# Patient Record
Sex: MALE | Race: WHITE | Employment: OTHER | URBAN - METROPOLITAN AREA
[De-identification: names, ages, dates, MRNs, and addresses within clinical notes are randomized per-mention and may not be internally consistent; named-entity substitution may affect disease eponyms.]

---

## 2019-06-03 ENCOUNTER — HOSPITAL ENCOUNTER (OUTPATIENT)
Dept: LAB | Age: 69
Discharge: HOME | End: 2019-06-03

## 2019-12-19 ENCOUNTER — OFFICE VISIT (OUTPATIENT)
Dept: DERMATOLOGY | Facility: AMBULATORY SURGERY CENTER | Age: 69
End: 2019-12-19

## 2019-12-19 VITALS
TEMPERATURE: 97.9 F | HEART RATE: 69 BPM | WEIGHT: 165 LBS | RESPIRATION RATE: 14 BRPM | OXYGEN SATURATION: 99 % | HEIGHT: 69 IN | DIASTOLIC BLOOD PRESSURE: 120 MMHG | BODY MASS INDEX: 24.44 KG/M2 | SYSTOLIC BLOOD PRESSURE: 200 MMHG

## 2019-12-19 DIAGNOSIS — C44.319 BASAL CELL CARCINOMA (BCC) OF CHEEK: ICD-10-CM

## 2019-12-19 DIAGNOSIS — C44.41 BASAL CELL CARCINOMA (BCC) OF LEFT SIDE OF NECK: ICD-10-CM

## 2019-12-19 DIAGNOSIS — C44.319 BASAL CELL CARCINOMA (BCC) OF LEFT FOREHEAD: Primary | ICD-10-CM

## 2019-12-19 RX ORDER — LABETALOL 200 MG/1
TABLET, FILM COATED ORAL 2 TIMES DAILY
COMMUNITY
End: 2020-09-17

## 2019-12-19 RX ORDER — RANITIDINE 150 MG/1
TABLET, FILM COATED ORAL
COMMUNITY
Start: 2019-09-26 | End: 2020-09-17

## 2019-12-19 RX ORDER — ATORVASTATIN CALCIUM 20 MG/1
TABLET, FILM COATED ORAL
COMMUNITY
Start: 2019-11-15 | End: 2020-09-17

## 2019-12-19 RX ORDER — PRAMIPEXOLE DIHYDROCHLORIDE 0.12 MG/1
TABLET ORAL
COMMUNITY
Start: 2019-11-23 | End: 2021-02-18

## 2019-12-19 RX ORDER — IRBESARTAN 150 MG/1
TABLET ORAL
COMMUNITY
Start: 2019-11-19 | End: 2020-09-17

## 2019-12-19 NOTE — PROGRESS NOTES
Óscar Robertson is a 78 y.o. male presents to the office today with the following:  Chief Complaint   Patient presents with    Basal Cell Carcinoma     left neck, left lateral cheek, left forehead mohs       67-year-old  male presented this morning for Mohs surgery to treat several biopsy-proven basal cell carcinomas. The lesions were recently biopsied by Shira Ramon MD and the patient has no additional skin complaints since that time. His blood pressure was noted to be 200/120 after repeated measurements. The patient reported that his blood pressure is frequently this high. He took his blood pressure medications this morning. He reports that his primary care has referred him to a cardiologist who he is planning to see in January for further treatment for his blood pressure. He reports that he has recently had to discontinue his physical therapy due to his blood pressure being extremely high. He denies headaches, dizziness, chest pain, shortness of breath. Physical Exam  HENT:      Head: Normocephalic. Pulmonary:      Effort: Pulmonary effort is normal.   Neurological:      Mental Status: He is alert and oriented to person, place, and time. 1. Basal cell carcinoma (BCC) of left forehead  Although Mohs surgery was planned for this lesion today, the surgery is precluded by the patient's extremely high blood pressure. He was counseled regarding follow-up with his primary care and cardiologist to more aggressively treat his blood pressure. He will be rescheduled for treatment after his visit with his cardiologist.    2. Basal cell carcinoma ([facility]) of left side of neck  Although Mohs surgery was planned for this lesion today, the surgery is precluded by the patient's extremely high blood pressure. He was counseled regarding follow-up with his primary care and cardiologist to more aggressively treat his blood pressure.   He will be rescheduled for treatment after his visit with his cardiologist.    3. Basal cell carcinoma (BCC) of cheek  Although Mohs surgery was planned for this lesion today, the surgery is precluded by the patient's extremely high blood pressure. He was counseled regarding follow-up with his primary care and cardiologist to more aggressively treat his blood pressure. He will be rescheduled for treatment after his visit with his cardiologist.      Follow-up and Dispositions    · Return in about 1 month (around 1/19/2020) for Mohs.          Stef Webster MD

## 2019-12-19 NOTE — PROGRESS NOTES
Visit Vitals  BP (!) 200/120 (BP 1 Location: Left arm, BP Patient Position: Sitting)   Pulse 69   Temp 97.9 °F (36.6 °C) (Oral)   Resp 14   Ht 5' 9\" (1.753 m)   Wt 74.8 kg (165 lb)   SpO2 99%   BMI 24.37 kg/m²       Patient states he feels fine, denies dizziness. States this is a 'normal blood pressure for him'. He has appointment to see Cardiologist 1/3/19 and he will return back to have [place] [id number] surgery 1/29/19 here in our office. Blood pressure was taken 2 times in both arms, blood pressure too high to perform surgery per MD.  Patient verbalized understanding and had no further questioning.      Bishop Soto LPN

## 2020-01-29 ENCOUNTER — OFFICE VISIT (OUTPATIENT)
Dept: DERMATOLOGY | Facility: AMBULATORY SURGERY CENTER | Age: 70
End: 2020-01-29

## 2020-01-29 VITALS
BODY MASS INDEX: 24.44 KG/M2 | SYSTOLIC BLOOD PRESSURE: 156 MMHG | OXYGEN SATURATION: 95 % | DIASTOLIC BLOOD PRESSURE: 86 MMHG | HEIGHT: 69 IN | RESPIRATION RATE: 14 BRPM | WEIGHT: 165 LBS | HEART RATE: 68 BPM

## 2020-01-29 DIAGNOSIS — C44.319 BASAL CELL CARCINOMA (BCC) OF CHEEK: ICD-10-CM

## 2020-01-29 DIAGNOSIS — C44.41 BASAL CELL CARCINOMA (BCC) OF LEFT SIDE OF NECK: Primary | ICD-10-CM

## 2020-01-29 RX ORDER — CEPHALEXIN 500 MG/1
500 CAPSULE ORAL 3 TIMES DAILY
Qty: 21 CAP | Refills: 0 | Status: SHIPPED | OUTPATIENT
Start: 2020-01-29 | End: 2020-02-05

## 2020-01-29 NOTE — PATIENT INSTRUCTIONS

## 2020-01-29 NOTE — PROGRESS NOTES
Progress note for Mohs surgery patient:    Chief Complaint:  basal cell carcinoma of the left neck  and basal of the left lateral cheek . HPI:  Colonel Callahan is a 69y.o. year old male referred by  Carola Freeman MD for Mohs surgery to treat the following lesions:  Lesion Info  Location: left neck   Size: 1.3 x 1.0 cm  Type: basal  Duration: 4 months  Path Lab: [facility]  Path #: [id number]  Prior Treatment: none Lesion Info  Location: left lateral cheek   Size: 2.3 x 2.0 cm  Type: basal  Duration: 4 months  Path Lab: [facility]   Path #: [id number]  Prior Treatment: none     Symptoms of the lesions include increase in size. ROS:  Jeffry Webber is feeling well and in their usual state of health today. He is not in pain. He does not have any other skin concerns. Exam:  Jeffry Webber is an awake, alert, oriented, well-appearing male in no distress. There is not preauricular, submandibular, or cervical lymphadenopathy. The face was examined. Findings are:  On the left neck there is a pearly telangiectatic plaque. A/P:    1. basal cell carcinoma of the left neck . The diagnosis was reviewed. The Mohs surgery procedure was reviewed. Indications, risks, and options were discussed with Mr. Jason Chinchilla preoperatively. Risks including, but not limited to: pain, bleeding, infection, tumor recurrence, scarring and damage to motor and/or sensory nerves, were discussed. Mr. Jason Chinchilla chose Mohs surgery. Mr. Jason Chinchilla was an acceptable surgery candidate. I performed Mohs surgery using standard technique after verbal and written consent were obtained. The lesion was identified and confirmed with the patient and photograph, if available. The surgical site was marked with gentian violet, prepped, draped and anesthetized in standard fashion. The tumor was debulked by curettage and orientation hashes were placed. The tumor and any associated scar was excised using beveled incision.   Hemostasis was achieved, the site was bandaged, and the tissue was transported to the Mohs lab. While maintaining anatomic orientation the tissue was divided, if needed, and marked with colored inks that were noted on the corresponding Mohs map. The tissue was prepared by Mohs en-face technique for fresh frozen section analysis. The resulting slides were examined for residual tumor, scar and other concerns, all of which were marked on the corresponding Mohs map, if present. The Mohs map was used to guide subsequent stages of surgery, if necessary, and the above process was repeated until a tumor-free plane was achieved. Once the tumor was cleared the map was marked as such and signed. Dr. Lisandro Russell acted as surgeon and pathologist for the entire case, performing all stages of the surgical excision as well as examination and interpretation of the histologic slides. See table below for details regarding the surgical case. 1 stage(s) were required to reach a tumor-free plane, resulting in a 1.5 x 1.0 cm defect extending to the subcutaneous. There were not complications. The wound management options of second intent healing, layered closure, local flap, and/or full thickness skin graft were discussed. Mr. Adeel Mitchell understands the aims, risks, alternatives, and possible complications and elects to proceed with a complex layered closure. Wound margins were debeveled, standing cones were corrected and the defect was widely undermined in the subcutaneous plane for a distance of 2 cm. The wound was closed with buried 4-0 vicryl suture in the muscle and deep subcutis to reduce width of the wound and a second layer in the dermis to reduce tension on the skin edges with careful attention to edge apposition and eversion for optimal cosmesis. Epidermal edges were carefully approximated with 5-0 ethilon suture, again with careful attention to apposition and eversion. The final closure length was Closure Length: 5.0 cm.   The wound was bandaged with Petrolatum ointment, Telfa, gauze and Coverroll. Wound care instructions (written and/or verbal) and a follow up appointment were given to Mr. Orin Hall before discharge. Mr. Orin Hall was discharged in good condition. 2. basal cell carcinoma of the left lateral cheek . The diagnosis was reviewed. The Mohs surgery procedure was reviewed. Indications, risks, and options were discussed with Mr. Orin Hall preoperatively. Risks including, but not limited to: pain, bleeding, infection, tumor recurrence, scarring and damage to motor and/or sensory nerves, were discussed. Mr. Orin Hall chose Mohs surgery. Mr. Orin Hall was an acceptable surgery candidate. I performed Mohs surgery using standard technique after verbal and written consent were obtained. The lesion was identified and confirmed with the patient and photograph, if available. The surgical site was marked with gentian violet, prepped, draped and anesthetized in standard fashion. The tumor was debulked by curettage and orientation hashes were placed. The tumor and any associated scar was excised using beveled incision. Hemostasis was achieved, the site was bandaged, and the tissue was transported to the Mohs lab. While maintaining anatomic orientation the tissue was divided, if needed, and marked with colored inks that were noted on the corresponding Mohs map. The tissue was prepared by Mohs en-face technique for fresh frozen section analysis. The resulting slides were examined for residual tumor, scar and other concerns, all of which were marked on the corresponding Mohs map, if present. The Mohs map was used to guide subsequent stages of surgery, if necessary, and the above process was repeated until a tumor-free plane was achieved. Once the tumor was cleared the map was marked as such and signed.   Dr. Deshawn Plummer acted as surgeon and pathologist for the entire case, performing all stages of the surgical excision as well as examination and interpretation of the histologic slides. See table below for details regarding the surgical case. 1 stage(s) were required to reach a tumor-free plane, resulting in a 3.0 x 2.1 cm defect extending to the subcutaneous. There were not complications. [place] will follow up as needed in the postoperative period. Regular skin examinations will be with  Arlyn Knight MD.    The wound management options of second intent healing, layered closure, local flap, and/or full thickness skin graft were discussed. Mr. David Mcbride understands the aims, risks, alternatives, and possible complications and elects to proceed with a complex layered closure. Wound margins were debeveled, standing cones were corrected and the defect was widely undermined in the subcutaneous plane for a distance of 2.5 cm. The wound was closed with buried 4-0 vicryl suture in the muscle and deep subcutis to reduce width of the wound and a second layer in the dermis to reduce tension on the skin edges with careful attention to edge apposition and eversion for optimal cosmesis. Epidermal edges were carefully approximated with 5-0 ethilon suture, again with careful attention to apposition and eversion. The final closure length was Closure Length: 5.0 cm. The wound was bandaged with Petrolatum ointment, Telfa, gauze and Coverroll. Wound care instructions (written and/or verbal) and a follow up appointment were given to Mr. David Mcbride before discharge. Mr. David Mcbride was discharged in good condition.           left neck   Mohs Lesion Operative Report  Date: 01/29/20  Room: Procedure room 3  Indications: Site  Pre-op BP: 168/90  Pre-op pulse: 66  1st Assistant: Ernie Steiner LPN  Stage #: 1  Stage 1 Sections: 1  Stage 1 # Pos: 0  Perineural Involvement: No  Lymphadenopathy: No  Defect Size: 1.5 x 1.0 cm  Depth: subcutaneous  Wound Mgt: complex  Suture: Buried, Surface  Buried details: 4.0 vicryl  Surface Details: 5.0 ethilon  Undermining: SubQ  Closure Length: 4.4 cm  Estimated Blood Loss: 0.3 mL  Hemostasis: Electrosurgery  Anesthesia: 1% Lidocaine w/1:100,000 epi  1% Lidocaine: 9 cc  Complications: none  Dressing: pressure, telfa, vaseline  Post-op BP: 156/86  Post-op Pulse: 68  Pos-op Meds: Keflex 500 mg TID q7 days  W/C Instructions: Written, Verbal  Follow-up: follow up in 10 days for suture removal  left lateral cheek   Mohs Lesion Operative Report  Date: 01/29/20  Room: procedure room 3  Indications: Site  Pre-op BP: 168/90  Pre-op pulse: 66  1st Assistant: Dwain Powers LPN  Stage #: 1  Stage 1 Sections: 1  Stage 1 # Pos: 0  Perineural Involvement: No  Lymphadenopathy: No  Defect Size: 3.0 x 2.1 cm  Depth: subcutaneous  Wound Mgt: complex  Suture: Buried, Surface  Buried details: 4.0 vicryl  Surface Details: 5.0 ethilon  Undermining: SubQ  Closure Length: 5.0 cm  Estimated Blood Loss: 0.3 mL  Hemostasis: Electrosurgery  Anesthesia: 1% Lidocaine w/1:100,000 epi  1% Lidocaine: 9 cc  Complications: none  Dressing: pressure, telfa, vaseline  Post-op BP: 156/86  Post-op Pulse: 68  Pos-op Meds: Keflex 500 mg TID q7 days  W/C Instructions: Written, Verbal  Follow-up: follow up in 10 days for suture removal      [facility]   OFFICE PROCEDURE PROGRESS NOTE   Chart reviewed for the following:   INorberto MD have reviewed the History, Physical and updated the Allergic reactions for Ledora Cola. TIME OUT performed immediately prior to start of procedure:   Alondra Alicea MD, have performed the following reviews on Ledora Cola   prior to the start of the procedure:     * Patient was identified by name and date of birth   * Agreement on procedure being performed was verified   * Risks and Benefits explained to the patient   * Procedure site verified and marked as necessary   * Patient was positioned for comfort   * Consent was signed and verified     Time: 9 AM  Date of procedure: 1/29/2020  Procedure performed by:  Manual Kidney Mavis Comer MD  Provider assisted by: Tsering Puri LPN  Patient assisted by: self   How tolerated by patient: tolerated the procedure well with no complications   Comments: none

## 2020-01-29 NOTE — LETTER
1/29/2020 2:52 PM 
 
Patient:  Lizbeth Barrios YOB: 1950 Date of Visit: 1/29/2020 Dear Silvestre Bush MD 
[address] [place] [id number] VIA Facsimile: [phone] Thank you for referring Lizbeth Barrios to me for evaluation/treatment. Below are the relevant portions of my assessment and plan of care. Mr. Jhonatan Hill presented today for Mohs surgery to treat a biopsy-proven basal cell carcinoma of the left lateral cheek and a biopsy-proven basal cell carcinoma of the left neck. 1 stage(s) of Mohs surgery were required to achieve tumor free margins at each site. I repaired each defect primarily. He tolerated the procedure well. Please see the attached procedure note(s) for additional details. Mr. Jhonatan Hill will return to me for suture removal and/or wound checks at an appropriate interval and I will follow-up with him regarding any issues arising from or relating to this surgery. I will otherwise defer any additional dermatologic care back to you. If you have questions, please do not hesitate to call me. I look forward to following Mr. Jhonatan Hill along with you. Sincerely, Sharad Linares MD 
[phone] (cell)

## 2020-02-07 ENCOUNTER — OFFICE VISIT (OUTPATIENT)
Dept: DERMATOLOGY | Facility: AMBULATORY SURGERY CENTER | Age: 70
End: 2020-02-07

## 2020-02-07 DIAGNOSIS — C44.319 BASAL CELL CARCINOMA (BCC) OF CHEEK: ICD-10-CM

## 2020-02-07 DIAGNOSIS — C44.41 BASAL CELL CARCINOMA (BCC) OF LEFT SIDE OF NECK: Primary | ICD-10-CM

## 2020-02-07 NOTE — PROGRESS NOTES
Wound check/suture removal:    Chief complaint: wound check. HPI: Agnes Fajardo presents for wound check following Mohs surgery to treat a biopsy-proven basal cell carcinoma of the left infra-auricular cheek and the left neck each repaired primarily performed about 10 days ago. Exam: The surgical site was examined. There is not evidence of infection. There is erythema. There is not edema. A/P:  Wound check. The surgical site is healing well. Additional care was reviewed including liberal application of Vaseline several times daily and gentle scar massage starting at 3 weeks postop. Follow up will be in 2 weeks for Mohs surgery on the left forehead.

## 2020-02-27 ENCOUNTER — OFFICE VISIT (OUTPATIENT)
Dept: DERMATOLOGY | Facility: AMBULATORY SURGERY CENTER | Age: 70
End: 2020-02-27

## 2020-02-27 VITALS
WEIGHT: 165 LBS | OXYGEN SATURATION: 99 % | BODY MASS INDEX: 24.44 KG/M2 | DIASTOLIC BLOOD PRESSURE: 94 MMHG | TEMPERATURE: 98.2 F | SYSTOLIC BLOOD PRESSURE: 162 MMHG | HEART RATE: 63 BPM | HEIGHT: 69 IN

## 2020-02-27 DIAGNOSIS — C44.319 BASAL CELL CARCINOMA (BCC) OF LEFT FOREHEAD: Primary | ICD-10-CM

## 2020-02-27 NOTE — PATIENT INSTRUCTIONS

## 2020-02-27 NOTE — PROGRESS NOTES
Progress note for Mohs surgery patient:    Chief Complaint:  basal cell carcinoma of the left forehead    HPI:  Bethany Snow is a 69y.o. year old male referred by  Nataliia Sosa MD for Mohs surgery to treat the following lesion:  Lesion Info  Location: left forehead  Size: 1.2 cm x 0.7 cm  Type: basal  Duration: months  Path Lab: [facility] [employer] [employer]  Path #: [id number]  Prior Treatment: none     Symptoms of the lesion include none. ROS:  Iliana Hein is feeling well and in their usual state of health today. He is not in pain. He does not have any other skin concerns. Exam:  Iliana Hein is an awake, alert, oriented, well-appearing male in no distress. There is not preauricular, submandibular, or cervical lymphadenopathy. The face was examined. Findings are:  On the left forehead there is a pearly telangiectatic plaque. A/P:  basal cell carcinoma of the left forehead. The diagnosis was reviewed. The Mohs surgery procedure was reviewed. Indications, risks, and options were discussed with Mr. Poly Cancino preoperatively. Risks including, but not limited to: pain, bleeding, infection, tumor recurrence, scarring and damage to motor and/or sensory nerves, were discussed. Mr. Poly Cancino chose Mohs surgery. Mr. Poly Cancino was an acceptable surgery candidate. I performed Mohs surgery using standard technique after verbal and written consent were obtained. The lesion was identified and confirmed with the patient and photograph, if available. The surgical site was marked with gentian violet, prepped, draped and anesthetized in standard fashion. The tumor was debulked by curettage and orientation hashes were placed. The tumor and any associated scar was excised using beveled incision. Hemostasis was achieved, the site was bandaged, and the tissue was transported to the Mohs lab.   While maintaining anatomic orientation the tissue was divided, if needed, and marked with colored inks that were noted on the corresponding Mohs map.  The tissue was prepared by Mohs en-face technique for fresh frozen section analysis. The resulting slides were examined for residual tumor, scar and other concerns, all of which were marked on the corresponding Mohs map, if present. The Mohs map was used to guide subsequent stages of surgery, if necessary, and the above process was repeated until a tumor-free plane was achieved. Once the tumor was cleared the map was marked as such and signed. Dr. Lisbeth Dumont acted as surgeon and pathologist for the entire case, performing all stages of the surgical excision as well as examination and interpretation of the histologic slides. See table below for details regarding the surgical case. 1 stage(s) were required to reach a tumor-free plane, resulting in a 1.9 cm x 1.0 cm defect extending to the subcutaneous fat. There were not complications. Marion العراقي will follow up as needed in the postoperative period. Regular skin examinations will be with  Wes Guerra MD.    The wound management options of second intent healing, layered closure, local flap, and/or full thickness skin graft were discussed. Mr. Chaparro Adams understands the aims, risks, alternatives, and possible complications and elects to proceed with a complex layered closure. Wound margins were debeveled, standing cones were corrected and the defect was widely undermined in the subcutaneous plane for a distance of 1.5 cm. The wound was closed with buried 4-0 vicryl suture in the muscle and deep subcutis to reduce width of the wound and a second layer in the dermis to reduce tension on the skin edges with careful attention to edge apposition and eversion for optimal cosmesis. Epidermal edges were carefully approximated with 5-0 fast absorbing gut suture, again with careful attention to apposition and eversion. The final closure length was Closure Length: 3.8 cm. The wound was bandaged with Petrolatum ointment, Telfa, gauze and Coverroll.  Wound care instructions (written and/or verbal) and a follow up appointment were given to Mr. Justin Duvall before discharge. Mr. Justin Duvall was discharged in good condition. left forehead  Mohs Lesion Operative Report  Date: 02/27/20  Room: Procedure room 1  Indications: Site, Size, Poor definition  Pre-op Meds: none  Pre-op BP: 156/92  Pre-op pulse: 66  1st Assistant: Papito Chen  Stage #: 1  Stage 1 Sections: 1  Stage 1 # Pos: 0  Perineural Involvement: No  Lymphadenopathy: No  Defect Size: 1.9 cm x 1.0 cm  Depth: subcutaneous fat  Wound Mgt: complex  Suture: Buried, Surface  Buried details: 4.0 vicryl  Surface Details: 5.0 fast gut  Undermining: SubQ  Closure Length: 3.8 cm  Estimated Blood Loss: 2 ml  Hemostasis: Electrosurgery, Pressure, Suture  Anesthesia: 1% Lidocaine w/1:100,000 epi  1% Lidocaine: 5 cc  Complications: none  Dressing: pressure  Post-op BP: 162/94  Post-op Pulse: 63  Pos-op Meds: none  W/C Instructions: Verbal, Written  Follow-up: 3-4 weeks     [facility]   OFFICE PROCEDURE PROGRESS NOTE   Chart reviewed for the following:   IJessica MD have reviewed the History, Physical and updated the Allergic reactions for Nida Donaldson. TIME OUT performed immediately prior to start of procedure:   Virginia Thomas MD, have performed the following reviews on Nida Donaldson   prior to the start of the procedure:     * Patient was identified by name and date of birth   * Agreement on procedure being performed was verified   * Risks and Benefits explained to the patient   * Procedure site verified and marked as necessary   * Patient was positioned for comfort   * Consent was signed and verified     Time: 9 AM  Date of procedure: 2/27/2020  Procedure performed by:  Jessica Pompa MD  Provider assisted by: Papito Chen  Patient assisted by: self   How tolerated by patient: tolerated the procedure well with no complications   Comments: none

## 2020-02-27 NOTE — LETTER
2/27/2020 3:11 PM 
 
Patient:  Yesenia Norton YOB: 1950 Date of Visit: 2/27/2020 Dear Lucy Chapman MD 
[address] [place] [address] [address] [place] [id number] VIA Facsimile: [phone] Thank you for referring Yesenia Norton to me for evaluation/treatment. Below are the relevant portions of my assessment and plan of care. Mr. Ronel Alvarez presented today for Mohs surgery to treat a biopsy-proven basal cell carcinoma of the left forehead. 1 stage(s) of Mohs surgery were required to achieve tumor free margins. I repaired the defect with a(n) primary closure. He tolerated the procedure well. Please see the attached procedure note(s) for additional details. Mr. Ronel Alvarez will return to me for suture removal and/or wound checks at an appropriate interval and I will follow-up with him regarding any issues arising from or relating to this surgery. I will otherwise defer any additional dermatologic care back to you. If you have questions, please do not hesitate to call me. I look forward to following Mr. Ronel Alvarez along with you. Sincerely, Hamzah Smith MD 
[phone] (cell)

## 2020-09-07 ENCOUNTER — APPOINTMENT (OUTPATIENT)
Dept: ULTRASOUND IMAGING | Age: 70
DRG: 064 | End: 2020-09-07
Attending: FAMILY MEDICINE
Payer: MEDICARE

## 2020-09-07 ENCOUNTER — APPOINTMENT (OUTPATIENT)
Dept: GENERAL RADIOLOGY | Age: 70
DRG: 064 | End: 2020-09-07
Attending: EMERGENCY MEDICINE
Payer: MEDICARE

## 2020-09-07 ENCOUNTER — HOSPITAL ENCOUNTER (INPATIENT)
Age: 70
LOS: 10 days | Discharge: NURSING, REHAB OR LONG-TERM CARE | DRG: 064 | End: 2020-09-17
Attending: EMERGENCY MEDICINE | Admitting: FAMILY MEDICINE
Payer: MEDICARE

## 2020-09-07 ENCOUNTER — APPOINTMENT (OUTPATIENT)
Dept: CT IMAGING | Age: 70
DRG: 064 | End: 2020-09-07
Attending: FAMILY MEDICINE
Payer: MEDICARE

## 2020-09-07 ENCOUNTER — APPOINTMENT (OUTPATIENT)
Dept: CT IMAGING | Age: 70
DRG: 064 | End: 2020-09-07
Attending: EMERGENCY MEDICINE
Payer: MEDICARE

## 2020-09-07 DIAGNOSIS — I21.4 NSTEMI (NON-ST ELEVATED MYOCARDIAL INFARCTION) (HCC): Primary | ICD-10-CM

## 2020-09-07 DIAGNOSIS — D64.9 ANEMIA REQUIRING TRANSFUSIONS: ICD-10-CM

## 2020-09-07 DIAGNOSIS — I69.30 CHRONIC ISCHEMIC LEFT MCA STROKE: ICD-10-CM

## 2020-09-07 DIAGNOSIS — E86.0 DEHYDRATION: ICD-10-CM

## 2020-09-07 DIAGNOSIS — I63.232 CEREBROVASCULAR ACCIDENT (CVA) DUE TO STENOSIS OF LEFT CAROTID ARTERY (HCC): ICD-10-CM

## 2020-09-07 DIAGNOSIS — I63.239 CAROTID STENOSIS, SYMPTOMATIC, WITH INFARCTION (HCC): ICD-10-CM

## 2020-09-07 LAB
ALBUMIN SERPL-MCNC: 2.5 G/DL (ref 3.5–5)
ALBUMIN/GLOB SERPL: 0.6 {RATIO} (ref 1.1–2.2)
ALP SERPL-CCNC: 109 U/L (ref 45–117)
ALT SERPL-CCNC: 20 U/L (ref 12–78)
ANION GAP SERPL CALC-SCNC: 11 MMOL/L (ref 5–15)
AST SERPL-CCNC: 45 U/L (ref 15–37)
ATRIAL RATE: 97 BPM
BILIRUB SERPL-MCNC: 0.4 MG/DL (ref 0.2–1)
BUN SERPL-MCNC: 44 MG/DL (ref 6–20)
BUN/CREAT SERPL: 27 (ref 12–20)
CALCIUM SERPL-MCNC: 8.4 MG/DL (ref 8.5–10.1)
CALCULATED P AXIS, ECG09: 29 DEGREES
CALCULATED R AXIS, ECG10: 41 DEGREES
CALCULATED T AXIS, ECG11: 17 DEGREES
CHLORIDE SERPL-SCNC: 108 MMOL/L (ref 97–108)
CK MB CFR SERPL CALC: 1.7 % (ref 0–2.5)
CK MB SERPL-MCNC: 13 NG/ML (ref 5–25)
CK SERPL-CCNC: 747 U/L (ref 39–308)
CO2 SERPL-SCNC: 19 MMOL/L (ref 21–32)
COMMENT, HOLDF: NORMAL
CREAT SERPL-MCNC: 1.61 MG/DL (ref 0.7–1.3)
DIAGNOSIS, 93000: NORMAL
GLOBULIN SER CALC-MCNC: 4.3 G/DL (ref 2–4)
GLUCOSE BLD STRIP.AUTO-MCNC: 100 MG/DL (ref 65–100)
GLUCOSE BLD STRIP.AUTO-MCNC: 110 MG/DL (ref 65–100)
GLUCOSE SERPL-MCNC: 107 MG/DL (ref 65–100)
HEMOCCULT STL QL: NEGATIVE
INR PPP: 1.4 (ref 0.9–1.1)
LACTATE SERPL-SCNC: 1.2 MMOL/L (ref 0.4–2)
MAGNESIUM SERPL-MCNC: 2.6 MG/DL (ref 1.6–2.4)
P-R INTERVAL, ECG05: 106 MS
PHOSPHATE SERPL-MCNC: 3.5 MG/DL (ref 2.6–4.7)
POTASSIUM SERPL-SCNC: 4 MMOL/L (ref 3.5–5.1)
PROT SERPL-MCNC: 6.8 G/DL (ref 6.4–8.2)
PROTHROMBIN TIME: 14.4 SEC (ref 9–11.1)
Q-T INTERVAL, ECG07: 388 MS
QRS DURATION, ECG06: 84 MS
QTC CALCULATION (BEZET), ECG08: 492 MS
SAMPLES BEING HELD,HOLD: NORMAL
SERVICE CMNT-IMP: ABNORMAL
SERVICE CMNT-IMP: NORMAL
SODIUM SERPL-SCNC: 138 MMOL/L (ref 136–145)
TROPONIN I SERPL-MCNC: 8.33 NG/ML
VENTRICULAR RATE, ECG03: 97 BPM

## 2020-09-07 PROCEDURE — 83735 ASSAY OF MAGNESIUM: CPT

## 2020-09-07 PROCEDURE — 85025 COMPLETE CBC W/AUTO DIFF WBC: CPT

## 2020-09-07 PROCEDURE — 82550 ASSAY OF CK (CPK): CPT

## 2020-09-07 PROCEDURE — 84484 ASSAY OF TROPONIN QUANT: CPT

## 2020-09-07 PROCEDURE — 80053 COMPREHEN METABOLIC PANEL: CPT

## 2020-09-07 PROCEDURE — 93005 ELECTROCARDIOGRAM TRACING: CPT

## 2020-09-07 PROCEDURE — 82553 CREATINE MB FRACTION: CPT

## 2020-09-07 PROCEDURE — 30233N1 TRANSFUSION OF NONAUTOLOGOUS RED BLOOD CELLS INTO PERIPHERAL VEIN, PERCUTANEOUS APPROACH: ICD-10-PCS | Performed by: FAMILY MEDICINE

## 2020-09-07 PROCEDURE — 84100 ASSAY OF PHOSPHORUS: CPT

## 2020-09-07 PROCEDURE — 82962 GLUCOSE BLOOD TEST: CPT

## 2020-09-07 PROCEDURE — 86900 BLOOD TYPING SEROLOGIC ABO: CPT

## 2020-09-07 PROCEDURE — 70450 CT HEAD/BRAIN W/O DYE: CPT

## 2020-09-07 PROCEDURE — C9113 INJ PANTOPRAZOLE SODIUM, VIA: HCPCS | Performed by: FAMILY MEDICINE

## 2020-09-07 PROCEDURE — 86923 COMPATIBILITY TEST ELECTRIC: CPT

## 2020-09-07 PROCEDURE — P9016 RBC LEUKOCYTES REDUCED: HCPCS

## 2020-09-07 PROCEDURE — 87040 BLOOD CULTURE FOR BACTERIA: CPT

## 2020-09-07 PROCEDURE — 65660000000 HC RM CCU STEPDOWN

## 2020-09-07 PROCEDURE — 76870 US EXAM SCROTUM: CPT

## 2020-09-07 PROCEDURE — 83605 ASSAY OF LACTIC ACID: CPT

## 2020-09-07 PROCEDURE — 36430 TRANSFUSION BLD/BLD COMPNT: CPT

## 2020-09-07 PROCEDURE — 74011250637 HC RX REV CODE- 250/637: Performed by: FAMILY MEDICINE

## 2020-09-07 PROCEDURE — 82272 OCCULT BLD FECES 1-3 TESTS: CPT

## 2020-09-07 PROCEDURE — 99223 1ST HOSP IP/OBS HIGH 75: CPT | Performed by: INTERNAL MEDICINE

## 2020-09-07 PROCEDURE — 87635 SARS-COV-2 COVID-19 AMP PRB: CPT

## 2020-09-07 PROCEDURE — 85610 PROTHROMBIN TIME: CPT

## 2020-09-07 PROCEDURE — 74011250636 HC RX REV CODE- 250/636: Performed by: FAMILY MEDICINE

## 2020-09-07 PROCEDURE — 74011250636 HC RX REV CODE- 250/636: Performed by: EMERGENCY MEDICINE

## 2020-09-07 PROCEDURE — 36415 COLL VENOUS BLD VENIPUNCTURE: CPT

## 2020-09-07 PROCEDURE — 99285 EMERGENCY DEPT VISIT HI MDM: CPT

## 2020-09-07 PROCEDURE — 74176 CT ABD & PELVIS W/O CONTRAST: CPT

## 2020-09-07 PROCEDURE — 71045 X-RAY EXAM CHEST 1 VIEW: CPT

## 2020-09-07 PROCEDURE — 74011000250 HC RX REV CODE- 250: Performed by: FAMILY MEDICINE

## 2020-09-07 RX ORDER — SODIUM CHLORIDE 0.9 % (FLUSH) 0.9 %
5-40 SYRINGE (ML) INJECTION AS NEEDED
Status: DISCONTINUED | OUTPATIENT
Start: 2020-09-07 | End: 2020-09-17 | Stop reason: HOSPADM

## 2020-09-07 RX ORDER — SODIUM CHLORIDE 9 MG/ML
250 INJECTION, SOLUTION INTRAVENOUS AS NEEDED
Status: DISCONTINUED | OUTPATIENT
Start: 2020-09-07 | End: 2020-09-17 | Stop reason: HOSPADM

## 2020-09-07 RX ORDER — ONDANSETRON 2 MG/ML
4 INJECTION INTRAMUSCULAR; INTRAVENOUS
Status: DISCONTINUED | OUTPATIENT
Start: 2020-09-07 | End: 2020-09-17 | Stop reason: HOSPADM

## 2020-09-07 RX ORDER — SODIUM CHLORIDE 9 MG/ML
75 INJECTION, SOLUTION INTRAVENOUS CONTINUOUS
Status: DISCONTINUED | OUTPATIENT
Start: 2020-09-07 | End: 2020-09-08

## 2020-09-07 RX ORDER — ASPIRIN 300 MG/1
75 SUPPOSITORY RECTAL ONCE
Status: COMPLETED | OUTPATIENT
Start: 2020-09-07 | End: 2020-09-07

## 2020-09-07 RX ORDER — SODIUM CHLORIDE 0.9 % (FLUSH) 0.9 %
5-40 SYRINGE (ML) INJECTION EVERY 8 HOURS
Status: DISCONTINUED | OUTPATIENT
Start: 2020-09-07 | End: 2020-09-17 | Stop reason: HOSPADM

## 2020-09-07 RX ADMIN — ASPIRIN 75 MG: 300 SUPPOSITORY RECTAL at 19:44

## 2020-09-07 RX ADMIN — SODIUM CHLORIDE 75 ML/HR: 9 INJECTION, SOLUTION INTRAVENOUS at 19:48

## 2020-09-07 RX ADMIN — SODIUM CHLORIDE 40 MG: 9 INJECTION INTRAMUSCULAR; INTRAVENOUS; SUBCUTANEOUS at 20:28

## 2020-09-07 RX ADMIN — Medication 10 ML: at 17:00

## 2020-09-07 RX ADMIN — Medication 10 ML: at 20:31

## 2020-09-07 RX ADMIN — SODIUM CHLORIDE 1000 ML: 9 INJECTION, SOLUTION INTRAVENOUS at 13:39

## 2020-09-07 RX ADMIN — Medication 10 ML: at 21:12

## 2020-09-07 NOTE — CONSULTS
[address] [place] [address] Consultation Note     Subjective:      Yuriy Ramires is a 69 y.o. patient who is seen for evaluation of elevated troponin 8.3. The patient has severe anemia with hemoglobin of 5.2. He had a previous history of stroke, about 15 to 18 months ago. He cannot tell me at which hospital he was treated. The patient has left side weakness and dysarthria  He lives by himself and fell week 2 days ago, he said he fell down but could not get up  The head CT did not show acute bleeding but there was evidence of lacunar infarct  EKG initially showed normal sinus rhythm without acute ST-T wave changes. The repeat EKG showed mild ST depression in V3 and V4 leads. He is in sinus tachycardia  He is consenting for blood transfusion and getting IV fluid  Creatinine is 1.6, albumin 2.5, CK-MB 13  He is not  and does not have children or relatives  He does not know his meds  He said he does not take NSAIDS for pain   His PCP saw him last time 6 months ago   He got transportation to come there    Patient Active Problem List   Diagnosis Code    NSTEMI (non-ST elevated myocardial infarction) ([facility] [address] I21.4     Current Facility-Administered Medications   Medication Dose Route Frequency Provider Last Rate Last Dose    0.9% sodium chloride infusion 250 mL  250 mL IntraVENous PRN Yong MD Vicente       Per ER:   Current Outpatient Medications   Medication Sig Dispense Refill    labetalol (NORMODYNE) 200 mg tablet Take  by mouth two (2) times a day. 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   No family hx of CAD MI  Social History     Tobacco Use    Smoking status: Current Every Day Smoker    Smokeless tobacco: Never Used   Substance Use Topics    Alcohol use: Yes        Review of Systems:   Constitutional: Negative for fever, chills, weight loss, + malaise/fatigue. HEENT: Negative for nosebleeds, + vision changes. Respiratory: Negative for cough, hemoptysis  Cardiovascular: Negative for chest pain, palpitations, orthopnea, claudication, leg swelling, syncope, and PND. Gastrointestinal: Negative for nausea, vomiting, diarrhea, blood in stool and melena. Genitourinary: Negative for dysuria, and hematuria. Musculoskeletal: Negative for myalgias, arthralgia. Skin: Negative for rash. Heme: Does not bleed or bruise easily. Neurological: Negative for speech change and focal weakness  Psych no depression      Objective:     Visit Vitals  /73 (BP 1 Location: Left arm, BP Patient Position: At rest)   Pulse 99   Temp 97.4 °F (36.3 °C)   Resp 16   Wt 151 lb 0.2 oz (68.5 kg)   SpO2 100%   BMI 22.30 kg/m²      Physical Exam:   Constitutional: well-developed and well-nourished. No respiratory distress. Head: Normocephalic and atraumatic. Eyes: Pupils are equal, round  ENT: hearing normal  Neck: supple. No JVD present. Cardiovascular: fast rate, regular rhythm. Exam reveals no gallop and no friction rub. No murmur heard. Pulmonary/Chest: Effort normal and breath sounds normal. No wheezes. Abdominal: Soft, no tenderness. Musculoskeletal: no edema. Neurological: alert,oriented. Skin: Skin is warm and dry, pale looking  Psychiatric: normal mood and affect.  Behavior is normal. Judgment and thought content normal.        Assessment/Plan:   Non ST elevation myocardial infarction   Severe anemia of unknown origin  Acute renal failure  History of stroke and dysarthria    At this time I agree with IV fluid, blood transfusion  He is not a candidate for cardiac catheterization at this time  His myocardial infarction was probably related to severe anemia although he may have underlying coronary artery disease  We will get a 2D echocardiogram to evaluate wall motion abnormality and left ventricular ejection fraction  Gi consult, probably slow chronic UGI bleeding    Thank you for involving me in this patient's care and please call with further concerns or questions. Antonio Powers M.D.   Electrophysiology/Cardiology  [facility] [place]  [address] [place] [address]                                [phone]

## 2020-09-07 NOTE — ED NOTES
Verbal shift change report given to Kristian AREVALO RN  (oncoming nurse) by Toy Barkley  (offgoing nurse). Report included the following information SBAR, ED Summary, MAR and Recent Results.

## 2020-09-07 NOTE — H&P
History & Physical    Primary Care Provider: Other, MD Ryan  Source of Information: Patient     History of Presenting Illness:   Irais Reyna is a 69 y.o. male who presents with fatigue and dehydration    Patient is dysarthric and is a very poor historian, not much history could be obtained from the patient. I tried calling the patient's next to kin Malena George, as listed on the chart but did not get a response. Thus history was extremely limited, patient apparently lives in assisted living facility by himself, was noted to be fatigued and dehydrated, has not been eating drinking much, has not been taking his medication, and thus was sent to the hospital for further management and evaluation. Apparently patient has left-sided weakness after his stroke currently has some slurred speech, unclear whether this is new or old. No further history can be obtained from the patient. Patient came to the ER, was found to have a hemoglobin of 5.2 and was requested to be admitted to the hospitalist service. Review of Systems:  Review of systems not obtained due to patient factors. 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 No pertinent surgical history. Prior to Admission medications    Medication Sig Start Date End Date Taking? Authorizing Provider   labetalol (NORMODYNE) 200 mg tablet Take  by mouth two (2) times a day. Provider, Historical   atorvastatin (LIPITOR) 20 mg tablet  11/15/19   Provider, Historical   raNITIdine (ZANTAC) 150 mg tablet  9/26/19   Provider, Historical   pramipexole (MIRAPEX) 0.125 mg tablet  11/23/19   Provider, Historical   irbesartan (AVAPRO) 150 mg tablet  11/19/19   Provider, Historical     No Known Allergies   History reviewed. No pertinent family history.      SOCIAL HISTORY:  Patient resides:  Independently    Assisted Living x   SNF    With family care       Smoking history:   None    Former x   Chronic      Alcohol history:   None    Social x   Chronic      Ambulates:   Independently    w/cane x   w/walker    w/wc    CODE STATUS:  DNR    Full x   Other      Objective:     Physical Exam:     Visit Vitals  /73 (BP 1 Location: Left arm, BP Patient Position: At rest)   Pulse 99   Temp 97.4 °F (36.3 °C)   Resp 16   Wt 68.5 kg (151 lb 0.2 oz)   SpO2 100%   BMI 22.30 kg/m²      O2 Device: Room air    General : alert x 0, resting in bed, disheveled male, does not provide much history  HEENT: PEERL dry mucus membrane, TM clear  Neck: supple, no JVD, no meningeal signs  Chest: Decreased basal breath sounds  CVS:  tachycardic  Abd: soft/ Non tender, non distended, BS physiological,   Ext: no clubbing, no cyanosis, no edema, brisk 2+ DP pulses  Neuro/Psych: Patient with somewhat slurred speech, very limited neuro exam as patient not participating, DTR 1+ to 4, moves all 4, but strength could not be tested, cranial nerves could not be tested and sensory could not be tested  Skin: warm      EKG: Sinus rhythm, nonspecific ST changes  With ST depression in anterolateral leads on repeat EKG    Data Review:     Recent Days:  Recent Labs     09/07/20  1258   WBC 9.3   HGB 5.2*   HCT 17.8*   *     Recent Labs     09/07/20  1258      K 4.0      CO2 19*   *   BUN 44*   CREA 1.61*   CA 8.4*   MG 2.6*   ALB 2.5*   ALT 20     No results for input(s): PH, PCO2, PO2, HCO3, FIO2 in the last 72 hours.     24 Hour Results:  Recent Results (from the past 24 hour(s))   CBC WITH AUTOMATED DIFF    Collection Time: 09/07/20 12:58 PM   Result Value Ref Range    WBC 9.3 4.1 - 11.1 K/uL    RBC 1.97 (L) 4.10 - 5.70 M/uL    HGB 5.2 (LL) 12.1 - 17.0 g/dL    HCT 17.8 (LL) 36.6 - 50.3 %    MCV 90.4 80.0 - 99.0 FL    MCH 26.4 26.0 - 34.0 PG    MCHC 29.2 (L) 30.0 - 36.5 g/dL    RDW 15.6 (H) 11.5 - 14.5 % PLATELET 220 (H) 070 - 400 K/uL    MPV 11.2 8.9 - 12.9 FL    NRBC 0.0 0  WBC    ABSOLUTE NRBC 0.00 0.00 - 0.01 K/uL    NEUTROPHILS 85 (H) 32 - 75 %    LYMPHOCYTES 8 (L) 12 - 49 %    MONOCYTES 6 5 - 13 %    EOSINOPHILS 0 0 - 7 %    BASOPHILS 0 0 - 1 %    IMMATURE GRANULOCYTES 1 (H) 0.0 - 0.5 %    ABS. NEUTROPHILS 7.9 1.8 - 8.0 K/UL    ABS. LYMPHOCYTES 0.7 (L) 0.8 - 3.5 K/UL    ABS. MONOCYTES 0.6 0.0 - 1.0 K/UL    ABS. EOSINOPHILS 0.0 0.0 - 0.4 K/UL    ABS. BASOPHILS 0.0 0.0 - 0.1 K/UL    ABS. IMM. GRANS. 0.1 (H) 0.00 - 0.04 K/UL    DF SMEAR SCANNED      PLATELET COMMENTS Large Platelets      RBC COMMENTS ANISOCYTOSIS  1+        RBC COMMENTS HYPOCHROMIA  1+        RBC COMMENTS Pathology Review Requested     METABOLIC PANEL, COMPREHENSIVE    Collection Time: 09/07/20 12:58 PM   Result Value Ref Range    Sodium 138 136 - 145 mmol/L    Potassium 4.0 3.5 - 5.1 mmol/L    Chloride 108 97 - 108 mmol/L    CO2 19 (L) 21 - 32 mmol/L    Anion gap 11 5 - 15 mmol/L    Glucose 107 (H) 65 - 100 mg/dL    BUN 44 (H) 6 - 20 MG/DL    Creatinine 1.61 (H) 0.70 - 1.30 MG/DL    BUN/Creatinine ratio 27 (H) 12 - 20      GFR est AA 52 (L) >60 ml/min/1.73m2    GFR est non-AA 43 (L) >60 ml/min/1.73m2    Calcium 8.4 (L) 8.5 - 10.1 MG/DL    Bilirubin, total 0.4 0.2 - 1.0 MG/DL    ALT (SGPT) 20 12 - 78 U/L    AST (SGOT) 45 (H) 15 - 37 U/L    Alk.  phosphatase 109 45 - 117 U/L    Protein, total 6.8 6.4 - 8.2 g/dL    Albumin 2.5 (L) 3.5 - 5.0 g/dL    Globulin 4.3 (H) 2.0 - 4.0 g/dL    A-G Ratio 0.6 (L) 1.1 - 2.2     TROPONIN I    Collection Time: 09/07/20 12:58 PM   Result Value Ref Range    Troponin-I, Qt. 8.33 (H) <0.05 ng/mL   LACTIC ACID    Collection Time: 09/07/20 12:58 PM   Result Value Ref Range    Lactic acid 1.2 0.4 - 2.0 MMOL/L   MAGNESIUM    Collection Time: 09/07/20 12:58 PM   Result Value Ref Range    Magnesium 2.6 (H) 1.6 - 2.4 mg/dL   CK W/ REFLX CKMB    Collection Time: 09/07/20 12:58 PM   Result Value Ref Range     (H) 39 - 308 U/L   CK-MB,QUANT. Collection Time: 09/07/20 12:58 PM   Result Value Ref Range    CK - MB 13.0 (H) <3.6 NG/ML    CK-MB Index 1.7 0.0 - 2.5     SAMPLES BEING HELD    Collection Time: 09/07/20 12:59 PM   Result Value Ref Range    SAMPLES BEING HELD 1RED,1BLU     COMMENT        Add-on orders for these samples will be processed based on acceptable specimen integrity and analyte stability, which may vary by analyte. GLUCOSE, POC    Collection Time: 09/07/20  1:29 PM   Result Value Ref Range    Glucose (POC) 100 65 - 100 mg/dL    Performed by Doreen CATALAN    EKG, 12 LEAD, INITIAL    Collection Time: 09/07/20  1:34 PM   Result Value Ref Range    Ventricular Rate 97 BPM    Atrial Rate 97 BPM    P-R Interval 106 ms    QRS Duration 84 ms    Q-T Interval 388 ms    QTC Calculation (Bezet) 492 ms    Calculated P Axis 29 degrees    Calculated R Axis 41 degrees    Calculated T Axis 17 degrees    Diagnosis       Sinus rhythm with short IN  No previous ECGs available     TYPE + CROSSMATCH    Collection Time: 09/07/20  1:56 PM   Result Value Ref Range    Crossmatch Expiration 09/10/2020     ABO/Rh(D) A POSITIVE     Antibody screen NEG          Imaging:   Ct Head Wo Cont    Result Date: 9/7/2020  IMPRESSION: Asymmetric white matter disease and lacunar infarcts. No acute process identified by noncontrast CT.      Xr Chest Port    Result Date: 9/7/2020  IMPRESSION: Mild pulmonary edema    Assessment:     Acute anemia: Unclear etiology, patient will be admitted to a telemetry bed, transfuse 1 unit PRBC, stool occult pending, concern for GI source, Protonix twice daily, n.p.o., IV hydration, H&H every 8 hours, GI consult, iron panel, supportive care and close monitoring further intervention per hospital course    NSTEMI:?  Type II, monitor on telemetry, echocardiogram, cycle troponins, will give patient 1 dose of aspirin 75 mg rectally, closely monitor for any acute signs of bleeding, closely monitor H&H, monitor and reassess as needed, cardiology consult has been requested, monitor      Elevated creatinine: Concern for prerenal azotemia secondary to hypovolemia, gentle IV hydration, transfuse PRBC, trend creatinine, avoid nephrotoxic medication, renally dose all other medication, monitor and reassess as needed      Acute metabolic encephalopathy: Unclear etiology, unclear baseline, MRI brain to rule out acute pathology, neurovascular checks, UA, treat underlying causes, supportive care, close monitoring, if persist may consider further intervention and diagnostics    GI DVT prophylaxis: Patient will be on SCDs               Signed By: Marissa Patel MD     September 7, 2020

## 2020-09-07 NOTE — PROGRESS NOTES
Requested Dr Yudi Liu to perform a hemoccult  Will await hemoccult results prior to transfer to floor

## 2020-09-07 NOTE — ED PROVIDER NOTES
74-year-old male with a history of stroke that presents with a chief complaint of fatigue dehydration. The patient arrives by EMS from an assisted living facility. The patient reportedly lives alone. He has reportedly not been eating and drinking much over the last couple of days. He has not been taking his medications. He is reported to have left-sided weakness after his stroke. It is not clear if he has had slurred speech in the past.  He does not provide additional history or review of systems due to his dysarthria. 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 No pertinent surgical history. History reviewed. No pertinent family history. Social History     Socioeconomic History    Marital status:      Spouse name: Not on file    Number of children: Not on file    Years of education: Not on file    Highest education level: Not on file   Occupational History    Not on file   Social Needs    Financial resource strain: Not on file    Food insecurity     Worry: Not on file     Inability: Not on file    Transportation needs     Medical: Not on file     Non-medical: Not on file   Tobacco Use    Smoking status: Current Every Day Smoker    Smokeless tobacco: Never Used   Substance and Sexual Activity    Alcohol use:  Yes    Drug use: Not on file    Sexual activity: Not on file   Lifestyle    Physical activity     Days per week: Not on file     Minutes per session: Not on file    Stress: Not on file   Relationships    Social connections     Talks on phone: Not on file     Gets together: Not on file     Attends Buddhist service: Not on file     Active member of club or organization: Not on file     Attends meetings of clubs or organizations: Not on file     Relationship status: Not on file    Intimate partner violence     Fear of current or ex partner: Not on file     Emotionally abused: Not on file     Physically abused: Not on file     Forced sexual activity: Not on file   Other Topics Concern    Not on file   Social History Narrative    Not on file         ALLERGIES: Patient has no known allergies. Review of Systems   Unable to perform ROS: Other   Dysarthric    Vitals:    09/07/20 1249   BP: 134/73   Pulse: 99   Resp: 16   Temp: 97.4 °F (36.3 °C)   SpO2: 100%            Physical Exam  Vitals signs and nursing note reviewed. Constitutional:       General: He is not in acute distress. Appearance: He is ill-appearing. He is not toxic-appearing or diaphoretic. Comments: Pale appearing   HENT:      Head: Normocephalic. Mouth/Throat:      Mouth: Mucous membranes are dry. Eyes:      Extraocular Movements: Extraocular movements intact. Neck:      Musculoskeletal: Normal range of motion. Cardiovascular:      Rate and Rhythm: Normal rate. Pulses: Normal pulses. Heart sounds: Normal heart sounds. No murmur. No friction rub. No gallop. Pulmonary:      Effort: Pulmonary effort is normal. No respiratory distress. Breath sounds: Normal breath sounds. No stridor. No wheezing, rhonchi or rales. Abdominal:      General: Abdomen is flat. Bowel sounds are normal. There is no distension. Palpations: Abdomen is soft. Tenderness: There is no abdominal tenderness. Musculoskeletal: Normal range of motion. Skin:     General: Skin is warm and dry. Capillary Refill: Capillary refill takes less than 2 seconds. Neurological:      Mental Status: He is alert and oriented to person, place, and time. Comments: Right upper and right lower extremity drift.   Severe dysarthria   Psychiatric:         Mood and Affect: Mood normal.          MDM  Number of Diagnoses or Management Options  Anemia requiring transfusions:   Dehydration:   NSTEMI (non-ST elevated myocardial infarction) [facility]):   Diagnosis management comments: EKG shows a sinus rhythm at a rate of 97, normal intervals, normal axis, no evidence of active ischemia. Patient seen evaluated by myself. He presents with slurred speech and poor oral intake. He appears clinically dehydrated and pale. Laboratory studies were obtained and show significant anemia. He was consented for transfusion and agreed. He will be transfused 2 units of PRBCs. His troponin is significantly elevated to greater than 8. His EKG shows no active ischemic changes; however, I spoke with cardiology who evaluated the patient in the emergency department. The patient was not heparinized given his significant anemia. He also did not receive aspirin. CT of the head is unremarkable. I do not feel that the patient's neurologic symptoms on presentation are present acute stroke and he is obviously not a TPA candidate given the time of onset of his symptoms as well as his anemia requiring transfusion. Perfect Serve Consult for Admission  2:11 PM    ED Room Number: ER12/12  Patient Name and age:  Doris Flores 69 y.o.  male  Working Diagnosis: NSTEMI (non-ST elevated myocardial infarction) ([facility] [address]  (primary encounter diagnosis)  Dehydration  Anemia requiring transfusions    COVID-19 Suspicion:  no  Sepsis present:  no  Reassessment needed: no  Code Status:  Full Code  Readmission: no  Isolation Requirements:  no  Recommended Level of Care:  telemetry  Department:[facility] Adult ED - 21   Other: Cardiology consulted    Repeat EKG shows sinus tachycardia at a rate of 110, long QT, otherwise normal intervals, normal axis, ST segment depressions in V3 and 4.            Procedures

## 2020-09-07 NOTE — ROUTINE PROCESS
TRANSFER - OUT REPORT: 
 
Verbal report given to Arnold Espino RN(name) on Mis Blend  being transferred to [facility]) for routine progression of care Report consisted of patients Situation, Background, Assessment and  
Recommendations(SBAR). Information from the following report(s) SBAR, Kardex, ED Summary, [facility] and Recent Results was reviewed with the receiving nurse. Lines:  
Peripheral IV 09/07/20 Left Antecubital (Active) Site Assessment Clean, dry, & intact 09/07/20 1308 Phlebitis Assessment 0 09/07/20 1308 Infiltration Assessment 0 09/07/20 1308 Dressing Status Clean, dry, & intact 09/07/20 1308 Dressing Type Transparent 09/07/20 1308 Hub Color/Line Status Pink;Patent; Flushed 09/07/20 1308 Action Taken Blood drawn 09/07/20 1308 Peripheral IV 09/07/20 Right Antecubital (Active) Site Assessment Clean, dry, & intact 09/07/20 1336 Phlebitis Assessment 0 09/07/20 1336 Infiltration Assessment 0 09/07/20 1336 Dressing Status Clean, dry, & intact 09/07/20 1336 Dressing Type Transparent 09/07/20 1336 Hub Color/Line Status Green;Flushed;Patent 09/07/20 1336 Action Taken Blood drawn 09/07/20 1336 Opportunity for questions and clarification was provided. Patient transported with: 
 Monitor Registered Nurse

## 2020-09-07 NOTE — ED NOTES
Bedside and Verbal shift change report given to Kristian RN (oncoming nurse) by Nguyễn Blackwell RN (offgoing nurse). Report included the following information SBAR, ED Summary, MAR and Recent Results.

## 2020-09-07 NOTE — ED TRIAGE NOTES
TRIAGE: Pt arrives via EMS from [facility] (independent living) with c/o inability to get out of bed to eat or take medications, incontinent episode, and increased slurred speech d/t dry mouth for 2-3 days. Pt noted to have difficulty expressing himself upon arrival. Hx of stroke with left sided deficits per EMS. Pt noted to have right sided weakness upon arrival with an abrasion to right knee. Pt arrives A&Ox4 with VSS. .

## 2020-09-08 LAB
ABO + RH BLD: NORMAL
ANION GAP SERPL CALC-SCNC: 14 MMOL/L (ref 5–15)
ATRIAL RATE: 110 BPM
BASOPHILS # BLD: 0 K/UL (ref 0–0.1)
BASOPHILS NFR BLD: 0 % (ref 0–1)
BLD PROD TYP BPU: NORMAL
BLD PROD TYP BPU: NORMAL
BLOOD GROUP ANTIBODIES SERPL: NORMAL
BPU ID: NORMAL
BPU ID: NORMAL
BUN SERPL-MCNC: 48 MG/DL (ref 6–20)
BUN/CREAT SERPL: 31 (ref 12–20)
CALCIUM SERPL-MCNC: 7.9 MG/DL (ref 8.5–10.1)
CALCULATED P AXIS, ECG09: -9 DEGREES
CALCULATED R AXIS, ECG10: 40 DEGREES
CALCULATED T AXIS, ECG11: 31 DEGREES
CHLORIDE SERPL-SCNC: 113 MMOL/L (ref 97–108)
CO2 SERPL-SCNC: 13 MMOL/L (ref 21–32)
CREAT SERPL-MCNC: 1.53 MG/DL (ref 0.7–1.3)
CROSSMATCH RESULT,%XM: NORMAL
CROSSMATCH RESULT,%XM: NORMAL
DIAGNOSIS, 93000: NORMAL
DIFFERENTIAL METHOD BLD: ABNORMAL
EOSINOPHIL # BLD: 0 K/UL (ref 0–0.4)
EOSINOPHIL NFR BLD: 0 % (ref 0–7)
ERYTHROCYTE [DISTWIDTH] IN BLOOD BY AUTOMATED COUNT: 15.6 % (ref 11.5–14.5)
GLUCOSE BLD STRIP.AUTO-MCNC: 111 MG/DL (ref 65–100)
GLUCOSE SERPL-MCNC: 108 MG/DL (ref 65–100)
HCT VFR BLD AUTO: 17.8 % (ref 36.6–50.3)
HGB BLD-MCNC: 5.2 G/DL (ref 12.1–17)
HGB BLD-MCNC: 7.5 G/DL (ref 12.1–17)
IMM GRANULOCYTES # BLD AUTO: 0.1 K/UL (ref 0–0.04)
IMM GRANULOCYTES NFR BLD AUTO: 1 % (ref 0–0.5)
LYMPHOCYTES # BLD: 0.7 K/UL (ref 0.8–3.5)
LYMPHOCYTES NFR BLD: 8 % (ref 12–49)
MCH RBC QN AUTO: 26.4 PG (ref 26–34)
MCHC RBC AUTO-ENTMCNC: 29.2 G/DL (ref 30–36.5)
MCV RBC AUTO: 90.4 FL (ref 80–99)
MONOCYTES # BLD: 0.6 K/UL (ref 0–1)
MONOCYTES NFR BLD: 6 % (ref 5–13)
NEUTS SEG # BLD: 7.9 K/UL (ref 1.8–8)
NEUTS SEG NFR BLD: 85 % (ref 32–75)
NRBC # BLD: 0 K/UL (ref 0–0.01)
NRBC BLD-RTO: 0 PER 100 WBC
P-R INTERVAL, ECG05: 86 MS
PLATELET # BLD AUTO: 476 K/UL (ref 150–400)
PLATELET COMMENTS,PCOM: ABNORMAL
PMV BLD AUTO: 11.2 FL (ref 8.9–12.9)
POTASSIUM SERPL-SCNC: 4 MMOL/L (ref 3.5–5.1)
Q-T INTERVAL, ECG07: 372 MS
QRS DURATION, ECG06: 84 MS
QTC CALCULATION (BEZET), ECG08: 503 MS
RBC # BLD AUTO: 1.97 M/UL (ref 4.1–5.7)
RBC MORPH BLD: ABNORMAL
SERVICE CMNT-IMP: ABNORMAL
SODIUM SERPL-SCNC: 140 MMOL/L (ref 136–145)
SPECIMEN EXP DATE BLD: NORMAL
STATUS OF UNIT,%ST: NORMAL
STATUS OF UNIT,%ST: NORMAL
TROPONIN I SERPL-MCNC: 10.4 NG/ML
TROPONIN I SERPL-MCNC: 11.4 NG/ML
TROPONIN I SERPL-MCNC: 12 NG/ML
TROPONIN I SERPL-MCNC: 15.3 NG/ML
UNIT DIVISION, %UDIV: 0
UNIT DIVISION, %UDIV: 0
VENTRICULAR RATE, ECG03: 110 BPM
WBC # BLD AUTO: 9.3 K/UL (ref 4.1–11.1)

## 2020-09-08 PROCEDURE — C9113 INJ PANTOPRAZOLE SODIUM, VIA: HCPCS | Performed by: FAMILY MEDICINE

## 2020-09-08 PROCEDURE — 99233 SBSQ HOSP IP/OBS HIGH 50: CPT | Performed by: SPECIALIST

## 2020-09-08 PROCEDURE — 82962 GLUCOSE BLOOD TEST: CPT

## 2020-09-08 PROCEDURE — 74011000258 HC RX REV CODE- 258: Performed by: HOSPITALIST

## 2020-09-08 PROCEDURE — 74011250637 HC RX REV CODE- 250/637: Performed by: SPECIALIST

## 2020-09-08 PROCEDURE — 74011250636 HC RX REV CODE- 250/636: Performed by: FAMILY MEDICINE

## 2020-09-08 PROCEDURE — 80048 BASIC METABOLIC PNL TOTAL CA: CPT

## 2020-09-08 PROCEDURE — 99222 1ST HOSP IP/OBS MODERATE 55: CPT | Performed by: PSYCHIATRY & NEUROLOGY

## 2020-09-08 PROCEDURE — 93005 ELECTROCARDIOGRAM TRACING: CPT

## 2020-09-08 PROCEDURE — 84484 ASSAY OF TROPONIN QUANT: CPT

## 2020-09-08 PROCEDURE — 74011000250 HC RX REV CODE- 250: Performed by: FAMILY MEDICINE

## 2020-09-08 PROCEDURE — 74011000250 HC RX REV CODE- 250: Performed by: HOSPITALIST

## 2020-09-08 PROCEDURE — 85018 HEMOGLOBIN: CPT

## 2020-09-08 PROCEDURE — 74011250637 HC RX REV CODE- 250/637: Performed by: HOSPITALIST

## 2020-09-08 PROCEDURE — 65660000001 HC RM ICU INTERMED STEPDOWN

## 2020-09-08 PROCEDURE — 36415 COLL VENOUS BLD VENIPUNCTURE: CPT

## 2020-09-08 RX ORDER — PRAMIPEXOLE DIHYDROCHLORIDE 0.12 MG/1
0.12 TABLET ORAL
Status: DISCONTINUED | OUTPATIENT
Start: 2020-09-08 | End: 2020-09-17 | Stop reason: HOSPADM

## 2020-09-08 RX ORDER — METOPROLOL TARTRATE 25 MG/1
12.5 TABLET, FILM COATED ORAL EVERY 12 HOURS
Status: DISCONTINUED | OUTPATIENT
Start: 2020-09-08 | End: 2020-09-09

## 2020-09-08 RX ORDER — ATORVASTATIN CALCIUM 20 MG/1
20 TABLET, FILM COATED ORAL
Status: DISCONTINUED | OUTPATIENT
Start: 2020-09-08 | End: 2020-09-12

## 2020-09-08 RX ADMIN — METOPROLOL TARTRATE 12.5 MG: 25 TABLET, FILM COATED ORAL at 21:06

## 2020-09-08 RX ADMIN — SODIUM CHLORIDE 40 MG: 9 INJECTION INTRAMUSCULAR; INTRAVENOUS; SUBCUTANEOUS at 09:46

## 2020-09-08 RX ADMIN — ATORVASTATIN CALCIUM 20 MG: 20 TABLET, FILM COATED ORAL at 21:07

## 2020-09-08 RX ADMIN — METOPROLOL TARTRATE 12.5 MG: 25 TABLET, FILM COATED ORAL at 12:41

## 2020-09-08 RX ADMIN — Medication 10 ML: at 21:08

## 2020-09-08 RX ADMIN — Medication 10 ML: at 18:30

## 2020-09-08 RX ADMIN — SODIUM BICARBONATE: 84 INJECTION, SOLUTION INTRAVENOUS at 12:48

## 2020-09-08 RX ADMIN — PRAMIPEXOLE DIHYDROCHLORIDE 0.12 MG: 0.25 TABLET ORAL at 21:06

## 2020-09-08 RX ADMIN — Medication 10 ML: at 06:50

## 2020-09-08 RX ADMIN — SODIUM CHLORIDE 40 MG: 9 INJECTION INTRAMUSCULAR; INTRAVENOUS; SUBCUTANEOUS at 21:04

## 2020-09-08 RX ADMIN — SODIUM CHLORIDE 75 ML/HR: 9 INJECTION, SOLUTION INTRAVENOUS at 06:50

## 2020-09-08 NOTE — PROGRESS NOTES
Bedside shift change report given to [address] [place] [address] Colleen (oncoming nurse) by Debora Meza (offgoing nurse). Report included the following information SBAR, Kardex, ED Summary, Intake/Output, Accordion and Recent Results. Problem: Falls - Risk of  Goal: *Absence of Falls  Description: Document Michaelar Du Fall Risk and appropriate interventions in the flowsheet. Outcome: Progressing Towards Goal  Note: Fall Risk Interventions:  Mobility Interventions: Communicate number of staff needed for ambulation/transfer, Patient to call before getting OOB    Mentation Interventions: Door open when patient unattended, Evaluate medications/consider consulting pharmacy, Increase mobility, More frequent rounding, Reorient patient, Toileting rounds         Elimination Interventions: Call light in reach, Patient to call for help with toileting needs, Toileting schedule/hourly rounds    History of Falls Interventions: Bed/chair exit alarm, Evaluate medications/consider consulting pharmacy, Room close to nurse's station         Problem: Pressure Injury - Risk of  Goal: *Prevention of pressure injury  Description: Document Judson Scale and appropriate interventions in the flowsheet. Outcome: Progressing Towards Goal  Note: Pressure Injury Interventions:  Sensory Interventions: Assess changes in LOC, Discuss PT/OT consult with provider, Minimize linen layers, Turn and reposition approx. every two hours (pillows and wedges if needed)    Moisture Interventions: Apply protective barrier, creams and emollients, Internal/External urinary devices, Maintain skin hydration (lotion/cream), Minimize layers    Activity Interventions: Assess need for specialty bed, Pressure redistribution bed/mattress(bed type)    Mobility Interventions: HOB 30 degrees or less, Pressure redistribution bed/mattress (bed type), Turn and reposition approx.  every two hours(pillow and wedges)    Nutrition Interventions: Document food/fluid/supplement intake    Friction and Shear Interventions: HOB 30 degrees or less, Apply protective barrier, creams and emollients, Minimize layers, Transferring/repositioning devices                Problem: General Medical Care Plan  Goal: *Optimal pain control at patient's stated goal  Outcome: Progressing Towards Goal       No complaints of pain

## 2020-09-08 NOTE — ROUTINE PROCESS
Primary Nurse Violetta Whitaker RN and Mandeep Angela RN performed a dual skin assessment on this patient No impairment noted - scab on R knee and R cheek Judson score is 14

## 2020-09-08 NOTE — PROGRESS NOTES
Cardiology Progress Note            Admit Date: 9/7/2020  Admit Diagnosis: NSTEMI (non-ST elevated myocardial infarction) ([facility] [address] [I21.4]  Date: 9/8/2020     Time: 10:57 AM    Subjective:  COVID PUI. Chart reviewed. Telemetry reviewed. Assessment and Plan     1. NSTEMI   - troponin 8.33 -->12.00 --> 15.30 --> trending   - asymptomatic. No reported CP or SOB. D/w his nurse   - EKG with sinus tach. Mild ST depression V3 and V4 (1mm)   - unable to start Hep gtt with acute anemia  2. Acute anemia   - presenting Hgb 5.2   - Hgb 7.5 at 2 U PRC   - Occult negative   - Transfuse for Hgb <7   - GI following  3. TUSHAR   - ? CKD   - suspect related to anemia and probable blood loss   - Follow BMP   - CT with small right kidney  4. COVID PUI   - pending  5. Large inguinal hernia   - left, noted on CT scan of abdomen    Troponin 15.3 and trending. Type II NSTEMI. Patient asymptomatic, 1mm ST depression V3-V4. Hgb 5.2 on admission. Echo pending COVID r/o. Continue to trend troponin to peak. Unable to cath with acute anemia and possible blood loss. Chart reviewed only, patient covid pui at this time,  agree with Advance Practice Provider (GARRY, NP,PA)  assessment and plans. 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, left neck, left lateral cheek     Stroke (cerebrum) ([facility] [address]       Social History     Tobacco Use    Smoking status: Current Every Day Smoker    Smokeless tobacco: Never Used   Substance Use Topics    Alcohol use:  Yes    Drug use: Not on file           Review of Systems:    Deferred 2/2 COVID PUI      Objective:     Physical Exam:                Visit Vitals  /85 (BP 1 Location: Left arm, BP Patient Position: At rest)   Pulse (!) 109   Temp 97.7 °F (36.5 °C)   Resp 20   Ht 5' 9\" (1.753 m)   Wt 151 lb 14.4 oz (68.9 kg)   SpO2 96%   BMI 22.43 kg/m²     Deferred 2/2 COVID PUI      Telemetry: normal sinus rhythm     Data Review:    Labs:    Recent Results (from the past 24 hour(s))   CBC WITH AUTOMATED DIFF    Collection Time: 09/07/20 12:58 PM   Result Value Ref Range    WBC 9.3 4.1 - 11.1 K/uL    RBC 1.97 (L) 4.10 - 5.70 M/uL    HGB 5.2 (LL) 12.1 - 17.0 g/dL    HCT 17.8 (LL) 36.6 - 50.3 %    MCV 90.4 80.0 - 99.0 FL    MCH 26.4 26.0 - 34.0 PG    MCHC 29.2 (L) 30.0 - 36.5 g/dL    RDW 15.6 (H) 11.5 - 14.5 %    PLATELET 489 (H) 633 - 400 K/uL    MPV 11.2 8.9 - 12.9 FL    NRBC 0.0 0  WBC    ABSOLUTE NRBC 0.00 0.00 - 0.01 K/uL    NEUTROPHILS 85 (H) 32 - 75 %    LYMPHOCYTES 8 (L) 12 - 49 %    MONOCYTES 6 5 - 13 %    EOSINOPHILS 0 0 - 7 %    BASOPHILS 0 0 - 1 %    IMMATURE GRANULOCYTES 1 (H) 0.0 - 0.5 %    ABS. NEUTROPHILS 7.9 1.8 - 8.0 K/UL    ABS. LYMPHOCYTES 0.7 (L) 0.8 - 3.5 K/UL    ABS. MONOCYTES 0.6 0.0 - 1.0 K/UL    ABS. EOSINOPHILS 0.0 0.0 - 0.4 K/UL    ABS. BASOPHILS 0.0 0.0 - 0.1 K/UL    ABS. IMM. GRANS. 0.1 (H) 0.00 - 0.04 K/UL    DF SMEAR SCANNED      PLATELET COMMENTS Large Platelets      RBC COMMENTS ANISOCYTOSIS  1+        RBC COMMENTS HYPOCHROMIA  1+        RBC COMMENTS        Pathology Review Requested  Pathologic examination results can be viewed in Connect [facility] Chart Review under the Pathology tab.         RBC COMMENTS       CORRECTED ON 09/08 AT 0950: PREVIOUSLY REPORTED AS ANISOCYTOSIS 1+ HYPOCHROMIA 1+ Pathology Review Requested   METABOLIC PANEL, COMPREHENSIVE    Collection Time: 09/07/20 12:58 PM   Result Value Ref Range    Sodium 138 136 - 145 mmol/L    Potassium 4.0 3.5 - 5.1 mmol/L    Chloride 108 97 - 108 mmol/L    CO2 19 (L) 21 - 32 mmol/L    Anion gap 11 5 - 15 mmol/L    Glucose 107 (H) 65 - 100 mg/dL    BUN 44 (H) 6 - 20 MG/DL    Creatinine 1.61 (H) 0.70 - 1.30 MG/DL    BUN/Creatinine ratio 27 (H) 12 - 20      GFR est AA 52 (L) >60 ml/min/1.73m2    GFR est non-AA 43 (L) >60 ml/min/1.73m2    Calcium 8.4 (L) 8.5 - 10.1 MG/DL    Bilirubin, total 0.4 0.2 - 1.0 MG/DL    ALT (SGPT) 20 12 - 78 U/L    AST (SGOT) 45 (H) 15 - 37 U/L    Alk. phosphatase 109 45 - 117 U/L    Protein, total 6.8 6.4 - 8.2 g/dL    Albumin 2.5 (L) 3.5 - 5.0 g/dL    Globulin 4.3 (H) 2.0 - 4.0 g/dL    A-G Ratio 0.6 (L) 1.1 - 2.2     TROPONIN I    Collection Time: 09/07/20 12:58 PM   Result Value Ref Range    Troponin-I, Qt. 8.33 (H) <0.05 ng/mL   LACTIC ACID    Collection Time: 09/07/20 12:58 PM   Result Value Ref Range    Lactic acid 1.2 0.4 - 2.0 MMOL/L   MAGNESIUM    Collection Time: 09/07/20 12:58 PM   Result Value Ref Range    Magnesium 2.6 (H) 1.6 - 2.4 mg/dL   CK W/ REFLX CKMB    Collection Time: 09/07/20 12:58 PM   Result Value Ref Range     (H) 39 - 308 U/L   CK-MB,QUANT. Collection Time: 09/07/20 12:58 PM   Result Value Ref Range    CK - MB 13.0 (H) <3.6 NG/ML    CK-MB Index 1.7 0.0 - 2.5     SAMPLES BEING HELD    Collection Time: 09/07/20 12:59 PM   Result Value Ref Range    SAMPLES BEING HELD 1RED,1BLU     COMMENT        Add-on orders for these samples will be processed based on acceptable specimen integrity and analyte stability, which may vary by analyte.    PHOSPHORUS    Collection Time: 09/07/20 12:59 PM   Result Value Ref Range    Phosphorus 3.5 2.6 - 4.7 MG/DL   PROTHROMBIN TIME + INR    Collection Time: 09/07/20 12:59 PM   Result Value Ref Range    INR 1.4 (H) 0.9 - 1.1      Prothrombin time 14.4 (H) 9.0 - 11.1 sec   CULTURE, BLOOD, PAIRED    Collection Time: 09/07/20  1:09 PM    Specimen: Blood   Result Value Ref Range    Special Requests: NO SPECIAL REQUESTS      Culture result: NO GROWTH AFTER 16 HOURS     GLUCOSE, POC    Collection Time: 09/07/20  1:29 PM   Result Value Ref Range    Glucose (POC) 100 65 - 100 mg/dL    Performed by Gudelia CATALAN    EKG, 12 LEAD, INITIAL    Collection Time: 09/07/20  1:34 PM   Result Value Ref Range    Ventricular Rate 97 BPM    Atrial Rate 97 BPM    P-R Interval 106 ms    QRS Duration 84 ms    Q-T Interval 388 ms    QTC Calculation (Bezet) 492 ms    Calculated P Axis 29 degrees    Calculated R Axis 41 degrees    Calculated T Axis 17 degrees    Diagnosis       Sinus rhythm with short IL  No previous ECGs available  Confirmed by Rui Rojas MD, [facility] ([id number]) on 9/7/2020 6:09:55 PM     TYPE + CROSSMATCH    Collection Time: 09/07/20  1:56 PM   Result Value Ref Range    Crossmatch Expiration 09/10/2020     ABO/Rh(D) A POSITIVE     Antibody screen NEG     Unit number [id number]     Blood component type  LR     Unit division 00     Status of unit TRANSFUSED     Crossmatch result Compatible     Unit number [id number]     Blood component type  LR     Unit division 00     Status of unit TRANSFUSED     Crossmatch result Compatible    EKG, 12 LEAD, INITIAL    Collection Time: 09/07/20  2:52 PM   Result Value Ref Range    Ventricular Rate 110 BPM    Atrial Rate 110 BPM    P-R Interval 86 ms    QRS Duration 84 ms    Q-T Interval 372 ms    QTC Calculation (Bezet) 503 ms    Calculated P Axis -9 degrees    Calculated R Axis 40 degrees    Calculated T Axis 31 degrees    Diagnosis       Sinus tachycardia with short IL  ST depression, consider subendocardial injury  Nonspecific T wave abnormality  When compared with ECG of 07-SEP-2020 13:34,  MANUAL COMPARISON REQUIRED, DATA IS UNCONFIRMED     OCCULT BLOOD, STOOL    Collection Time: 09/07/20  3:03 PM   Result Value Ref Range    Occult blood, stool Negative NEG     SARS-COV-2    Collection Time: 09/07/20  3:16 PM   Result Value Ref Range    Specimen source Nasopharyngeal      Specimen source Nasopharyngeal      Specimen type NP Swab      Health status NP Swab      COVID-19 PENDING    GLUCOSE, POC    Collection Time: 09/07/20 11:47 PM   Result Value Ref Range    Glucose (POC) 110 (H) 65 - 100 mg/dL    Performed by Andrey Puente    HEMOGLOBIN    Collection Time: 09/08/20  1:49 AM   Result Value Ref Range    HGB 7.5 (L) 12.1 - 17.0 g/dL   TROPONIN I    Collection Time: 09/08/20  1:49 AM   Result Value Ref Range    Troponin-I, Qt. 12.00 (H) <7.10 ng/mL   METABOLIC PANEL, BASIC    Collection Time: 09/08/20  4:55 AM   Result Value Ref Range    Sodium 140 136 - 145 mmol/L    Potassium 4.0 3.5 - 5.1 mmol/L    Chloride 113 (H) 97 - 108 mmol/L    CO2 13 (LL) 21 - 32 mmol/L    Anion gap 14 5 - 15 mmol/L    Glucose 108 (H) 65 - 100 mg/dL    BUN 48 (H) 6 - 20 MG/DL    Creatinine 1.53 (H) 0.70 - 1.30 MG/DL    BUN/Creatinine ratio 31 (H) 12 - 20      GFR est AA 55 (L) >60 ml/min/1.73m2    GFR est non-AA 45 (L) >60 ml/min/1.73m2    Calcium 7.9 (L) 8.5 - 10.1 MG/DL   TROPONIN I    Collection Time: 09/08/20  4:55 AM   Result Value Ref Range    Troponin-I, Qt. 15.30 (H) <0.05 ng/mL   GLUCOSE, POC    Collection Time: 09/08/20  6:40 AM   Result Value Ref Range    Glucose (POC) 111 (H) 65 - 100 mg/dL    Performed by Kirsty Ta    EKG, 12 LEAD, INITIAL    Collection Time: 09/08/20  7:01 AM   Result Value Ref Range    Ventricular Rate 101 BPM    Atrial Rate 101 BPM    P-R Interval 130 ms    QRS Duration 92 ms    Q-T Interval 376 ms    QTC Calculation (Bezet) 487 ms    Calculated P Axis 14 degrees    Calculated R Axis 36 degrees    Calculated T Axis 52 degrees    Diagnosis       Sinus tachycardia  Nonspecific ST and T wave abnormality  When compared with ECG of 07-SEP-2020 14:52,  Nonspecific T wave abnormality now evident in Inferior leads            Radiology:        Current Facility-Administered Medications   Medication Dose Route Frequency    0.45% sodium chloride 1,000 mL with sodium bicarbonate (8.4%) 75 mEq infusion   IntraVENous CONTINUOUS    pramipexole (MIRAPEX) tablet 0.125 mg  0.125 mg Oral QHS    atorvastatin (LIPITOR) tablet 20 mg 20 mg Oral QHS    0.9% sodium chloride infusion 250 mL  250 mL IntraVENous PRN    sodium chloride (NS) flush 5-40 mL  5-40 mL IntraVENous Q8H    sodium chloride (NS) flush 5-40 mL  5-40 mL IntraVENous PRN    ondansetron (ZOFRAN) injection 4 mg  4 mg IntraVENous Q4H PRN    pantoprazole (PROTONIX) 40 mg in 0.9% sodium chloride 10 mL injection  40 mg IntraVENous Q12H       Rufus Castillo MD     [facility] [address] [place] [address], [address] [place] [address] [address]   [facility]   [phone]

## 2020-09-08 NOTE — PROGRESS NOTES
Problem: Falls - Risk of  Goal: *Absence of Falls  Description: Document Dima Benavidez Fall Risk and appropriate interventions in the flowsheet. Outcome: Progressing Towards Goal  Note: Fall Risk Interventions:  Mobility Interventions: Communicate number of staff needed for ambulation/transfer, Patient to call before getting OOB    Mentation Interventions: Adequate sleep, hydration, pain control, Evaluate medications/consider consulting pharmacy, More frequent rounding, Reorient patient, Room close to nurse's station, Toileting rounds, Update white board         Elimination Interventions: Patient to call for help with toileting needs, Toileting schedule/hourly rounds    History of Falls Interventions: Consult care management for discharge planning, Evaluate medications/consider consulting pharmacy, Room close to nurse's station         Problem: Pressure Injury - Risk of  Goal: *Prevention of pressure injury  Description: Document Judson Scale and appropriate interventions in the flowsheet.   Outcome: Progressing Towards Goal  Note: Pressure Injury Interventions:  Sensory Interventions: Assess changes in LOC, Assess need for specialty bed, Check visual cues for pain, Float heels, Keep linens dry and wrinkle-free, Minimize linen layers    Moisture Interventions: Absorbent underpads, Apply protective barrier, creams and emollients, Assess need for specialty bed, Check for incontinence Q2 hours and as needed, Moisture barrier, Minimize layers    Activity Interventions: Assess need for specialty bed    Mobility Interventions: Assess need for specialty bed, Float heels, HOB 30 degrees or less    Nutrition Interventions: Document food/fluid/supplement intake, Offer support with meals,snacks and hydration    Friction and Shear Interventions: Apply protective barrier, creams and emollients, HOB 30 degrees or less, Minimize layers                Problem: Risk for Spread of Infection  Goal: Prevent transmission of infectious organism to others  Description: Prevent the transmission of infectious organisms to other patients, staff members, and visitors. Outcome: Progressing Towards Goal  Note: Pt on droplet plus precautions for COVID rule out. Gown, gloves, mask, and face shield worn during each interaction with patient. Problem: General Medical Care Plan  Goal: *Labs within defined limits  Outcome: Progressing Towards Goal  Note: Hgb = 5.2.  2 units PRBC ordered. Q6 H&H ordered in plan of care for monitoring. BUN = 44 and Cr = 1.61. Pt receiving NS @ 75mL/hr. Will continue to monitor. Bedside shift change report given to Bony Tovar RN (oncoming nurse) by Carleen Henson RN (offgoing nurse).  Report included the following information SBAR, Kardex, ED Summary, Intake/Output, MAR, Accordion, Recent Results, and Cardiac Rhythm ST .

## 2020-09-08 NOTE — CONSULTS
Requesting Provider: Celia Carnes MD - Reason for Consultation: \"scrotal edema\"  Pre-existing [facility] Patient:                   Patient: Sherrie Medina MRN: [id number]  SSN: [id number]    YOB: 1950  Age: 69 y.o. Sex: male     Location:        Code Status: Full Code   PCP: Anurag, MD Ryan  - None   Emergency Contact:  Primary Emergency Contact: Arben Morris, Home Phone: [phone]   Race/Worship/Language: [facility] /  / [facility]   Payor: Payor: [facility] [employer] / Plan: [address] / Product Type: Medicare /    Prior Admission Data:         Hospitalized:  Hospital Day: 2 - Admitted 2020 12:44 PM     CONSULTANTS  IP CONSULT TO CARDIOLOGY  IP CONSULT TO CARDIOLOGY  IP CONSULT TO NEUROLOGY  IP CONSULT TO GASTROENTEROLOGY  IP CONSULT TO Bri Macias ICD-9-CM   1. NSTEMI (non-ST elevated myocardial infarction) ([facility] [address]  I21.4 410.70   2. Dehydration  E86.0 276.51   3. Anemia requiring transfusions  D64.9 285.9         Assessment/Plan:       · Scrotal edema    -Scrotal US reviewed: Normal testes. 3.5 cm right epididymal cyst. Diffuse scrotal wall edema. No evidence of scrotal abscess.   -Elevate scrotum w/ towel roll. -Dependent edema likely r/t immobility, will reabsorb with time and ambulation  -Monitor I&O, bladder scan PRN for suspected retention     CC: Decreased Appetite and Lethargy   HPI: He is a 69 y.o. male w/ PMHx of stroke, admitted for treatment of fatigue and dehydration, found to have NSTEMI, acute anemia, TUSHAR, ? CKD. Urology consulted for cc of scrotal edema. Afebrile. VSS. WBC 9.3 on 2020. Cr 1.61-->1. 53. Scrotal exam reveals, edema, no erythema, crepitus, or induration. Non tender. Problem: edema; Location: scrotum;   Severity: mild-moderate; Timing:unknown, Context: as abovein hPI; Better/Worse: elevation, ambulation, Associated s/s:none     Temp (24hrs), Av °F (36.7 °C), Min:97.5 °F (36.4 °C), Max:98.5 °F (36.9 °C)    Urinary Status: Voiding, Incontinent briefs  Creatinine   Date/Time Value Ref Range Status   09/08/2020 04:55 AM 1.53 (H) 0.70 - 1.30 MG/DL Final   09/07/2020 12:58 PM 1.61 (H) 0.70 - 1.30 MG/DL Final     Current Antimicrobial Therapy (168h ago, onward)    None        Key Anti-Platelet Anticoagulant Meds     The patient is on no antiplatelet meds or anticoagulants. Diet: DIET CLEAR LIQUID -       Labs     Lab Results   Component Value Date/Time    Lactic acid 1.2 09/07/2020 12:58 PM    WBC 9.3 09/07/2020 12:58 PM    HCT 17.8 (LL) 09/07/2020 12:58 PM    PLATELET 661 (H) 95/77/3673 12:58 PM    Sodium 140 09/08/2020 04:55 AM    Potassium 4.0 09/08/2020 04:55 AM    Chloride 113 (H) 09/08/2020 04:55 AM    CO2 13 (LL) 09/08/2020 04:55 AM    BUN 48 (H) 09/08/2020 04:55 AM    Creatinine 1.53 (H) 09/08/2020 04:55 AM    Glucose 108 (H) 09/08/2020 04:55 AM    Calcium 7.9 (L) 09/08/2020 04:55 AM    Magnesium 2.6 (H) 09/07/2020 12:58 PM    INR 1.4 (H) 09/07/2020 12:59 PM     UA: No results found for: COLOR, APPRN, SPGRU, REFSG, YSEY, PROTU, GLUCU, KETU, BILU, UROU, PRADEEP, LEUKU, GLUKE, EPSU, BACTU, WBCU, RBCU, CASTS, UCRY  Imaging     Results for orders placed during the hospital encounter of 09/07/20   CT ABD PELV WO CONT    Narrative EXAM: CT ABD PELV WO CONT    INDICATION: GI bleed    COMPARISON: None    CONTRAST:  None. TECHNIQUE:   Thin axial images were obtained through the abdomen and pelvis. Coronal and  sagittal reformats were generated. Oral contrast was not administered. CT dose  reduction was achieved through use of a standardized protocol tailored for this  examination and automatic exposure control for dose modulation. The absence of intravenous contrast material reduces the sensitivity for  evaluation of the vasculature and solid organs. FINDINGS:   LOWER THORAX: Bilateral pleural effusions with the subsegmental atelectasis. LIVER: No mass.   BILIARY TREE: Gallbladder is within normal limits. CBD is not dilated. SPLEEN: within normal limits. PANCREAS: No focal abnormality. ADRENALS: Unremarkable. KIDNEYS/URETERS: Small right kidney nonobstructing left renal calculi. STOMACH: Unremarkable. SMALL BOWEL: No dilatation or wall thickening. COLON: No dilatation or wall thickening. APPENDIX: Axial image 54  PERITONEUM: No ascites or pneumoperitoneum. RETROPERITONEUM: No lymphadenopathy or aortic aneurysm. REPRODUCTIVE ORGANS: Small prostate  URINARY BLADDER: No mass or calculus. BONES: No destructive bone lesion. ABDOMINAL WALL: Left inguinal bowel-containing hernia with neck measurement of 4  cm and outer diameter of 9 cm. ADDITIONAL COMMENTS: N/A      Impression IMPRESSION:  Large left inguinal hernia  Bilateral pleural effusions with subsegmental atelectasis  Nonobstructing left renal calculus  Small right kidney       US Results (most recent):  Results from Hospital Encounter encounter on 09/07/20   US SCROTUM/TESTICLES    Narrative SCROTAL ULTRASOUND    INDICATION: SCROTAL EDEMA    COMPARISON: None. TECHNIQUE:  Grayscale and color Doppler sonography of the scrotum was performed. FINDINGS:    RIGHT TESTICLE: measures 3.9 x 2.6 x 1.9 cm. Testicular volume is 9.8 cc. Normal size, echotexture, and color Doppler flow No intratesticular mass   RIGHT EPIDIDYMIS: 3.5 x 1.6 x 2.2 cm epididymal cyst  OTHER: No hydrocele or varicocele. LEFT TESTICLE: measures 3.3 x 2.3 x 1.3 cm. Testicular volume is 5.1 cc. Normal  size, echotexture, and color Doppler flow. No intratesticular mass. LEFT EPIDIDYMIS: Normal.  OTHER: No hydrocele or varicocele. Diffuse scrotal wall edema is noted. No scrotal abscess is visualized. Impression IMPRESSION:   Normal testes. 3.5 cm right epididymal cyst. Diffuse scrotal wall edema. No  evidence of scrotal abscess.           Cultures     All Micro Results     Procedure Component Value Units Date/Time    CULTURE, BLOOD, PAIRED [[id number]] Collected:  09/07/20 1309    Order Status:  Completed Specimen:  Blood Updated:  09/08/20 0745     Special Requests: NO SPECIAL REQUESTS        Culture result: NO GROWTH AFTER 16 HOURS              Past History: (Complete 2+/3 areas)   No Known Allergies   Current Facility-Administered Medications   Medication Dose Route Frequency    0.45% sodium chloride 1,000 mL with sodium bicarbonate (8.4%) 75 mEq infusion   IntraVENous CONTINUOUS    pramipexole (MIRAPEX) tablet 0.125 mg  0.125 mg Oral QHS    atorvastatin (LIPITOR) tablet 20 mg  20 mg Oral QHS    metoprolol tartrate (LOPRESSOR) tablet 12.5 mg  12.5 mg Oral Q12H    0.9% sodium chloride infusion 250 mL  250 mL IntraVENous PRN    sodium chloride (NS) flush 5-40 mL  5-40 mL IntraVENous Q8H    sodium chloride (NS) flush 5-40 mL  5-40 mL IntraVENous PRN    ondansetron (ZOFRAN) injection 4 mg  4 mg IntraVENous Q4H PRN    pantoprazole (PROTONIX) 40 mg in 0.9% sodium chloride 10 mL injection  40 mg IntraVENous Q12H    Prior to Admission medications    Medication Sig Start Date End Date Taking? Authorizing Provider   labetalol (NORMODYNE) 200 mg tablet Take  by mouth two (2) times a day. Provider, Historical   atorvastatin (LIPITOR) 20 mg tablet  11/15/19   Provider, Historical   raNITIdine (ZANTAC) 150 mg tablet  9/26/19   Provider, Historical   pramipexole (MIRAPEX) 0.125 mg tablet  11/23/19   Provider, Historical   irbesartan (AVAPRO) 150 mg tablet  11/19/19   Provider, Historical        PMHx:  has a past medical history of Skin cancer (12/19/2019) and Stroke (cerebrum) ([facility] [address] PSurgHx:  has no past surgical history on file. PSocHx:  reports that he has been smoking. He has never used smokeless tobacco. He reports current alcohol use.    ROS:  (Complete - 10 systems) - DENIES: Weightloss (Constitutional), Dry mouth (ENMT), Chest pain (CV), SOB (Respiratory), Constipation (GI), Weakness (MS), Pallor (Skin), TIA Sx (Neuro), Confusion (Psych), Easy bruising (Heme)    Physical Exam: (Comprehesive - 8+ 1995 Systems)     (1) Constitutional:  FIO2:   on SpO2: O2 Sat (%): 96 %  O2 Device: Nasal cannula O2 Flow Rate (L/min): 1 l/min  Patient Vitals for the past 24 hrs:   BP Temp Pulse Resp SpO2 Height Weight   09/08/20 1241 147/87  99       09/08/20 1237 147/87 98 °F (36.7 °C) 97 20 96 %     09/08/20 0940     96 %     09/08/20 0935     92 %     09/08/20 0849      5' 9\" (1.753 m)    09/08/20 0715 162/85 97.7 °F (36.5 °C) (!) 109 20 96 %     09/08/20 0642 156/84 97.7 °F (36.5 °C) 100 18 95 %     09/08/20 0340 147/88 97.9 °F (36.6 °C) 96 20 97 %     09/08/20 0235 150/80 97.9 °F (36.6 °C) (!) 105 20 94 %  68.9 kg (151 lb 14.4 oz)   09/08/20 0120 145/78 97.9 °F (36.6 °C) (!) 107 20 94 %     09/07/20 2343 156/90 97.9 °F (36.6 °C) (!) 111 22 96 %     09/07/20 2315 164/80 97.9 °F (36.6 °C) (!) 110 20 93 %     09/07/20 2300 138/80 97.5 °F (36.4 °C) (!) 109 20 92 %     09/07/20 2243 153/84 97.9 °F (36.6 °C) (!) 111 24 96 %     09/07/20 2210 136/87 97.8 °F (36.6 °C) 98 20 94 %     09/07/20 2109 132/82 98 °F (36.7 °C) (!) 105 18 94 %     09/07/20 2022 134/84 98.5 °F (36.9 °C) (!) 116 24 94 %     09/07/20 1936 145/78 98.3 °F (36.8 °C) (!) 122 21 99 %     09/07/20 1907 136/84 98.1 °F (36.7 °C) (!) 123 20 99 %     09/07/20 1850 135/84 98.2 °F (36.8 °C) (!) 123 24 98 %     09/07/20 1812 139/74 98.1 °F (36.7 °C) (!) 123 28      09/07/20 1630 135/77 97.6 °F (36.4 °C) (!) 116 22 99 %  69.7 kg (153 lb 10.6 oz)   09/07/20 1600 131/67 98.5 °F (36.9 °C) (!) 118 24 99 %     09/07/20 1500 124/62 98.5 °F (36.9 °C) (!) 113 19 100 %         Date 09/07/20 0700 - 09/08/20 0659 09/08/20 0700 - 09/09/20 0659   Shift 8207-3314 0597-2793 24 Hour Total 2147-9190 3073-3251 24 Hour Total   INTAKE   Blood  844.6 844.6        Volume (TRANSFUSE PACKED RBC'S)  359.2 359.2        Volume (TRANSFUSE PACKED RBC'S)  485.4 485. 4      Shift Total(mL/kg) 844.6(12.3) 844. 6(12.3)      OUTPUT   Urine(mL/kg/hr)           Urine Occurrence(s)  1 x 1 x      Shift Total(mL/kg)         NET  844.6 844.6      Weight (kg) 69.7 68.9 68.9 68.9 68.9 68.9      (2) ENMT:   moist mucous membranes, normal sinuses   (3) Respiratory:  breathing easily, no distress   (4) GI:  no abdominal masses, tenderness   (5) : Scrotum: + edema, no erythema, crepitus, or induration. Non tender.    (6) Lymphatic:  no adenopathy, neck supple   (7) Muscloskeletal:  no gross deformity, normal ROM   (8) Skin:  no rash, warm & dry   (9) Neuro:  no focal deficits, normal speech      Signed By: Tanja Lindsey NP  - September 8, 2020

## 2020-09-08 NOTE — CONSULTS
[address] [facility].   [address] [facility] [address]        GASTROENTEROLOGY CONSULTATION NOTE  Will Della Gifford  [phone] office  [phone] NP/PA in-hospital cell phone M-F until 4:30PM  After 5PM or on weekends, please call  for physician on call        NAME:  Kartik GonzalesB:   1950   MRN:   [id number]       Referring Physician: Dr. Meza Citizen Date: 2020 9:05 AM    Chief Complaint: unable to obtain     History of Present Illness:  Patient is a 69 y.o. who is seen in consultation at the request of Dr. SINGH [facility] for anemia. Patient has a past medical history significant for stroke. He presented to the ED for fatigue and dehydration from an assisted living facility. In the ED, hemoglobin was found to be 5.2. Patient was admitted to the hospital on 20 for acute anemia, possible NSTEMI, elevated creatinine, and acute metabolic encephalopathy. Patient is COVID pending and was not physically seen. I discussed with the patient's RN. Patient is reportedly oriented only to self. History was obtained through chart review and the patient's RN. No reported signs of active GI bleeding. No vomiting, hematochezia, or melena. No anticoagulation prior to admission. Patient was reportedly on aspirin prior to admission. No record of EGD or colonoscopy. I have reviewed the emergency room note, hospital admission note, notes by all other clinicians who have seen the patient during this hospitalization to date. I have reviewed the problem list and the reason for this hospitalization. I have reviewed the allergies and the medications the patient was taking at home prior to this hospitalization. PMH:  Past Medical History:   Diagnosis Date    Skin cancer 2019    bcc-left forehead, left neck, left lateral cheek     Stroke (cerebrum) (HCC)        PSH:  History reviewed. No pertinent surgical history.     Allergies:  No Known Allergies    Home Medications:  Prior to Admission Medications   Prescriptions Last Dose Informant Patient Reported? Taking?   atorvastatin (LIPITOR) 20 mg tablet   Yes No   irbesartan (AVAPRO) 150 mg tablet   Yes No   labetalol (NORMODYNE) 200 mg tablet   Yes No   Sig: Take  by mouth two (2) times a day. pramipexole (MIRAPEX) 0.125 mg tablet   Yes No   raNITIdine (ZANTAC) 150 mg tablet   Yes No      Facility-Administered Medications: None       Hospital Medications:  Current Facility-Administered Medications   Medication Dose Route Frequency    0.9% sodium chloride infusion 250 mL  250 mL IntraVENous PRN    sodium chloride (NS) flush 5-40 mL  5-40 mL IntraVENous Q8H    sodium chloride (NS) flush 5-40 mL  5-40 mL IntraVENous PRN    0.9% sodium chloride infusion  75 mL/hr IntraVENous CONTINUOUS    ondansetron (ZOFRAN) injection 4 mg  4 mg IntraVENous Q4H PRN    pantoprazole (PROTONIX) 40 mg in 0.9% sodium chloride 10 mL injection  40 mg IntraVENous Q12H       Social History:  Social History     Tobacco Use    Smoking status: Current Every Day Smoker    Smokeless tobacco: Never Used   Substance Use Topics    Alcohol use: Yes       Family History:  History reviewed. No pertinent family history. Review of Systems:  Unable to obtain    Objective:     Patient Vitals for the past 8 hrs:   BP Temp Pulse Resp SpO2 Height Weight   09/08/20 0849      5' 9\" (1.753 m)    09/08/20 0715 162/85 97.7 °F (36.5 °C) (!) 109 20 96 %     09/08/20 0642 156/84 97.7 °F (36.5 °C) 100 18 95 %     09/08/20 0340 147/88 97.9 °F (36.6 °C) 96 20 97 %     09/08/20 0235 150/80 97.9 °F (36.6 °C) (!) 105 20 94 %  68.9 kg (151 lb 14.4 oz)   09/08/20 0120 145/78 97.9 °F (36.6 °C) (!) 107 20 94 %       No intake/output data recorded.   09/06 1901 - 09/08 0700  In: 844.6   Out: -     EXAM:    Patient was not physically seen due to COVID rule out       Data Review     Recent Labs     09/08/20  0149 09/07/20  1258   WBC  --  9.3   HGB 7.5* 5.2* HCT  --  17.8*   PLT  --  476*     Recent Labs     09/08/20  0455 09/07/20  1259 09/07/20  1258     --  138   K 4.0  --  4.0   *  --  108   CO2 13*  --  19*   BUN 48*  --  44*   CREA 1.53*  --  1.61*   *  --  107*   PHOS  --  3.5  --    CA 7.9*  --  8.4*     Recent Labs     09/07/20  1258      TP 6.8   ALB 2.5*   GLOB 4.3*     Recent Labs     09/07/20  1259   INR 1.4*   PTP 14.4*        Assessment:   · Anemia with hemoccult negative stool: Hgb 7.5 (5.2) status post 2 units PRBCs, platelets 473, INR 1.4. CT abdomen/pelvis without contrast (9/7/20): large left inguinal hernia; bilateral pleural effusions with subsegmental atelectasis; nonbostructing left renal calculus; small right kidney. · NSTEMI: cardiology following, no plan for cardiac catheterization at this time. Echo pending. · Acute metabolic encephalopathy: MRI brain pending. · History of stroke  · COVID-19 rule out     Patient Active Problem List   Diagnosis Code    NSTEMI (non-ST elevated myocardial infarction) ([facility] [address] I21.4     Plan:     · PPI BID  · Trend CBC and transfuse as necessary. Monitor for signs of active GI bleeding. · I attempted to contact the patient's relative, Gabriela Coughlin ([phone]), however was unable to reach. · No immediate plan for endoscopic procedures at this time  · Patient was discussed with and will be seen by Dr. Awa Clinton  · Thank you for allowing me to participate in care of Rohit Rogers     Signed By: GUERLINE Lipscomb     9/8/2020  9:05 AM         GI Attending: Agree with plan as above. No plan for endoscopic evaluation at this time, but we can decide on timing of EGD and colonoscopy based on clinical course. Abbe Clinton MD

## 2020-09-08 NOTE — PROGRESS NOTES
3726: Paged Dr. Cintia Hodges. Dr stated trend the troponins just to see where it goes. Patient is complaining of no chest pain currently of during the night. Patient remains stable.

## 2020-09-08 NOTE — PROGRESS NOTES
Verbal shift change report given to Horacio Quiñones RN (oncoming nurse) by Liz Grider RN (offgoing nurse). Report included the following information SBAR, Kardex, ED Summary, MAR, Recent Results and Cardiac Rhythm Sinus Tach.      Last 3 Recorded Weights in this Encounter    09/07/20 1249 09/07/20 1630 09/08/20 0235   Weight: 68.5 kg (151 lb 0.2 oz) 69.7 kg (153 lb 10.6 oz) 68.9 kg (151 lb 14.4 oz)

## 2020-09-08 NOTE — PROGRESS NOTES
CM attempted to call patient in room and left message for emergency contact, Jayne Pérez to complete initial assessment.     Collette Zhou RN/CRM

## 2020-09-08 NOTE — PROGRESS NOTES
[address] [facility]  Hospitalist Group                                                                                          Hospitalist Progress Note  Lauren Alberto MD  Answering service: [phone] from in house phone        Date of Service:  2020  NAME:  Jean Mckenzie  :  1950  MRN:  [id number]      Admission Summary:   Jean Mckenzie is a 69 y.o. male who presents with fatigue and dehydration     Patient is dysarthric and is a very poor historian, not much history could be obtained from the patient. I tried calling the patient's next to kin Rebecca Lillyalthea, as listed on the chart but did not get a response. Thus history was extremely limited, patient apparently lives in assisted living facility by himself, was noted to be fatigued and dehydrated, has not been eating drinking much, has not been taking his medication, and thus was sent to the hospital for further management and evaluation. Apparently patient has left-sided weakness after his stroke currently has some slurred speech, unclear whether this is new or old. No further history can be obtained from the patient. Patient came to the ER, was found to have a hemoglobin of 5.2 and was requested to be admitted to the hospitalist service.     Interval history / Subjective:     F/u anemia   No new issues  Assessment & Plan:     Acute anemia  -unclear etiology  -h/h  -s/p2 units PRBC  -transfuse for hb<7    Probable GI bleed  -FOBT negative  -Appreciate GI, no immediate plans for endoscopy    NSTEMI:  -troponins elevated  -Cardiology on board, follow Echo. Per cardiology, the patient is not a candidate for cardiac cath        Elevated creatinine  -not sure if CKD  -Monitor    Metabolic acidosis, non anion gap  -Will put the patient on bicarb drip for now        Acute metabolic encephalopathy  -CT head  Asymmetric white matter disease and lacunar infarcts.  No acute process  identified by noncontrast CT  -MRI  -Follow Neurology    COVID PUI  -droplet plus  -low suspicion  -Follow test results    Large left inguinal hernia  -noted on CT abd  -Outpatient follow up     NPO  -will start the patient on Clears    The patient is quite critical and is a high risk of clinical decline     Code status: FULL CODE  DVT prophylaxis: scd    Plan: Follow Neurology, Cardiology, GI    Care Plan discussed with: Patient/Family  Anticipated Disposition: TBD  Anticipated Discharge: 24 hours to 48 hours     Hospital Problems  Date Reviewed: 9/8/2020          Codes Class Noted POA    * (Principal) NSTEMI (non-ST elevated myocardial infarction) [facility]) ICD-10-CM: I21.4  ICD-9-CM: 410.70  9/7/2020 Yes                Review of Systems:   A comprehensive review of systems was negative except for that written in the HPI. Vital Signs:    Last 24hrs VS reviewed since prior progress note. Most recent are:  Visit Vitals  /85 (BP 1 Location: Left arm, BP Patient Position: At rest)   Pulse (!) 109   Temp 97.7 °F (36.5 °C)   Resp 20   Ht 5' 9\" (1.753 m)   Wt 68.9 kg (151 lb 14.4 oz)   SpO2 96%   BMI 22.43 kg/m²         Intake/Output Summary (Last 24 hours) at 9/8/2020 1036  Last data filed at 9/8/2020 0340  Gross per 24 hour   Intake 844.6 ml   Output    Net 844.6 ml        Physical Examination:             Constitutional:  No acute distress, cooperative, pleasant    ENT:  Oral mucosa moist, oropharynx benign. Resp:  CTA bilaterally. No wheezing/rhonchi/rales. No accessory muscle use   CV:  Regular rhythm, normal rate, no murmurs, gallops, rubs    GI:  Soft, non distended, non tender. normoactive bowel sounds, no hepatosplenomegaly     Musculoskeletal:  No edema, warm, 2+ pulses throughout    Neurologic:  Moves all extremities.   AAOx3, CN II-XII reviewed     Skin:  Good turgor, no rashes or ulcers       Data Review:    Review and/or order of clinical lab test      Labs:     Recent Labs     09/08/20  0149 09/07/20  1258   WBC  --  9.3   HGB 7.5* 5.2* HCT  --  17.8*   PLT  --  476*     Recent Labs     09/08/20  0455 09/07/20  1259 09/07/20  1258     --  138   K 4.0  --  4.0   *  --  108   CO2 13*  --  19*   BUN 48*  --  44*   CREA 1.53*  --  1.61*   *  --  107*   CA 7.9*  --  8.4*   MG  --   --  2.6*   PHOS  --  3.5  --      Recent Labs     09/07/20  1258   ALT 20      TBILI 0.4   TP 6.8   ALB 2.5*   GLOB 4.3*     Recent Labs     09/07/20  1259   INR 1.4*   PTP 14.4*      No results for input(s): FE, TIBC, PSAT, FERR in the last 72 hours. No results found for: FOL, RBCF   No results for input(s): PH, PCO2, PO2 in the last 72 hours.   Recent Labs     09/08/20 0455 09/08/20  0149 09/07/20  1258   CKNDX  --   --  1.7   TROIQ 15.30* 12.00* 8.33*     No results found for: CHOL, CHOLX, CHLST, CHOLV, HDL, HDLP, LDL, LDLC, DLDLP, TGLX, TRIGL, TRIGP, CHHD, CHHDX  Lab Results   Component Value Date/Time    Glucose (POC) 111 (H) 09/08/2020 06:40 AM    Glucose (POC) 110 (H) 09/07/2020 11:47 PM    Glucose (POC) 100 09/07/2020 01:29 PM     No results found for: COLOR, APPRN, SPGRU, REFSG, YESY, PROTU, GLUCU, KETU, BILU, UROU, PRADEEP, LEUKU, GLUKE, EPSU, BACTU, WBCU, RBCU, CASTS, UCRY      Medications Reviewed:     Current Facility-Administered Medications   Medication Dose Route Frequency    0.9% sodium chloride infusion 250 mL  250 mL IntraVENous PRN    sodium chloride (NS) flush 5-40 mL  5-40 mL IntraVENous Q8H    sodium chloride (NS) flush 5-40 mL  5-40 mL IntraVENous PRN    0.9% sodium chloride infusion  75 mL/hr IntraVENous CONTINUOUS    ondansetron (ZOFRAN) injection 4 mg  4 mg IntraVENous Q4H PRN    pantoprazole (PROTONIX) 40 mg in 0.9% sodium chloride 10 mL injection  40 mg IntraVENous Q12H     ______________________________________________________________________  EXPECTED LENGTH OF STAY: - - -  ACTUAL LENGTH OF STAY:          1                 Cody Gibson MD

## 2020-09-08 NOTE — CONSULTS
INPATIENT NEUROLOGY CONSULTATION  9/8/2020     Consulted by: Boom Farmer MD        Patient ID:  Meli Butler  [id number]  69 y.o.  1950    Chief Complaint   Patient presents with    Decreased Appetite    Lethargy       [facility]    Dutch Padilla is a 76-year-old gentleman who presents from an [facility] for progressive weakness and lethargy. He is not a very good historian. He has a history of stroke with impairment of language. It sounds like according to the record he has not been eating or drinking much. There was concern he had new left-sided weakness and neurology was consulted. Concomitant to all of this he is getting blood transfusions for acute anemia. Head CT was done showing chronic ischemic changes more on the left. Review of Systems   Unable to perform ROS: Language       Past Medical History:   Diagnosis Date    Skin cancer 12/19/2019    bcc-left forehead, left neck, left lateral cheek     Stroke (cerebrum) ([facility] [address]      History reviewed. No pertinent family history. Social History     Socioeconomic History    Marital status:      Spouse name: Not on file    Number of children: Not on file    Years of education: Not on file    Highest education level: Not on file   Occupational History    Not on file   Social Needs    Financial resource strain: Not on file    Food insecurity     Worry: Not on file     Inability: Not on file    Transportation needs     Medical: Not on file     Non-medical: Not on file   Tobacco Use    Smoking status: Current Every Day Smoker    Smokeless tobacco: Never Used   Substance and Sexual Activity    Alcohol use:  Yes    Drug use: Not on file    Sexual activity: Not on file   Lifestyle    Physical activity     Days per week: Not on file     Minutes per session: Not on file    Stress: Not on file   Relationships    Social connections     Talks on phone: Not on file     Gets together: Not on file     Attends Faith service: Not on file     Active member of club or organization: Not on file     Attends meetings of clubs or organizations: Not on file     Relationship status: Not on file    Intimate partner violence     Fear of current or ex partner: Not on file     Emotionally abused: Not on file     Physically abused: Not on file     Forced sexual activity: Not on file   Other Topics Concern    Not on file   Social History Narrative    Not on file     Current Facility-Administered Medications   Medication Dose Route Frequency    0.45% sodium chloride 1,000 mL with sodium bicarbonate (8.4%) 75 mEq infusion   IntraVENous CONTINUOUS    pramipexole (MIRAPEX) tablet 0.125 mg  0.125 mg Oral QHS    atorvastatin (LIPITOR) tablet 20 mg  20 mg Oral QHS    metoprolol tartrate (LOPRESSOR) tablet 12.5 mg  12.5 mg Oral Q12H    0.9% sodium chloride infusion 250 mL  250 mL IntraVENous PRN    sodium chloride (NS) flush 5-40 mL  5-40 mL IntraVENous Q8H    sodium chloride (NS) flush 5-40 mL  5-40 mL IntraVENous PRN    ondansetron (ZOFRAN) injection 4 mg  4 mg IntraVENous Q4H PRN    pantoprazole (PROTONIX) 40 mg in 0.9% sodium chloride 10 mL injection  40 mg IntraVENous Q12H     No Known Allergies    Visit Vitals  /87   Pulse 99   Temp 98 °F (36.7 °C)   Resp 20   Ht 5' 9\" (1.753 m)   Wt 68.9 kg (151 lb 14.4 oz)   SpO2 96%   BMI 22.43 kg/m²     Physical Exam   Constitutional: He appears well-developed and well-nourished. Cardiovascular: Normal rate. Pulmonary/Chest: Effort normal.   Skin: Skin is warm and dry. There is pallor. Vitals reviewed. Neurologic Exam     Mental Status   Age-appropriate gentleman awake and alert in bed. Exam limited as he has aphasia. He is very distressed when I mention about his history of stroke.   Pupils appear equal, EOMI  Face is asymmetric to the right tongue is midline speech is slurred with aphasia intermittently  He has right-sided hemiparesis leg more than arm approximately 3+  Left side approximately 4+ with good effort  He tells me sensation is symmetric  No abnormal movements  Gait deferred            Lab Results   Component Value Date/Time    WBC 9.3 09/07/2020 12:58 PM    HGB 7.5 (L) 09/08/2020 01:49 AM    HCT 17.8 (LL) 09/07/2020 12:58 PM    PLATELET 036 (H) 99/27/4772 12:58 PM    MCV 90.4 09/07/2020 12:58 PM     Lab Results   Component Value Date/Time    Glucose 108 (H) 09/08/2020 04:55 AM    Glucose (POC) 111 (H) 09/08/2020 06:40 AM    Creatinine 1.53 (H) 09/08/2020 04:55 AM      No results found for: CHOL, CHOLPOCT, HDL, LDL, LDLC, LDLCPOC, LDLCEXT, TRIGL, TGLPOCT, CHHD, CHHDX  Lab Results   Component Value Date/Time    ALT (SGPT) 20 09/07/2020 12:58 PM    Alk. phosphatase 109 09/07/2020 12:58 PM    Bilirubin, total 0.4 09/07/2020 12:58 PM    Albumin 2.5 (L) 09/07/2020 12:58 PM    Protein, total 6.8 09/07/2020 12:58 PM    INR 1.4 (H) 09/07/2020 12:59 PM    Prothrombin time 14.4 (H) 09/07/2020 12:59 PM    PLATELET 856 (H) 10/53/5244 12:58 PM        CT Results (maximum last 3): Results from [place] encounter on 09/07/20   CT ABD PELV WO CONT    Narrative EXAM: CT ABD PELV WO CONT    INDICATION: GI bleed    COMPARISON: None    CONTRAST:  None. TECHNIQUE:   Thin axial images were obtained through the abdomen and pelvis. Coronal and  sagittal reformats were generated. Oral contrast was not administered. CT dose  reduction was achieved through use of a standardized protocol tailored for this  examination and automatic exposure control for dose modulation. The absence of intravenous contrast material reduces the sensitivity for  evaluation of the vasculature and solid organs. FINDINGS:   LOWER THORAX: Bilateral pleural effusions with the subsegmental atelectasis. LIVER: No mass. BILIARY TREE: Gallbladder is within normal limits. CBD is not dilated. SPLEEN: within normal limits. PANCREAS: No focal abnormality. ADRENALS: Unremarkable.   KIDNEYS/URETERS: Small right kidney nonobstructing left renal calculi. STOMACH: Unremarkable. SMALL BOWEL: No dilatation or wall thickening. COLON: No dilatation or wall thickening. APPENDIX: Axial image 54  PERITONEUM: No ascites or pneumoperitoneum. RETROPERITONEUM: No lymphadenopathy or aortic aneurysm. REPRODUCTIVE ORGANS: Small prostate  URINARY BLADDER: No mass or calculus. BONES: No destructive bone lesion. ABDOMINAL WALL: Left inguinal bowel-containing hernia with neck measurement of 4  cm and outer diameter of 9 cm. ADDITIONAL COMMENTS: N/A      Impression IMPRESSION:  Large left inguinal hernia  Bilateral pleural effusions with subsegmental atelectasis  Nonobstructing left renal calculus  Small right kidney   CT HEAD WO CONT    Narrative EXAM: CT HEAD WO CONT    INDICATION: slurred speech    COMPARISON: None. CONTRAST: None. TECHNIQUE: Unenhanced CT of the head was performed using 5 mm images. Brain and  bone windows were generated. Coronal and sagittal reformats. CT dose reduction  was achieved through use of a standardized protocol tailored for this  examination and automatic exposure control for dose modulation. FINDINGS:  The ventricles and sulci are normal in size, shape and configuration. . There are  lacunar infarcts in the left external capsule and right periventricular white  matter. Periventricular white matter hypodensity is asymmetric to the left. There is no intracranial hemorrhage, extra-axial collection, or mass effect. The  basilar cisterns are open. No CT evidence of acute infarct. The bone windows demonstrate no abnormalities. The visualized portions of the  paranasal sinuses and mastoid air cells are clear. Impression IMPRESSION:   Asymmetric white matter disease and lacunar infarcts. No acute process  identified by noncontrast CT.           MRI Results (maximum last 3): No results found for this or any previous visit. VAS/US/Carotid Doppler Results (maximum last 3):   No results found for this or any previous visit.    PET Results (maximum last 3): No results found for this or any previous visit. Assessment and Plan        76-year-old gentleman who has a history of stroke according to the imaging and based on clinical presentation. He has right hemiparesis and aphasia consistent with the old stroke I see. Left side examination does not show any clear deficit and he indicates to me it is normal.  At this juncture I would continue the current management regarding his overall acute anemia and debility and dehydration. Hold off on MRI since he is being tested for COVID. Once he is medically cleared it is okay to pursue the MRI brain if he still needs it. If he is cleared from an anemia standpoint he should be back on a baby aspirin daily. Please call if needed. This clinical note was dictated with an electronic dictation software that can make unintentional errors. If there are any questions, please contact me directly for clarification.       2 [facility], DO  NEUROLOGIST  Diplomate JOSE  9/8/2020

## 2020-09-08 NOTE — PROGRESS NOTES
Problem: Falls - Risk of  Goal: *Absence of Falls  Description: Document Valerie [place] Fall Risk and appropriate interventions in the flowsheet. Outcome: Progressing Towards Goal  Note: Fall Risk Interventions:  Mobility Interventions: Communicate number of staff needed for ambulation/transfer, Patient to call before getting OOB, Bed/chair exit alarm    Mentation Interventions: Adequate sleep, hydration, pain control, Bed/chair exit alarm, More frequent rounding, Reorient patient, Toileting rounds, Update white board         Elimination Interventions: Bed/chair exit alarm, Call light in reach, Patient to call for help with toileting needs, Toileting schedule/hourly rounds    History of Falls Interventions: Bed/chair exit alarm, Evaluate medications/consider consulting pharmacy, Room close to nurse's station         Problem: Pressure Injury - Risk of  Goal: *Prevention of pressure injury  Description: Document Judson Scale and appropriate interventions in the flowsheet. Outcome: Progressing Towards Goal  Note: Pressure Injury Interventions:  Sensory Interventions: Assess changes in LOC, Assess need for specialty bed, Keep linens dry and wrinkle-free, Minimize linen layers, Turn and reposition approx. every two hours (pillows and wedges if needed), Float heels    Moisture Interventions: Absorbent underpads, Apply protective barrier, creams and emollients, Minimize layers, Moisture barrier, Check for incontinence Q2 hours and as needed    Activity Interventions: Assess need for specialty bed, Pressure redistribution bed/mattress(bed type)    Mobility Interventions: Pressure redistribution bed/mattress (bed type), Float heels, HOB 30 degrees or less, PT/OT evaluation, Turn and reposition approx.  every two hours(pillow and wedges)    Nutrition Interventions: Document food/fluid/supplement intake    Friction and Shear Interventions: Apply protective barrier, creams and emollients, Minimize layers, HOB 30 degrees or less, Transferring/repositioning devices                Problem: Risk for Spread of Infection  Goal: Prevent transmission of infectious organism to others  Description: Prevent the transmission of infectious organisms to other patients, staff members, and visitors. Outcome: Progressing Towards Goal  Note: Patient is currently on droplet plus precautions for COVID PUI. Problem: General Medical Care Plan  Goal: *Absence of infection signs and symptoms  Outcome: Progressing Towards Goal  Note: Patient has remained free of fevers and infections. Patient remains stable during shift.

## 2020-09-09 ENCOUNTER — APPOINTMENT (OUTPATIENT)
Dept: NON INVASIVE DIAGNOSTICS | Age: 70
DRG: 064 | End: 2020-09-09
Attending: PHYSICIAN ASSISTANT
Payer: MEDICARE

## 2020-09-09 LAB
ALBUMIN SERPL-MCNC: 2.2 G/DL (ref 3.5–5)
ALBUMIN/GLOB SERPL: 0.6 {RATIO} (ref 1.1–2.2)
ALP SERPL-CCNC: 97 U/L (ref 45–117)
ALT SERPL-CCNC: 20 U/L (ref 12–78)
ANION GAP SERPL CALC-SCNC: 11 MMOL/L (ref 5–15)
AST SERPL-CCNC: 34 U/L (ref 15–37)
ATRIAL RATE: 101 BPM
ATRIAL RATE: 103 BPM
BASOPHILS # BLD: 0.1 K/UL (ref 0–0.1)
BASOPHILS NFR BLD: 1 % (ref 0–1)
BILIRUB SERPL-MCNC: 0.5 MG/DL (ref 0.2–1)
BUN SERPL-MCNC: 44 MG/DL (ref 6–20)
BUN/CREAT SERPL: 32 (ref 12–20)
CALCIUM SERPL-MCNC: 8.2 MG/DL (ref 8.5–10.1)
CALCULATED P AXIS, ECG09: 14 DEGREES
CALCULATED P AXIS, ECG09: 30 DEGREES
CALCULATED R AXIS, ECG10: 36 DEGREES
CALCULATED R AXIS, ECG10: 38 DEGREES
CALCULATED T AXIS, ECG11: 2 DEGREES
CALCULATED T AXIS, ECG11: 52 DEGREES
CHLORIDE SERPL-SCNC: 111 MMOL/L (ref 97–108)
CO2 SERPL-SCNC: 19 MMOL/L (ref 21–32)
COVID-19, XGCOVT: NOT DETECTED
CREAT SERPL-MCNC: 1.37 MG/DL (ref 0.7–1.3)
DIAGNOSIS, 93000: NORMAL
DIAGNOSIS, 93000: NORMAL
DIFFERENTIAL METHOD BLD: ABNORMAL
ECHO AO ROOT DIAM: 3 CM
ECHO AV AREA PEAK VELOCITY: 2.08 CM2
ECHO AV AREA/BSA PEAK VELOCITY: 1.1 CM2/M2
ECHO AV PEAK GRADIENT: 5.03 MMHG
ECHO AV PEAK VELOCITY: 112.1 CM/S
ECHO EST RA PRESSURE: 3 MMHG
ECHO LA AREA 4C: 17.06 CM2
ECHO LA MAJOR AXIS: 4.02 CM
ECHO LA MINOR AXIS: 2.16 CM
ECHO LA VOL 2C: 49.71 ML (ref 18–58)
ECHO LA VOL 4C: 44.74 ML (ref 18–58)
ECHO LA VOL BP: 51.78 ML (ref 18–58)
ECHO LA VOL/BSA BIPLANE: 27.86 ML/M2 (ref 16–28)
ECHO LA VOLUME INDEX A2C: 26.75 ML/M2 (ref 16–28)
ECHO LA VOLUME INDEX A4C: 24.07 ML/M2 (ref 16–28)
ECHO LV INTERNAL DIMENSION DIASTOLIC: 4.59 CM (ref 4.2–5.9)
ECHO LV INTERNAL DIMENSION SYSTOLIC: 3.88 CM
ECHO LV IVSD: 0.97 CM (ref 0.6–1)
ECHO LV MASS 2D: 147.6 G (ref 88–224)
ECHO LV MASS INDEX 2D: 79.4 G/M2 (ref 49–115)
ECHO LV POSTERIOR WALL DIASTOLIC: 0.93 CM (ref 0.6–1)
ECHO LVOT DIAM: 1.97 CM
ECHO LVOT PEAK GRADIENT: 2.37 MMHG
ECHO LVOT PEAK VELOCITY: 76.92 CM/S
ECHO MV A VELOCITY: 58.72 CM/S
ECHO MV AREA PHT: 4.95 CM2
ECHO MV E DECELERATION TIME (DT): 0.15 S
ECHO MV E VELOCITY: 99.93 CM/S
ECHO MV E/A RATIO: 1.7
ECHO MV PRESSURE HALF TIME (PHT): 0.04 S
ECHO PV PEAK INSTANTANEOUS GRADIENT SYSTOLIC: 3.23 MMHG
ECHO RIGHT VENTRICULAR SYSTOLIC PRESSURE (RVSP): 43.74 MMHG
ECHO RV INTERNAL DIMENSION: 4.51 CM
ECHO RV TAPSE: 2.02 CM (ref 1.5–2)
ECHO TV REGURGITANT MAX VELOCITY: 319.13 CM/S
ECHO TV REGURGITANT PEAK GRADIENT: 40.74 MMHG
EOSINOPHIL # BLD: 0.1 K/UL (ref 0–0.4)
EOSINOPHIL NFR BLD: 1 % (ref 0–7)
ERYTHROCYTE [DISTWIDTH] IN BLOOD BY AUTOMATED COUNT: 15.4 % (ref 11.5–14.5)
GLOBULIN SER CALC-MCNC: 3.7 G/DL (ref 2–4)
GLUCOSE BLD STRIP.AUTO-MCNC: 147 MG/DL (ref 65–100)
GLUCOSE SERPL-MCNC: 127 MG/DL (ref 65–100)
HCT VFR BLD AUTO: 26.2 % (ref 36.6–50.3)
HEALTH STATUS, XMCV2T: NORMAL
HGB BLD-MCNC: 8.3 G/DL (ref 12.1–17)
IMM GRANULOCYTES # BLD AUTO: 0.1 K/UL (ref 0–0.04)
IMM GRANULOCYTES NFR BLD AUTO: 1 % (ref 0–0.5)
LYMPHOCYTES # BLD: 0.6 K/UL (ref 0.8–3.5)
LYMPHOCYTES NFR BLD: 6 % (ref 12–49)
MCH RBC QN AUTO: 28.5 PG (ref 26–34)
MCHC RBC AUTO-ENTMCNC: 31.7 G/DL (ref 30–36.5)
MCV RBC AUTO: 90 FL (ref 80–99)
MONOCYTES # BLD: 0.7 K/UL (ref 0–1)
MONOCYTES NFR BLD: 7 % (ref 5–13)
NEUTS SEG # BLD: 9 K/UL (ref 1.8–8)
NEUTS SEG NFR BLD: 84 % (ref 32–75)
NRBC # BLD: 0.02 K/UL (ref 0–0.01)
NRBC BLD-RTO: 0.2 PER 100 WBC
P-R INTERVAL, ECG05: 116 MS
P-R INTERVAL, ECG05: 130 MS
PLATELET # BLD AUTO: 358 K/UL (ref 150–400)
PMV BLD AUTO: 11.6 FL (ref 8.9–12.9)
POTASSIUM SERPL-SCNC: 3.5 MMOL/L (ref 3.5–5.1)
PROT SERPL-MCNC: 5.9 G/DL (ref 6.4–8.2)
Q-T INTERVAL, ECG07: 366 MS
Q-T INTERVAL, ECG07: 376 MS
QRS DURATION, ECG06: 90 MS
QRS DURATION, ECG06: 92 MS
QTC CALCULATION (BEZET), ECG08: 479 MS
QTC CALCULATION (BEZET), ECG08: 487 MS
RBC # BLD AUTO: 2.91 M/UL (ref 4.1–5.7)
RBC MORPH BLD: ABNORMAL
SERVICE CMNT-IMP: ABNORMAL
SODIUM SERPL-SCNC: 141 MMOL/L (ref 136–145)
SOURCE, COVRS: NORMAL
SPECIMEN SOURCE, FCOV2M: NORMAL
SPECIMEN TYPE, XMCV1T: NORMAL
TROPONIN I SERPL-MCNC: 8.74 NG/ML
VENTRICULAR RATE, ECG03: 101 BPM
VENTRICULAR RATE, ECG03: 103 BPM
WBC # BLD AUTO: 10.6 K/UL (ref 4.1–11.1)

## 2020-09-09 PROCEDURE — 74011250637 HC RX REV CODE- 250/637: Performed by: PHYSICIAN ASSISTANT

## 2020-09-09 PROCEDURE — 93306 TTE W/DOPPLER COMPLETE: CPT | Performed by: SPECIALIST

## 2020-09-09 PROCEDURE — 74011000250 HC RX REV CODE- 250: Performed by: FAMILY MEDICINE

## 2020-09-09 PROCEDURE — 36415 COLL VENOUS BLD VENIPUNCTURE: CPT

## 2020-09-09 PROCEDURE — 99233 SBSQ HOSP IP/OBS HIGH 50: CPT | Performed by: SPECIALIST

## 2020-09-09 PROCEDURE — 74011250637 HC RX REV CODE- 250/637: Performed by: SPECIALIST

## 2020-09-09 PROCEDURE — 84484 ASSAY OF TROPONIN QUANT: CPT

## 2020-09-09 PROCEDURE — 74011000258 HC RX REV CODE- 258: Performed by: HOSPITALIST

## 2020-09-09 PROCEDURE — 77010033678 HC OXYGEN DAILY

## 2020-09-09 PROCEDURE — 74011000250 HC RX REV CODE- 250: Performed by: HOSPITALIST

## 2020-09-09 PROCEDURE — 85025 COMPLETE CBC W/AUTO DIFF WBC: CPT

## 2020-09-09 PROCEDURE — 36600 WITHDRAWAL OF ARTERIAL BLOOD: CPT

## 2020-09-09 PROCEDURE — 74011250637 HC RX REV CODE- 250/637: Performed by: HOSPITALIST

## 2020-09-09 PROCEDURE — 80053 COMPREHEN METABOLIC PANEL: CPT

## 2020-09-09 PROCEDURE — 65660000000 HC RM CCU STEPDOWN

## 2020-09-09 PROCEDURE — C9113 INJ PANTOPRAZOLE SODIUM, VIA: HCPCS | Performed by: FAMILY MEDICINE

## 2020-09-09 PROCEDURE — 74011250636 HC RX REV CODE- 250/636: Performed by: FAMILY MEDICINE

## 2020-09-09 PROCEDURE — 82962 GLUCOSE BLOOD TEST: CPT

## 2020-09-09 PROCEDURE — 93306 TTE W/DOPPLER COMPLETE: CPT

## 2020-09-09 RX ORDER — METOPROLOL TARTRATE 25 MG/1
25 TABLET, FILM COATED ORAL EVERY 12 HOURS
Status: DISCONTINUED | OUTPATIENT
Start: 2020-09-09 | End: 2020-09-17 | Stop reason: HOSPADM

## 2020-09-09 RX ADMIN — Medication 10 ML: at 15:02

## 2020-09-09 RX ADMIN — ATORVASTATIN CALCIUM 20 MG: 20 TABLET, FILM COATED ORAL at 22:18

## 2020-09-09 RX ADMIN — METOPROLOL TARTRATE 25 MG: 25 TABLET, FILM COATED ORAL at 22:17

## 2020-09-09 RX ADMIN — SODIUM CHLORIDE 40 MG: 9 INJECTION INTRAMUSCULAR; INTRAVENOUS; SUBCUTANEOUS at 08:40

## 2020-09-09 RX ADMIN — Medication 10 ML: at 23:20

## 2020-09-09 RX ADMIN — METOPROLOL TARTRATE 12.5 MG: 25 TABLET, FILM COATED ORAL at 08:40

## 2020-09-09 RX ADMIN — PRAMIPEXOLE DIHYDROCHLORIDE 0.12 MG: 0.25 TABLET ORAL at 23:20

## 2020-09-09 RX ADMIN — SODIUM BICARBONATE: 84 INJECTION, SOLUTION INTRAVENOUS at 04:24

## 2020-09-09 RX ADMIN — Medication 10 ML: at 05:03

## 2020-09-09 RX ADMIN — SODIUM CHLORIDE 40 MG: 9 INJECTION INTRAMUSCULAR; INTRAVENOUS; SUBCUTANEOUS at 22:18

## 2020-09-09 RX ADMIN — SODIUM BICARBONATE: 84 INJECTION, SOLUTION INTRAVENOUS at 18:06

## 2020-09-09 NOTE — PROGRESS NOTES
[address] [facility]  Hospitalist Group                                                                                          Hospitalist Progress Note  Morales Hinton MD  Answering service: [phone] from in house phone        Date of Service:  2020  NAME:  Natasha Johns  :  1950  MRN:  [id number]      Admission Summary:   Natasha Johns is a 69 y.o. male who presents with fatigue and dehydration     Patient is dysarthric and is a very poor historian, not much history could be obtained from the patient. I tried calling the patient's next to kin Coral Comas, as listed on the chart but did not get a response. Thus history was extremely limited, patient apparently lives in assisted living facility by himself, was noted to be fatigued and dehydrated, has not been eating drinking much, has not been taking his medication, and thus was sent to the hospital for further management and evaluation. Apparently patient has left-sided weakness after his stroke currently has some slurred speech, unclear whether this is new or old. No further history can be obtained from the patient. Patient came to the ER, was found to have a hemoglobin of 5.2 and was requested to be admitted to the hospitalist service.     Interval history / Subjective:     F/u anemia   COVID negative  Hb stable  Assessment & Plan:     Acute anemia  -unclear etiology  -h/h  -s/p 2 units PRBC  -transfuse for hb<7    Probable GI bleed  -FOBT negative  -Appreciate GI, no immediate plans for endoscopy    NSTEMI:  -troponins elevated  -Cardiology on board, follow Echo. Per cardiology, the patient is not a candidate for cardiac cath        Elevated creatinine  Most likley CKD stage III  -Monitor    Metabolic acidosis, non anion gap  -Continues to improve with bicarb, continue     Acute metabolic encephalopathy  -CT head  Asymmetric white matter disease and lacunar infarcts.  No acute process  identified by noncontrast CT  -MRI brain, follow  -Follow Neurology    COVID PUI  -droplet plus  -COVID negative, will take off droplet plus isolation    Large left inguinal hernia  -noted on CT abd  -Outpatient follow up    Scrotal edema  -Appreciate Urology, elevates scrotum     Clears    The patient is quite critical and is a high risk of clinical decline     Code status: FULL CODE  DVT prophylaxis: scd    Plan: Follow Neurology, Cardiology, GI    Care Plan discussed with: Patient/Family  Anticipated Disposition: TBD  Anticipated Discharge: 24 hours to 48 hours     Hospital Problems  Date Reviewed: 9/8/2020          Codes Class Noted POA    * (Principal) NSTEMI (non-ST elevated myocardial infarction) ([facility] ICD-10-CM: I21.4  ICD-9-CM: 410.70  9/7/2020 Yes                Review of Systems:   A comprehensive review of systems was negative except for that written in the HPI. Vital Signs:    Last 24hrs VS reviewed since prior progress note. Most recent are:  Visit Vitals  /65   Pulse 92   Temp 98.5 °F (36.9 °C)   Resp 20   Ht 5' 9\" (1.753 m)   Wt 70.8 kg (156 lb)   SpO2 93%   BMI 23.04 kg/m²         Intake/Output Summary (Last 24 hours) at 9/9/2020 1403  Last data filed at 9/9/2020 0850  Gross per 24 hour   Intake 332.5 ml   Output    Net 332.5 ml        Physical Examination:             Constitutional:  No acute distress, cooperative, pleasant    ENT:  Oral mucosa moist, oropharynx benign. Resp:  CTA bilaterally. No wheezing/rhonchi/rales. No accessory muscle use   CV:  Regular rhythm, normal rate, no murmurs, gallops, rubs    GI:  Soft, non distended, non tender. normoactive bowel sounds, no hepatosplenomegaly     Musculoskeletal:  No edema, warm, 2+ pulses throughout    Neurologic:  Moves all extremities.   AAOx3, CN II-XII reviewed     Skin:  Good turgor, no rashes or ulcers       Data Review:    Review and/or order of clinical lab test      Labs:     Recent Labs     09/09/20  0548 09/08/20  0149 09/07/20  1258   WBC 10.6 --  9.3   HGB 8.3* 7.5* 5.2*   HCT 26.2*  --  17.8*     --  476*     Recent Labs     09/09/20  0548 09/08/20  0455 09/07/20  1259 09/07/20  1258    140  --  138   K 3.5 4.0  --  4.0   * 113*  --  108   CO2 19* 13*  --  19*   BUN 44* 48*  --  44*   CREA 1.37* 1.53*  --  1.61*   * 108*  --  107*   CA 8.2* 7.9*  --  8.4*   MG  --   --   --  2.6*   PHOS  --   --  3.5  --      Recent Labs     09/09/20  0548 09/07/20  1258   ALT 20 20   AP 97 109   TBILI 0.5 0.4   TP 5.9* 6.8   ALB 2.2* 2.5*   GLOB 3.7 4.3*     Recent Labs     09/07/20  1259   INR 1.4*   PTP 14.4*      No results for input(s): FE, TIBC, PSAT, FERR in the last 72 hours. No results found for: FOL, RBCF   No results for input(s): PH, PCO2, PO2 in the last 72 hours. Recent Labs     09/09/20 0548 09/08/20  2101 09/08/20  1439  09/07/20  1258   CKNDX  --   --   --   --  1.7   TROIQ 8.74* 10.40* 11.40*   < > 8.33*    < > = values in this interval not displayed.      No results found for: CHOL, CHOLX, CHLST, CHOLV, HDL, HDLP, LDL, LDLC, DLDLP, TGLX, TRIGL, TRIGP, CHHD, CHHDX  Lab Results   Component Value Date/Time    Glucose (POC) 147 (H) 09/09/2020 12:33 PM    Glucose (POC) 111 (H) 09/08/2020 06:40 AM    Glucose (POC) 110 (H) 09/07/2020 11:47 PM    Glucose (POC) 100 09/07/2020 01:29 PM     No results found for: COLOR, APPRN, SPGRU, REFSG, YESY, PROTU, GLUCU, KETU, BILU, UROU, PRADEEP, LEUKU, GLUKE, EPSU, BACTU, WBCU, RBCU, CASTS, UCRY      Medications Reviewed:     Current Facility-Administered Medications   Medication Dose Route Frequency    metoprolol tartrate (LOPRESSOR) tablet 25 mg  25 mg Oral Q12H    0.45% sodium chloride 1,000 mL with sodium bicarbonate (8.4%) 75 mEq infusion   IntraVENous CONTINUOUS    pramipexole (MIRAPEX) tablet 0.125 mg  0.125 mg Oral QHS    atorvastatin (LIPITOR) tablet 20 mg  20 mg Oral QHS    0.9% sodium chloride infusion 250 mL  250 mL IntraVENous PRN    sodium chloride (NS) flush 5-40 mL  5-40 mL IntraVENous Q8H    sodium chloride (NS) flush 5-40 mL  5-40 mL IntraVENous PRN    ondansetron (ZOFRAN) injection 4 mg  4 mg IntraVENous Q4H PRN    pantoprazole (PROTONIX) 40 mg in 0.9% sodium chloride 10 mL injection  40 mg IntraVENous Q12H     ______________________________________________________________________  EXPECTED LENGTH OF STAY: 3d 14h  ACTUAL LENGTH OF STAY:          2                 Morales Hinton MD

## 2020-09-09 NOTE — PROGRESS NOTES
[address] [place] [address]               Division of Cardiology  [phone]                       Progress note              Ashley Live M.D., F.A.CSaraC. NAME:  Shanelle Rosa   :   1950   MRN:   [id number]         ICD-10-CM ICD-9-CM    1. NSTEMI (non-ST elevated [facility] infarction) [facility]  I21.4 410.70    2. Dehydration  E86.0 276.51    3. Anemia requiring transfusions  D64.9 285.9    4. Chronic ischemic left MCA stroke  I69.30 V12.54                  HPI  69years old gentleman seen today after he ruled out for COVID with past medical history remarkable for a CVA and dysarthria. He also has a history of hypertension hyperlipidemia. He is not a very good historian. He was admitted for fatigue and dehydration. The patient denies any chest pain or shortness of breath before his admission. Cardiology was called for a relatively significant elevation in troponin. He is seemingly asymptomatic cardiac wise today. He feels okay she he tells me. Assessment/Plan:    1. NSTEMI: Patient has experienced a non-ST elevation MI. His troponin peaked at 15.3. His EKG also showed ST depression in the anterolateral leads which have now recovered. Once again he remains asymptomatic at this time and clearly the severe and profound anemia contributed trigger at this ischemic episode. He likely has underlying coronary disease. Unfortunately interventions are limited at this point until the cause of anemia is identified. He will need cardiac catheterization but clearly dual antiplatelet agents and or anticoagulant are not an option right at this time. Therefore cardiac catheterization will be on hold until the etiology of edema is identified and corrected. 2.  Anemia: His hemoglobin appears more stable. GI is also following there is no plan for endoscopy at this time.   Maintain hemoglobin above 8 if possible. 3.  TUSHAR: Better likely related dehydration and blood loss. Closely followed by primary team.    4.  Large inguinal hernia: On the left noted on the CT scan of the abdomen. 5.  COVID: He ruled out for COVID at this time. We will continue with Lopressor twice a day this will be increased today since his blood pressure seems acceptable. Continue with statin. Clearly no aspirin or other anticoagulation is possible at this time.            CARDIAC STUDIES                      EKG Results     Procedure 720 Value Units Date/Time    EKG, 12 LEAD, INITIAL [[id number]] Collected:  09/08/20 0703    Order Status:  Completed Updated:  09/09/20 0707     Ventricular Rate 103 BPM      Atrial Rate 103 BPM      P-R Interval 116 ms      QRS Duration 90 ms      Q-T Interval 366 ms      QTC Calculation (Bezet) 479 ms      Calculated P Axis 30 degrees      Calculated R Axis 38 degrees      Calculated T Axis 2 degrees      Diagnosis --     Sinus tachycardia  Nonspecific ST and T wave abnormality Prolonged QT  When compared with ECG of 08-SEP-2020 07:01,  No significant change was found  Confirmed by Antonette Reece M.D., Bobbie Sickle ([id number]) on 9/9/2020 7:07:00 AM      EKG, 12 LEAD, INITIAL [[id number]] Collected:  09/08/20 0701    Order Status:  Completed Updated:  09/09/20 0707     Ventricular Rate 101 BPM      Atrial Rate 101 BPM      P-R Interval 130 ms      QRS Duration 92 ms      Q-T Interval 376 ms      QTC Calculation (Bezet) 487 ms      Calculated P Axis 14 degrees      Calculated R Axis 36 degrees      Calculated T Axis 52 degrees      Diagnosis --     Sinus tachycardia with short FL  Nonspecific ST and T wave abnormality  When compared with ECG of 07-SEP-2020 14:52,  Nonspecific T wave abnormality now evident in Inferior leads  Confirmed by Antonette Reece M.D., Bobbie Sickle ([id number]) on 9/9/2020 7:06:45 AM      EKG, 12 LEAD, INITIAL [[id number]] Collected:  09/07/20 1452    Order Status:  Completed Updated:  09/08/20 3851 Ventricular Rate 110 BPM      Atrial Rate 110 BPM      P-R Interval 86 ms      QRS Duration 84 ms      Q-T Interval 372 ms      QTC Calculation (Bezet) 503 ms      Calculated P Axis -9 degrees      Calculated R Axis 40 degrees      Calculated T Axis 31 degrees      Diagnosis --     Sinus tachycardia with short DC  ST depression, consider subendocardial injury  Nonspecific T wave abnormality Prolonged QT  When compared with ECG of 07-SEP-2020 13:34,  ST more depressed in Anterolateral leads  Confirmed by Reece Patrick M.D., Thomas Zaldivar ([id number]) on 9/8/2020 6:31:51 PM      EKG, 12 LEAD, INITIAL [[id number]] Collected:  09/07/20 1334    Order Status:  Completed Updated:  09/07/20 1810     Ventricular Rate 97 BPM      Atrial Rate 97 BPM      P-R Interval 106 ms      QRS Duration 84 ms      Q-T Interval 388 ms      QTC Calculation (Bezet) 492 ms      Calculated P Axis 29 degrees      Calculated R Axis 41 degrees      Calculated T Axis 17 degrees      Diagnosis --     Sinus rhythm with short DC  No previous ECGs available  Confirmed by Tera Burleson MD, Arpit Carrillo ([id number]) on 9/7/2020 6:09:55 PM      EKG, 12 LEAD, INITIAL [[id number]]     Order Status:  Sent             IMAGING      CT Results (most recent):  Results from Hospital Encounter encounter on 09/07/20   CT ABD PELV WO CONT    Narrative EXAM: CT ABD PELV WO CONT    INDICATION: GI bleed    COMPARISON: None    CONTRAST:  None. TECHNIQUE:   Thin axial images were obtained through the abdomen and pelvis. Coronal and  sagittal reformats were generated. Oral contrast was not administered. CT dose  reduction was achieved through use of a standardized protocol tailored for this  examination and automatic exposure control for dose modulation. The absence of intravenous contrast material reduces the sensitivity for  evaluation of the vasculature and solid organs. FINDINGS:   LOWER THORAX: Bilateral pleural effusions with the subsegmental atelectasis. LIVER: No mass.   BILIARY TREE: Gallbladder is within normal limits. CBD is not dilated. SPLEEN: within normal limits. PANCREAS: No focal abnormality. ADRENALS: Unremarkable. KIDNEYS/URETERS: Small right kidney nonobstructing left renal calculi. STOMACH: Unremarkable. SMALL BOWEL: No dilatation or wall thickening. COLON: No dilatation or wall thickening. APPENDIX: Axial image 54  PERITONEUM: No ascites or pneumoperitoneum. RETROPERITONEUM: No lymphadenopathy or aortic aneurysm. REPRODUCTIVE ORGANS: Small prostate  URINARY BLADDER: No mass or calculus. BONES: No destructive bone lesion. ABDOMINAL WALL: Left inguinal bowel-containing hernia with neck measurement of 4  cm and outer diameter of 9 cm. ADDITIONAL COMMENTS: N/A      Impression IMPRESSION:  Large left inguinal hernia  Bilateral pleural effusions with subsegmental atelectasis  Nonobstructing left renal calculus  Small right kidney       XR Results (most recent):  Results from Hospital Encounter encounter on 09/07/20   XR CHEST PORT    Narrative EXAM: XR CHEST PORT    INDICATION: Acute mental status change    COMPARISON: None. FINDINGS: A portable AP radiograph of the chest was obtained at 1421 hours. The  patient is on a cardiac monitor. There is mild pulmonary edema. The heart is  normal in size. Impression IMPRESSION: Mild pulmonary edema        MRI Results (most recent):  No results found for this or any previous visit. Past Medical History:   Diagnosis Date    Skin cancer 12/19/2019    bcc-left forehead, left neck, left lateral cheek     Stroke (cerebrum) ([facility]            History reviewed. No pertinent surgical history.             Visit Vitals  /65   Pulse 88   Temp 98.6 °F (37 °C)   Resp 16   Ht 5' 9\" (1.753 m)   Wt 156 lb 12 oz (71.1 kg)   SpO2 100%   BMI 23.15 kg/m²         Wt Readings from Last 3 Encounters:   09/09/20 156 lb 12 oz (71.1 kg)   02/27/20 165 lb (74.8 kg)   01/29/20 165 lb (74.8 kg)         Review of Systems:   Pertinent items are noted in the History of Present Illness. 09/09 0701 - 09/09 1900  In: 332.5 [I.V.:332.5]  Out: -     09/07 1901 - 09/09 0700  In: 844.6   Out: -       Telemetry: normal sinus rhythm    Physical Exam:    Neck: no JVD  Heart: regular rate and rhythm  Lungs: rhonchi R base, L base, diminished breath sounds R base, L base  Abdomen: soft, non-tender.  Bowel sounds normal. No masses,  no organomegaly  Extremities: no edema    Current Facility-Administered Medications   Medication Dose Route Frequency    metoprolol tartrate (LOPRESSOR) tablet 25 mg  25 mg Oral Q12H    0.45% sodium chloride 1,000 mL with sodium bicarbonate (8.4%) 75 mEq infusion   IntraVENous CONTINUOUS    pramipexole (MIRAPEX) tablet 0.125 mg  0.125 mg Oral QHS    atorvastatin (LIPITOR) tablet 20 mg  20 mg Oral QHS    0.9% sodium chloride infusion 250 mL  250 mL IntraVENous PRN    sodium chloride (NS) flush 5-40 mL  5-40 mL IntraVENous Q8H    sodium chloride (NS) flush 5-40 mL  5-40 mL IntraVENous PRN    ondansetron (ZOFRAN) injection 4 mg  4 mg IntraVENous Q4H PRN    pantoprazole (PROTONIX) 40 mg in 0.9% sodium chloride 10 mL injection  40 mg IntraVENous Q12H         All Cardiac Markers in the last 24 hours:    Lab Results   Component Value Date/Time    TROIQ 8.74 (H) 09/09/2020 05:48 AM    TROIQ 10.40 (H) 09/08/2020 09:01 PM    TROIQ 11.40 (H) 09/08/2020 02:39 PM        Lab Results   Component Value Date/Time    Creatinine 1.37 (H) 09/09/2020 05:48 AM          Lab Results   Component Value Date/Time    CK - MB 13.0 (H) 09/07/2020 12:58 PM    CK-MB Index 1.7 09/07/2020 12:58 PM    Troponin-I, Qt. 8.74 (H) 09/09/2020 05:48 AM         Lab Results   Component Value Date/Time    WBC 10.6 09/09/2020 05:48 AM    HGB 8.3 (L) 09/09/2020 05:48 AM    HCT 26.2 (L) 09/09/2020 05:48 AM    PLATELET 584 40/35/1832 05:48 AM    MCV 90.0 09/09/2020 05:48 AM         Lab Results   Component Value Date/Time    Sodium 141 09/09/2020 05:48 AM    Potassium 3.5 09/09/2020 05:48 AM    Chloride 111 (H) 09/09/2020 05:48 AM    CO2 19 (L) 09/09/2020 05:48 AM    Anion gap 11 09/09/2020 05:48 AM    Glucose 127 (H) 09/09/2020 05:48 AM    BUN 44 (H) 09/09/2020 05:48 AM    Creatinine 1.37 (H) 09/09/2020 05:48 AM    BUN/Creatinine ratio 32 (H) 09/09/2020 05:48 AM    GFR est AA >60 09/09/2020 05:48 AM    GFR est non-AA 51 (L) 09/09/2020 05:48 AM    Calcium 8.2 (L) 09/09/2020 05:48 AM         No results found for: BNP, BNPP, BNPPPOC, XBNPT, BNPNT      No results found for: CHOL, CHOLPOCT, CHOLX, CHLST, CHOLV, HDL, HDLPOC, HDLP, LDL, LDLCPOC, LDLC, DLDLP, VLDLC, VLDL, TGLX, TRIGL, TRIGP, TGLPOCT, CHHD, CHHDX      Lab Results   Component Value Date/Time    ALT (SGPT) 20 09/09/2020 05:48 AM    Alk.  phosphatase 97 09/09/2020 05:48 AM    Bilirubin, total 0.5 09/09/2020 05:48 AM

## 2020-09-09 NOTE — ADVANCED PRACTICE NURSE
[facility] [address] [place] [address] Drive, [address] [address]   Alka Lira   [phone]  Jessica Moreno  9/9/2020  11:05 AM      Patient fairly poor historian. He does not know his PCP or any other Physicians. Chart reviewed for current Physicians. Noted he followed up with Dr. Irma Estrella from [address] [place] [address] on 5/4/20. Will defer any further cardiac work up to [facility]. Consult redirected.     Jen Jones PA-C

## 2020-09-09 NOTE — WOUND CARE
WOCN Note:  
 
New consult placed for assessment of Right cheek, right knee and scrotum. Assessed in room 421. PPE: face shield, mask and gloves. Chart reviewed. Admitted DX:  NSTEMI Assessment:  
Patient is A&O x 3, communicative and requires assist of 2 with repositioning. Bed: foam mattress Patient wearing briefs for incontinence Patient reports no pain. Patient repositioned on right side with pillow. Heels offloaded with pillows. Heels intact without erythema. Sacrum and buttocks intact without erythema. 1.  POA Right cheek, dry lesion: 1.5 x 1 x 0.1 cm. Patient reports that his PCP suspects skin cancer. Left open to air. 2.  POA Right knee, dry abrasion:   2.5 x 2 x 0.1 cm. 
3.  POA scrotum, 100% red denudation to skin and swelling to scrotum. Wound, Pressure Prevention & Skin Care Recommendations: 1. Minimize layers of linen/pads under patient to optimize support surface. 2.  Turn/reposition approximately every 2 hours and offload heels. 3.  Manage moisture/ Keep skin folds clean and dry/minimize use of briefs when able. 4.  Right knee:  Daily cleanse and apply vaseline. 5.  Scrotum:  TID and as needed cleanse and apply Zinc cream. 
 
Discussed above plan with patient and Reymundo MCGEE. Transition of Care: Plan to follow as needed while admitted to hospital. 
 
ALDA Platt RN [facility] Inpatient Wound Care Available on Perfect Serve Pager [phone] Office [phone]

## 2020-09-09 NOTE — PROGRESS NOTES
Problem: Pressure Injury - Risk of  Goal: *Prevention of pressure injury  Description: Document Judson Scale and appropriate interventions in the flowsheet. Outcome: Progressing Towards Goal  Note: Pressure Injury Interventions:  Sensory Interventions: Pressure redistribution bed/mattress (bed type), Minimize linen layers, Keep linens dry and wrinkle-free    Moisture Interventions: Absorbent underpads, Apply protective barrier, creams and emollients, Check for incontinence Q2 hours and as needed, Minimize layers, Maintain skin hydration (lotion/cream), Moisture barrier    Activity Interventions: Pressure redistribution bed/mattress(bed type), Increase time out of bed, Assess need for specialty bed    Mobility Interventions: Assess need for specialty bed, Pressure redistribution bed/mattress (bed type), HOB 30 degrees or less, Turn and reposition approx. every two hours(pillow and wedges)    Nutrition Interventions: Document food/fluid/supplement intake    Friction and Shear Interventions: Minimize layers, HOB 30 degrees or less, Apply protective barrier, creams and emollients, Transferring/repositioning devices                Problem: Risk for Spread of Infection  Goal: Prevent transmission of infectious organism to others  Description: Prevent the transmission of infectious organisms to other patients, staff members, and visitors. Outcome: Progressing Towards Goal  Note: Patient is currently on droplet plus and airborne for COVID PUI. Problem: General Medical Care Plan  Goal: *Absence of infection signs and symptoms  Outcome: Progressing Towards Goal  Note: Patient remains free of infections and fevers during shift.

## 2020-09-09 NOTE — PROGRESS NOTES
Reason for Admission:   Admitted from home with complaints of weakness, fatigue and dehydration. Lives independent at [facility] Score:  18%-Low                   Plan for utilizing home health:   Pending progress. Will assess for safety to return to independent living vs rehab. PCP: Will need new PCP appointment at [facility] will assist and schedule. Will need choices. Current Advanced Directive/Advance Care Plan: does not have and declines completing at this time. Roommate Viloetajoelle Avery named decision maker. Transition of Care Plan:    GI consult for bleed Hgb on admit 5.5  Cardio consult  Will need PT/OT consults as well. Anticipate that he will discharge home once stable.     Care Management Interventions  PCP Verified by CM: Yes(1/2020)  Mode of Transport at Discharge: (private car)  Current Support Network: Lives Alone(Lives at [facility])  The Patient and/or Patient Representative was Provided with a Choice of Provider and Agrees with the Discharge Plan?: ([address] )  [address] Provided?: No  Harsh Angulo RN CRM  Ext [phone]

## 2020-09-09 NOTE — PROGRESS NOTES
0422: I was notified by the monitor tech that the patient's heart rate dropped down to 41 beats for a few seconds and went back to 89 beats. During that time I was in the room doing vital signs and assessing the patient. The patient complained of no pain, no chest pain and/or chest tightness. Marcos Machado the NP was notified at [phone].

## 2020-09-09 NOTE — PROGRESS NOTES
Advance Care Planning     Advance Care Planning Activator (Inpatient)  Conversation Note      Date of ACP Conversation: 09/09/20     Conversation Conducted with:   Patient with Decision Making Capacity    ACP Activator: Jacky Bosworth, RN    *When Decision Maker makes decisions on behalf of the incapacitated patient: Decision Maker is asked to consider and make decisions based on patient values, known preferences, or best interests. Health Care Decision Maker:    Current Designated Health Care Decision Maker:   Primary Decision Maker: Radhakiet Gray - Other Relative - [phone]  (If there is a valid Health Care Decision Maker named in the [facility]\" box in the ACP activity, but it is not visible above, be sure to open that field and then select the health care decision maker relationship (ie \"primary\") in the blank space to the right of the name.) Validate  this information as still accurate & up-to-date; edit [place] field as needed.)    Note: Assess and validate information in current ACP documents, as indicated. If no Decision Maker listed above or available through scanned documents, then:    If no Authorized Decision Maker has previously been identified, then patient chooses [place]:  \"Who would you like to name as your primary health care decision-maker? \"    Name: Mary Varela   Relationship: roommate   Phone number: [phone]  Dona Mendoza this person be reached easily? \" YES    Backup decision maker-Manuela VarelaCdmdkhwd-tpnsf-[phone]    Care Preferences    Ventilation: \"If you were in your present state of health and suddenly became very ill and were unable to breathe on your own, what would your preference be about the use of a ventilator (breathing machine) if it were available to you? \"      If patient would desire the use of a ventilator (breathing machine), answer \"yes\", if not \"no\":yes    \"If your health worsens and it becomes clear that your chance of recovery is unlikely, what would your preference be about the use of a ventilator (breathing machine) if it were available to you? \"     Would the patient desire the use of a ventilator (breathing machine)? YES      Resuscitation  \"CPR works best to restart the heart when there is a sudden event, like a heart attack, in someone who is otherwise healthy. Unfortunately, CPR does not typically restart the heart for people who have serious health conditions or who are very sick. \"    \"In the event your heart stopped as a result of an underlying serious health condition, would you want attempts to be made to restart your heart (answer \"yes\" for attempt to resuscitate) or would you prefer a natural death (answer \"no\" for do not attempt to resuscitate)? \" YES    [x] Yes  [] No   Educated Patient / [place] regarding differences between Advance Directives and portable DNR orders.     Length of ACP Conversation in minutes:  5    Conversation Outcomes:  [x] ACP discussion completed  [] Existing advance directive reviewed with patient; no changes to patient's previously recorded wishes     [] New Advance Directive completed   [] Portable Do Not Resuscitate prepared for Provider review and signature  [] POLST/POST/MOLST/MOST prepared for Provider review and signature      Follow-up plan:    [] Schedule follow-up conversation to continue planning  [] Referred individual to Provider for additional questions/concerns   [] Advised patient/agent/surrogate to review completed ACP document and update if needed with changes in condition, patient preferences or care setting     [] This note routed to one or more involved healthcare providers

## 2020-09-09 NOTE — PROGRESS NOTES
Patient: Jaspal Acosta MRN: [id number]  SSN: [id number]    YOB: 1950  Age: 69 y.o. Sex: male        ADMITTED: 2020 to Tristan Burgess MD by Chrisyt Burnett MD for NSTEMI (non-ST elevated myocardial infarction) [facility]) [I21.4]  Scrotal edema    -Afebrile/VSS  -WBC 10.6  BCX NG2D  -Cr 1.5-->1.3. No Castaneda, incontinent w/ brief. Scrotal exam reveals, +edema, slightly improved, no erythema, crepitus, or induration. Non tender. Vitals: Temp (24hrs), Av.3 °F (36.8 °C), Min:97.8 °F (36.6 °C), Max:98.6 °F (37 °C)    Blood pressure 135/72, pulse 99, temperature 98.2 °F (36.8 °C), resp. rate 20, height 5' 9\" (1.753 m), weight 70.8 kg (156 lb), SpO2 97 %. Intake and Output:   190 -  07  In: 844.6   Out: -    07 -  1900  In: 332.5 [I.V.:332.5]  Out: -   JUANITA Output lats 24 hrs: No data found. JUANITA Output last 8 hrs: No data found. BM over last 24 hrs:   Patient Vitals for the past 24 hrs:   Stool Occurrence(s)   20 1836 1       Exam:   : Scrotal exam reveals, + edema, slightly improved, no erythema, crepitus, or induration. Non tender. Labs:  CBC:   Lab Results   Component Value Date/Time    WBC 10.6 2020 05:48 AM    HCT 26.2 (L) 2020 05:48 AM    PLATELET 828  05:48 AM     BMP:   Lab Results   Component Value Date/Time    Glucose 127 (H) 2020 05:48 AM    Sodium 141 2020 05:48 AM    Potassium 3.5 2020 05:48 AM    Chloride 111 (H) 2020 05:48 AM    CO2 19 (L) 2020 05:48 AM    BUN 44 (H) 2020 05:48 AM    Creatinine 1.37 (H) 2020 05:48 AM    Calcium 8.2 (L) 2020 05:48 AM     Cultures: BCX NG2D    Imaging: Scrotal US reviewed    Assessment/Plan:     Scrotal edema    -Continue to elevate scrotum w/ towel roll.   -Dependent edema likely r/t immobility, will reabsorb with time and ambulation  -Monitor I&O, bladder scan PRN for suspected retention    Signed By: Crystal Kang NP - 2020

## 2020-09-09 NOTE — PROGRESS NOTES
[address]  [address] [facility], [address]       GI PROGRESS NOTE  Will Ahmet Doreen  [phone] office  [phone] NP/PA in-hospital cell phone M-F until 4:30PM  After 5PM or on weekends, please call  for physician on call      NAME: Sherrie Medina   :  1950   MRN:  [id number]       Subjective:   Patient denies nausea, vomiting, or abdominal pain. Denies bowel movement today. Last documented bowel movement was last evening (brown). He has no complaints. Objective:     VITALS:   Last 24hrs VS reviewed since prior progress note. Most recent are:  Visit Vitals  /65   Pulse 88   Temp 98.6 °F (37 °C)   Resp 16   Ht 5' 9\" (1.753 m)   Wt 71.1 kg (156 lb 12 oz)   SpO2 100%   BMI 23.15 kg/m²       PHYSICAL EXAM:  General: Cooperative, no acute distress    Neurologic:  Alert and oriented X 3  HEENT: EOMI, no scleral icterus   Lungs:  No respiratory distress  Heart:  S1 S2  Abdomen: Soft, non-distended, no tenderness, no guarding, no rebound. +Bowel sounds.    Extremities: Warm  Psych:   Not anxious or agitated      Lab Data Reviewed:     Recent Results (from the past 24 hour(s))   TROPONIN I    Collection Time: 20  2:39 PM   Result Value Ref Range    Troponin-I, Qt. 11.40 (H) <0.05 ng/mL   TROPONIN I    Collection Time: 20  9:01 PM   Result Value Ref Range    Troponin-I, Qt. 10.40 (H) <0.05 ng/mL   CBC WITH AUTOMATED DIFF    Collection Time: 20  5:48 AM   Result Value Ref Range    WBC 10.6 4.1 - 11.1 K/uL    RBC 2.91 (L) 4.10 - 5.70 M/uL    HGB 8.3 (L) 12.1 - 17.0 g/dL    HCT 26.2 (L) 36.6 - 50.3 %    MCV 90.0 80.0 - 99.0 FL    MCH 28.5 26.0 - 34.0 PG    MCHC 31.7 30.0 - 36.5 g/dL    RDW 15.4 (H) 11.5 - 14.5 %    PLATELET 616 756 - 527 K/uL    MPV 11.6 8.9 - 12.9 FL    NRBC 0.2 (H) 0  WBC    ABSOLUTE NRBC 0.02 (H) 0.00 - 0.01 K/uL    NEUTROPHILS 84 (H) 32 - 75 %    LYMPHOCYTES 6 (L) 12 - 49 %    MONOCYTES 7 5 - 13 %    EOSINOPHILS 1 0 - 7 % BASOPHILS 1 0 - 1 %    IMMATURE GRANULOCYTES 1 (H) 0.0 - 0.5 %    ABS. NEUTROPHILS 9.0 (H) 1.8 - 8.0 K/UL    ABS. LYMPHOCYTES 0.6 (L) 0.8 - 3.5 K/UL    ABS. MONOCYTES 0.7 0.0 - 1.0 K/UL    ABS. EOSINOPHILS 0.1 0.0 - 0.4 K/UL    ABS. BASOPHILS 0.1 0.0 - 0.1 K/UL    ABS. IMM. GRANS. 0.1 (H) 0.00 - 0.04 K/UL    DF SMEAR SCANNED      RBC COMMENTS ANISOCYTOSIS  1+       METABOLIC PANEL, COMPREHENSIVE    Collection Time: 09/09/20  5:48 AM   Result Value Ref Range    Sodium 141 136 - 145 mmol/L    Potassium 3.5 3.5 - 5.1 mmol/L    Chloride 111 (H) 97 - 108 mmol/L    CO2 19 (L) 21 - 32 mmol/L    Anion gap 11 5 - 15 mmol/L    Glucose 127 (H) 65 - 100 mg/dL    BUN 44 (H) 6 - 20 MG/DL    Creatinine 1.37 (H) 0.70 - 1.30 MG/DL    BUN/Creatinine ratio 32 (H) 12 - 20      GFR est AA >60 >60 ml/min/1.73m2    GFR est non-AA 51 (L) >60 ml/min/1.73m2    Calcium 8.2 (L) 8.5 - 10.1 MG/DL    Bilirubin, total 0.5 0.2 - 1.0 MG/DL    ALT (SGPT) 20 12 - 78 U/L    AST (SGOT) 34 15 - 37 U/L    Alk. phosphatase 97 45 - 117 U/L    Protein, total 5.9 (L) 6.4 - 8.2 g/dL    Albumin 2.2 (L) 3.5 - 5.0 g/dL    Globulin 3.7 2.0 - 4.0 g/dL    A-G Ratio 0.6 (L) 1.1 - 2.2     TROPONIN I    Collection Time: 09/09/20  5:48 AM   Result Value Ref Range    Troponin-I, Qt. 8.74 (H) <0.05 ng/mL        Assessment:   · Anemia with hemoccult negative stool: Hgb 8.3<--7.5<--5.2 status post 2 units PRBCs, platelets 617, INR 1.4. CT abdomen/pelvis without contrast (9/7/20): large left inguinal hernia; bilateral pleural effusions with subsegmental atelectasis; nonbostructing left renal calculus; small right kidney. · NSTEMI: cardiology following  · Acute metabolic encephalopathy  · History of stroke  · COVID-19 negative     Patient Active Problem List   Diagnosis Code    NSTEMI (non-ST elevated myocardial infarction) ([facility] [address] I21.4     Plan:   · PPI BID  · Trend CBC and transfuse as necessary. Monitor for signs of active GI bleeding.   · Cardiology following  · Tentatively plan on EGD tomorrow with Dr. Miguel Angel Reyes. NPO after midnight. Details and risks of the procedure to include (but not limited to) anesthesia, bleeding, infection, and perforation were discussed. Patient understands and is in agreement. Signed By: Dominguez Schneider     9/9/2020  11:12 AM       GI Attending: I have reviewed his case with Anesthesia and they have requested if Cardiology team can comment on what parameters need to be achieved prior to endoscopy. Per Dr. Heena Hamilton, we can proceed if Hgb>8, no chest pain, and BP>100. Abbe Reyes MD

## 2020-09-09 NOTE — PROGRESS NOTES
Bedside shift change report given to Melissa Cole RN (oncoming nurse) by Natalie Philip RN (offgoing nurse). Report included the following information SBAR, Kardex, ED Summary, MAR, Recent Results and Cardiac Rhythm Sinus Tach.      Last 3 Recorded Weights in this Encounter    09/07/20 1630 09/08/20 0235 09/09/20 0644   Weight: 69.7 kg (153 lb 10.6 oz) 68.9 kg (151 lb 14.4 oz) 71.1 kg (156 lb 12 oz)

## 2020-09-09 NOTE — PROGRESS NOTES
Bedside and Verbal shift change report given to [address] [place] [address] (oncoming nurse) by Kim Carter (offgoing nurse). Report included the following information SBAR, Kardex, Intake/Output, MAR, Accordion and Cardiac Rhythm NST. Problem: General Medical Care Plan  Goal: *Skin integrity maintained  Outcome: Progressing Towards Goal  Note: Wound care consulted and has seen patient for skin issues.   Goal: *Optimize nutritional status  Outcome: Progressing Towards Goal  Goal: *Anxiety reduced or absent  Outcome: Progressing Towards Goal

## 2020-09-10 ENCOUNTER — ANESTHESIA (OUTPATIENT)
Dept: ENDOSCOPY | Age: 70
DRG: 064 | End: 2020-09-10
Payer: MEDICARE

## 2020-09-10 ENCOUNTER — APPOINTMENT (OUTPATIENT)
Dept: MRI IMAGING | Age: 70
DRG: 064 | End: 2020-09-10
Attending: FAMILY MEDICINE
Payer: MEDICARE

## 2020-09-10 ENCOUNTER — ANESTHESIA EVENT (OUTPATIENT)
Dept: ENDOSCOPY | Age: 70
DRG: 064 | End: 2020-09-10
Payer: MEDICARE

## 2020-09-10 LAB
ANION GAP SERPL CALC-SCNC: 10 MMOL/L (ref 5–15)
BASOPHILS # BLD: 0.1 K/UL (ref 0–0.1)
BASOPHILS NFR BLD: 1 % (ref 0–1)
BUN SERPL-MCNC: 35 MG/DL (ref 6–20)
BUN/CREAT SERPL: 27 (ref 12–20)
CALCIUM SERPL-MCNC: 7.9 MG/DL (ref 8.5–10.1)
CHLORIDE SERPL-SCNC: 108 MMOL/L (ref 97–108)
CO2 SERPL-SCNC: 21 MMOL/L (ref 21–32)
CREAT SERPL-MCNC: 1.32 MG/DL (ref 0.7–1.3)
DIFFERENTIAL METHOD BLD: ABNORMAL
EOSINOPHIL # BLD: 0.2 K/UL (ref 0–0.4)
EOSINOPHIL NFR BLD: 2 % (ref 0–7)
ERYTHROCYTE [DISTWIDTH] IN BLOOD BY AUTOMATED COUNT: 15.8 % (ref 11.5–14.5)
GLUCOSE BLD STRIP.AUTO-MCNC: 111 MG/DL (ref 65–100)
GLUCOSE BLD STRIP.AUTO-MCNC: 111 MG/DL (ref 65–100)
GLUCOSE BLD STRIP.AUTO-MCNC: 112 MG/DL (ref 65–100)
GLUCOSE BLD STRIP.AUTO-MCNC: 174 MG/DL (ref 65–100)
GLUCOSE BLD STRIP.AUTO-MCNC: 98 MG/DL (ref 65–100)
GLUCOSE SERPL-MCNC: 100 MG/DL (ref 65–100)
HCT VFR BLD AUTO: 27.5 % (ref 36.6–50.3)
HGB BLD-MCNC: 8.3 G/DL (ref 12.1–17)
IMM GRANULOCYTES # BLD AUTO: 0.1 K/UL (ref 0–0.04)
IMM GRANULOCYTES NFR BLD AUTO: 1 % (ref 0–0.5)
LYMPHOCYTES # BLD: 1.1 K/UL (ref 0.8–3.5)
LYMPHOCYTES NFR BLD: 13 % (ref 12–49)
MCH RBC QN AUTO: 28.6 PG (ref 26–34)
MCHC RBC AUTO-ENTMCNC: 30.2 G/DL (ref 30–36.5)
MCV RBC AUTO: 94.8 FL (ref 80–99)
MONOCYTES # BLD: 0.8 K/UL (ref 0–1)
MONOCYTES NFR BLD: 9 % (ref 5–13)
NEUTS SEG # BLD: 6.4 K/UL (ref 1.8–8)
NEUTS SEG NFR BLD: 75 % (ref 32–75)
NRBC # BLD: 0 K/UL (ref 0–0.01)
NRBC BLD-RTO: 0 PER 100 WBC
PLATELET # BLD AUTO: 312 K/UL (ref 150–400)
PMV BLD AUTO: 11.7 FL (ref 8.9–12.9)
POTASSIUM SERPL-SCNC: 3.8 MMOL/L (ref 3.5–5.1)
RBC # BLD AUTO: 2.9 M/UL (ref 4.1–5.7)
SERVICE CMNT-IMP: ABNORMAL
SERVICE CMNT-IMP: NORMAL
SODIUM SERPL-SCNC: 139 MMOL/L (ref 136–145)
WBC # BLD AUTO: 8.5 K/UL (ref 4.1–11.1)

## 2020-09-10 PROCEDURE — 65660000000 HC RM CCU STEPDOWN

## 2020-09-10 PROCEDURE — 80048 BASIC METABOLIC PNL TOTAL CA: CPT

## 2020-09-10 PROCEDURE — 0DB38ZX EXCISION OF LOWER ESOPHAGUS, VIA NATURAL OR ARTIFICIAL OPENING ENDOSCOPIC, DIAGNOSTIC: ICD-10-PCS | Performed by: INTERNAL MEDICINE

## 2020-09-10 PROCEDURE — 88341 IMHCHEM/IMCYTCHM EA ADD ANTB: CPT

## 2020-09-10 PROCEDURE — 82962 GLUCOSE BLOOD TEST: CPT

## 2020-09-10 PROCEDURE — 74011000258 HC RX REV CODE- 258: Performed by: HOSPITALIST

## 2020-09-10 PROCEDURE — 74011250637 HC RX REV CODE- 250/637: Performed by: PHYSICIAN ASSISTANT

## 2020-09-10 PROCEDURE — 77030021593 HC FCPS BIOP ENDOSC BSC -A: Performed by: INTERNAL MEDICINE

## 2020-09-10 PROCEDURE — 74011250636 HC RX REV CODE- 250/636: Performed by: NURSE ANESTHETIST, CERTIFIED REGISTERED

## 2020-09-10 PROCEDURE — 76040000019: Performed by: INTERNAL MEDICINE

## 2020-09-10 PROCEDURE — 85025 COMPLETE CBC W/AUTO DIFF WBC: CPT

## 2020-09-10 PROCEDURE — 70551 MRI BRAIN STEM W/O DYE: CPT

## 2020-09-10 PROCEDURE — 74011000250 HC RX REV CODE- 250: Performed by: FAMILY MEDICINE

## 2020-09-10 PROCEDURE — 74011250637 HC RX REV CODE- 250/637: Performed by: HOSPITALIST

## 2020-09-10 PROCEDURE — 88312 SPECIAL STAINS GROUP 1: CPT

## 2020-09-10 PROCEDURE — 88342 IMHCHEM/IMCYTCHM 1ST ANTB: CPT

## 2020-09-10 PROCEDURE — 74011000250 HC RX REV CODE- 250: Performed by: NURSE ANESTHETIST, CERTIFIED REGISTERED

## 2020-09-10 PROCEDURE — 88305 TISSUE EXAM BY PATHOLOGIST: CPT

## 2020-09-10 PROCEDURE — 36415 COLL VENOUS BLD VENIPUNCTURE: CPT

## 2020-09-10 PROCEDURE — 76060000031 HC ANESTHESIA FIRST 0.5 HR: Performed by: INTERNAL MEDICINE

## 2020-09-10 PROCEDURE — 74011000250 HC RX REV CODE- 250: Performed by: HOSPITALIST

## 2020-09-10 PROCEDURE — 74011250636 HC RX REV CODE- 250/636: Performed by: FAMILY MEDICINE

## 2020-09-10 PROCEDURE — 51798 US URINE CAPACITY MEASURE: CPT

## 2020-09-10 PROCEDURE — C9113 INJ PANTOPRAZOLE SODIUM, VIA: HCPCS | Performed by: FAMILY MEDICINE

## 2020-09-10 RX ORDER — LIDOCAINE HYDROCHLORIDE 20 MG/ML
INJECTION, SOLUTION EPIDURAL; INFILTRATION; INTRACAUDAL; PERINEURAL AS NEEDED
Status: DISCONTINUED | OUTPATIENT
Start: 2020-09-10 | End: 2020-09-10 | Stop reason: HOSPADM

## 2020-09-10 RX ORDER — SODIUM CHLORIDE 0.9 % (FLUSH) 0.9 %
5-40 SYRINGE (ML) INJECTION EVERY 8 HOURS
Status: DISCONTINUED | OUTPATIENT
Start: 2020-09-10 | End: 2020-09-17 | Stop reason: HOSPADM

## 2020-09-10 RX ORDER — SODIUM CHLORIDE 9 MG/ML
INJECTION, SOLUTION INTRAVENOUS
Status: DISCONTINUED | OUTPATIENT
Start: 2020-09-10 | End: 2020-09-10 | Stop reason: HOSPADM

## 2020-09-10 RX ORDER — NALOXONE HYDROCHLORIDE 0.4 MG/ML
0.4 INJECTION, SOLUTION INTRAMUSCULAR; INTRAVENOUS; SUBCUTANEOUS
Status: DISCONTINUED | OUTPATIENT
Start: 2020-09-10 | End: 2020-09-10 | Stop reason: HOSPADM

## 2020-09-10 RX ORDER — FLUMAZENIL 0.1 MG/ML
0.2 INJECTION INTRAVENOUS
Status: DISCONTINUED | OUTPATIENT
Start: 2020-09-10 | End: 2020-09-10 | Stop reason: HOSPADM

## 2020-09-10 RX ORDER — EPINEPHRINE 0.1 MG/ML
1 INJECTION INTRACARDIAC; INTRAVENOUS
Status: DISCONTINUED | OUTPATIENT
Start: 2020-09-10 | End: 2020-09-10 | Stop reason: HOSPADM

## 2020-09-10 RX ORDER — GUAIFENESIN 100 MG/5ML
81 LIQUID (ML) ORAL DAILY
Status: DISCONTINUED | OUTPATIENT
Start: 2020-09-10 | End: 2020-09-17 | Stop reason: HOSPADM

## 2020-09-10 RX ORDER — MIDAZOLAM HYDROCHLORIDE 1 MG/ML
.25-5 INJECTION, SOLUTION INTRAMUSCULAR; INTRAVENOUS
Status: DISCONTINUED | OUTPATIENT
Start: 2020-09-10 | End: 2020-09-10 | Stop reason: HOSPADM

## 2020-09-10 RX ORDER — PROPOFOL 10 MG/ML
INJECTION, EMULSION INTRAVENOUS AS NEEDED
Status: DISCONTINUED | OUTPATIENT
Start: 2020-09-10 | End: 2020-09-10 | Stop reason: HOSPADM

## 2020-09-10 RX ORDER — SODIUM CHLORIDE 9 MG/ML
50 INJECTION, SOLUTION INTRAVENOUS CONTINUOUS
Status: DISPENSED | OUTPATIENT
Start: 2020-09-10 | End: 2020-09-10

## 2020-09-10 RX ORDER — ATROPINE SULFATE 0.1 MG/ML
0.5 INJECTION INTRAVENOUS
Status: DISCONTINUED | OUTPATIENT
Start: 2020-09-10 | End: 2020-09-10 | Stop reason: HOSPADM

## 2020-09-10 RX ORDER — SODIUM CHLORIDE 0.9 % (FLUSH) 0.9 %
5-40 SYRINGE (ML) INJECTION AS NEEDED
Status: DISCONTINUED | OUTPATIENT
Start: 2020-09-10 | End: 2020-09-17 | Stop reason: HOSPADM

## 2020-09-10 RX ORDER — DEXTROMETHORPHAN/PSEUDOEPHED 2.5-7.5/.8
1.2 DROPS ORAL
Status: DISCONTINUED | OUTPATIENT
Start: 2020-09-10 | End: 2020-09-10 | Stop reason: HOSPADM

## 2020-09-10 RX ORDER — FENTANYL CITRATE 50 UG/ML
25-200 INJECTION, SOLUTION INTRAMUSCULAR; INTRAVENOUS
Status: DISCONTINUED | OUTPATIENT
Start: 2020-09-10 | End: 2020-09-10 | Stop reason: HOSPADM

## 2020-09-10 RX ADMIN — METOPROLOL TARTRATE 25 MG: 25 TABLET, FILM COATED ORAL at 09:35

## 2020-09-10 RX ADMIN — Medication 10 ML: at 21:52

## 2020-09-10 RX ADMIN — PRAMIPEXOLE DIHYDROCHLORIDE 0.12 MG: 0.25 TABLET ORAL at 21:42

## 2020-09-10 RX ADMIN — METOPROLOL TARTRATE 25 MG: 25 TABLET, FILM COATED ORAL at 21:41

## 2020-09-10 RX ADMIN — Medication 10 ML: at 21:46

## 2020-09-10 RX ADMIN — LIDOCAINE HYDROCHLORIDE 40 MG: 20 INJECTION, SOLUTION EPIDURAL; INFILTRATION; INTRACAUDAL; PERINEURAL at 15:08

## 2020-09-10 RX ADMIN — PROPOFOL 20 MG: 10 INJECTION, EMULSION INTRAVENOUS at 15:08

## 2020-09-10 RX ADMIN — PROPOFOL 20 MG: 10 INJECTION, EMULSION INTRAVENOUS at 15:09

## 2020-09-10 RX ADMIN — ATORVASTATIN CALCIUM 20 MG: 20 TABLET, FILM COATED ORAL at 21:41

## 2020-09-10 RX ADMIN — SODIUM CHLORIDE: 900 INJECTION, SOLUTION INTRAVENOUS at 15:02

## 2020-09-10 RX ADMIN — SODIUM CHLORIDE 40 MG: 9 INJECTION INTRAMUSCULAR; INTRAVENOUS; SUBCUTANEOUS at 21:41

## 2020-09-10 RX ADMIN — SODIUM BICARBONATE: 84 INJECTION, SOLUTION INTRAVENOUS at 10:01

## 2020-09-10 RX ADMIN — Medication 10 ML: at 06:09

## 2020-09-10 RX ADMIN — SODIUM CHLORIDE 40 MG: 9 INJECTION INTRAMUSCULAR; INTRAVENOUS; SUBCUTANEOUS at 09:35

## 2020-09-10 RX ADMIN — ASPIRIN 81 MG CHEWABLE TABLET 81 MG: 81 TABLET CHEWABLE at 19:31

## 2020-09-10 RX ADMIN — PROPOFOL 20 MG: 10 INJECTION, EMULSION INTRAVENOUS at 15:11

## 2020-09-10 NOTE — PROGRESS NOTES
[address] [facility]  Hospitalist Group                                                                                          Hospitalist Progress Note  Harry Azul MD  Answering service: [phone] from in house phone        Date of Service:  9/10/2020  NAME:  Claudia Cespedes  :  1950  MRN:  [id number]      Admission Summary:   Claudia Cespedes is a 69 y.o. male who presents with fatigue and dehydration     Patient is dysarthric and is a very poor historian, not much history could be obtained from the patient. I tried calling the patient's next to kin Cindy Gramajo, as listed on the chart but did not get a response. Thus history was extremely limited, patient apparently lives in assisted living facility by himself, was noted to be fatigued and dehydrated, has not been eating drinking much, has not been taking his medication, and thus was sent to the hospital for further management and evaluation. Apparently patient has left-sided weakness after his stroke currently has some slurred speech, unclear whether this is new or old. No further history can be obtained from the patient. Patient came to the ER, was found to have a hemoglobin of 5.2 and was requested to be admitted to the hospitalist service.     Interval history / Subjective:     F/u anemia   Patient alert and interactive. Denied any complaints. Assessment & Plan:     Acute blood loss anemia  -unclear etiology  -h/h  -s/p 2 units PRBC  -transfuse for hb<7    Probable GI bleed  -FOBT negative  -EGD showed esophageal stricture with overlying ulcer       NSTEMI:  -troponins elevated. Echocardiogram with left ventricle ejection fraction of 45-50%. Wall motion was not assessed due to poor image quality. Cardiology consulted, does not feel further cardiac work-up is needed.        Elevated creatinine, creatinine improving.   Most augustin CKD stage III  -Monitor    Metabolic acidosis, non anion gap resolved with bicarbonate drip.  Discontinue bicarb drip and monitor       Acute metabolic encephalopathy  -CT head 9/7 Asymmetric white matter disease and lacunar infarcts. No acute process  identified by noncontrast CT  -MRI brain showed acute left hemisphere cortical infarction primarily involving frontal and  parietal lobes but with small acute cortical infarct in the posterior inferior  occipital lobe. See above. Acute left hemispheric stroke? Embolic. Patient has history of old stroke with right hemiparesis  -MRI brain performed on 9/10 showed showed acute left hemisphere cortical infarction primarily involving frontal and parietal lobes but with small acute cortical infarct in the posterior inferior. He is admitted for severe anemia and onset of the stroke is unknown, but this test was planned couple days ago and was done today after his SARS-CoV-2 test result was negative as such patient is not a candidate for TPA. History obtained  -Echocardiogram with LVEF of 45-50%. -Discussed with GI,okay to start baby asa,ordered. Informed Dr Maylin Whiteheads with this and will follow in AM      SARS-CoV-2 not detected  -Taken off droplet plus isolation. Large left inguinal hernia  -noted on CT abd  -Outpatient follow up    Scrotal edema  -Appreciate Urology, elevates scrotum      The patient is quite critical and is a high risk of clinical decline     Code status: FULL CODE  DVT prophylaxis: scd    Plan: Follow Neurology, Cardiology, GI    Care Plan discussed with: Patient/Family  Anticipated Disposition: TBD  Anticipated Discharge: 24 hours to 48 hours     Hospital Problems  Date Reviewed: 9/8/2020          Codes Class Noted POA    * (Principal) NSTEMI (non-ST elevated myocardial infarction) ([facility] [address] ICD-10-CM: I21.4  ICD-9-CM: 410.70  9/7/2020 Yes                Review of Systems:   A comprehensive review of systems was negative except for that written in the HPI. Vital Signs:    Last 24hrs VS reviewed since prior progress note. Most recent are:  Visit Vitals  /66   Pulse 89   Temp 97.9 °F (36.6 °C)   Resp 14   Ht 5' 9\" (1.753 m)   Wt 77.8 kg (171 lb 8.3 oz)   SpO2 100%   BMI 25.33 kg/m²         Intake/Output Summary (Last 24 hours) at 9/10/2020 1041  Last data filed at 9/10/2020 0610  Gross per 24 hour   Intake 600 ml   Output 125 ml   Net 475 ml        Physical Examination:             Constitutional:  No acute distress, cooperative, pleasant    ENT:  Oral mucosa moist, oropharynx benign. Resp:  CTA bilaterally. No wheezing/rhonchi/rales. No accessory muscle use   CV:  Regular rhythm, normal rate, no murmurs, gallops, rubs    GI:  Soft, non distended, non tender. normoactive bowel sounds, no hepatosplenomegaly     Musculoskeletal:  No edema, warm, 2+ pulses throughout    Neurologic:  Alert and oriented. RUE 3/4. RLE 4+/5     Skin:  Good turgor, no rashes or ulcers       Data Review:    Review and/or order of clinical lab test      Labs:     Recent Labs     09/10/20  0600 09/09/20  0548   WBC 8.5 10.6   HGB 8.3* 8.3*   HCT 27.5* 26.2*    358     Recent Labs     09/10/20  0600 09/09/20  0548 09/08/20  0455 09/07/20  1259 09/07/20  1258    141 140  --  138   K 3.8 3.5 4.0  --  4.0    111* 113*  --  108   CO2 21 19* 13*  --  19*   BUN 35* 44* 48*  --  44*   CREA 1.32* 1.37* 1.53*  --  1.61*    127* 108*  --  107*   CA 7.9* 8.2* 7.9*  --  8.4*   MG  --   --   --   --  2.6*   PHOS  --   --   --  3.5  --      Recent Labs     09/09/20  0548 09/07/20  1258   ALT 20 20   AP 97 109   TBILI 0.5 0.4   TP 5.9* 6.8   ALB 2.2* 2.5*   GLOB 3.7 4.3*     Recent Labs     09/07/20  1259   INR 1.4*   PTP 14.4*      No results for input(s): FE, TIBC, PSAT, FERR in the last 72 hours. No results found for: FOL, RBCF   No results for input(s): PH, PCO2, PO2 in the last 72 hours.   Recent Labs     09/09/20  0548 09/08/20  2101 09/08/20  1439  09/07/20  1258   CKNDX  --   --   --   --  1.7   TROIQ 8.74* 10.40* 11.40*   < > 8.33* < > = values in this interval not displayed.      No results found for: CHOL, CHOLX, CHLST, CHOLV, HDL, HDLP, LDL, LDLC, DLDLP, TGLX, TRIGL, TRIGP, CHHD, CHHDX  Lab Results   Component Value Date/Time    Glucose (POC) 112 (H) 09/10/2020 06:34 AM    Glucose (POC) 174 (H) 09/10/2020 12:38 AM    Glucose (POC) 147 (H) 09/09/2020 12:33 PM    Glucose (POC) 111 (H) 09/08/2020 06:40 AM    Glucose (POC) 110 (H) 09/07/2020 11:47 PM     No results found for: COLOR, APPRN, SPGRU, REFSG, YESY, PROTU, GLUCU, KETU, BILU, UROU, PRADEEP, LEUKU, GLUKE, EPSU, BACTU, WBCU, RBCU, CASTS, UCRY      Medications Reviewed:     Current Facility-Administered Medications   Medication Dose Route Frequency    metoprolol tartrate (LOPRESSOR) tablet 25 mg  25 mg Oral Q12H    0.45% sodium chloride 1,000 mL with sodium bicarbonate (8.4%) 75 mEq infusion   IntraVENous CONTINUOUS    pramipexole (MIRAPEX) tablet 0.125 mg  0.125 mg Oral QHS    atorvastatin (LIPITOR) tablet 20 mg  20 mg Oral QHS    0.9% sodium chloride infusion 250 mL  250 mL IntraVENous PRN    sodium chloride (NS) flush 5-40 mL  5-40 mL IntraVENous Q8H    sodium chloride (NS) flush 5-40 mL  5-40 mL IntraVENous PRN    ondansetron (ZOFRAN) injection 4 mg  4 mg IntraVENous Q4H PRN    pantoprazole (PROTONIX) 40 mg in 0.9% sodium chloride 10 mL injection  40 mg IntraVENous Q12H     ______________________________________________________________________  EXPECTED LENGTH OF STAY: 3d 14h  ACTUAL LENGTH OF STAY:          3                 Janelle John MD

## 2020-09-10 NOTE — PROGRESS NOTES

## 2020-09-10 NOTE — PROGRESS NOTES
0948:Spoke with patient. Seems to be A+Ox4 with intermittent confusion. Patient recalled speaking with PA yesterday about planned EGD today. Consent signed and placed in patient chart.

## 2020-09-10 NOTE — PROGRESS NOTES
Transition Plan of Care  RUR-18%-Med  Planned EGD today  Spoke with brother Marshall Bosch [phone] to update. Left message for chip Tobias [phone] with CM contact information.     Harsh Dorman RN CRM  Ext [phone]

## 2020-09-10 NOTE — ANESTHESIA PREPROCEDURE EVALUATION
Relevant Problems   No relevant active problems       Anesthetic History   No history of anesthetic complications            Review of Systems / Medical History  Patient summary reviewed, nursing notes reviewed and pertinent labs reviewed    Pulmonary  Within defined limits                 Neuro/Psych   Within defined limits    CVA       Cardiovascular  Within defined limits            Past MI and CAD         GI/Hepatic/Renal  Within defined limits              Endo/Other  Within defined limits      Anemia     Other Findings              Physical Exam    Airway  Mallampati: II  TM Distance: > 6 cm  Neck ROM: normal range of motion   Mouth opening: Normal     Cardiovascular  Regular rate and rhythm,  S1 and S2 normal,  no murmur, click, rub, or gallop             Dental  No notable dental hx       Pulmonary  Breath sounds clear to auscultation               Abdominal  GI exam deferred       Other Findings            Anesthetic Plan    ASA: 3  Anesthesia type: MAC            Anesthetic plan and risks discussed with: Patient

## 2020-09-10 NOTE — PROGRESS NOTES
[facility] TRANSFER - OUT REPORT:    Verbal report given to nurse Navi Collins RN(name) on Deisi Pena  being transferred to [facility](unit) for routine progression of care       Report consisted of patients Situation, Background, Assessment and   Recommendations(SBAR). Information from the following report(s) SBAR, Procedure Summary and Recent Results was reviewed with the receiving nurse. Lines:   Peripheral IV 09/07/20 Right Antecubital (Active)   Site Assessment Clean, dry, & intact 09/10/20 0801   Phlebitis Assessment 0 09/10/20 0801   Infiltration Assessment 0 09/10/20 0801   Dressing Status Clean, dry, & intact 09/10/20 0801   Dressing Type Tape;Transparent 09/10/20 0801   Hub Color/Line Status Green; Infusing 09/10/20 0801   Action Taken Open ports on tubing capped 09/10/20 0801   Alcohol Cap Used Yes 09/10/20 0801        Opportunity for questions and clarification was provided.       Patient transported with:   [id number]

## 2020-09-10 NOTE — PROGRESS NOTES
Bedside and Verbal shift change report given to [address] [place] [address] (oncoming nurse) by Shanon Knight (offgoing nurse). Report included the following information SBAR, Kardex, ED Summary, Intake/Output, MAR, Accordion and Cardiac Rhythm NSR. Problem: Falls - Risk of  Goal: *Absence of Falls  Description: Document Jassi Gasca Fall Risk and appropriate interventions in the flowsheet. Outcome: Progressing Towards Goal  Note: Fall Risk Interventions:  Mobility Interventions: Bed/chair exit alarm, Communicate number of staff needed for ambulation/transfer, OT consult for ADLs, Patient to call before getting OOB, PT Consult for mobility concerns, Strengthening exercises (ROM-active/passive)    Mentation Interventions: Adequate sleep, hydration, pain control, Bed/chair exit alarm, Door open when patient unattended, Evaluate medications/consider consulting pharmacy, Room close to nurse's station, Toileting rounds    Medication Interventions: Assess postural VS orthostatic hypotension, Bed/chair exit alarm, Evaluate medications/consider consulting pharmacy, Patient to call before getting OOB, Teach patient to arise slowly    Elimination Interventions: Bed/chair exit alarm, Call light in reach, Patient to call for help with toileting needs, Stay With Me (per policy), Toileting schedule/hourly rounds    History of Falls Interventions: Bed/chair exit alarm, Consult care management for discharge planning, Door open when patient unattended, Evaluate medications/consider consulting pharmacy, Room close to nurse's station         Problem: Anemia Care Plan (Adult and Pediatric)  Goal: *Labs within defined limits  Outcome: Progressing Towards Goal  Note: Hgb >7.0   EGD done today, but unable to assess inguinal hernia due to esophageal ulcers/strictures per gastroenterology note. Problem: Unstable Angina/NSTEMI: Discharge Outcomes  Goal: *Stable cardiac rhythm  Outcome: Progressing Towards Goal  Note: Heart rhythm has been normal sinus.   Goal: *Lungs clear or at baseline  Outcome: Progressing Towards Goal  Goal: *Optimal pain control at patient's stated goal  Outcome: Progressing Towards Goal  Goal: *Anxiety reduced or absent  Outcome: Progressing Towards Goal

## 2020-09-10 NOTE — ANESTHESIA POSTPROCEDURE EVALUATION
Procedure(s):  ESOPHAGOGASTRODUODENOSCOPY (EGD)   :-  ESOPHAGOGASTRODUODENAL (EGD) BIOPSY. MAC    Anesthesia Post Evaluation      Multimodal analgesia: multimodal analgesia not used between 6 hours prior to anesthesia start to PACU discharge  Patient location during evaluation: PACU  Patient participation: complete - patient participated  Level of consciousness: awake  Pain score: 0  Pain management: adequate  Airway patency: patent  Anesthetic complications: no  Cardiovascular status: acceptable  Respiratory status: acceptable  Hydration status: acceptable  Comments: I have evaluated the patient and meets criteria for discharge from PACU.  Imtiaz Bowers MD        INITIAL Post-op Vital signs:   Vitals Value Taken Time   /63 9/10/2020  3:20 PM   Temp 36.9 °C (98.4 °F) 9/10/2020  3:20 PM   Pulse 76 9/10/2020  3:20 PM   Resp 24 9/10/2020  3:20 PM   SpO2 96 % 9/10/2020  3:20 PM

## 2020-09-10 NOTE — PROCEDURES
[address] [place] [address], [address] [place] [address]        Es[facility]ago- Gastroduodenoscopy (EGD) Procedure Note    Shanelle Rosa  1950  [id number]      Procedure: Endoscopic Gastroduodenoscopy --diagnostic, with biopsy    Indication:  Iron deficiency anemia     Pre-operative Diagnosis: see indication above    Post-operative Diagnosis: see findings below    : Rajesh Powell. Gómez Whitaker MD    Referring Provider:  Other, MD Ryan      Anesthesia/Sedation:  MAC anesthesia Propofol        Procedure Details     After informed consent was obtained for the procedure, with all risks and benefits of procedure explained the patient was taken to the endoscopy suite and placed in the left lateral decubitus position. Following sequential administration of sedation as per above, the endoscope was inserted into the mouth and advanced under direct vision to esophagus. A careful inspection was made as the gastroscope was withdrawn. Findings and interventions are described below. Findings:   Esophagus: in the distal esophagus, a focal stricture was encountered with an overlying clean-based ulcer - multiple cold biopsies taken. The lumen diameter was approximately 8-9 mm. The gastroscope could not traverse this segment (9.2 mm). Dilation was deferred in setting of ulceration. Stomach: not examined  Duodenum: not examined      Therapies:  As above    Specimens: 1. Esophageal ulcer biopsy         EBL: None      Complications:   None; patient tolerated the procedure well. Impression:    1. Esophageal stricture with overlying ulcer - biopsied    Recommendations:  1. Follow up surgical pathology  2. Full liquid diet  3. PPI  4. Will follow along with you    Signed By: Rajesh Powell.  Gómez Whitaker MD     9/10/2020  3:27 PM

## 2020-09-10 NOTE — ROUTINE PROCESS
Kiki Patel 1950 
[id number] Situation: 
Verbal report received from: Omid Mckay RN Procedure: Procedure(s): ESOPHAGOGASTRODUODENOSCOPY (EGD)   :- 
ESOPHAGOGASTRODUODENAL (EGD) BIOPSY Background: 
 
Preoperative diagnosis: anemia Postoperative diagnosis: Esophageal Ulcer Esophageal Stricture :  Dr. Kacie Ponce Assistant(s): Endoscopy Technician-1: Zechariah Wagner Endoscopy RN-1: Adal Lira RN Specimens:  
ID Type Source Tests Collected by Time Destination 1 : Esophageal Ulcer Bx Preservative   Gilberto Lugo MD 9/10/2020 1512 Pathology H. Pylori  no Assessment: 
Intra-procedure medications Anesthesia gave intra-procedure sedation and medications, see anesthesia flow sheet yes Intravenous fluids: NS@ Demetrius Messenger Vital signs stable Abdominal assessment: round and soft Recommendation: 
 
Return to floor

## 2020-09-10 NOTE — PROGRESS NOTES
Óscar [facility]  1950  [id number]    Situation:    Scheduled Procedure: Procedure(s):  ESOPHAGOGASTRODUODENOSCOPY (EGD)   :-  Verbal report received from: Jony Bass RN  Preoperative diagnosis: anemia    Background:    Procedure: Procedure(s):  ESOPHAGOGASTRODUODENOSCOPY (EGD)   :-  Physician performing procedure; Dr. Jen Vega RN    NPO Status/Last PO Intake: NPO since MN  Is the patient taking Blood Thinners: no  Is the patient diabetic: no     Does the patient have a Pacemaker/Defibrillator in place?: no  Does the patient need antibiotics before/during/after procedure: no  Does the patient have SCD in place:{ no  Is patient on CONTACT precautions: no    Assessment:  Are the vital signs stable prior to patient coming to ENDO?  yes  Is the patient alert/oriented and able to sign consent for the procedures: consent on chart, per RN is A&O w/ intermittent confusion. How does the patient's abdomen feel prior to coming to ENDO? round and soft   Does the patient have a patient IV in place?  yes    Recommendation:  Family or Friend present: no

## 2020-09-10 NOTE — PROGRESS NOTES
[address]  Leamon Sacks, [address]       GI PROGRESS NOTE  Will Roxanne [facility]  [phone] office  [phone] NP/PA in-hospital cell phone M-F until 4:30PM  After 5PM or on weekends, please call  for physician on call      NAME: Jonathan Bains   :  1950   MRN:  [id number]       Subjective:   Patient denies nausea, vomiting, or abdominal pain. No reported bowel movement. No chest pain. He is NPO. Objective:     VITALS:   Last 24hrs VS reviewed since prior progress note. Most recent are:  Visit Vitals  /73 (BP 1 Location: Left arm, BP Patient Position: At rest)   Pulse 81   Temp 97.9 °F (36.6 °C)   Resp 14   Ht 5' 9\" (1.753 m)   Wt 77.8 kg (171 lb 8.3 oz)   SpO2 100%   BMI 25.33 kg/m²       PHYSICAL EXAM:  General:          Cooperative, no acute distress    Neurologic:      Alert and oriented X 3  HEENT:           EOMI, no scleral icterus   Lungs:             No respiratory distress  Heart:              S1 S2  Abdomen:        Soft, non-distended, no tenderness, no guarding, no rebound. +Bowel sounds.    Extremities:     Warm  Psych:             Not anxious or agitated      Lab Data Reviewed:     Recent Results (from the past 24 hour(s))   ECHO ADULT COMPLETE    Collection Time: 20 12:30 PM   Result Value Ref Range    IVSd 0.97 0.6 - 1.0 cm    LVIDd 4.59 4.2 - 5.9 cm    LVIDs 3.88 cm    LVOT d 1.97 cm    LVPWd 0.93 0.6 - 1.0 cm    LVOT Peak Gradient 2.37 mmHg    LVOT Peak Velocity 76.92 cm/s    RVIDd 4.51 cm    RVSP 43.74 mmHg    Left Atrium Major Axis 4.02 cm    LA Volume 51.78 18 - 58 mL    LA Area 4C 17.06 cm2    LA Vol 2C 49.71 18 - 58 mL    LA Vol 4C 44.74 18 - 58 mL    Est. RA Pressure 3.00 mmHg    Aortic Valve Area by Continuity of Peak Velocity 2.08 cm2    AoV PG 5.03 mmHg    Aortic Valve Systolic Peak Velocity 265.46 cm/s    MV A Mani 58.72 cm/s    Mitral Valve E Wave Deceleration Time 0.15 s    MV E Mani 99.93 cm/s    Mitral Valve Pressure Half-time 0.04 s    MVA (PHT) 4.95 cm2    Pulmonic Valve Systolic Peak Instantaneous Gradient 3.23 mmHg    Tapse 2.02 (A) 1.5 - 2.0 cm    Triscuspid Valve Regurgitation Peak Gradient 40.74 mmHg    TR Max Velocity 319.13 cm/s    Ao Root D 3.00 cm    MV E/A 1.70     LV Mass .6 88 - 224 g    LV Mass AL Index 79.4 49 - 115 g/m2    Left Atrium Minor Axis 2.16 cm    LA Vol Index 27.86 16 - 28 ml/m2    LA Vol Index 26.75 16 - 28 ml/m2    LA Vol Index 24.07 16 - 28 ml/m2    JH/BSA Pk Mani 1.1 cm2/m2   GLUCOSE, POC    Collection Time: 09/09/20 12:33 PM   Result Value Ref Range    Glucose (POC) 147 (H) 65 - 100 mg/dL    Performed by Rahul Bolivar, POC    Collection Time: 09/10/20 12:38 AM   Result Value Ref Range    Glucose (POC) 174 (H) 65 - 100 mg/dL    Performed by Stanley Coley RN    CBC WITH AUTOMATED DIFF    Collection Time: 09/10/20  6:00 AM   Result Value Ref Range    WBC 8.5 4.1 - 11.1 K/uL    RBC 2.90 (L) 4.10 - 5.70 M/uL    HGB 8.3 (L) 12.1 - 17.0 g/dL    HCT 27.5 (L) 36.6 - 50.3 %    MCV 94.8 80.0 - 99.0 FL    MCH 28.6 26.0 - 34.0 PG    MCHC 30.2 30.0 - 36.5 g/dL    RDW 15.8 (H) 11.5 - 14.5 %    PLATELET 257 707 - 903 K/uL    MPV 11.7 8.9 - 12.9 FL    NRBC 0.0 0  WBC    ABSOLUTE NRBC 0.00 0.00 - 0.01 K/uL    NEUTROPHILS 75 32 - 75 %    LYMPHOCYTES 13 12 - 49 %    MONOCYTES 9 5 - 13 %    EOSINOPHILS 2 0 - 7 %    BASOPHILS 1 0 - 1 %    IMMATURE GRANULOCYTES 1 (H) 0.0 - 0.5 %    ABS. NEUTROPHILS 6.4 1.8 - 8.0 K/UL    ABS. LYMPHOCYTES 1.1 0.8 - 3.5 K/UL    ABS. MONOCYTES 0.8 0.0 - 1.0 K/UL    ABS. EOSINOPHILS 0.2 0.0 - 0.4 K/UL    ABS. BASOPHILS 0.1 0.0 - 0.1 K/UL    ABS. IMM.  GRANS. 0.1 (H) 0.00 - 0.04 K/UL    DF AUTOMATED     METABOLIC PANEL, BASIC    Collection Time: 09/10/20  6:00 AM   Result Value Ref Range    Sodium 139 136 - 145 mmol/L    Potassium 3.8 3.5 - 5.1 mmol/L    Chloride 108 97 - 108 mmol/L    CO2 21 21 - 32 mmol/L    Anion gap 10 5 - 15 mmol/L    Glucose 100 65 - 100 mg/dL BUN 35 (H) 6 - 20 MG/DL    Creatinine 1.32 (H) 0.70 - 1.30 MG/DL    BUN/Creatinine ratio 27 (H) 12 - 20      GFR est AA >60 >60 ml/min/1.73m2    GFR est non-AA 53 (L) >60 ml/min/1.73m2    Calcium 7.9 (L) 8.5 - 10.1 MG/DL   GLUCOSE, POC    Collection Time: 09/10/20  6:34 AM   Result Value Ref Range    Glucose (POC) 112 (H) 65 - 100 mg/dL    Performed by Lynnann Moritz RN         Assessment:   · Anemia with hemoccult negative stool: Hgb 8.3<--8.3<--7.5<--5.2 status post 2 units PRBCs, platelets 562, INR 1.4. CT abdomen/pelvis without contrast (9/7/20): large left inguinal hernia; bilateral pleural effusions with subsegmental atelectasis; nonbostructing left renal calculus; small right kidney. · NSTEMI: cardiology following  · Acute metabolic encephalopathy  · History of stroke  · COVID-19 negative     Patient Active Problem List   Diagnosis Code    NSTEMI (non-ST elevated myocardial infarction) ([facility] [address] I21.4     Plan:   · NPO  · PPI BID  · Trend CBC and transfuse as necessary  · Cardiology following  · Plan for EGD today with Dr. Mary Jacobson. Details and risks of the procedure to include (but not limited to) anesthesia, bleeding, infection, and perforation were discussed. Patient understands and is in agreement. Please verify consent has been obtained. Signed By: Dominguez Marcum     9/10/2020  9:14 AM       GI Attending: Agree with above plan. EGD today. Abbe Jacobson MD

## 2020-09-10 NOTE — PROGRESS NOTES
Problem: Falls - Risk of  Goal: *Absence of Falls  Description: Document Ayesha Faria Fall Risk and appropriate interventions in the flowsheet. Outcome: Progressing Towards Goal  Note: Fall Risk Interventions:  Mobility Interventions: Bed/chair exit alarm, Communicate number of staff needed for ambulation/transfer, Mechanical lift, Patient to call before getting OOB, PT Consult for mobility concerns, Utilize walker, cane, or other assistive device    Mentation Interventions: Adequate sleep, hydration, pain control, Bed/chair exit alarm, Door open when patient unattended, Evaluate medications/consider consulting pharmacy, More frequent rounding, Reorient patient, Room close to nurse's station, Toileting rounds, Update white board    Medication Interventions: Assess postural VS orthostatic hypotension, Bed/chair exit alarm, Evaluate medications/consider consulting pharmacy, Patient to call before getting OOB, Teach patient to arise slowly    Elimination Interventions: Bed/chair exit alarm, Call light in reach, Patient to call for help with toileting needs, Toileting schedule/hourly rounds    History of Falls Interventions: Bed/chair exit alarm, Door open when patient unattended, Evaluate medications/consider consulting pharmacy, Room close to nurse's station         Problem: Pressure Injury - Risk of  Goal: *Prevention of pressure injury  Description: Document Judson Scale and appropriate interventions in the flowsheet.   Outcome: Progressing Towards Goal  Note: Pressure Injury Interventions:  Sensory Interventions: Assess changes in LOC, Assess need for specialty bed, Discuss PT/OT consult with provider, Float heels, Keep linens dry and wrinkle-free, Maintain/enhance activity level, Minimize linen layers    Moisture Interventions: Absorbent underpads, Assess need for specialty bed, Check for incontinence Q2 hours and as needed, Internal/External urinary devices, Limit adult briefs, Minimize layers, Moisture barrier, Offer toileting Q_hr    Activity Interventions: Assess need for specialty bed, Increase time out of bed, PT/OT evaluation, Pressure redistribution bed/mattress(bed type)    Mobility Interventions: Assess need for specialty bed, HOB 30 degrees or less, PT/OT evaluation    Nutrition Interventions: Document food/fluid/supplement intake    Friction and Shear Interventions: Apply protective barrier, creams and emollients, HOB 30 degrees or less, Lift sheet, Minimize layers, Transferring/repositioning devices       Problem: General Medical Care Plan  Goal: *Skin integrity maintained  Outcome: Progressing Towards Goal  Note: Turned and repositioned himself throughout shift. Verbal shift change report given to Marta Christine RN (oncoming nurse) by Mick Blackwell RN (offgoing nurse). Report included the following information SBAR, Kardex, ED Summary, Procedure Summary, Intake/Output, MAR, Cardiac Rhythm NSR, and Alarm Parameters .

## 2020-09-10 NOTE — PROGRESS NOTES
0100: MRI called for completed screening form during shift - this nurse stated how pt is not able to complete form and his POA is also hospitalized. MRI tech, Lorenzo Capps, stated she would call the Radiologist to get permission to perform MRI without completed screening form. 0130: Lorenzo Capps returned call, Radiologist gave orders for pt to complete MRI without screening form. Will take pt down to MRI.

## 2020-09-11 ENCOUNTER — APPOINTMENT (OUTPATIENT)
Dept: CT IMAGING | Age: 70
DRG: 064 | End: 2020-09-11
Attending: PSYCHIATRY & NEUROLOGY
Payer: MEDICARE

## 2020-09-11 ENCOUNTER — APPOINTMENT (OUTPATIENT)
Dept: GENERAL RADIOLOGY | Age: 70
DRG: 064 | End: 2020-09-11
Attending: HOSPITALIST
Payer: MEDICARE

## 2020-09-11 LAB
ANION GAP SERPL CALC-SCNC: 6 MMOL/L (ref 5–15)
BUN SERPL-MCNC: 31 MG/DL (ref 6–20)
BUN/CREAT SERPL: 24 (ref 12–20)
CALCIUM SERPL-MCNC: 8 MG/DL (ref 8.5–10.1)
CHLORIDE SERPL-SCNC: 108 MMOL/L (ref 97–108)
CO2 SERPL-SCNC: 26 MMOL/L (ref 21–32)
COMMENT, HOLDF: NORMAL
CREAT SERPL-MCNC: 1.27 MG/DL (ref 0.7–1.3)
GLUCOSE BLD STRIP.AUTO-MCNC: 123 MG/DL (ref 65–100)
GLUCOSE BLD STRIP.AUTO-MCNC: 146 MG/DL (ref 65–100)
GLUCOSE BLD STRIP.AUTO-MCNC: 197 MG/DL (ref 65–100)
GLUCOSE SERPL-MCNC: 98 MG/DL (ref 65–100)
POTASSIUM SERPL-SCNC: 3.7 MMOL/L (ref 3.5–5.1)
SAMPLES BEING HELD,HOLD: NORMAL
SERVICE CMNT-IMP: ABNORMAL
SODIUM SERPL-SCNC: 140 MMOL/L (ref 136–145)

## 2020-09-11 PROCEDURE — 99233 SBSQ HOSP IP/OBS HIGH 50: CPT | Performed by: PSYCHIATRY & NEUROLOGY

## 2020-09-11 PROCEDURE — C9113 INJ PANTOPRAZOLE SODIUM, VIA: HCPCS | Performed by: FAMILY MEDICINE

## 2020-09-11 PROCEDURE — 74011000258 HC RX REV CODE- 258: Performed by: RADIOLOGY

## 2020-09-11 PROCEDURE — 74011000636 HC RX REV CODE- 636: Performed by: RADIOLOGY

## 2020-09-11 PROCEDURE — 74011000250 HC RX REV CODE- 250: Performed by: FAMILY MEDICINE

## 2020-09-11 PROCEDURE — 74011250637 HC RX REV CODE- 250/637: Performed by: PSYCHIATRY & NEUROLOGY

## 2020-09-11 PROCEDURE — 70498 CT ANGIOGRAPHY NECK: CPT

## 2020-09-11 PROCEDURE — 74011250637 HC RX REV CODE- 250/637: Performed by: HOSPITALIST

## 2020-09-11 PROCEDURE — 82962 GLUCOSE BLOOD TEST: CPT

## 2020-09-11 PROCEDURE — 77010033678 HC OXYGEN DAILY

## 2020-09-11 PROCEDURE — 36415 COLL VENOUS BLD VENIPUNCTURE: CPT

## 2020-09-11 PROCEDURE — 65660000000 HC RM CCU STEPDOWN

## 2020-09-11 PROCEDURE — 80048 BASIC METABOLIC PNL TOTAL CA: CPT

## 2020-09-11 PROCEDURE — 70496 CT ANGIOGRAPHY HEAD: CPT

## 2020-09-11 PROCEDURE — 74011250636 HC RX REV CODE- 250/636: Performed by: FAMILY MEDICINE

## 2020-09-11 PROCEDURE — 74011250637 HC RX REV CODE- 250/637: Performed by: PHYSICIAN ASSISTANT

## 2020-09-11 PROCEDURE — 71045 X-RAY EXAM CHEST 1 VIEW: CPT

## 2020-09-11 RX ORDER — SODIUM CHLORIDE 0.9 % (FLUSH) 0.9 %
10 SYRINGE (ML) INJECTION
Status: COMPLETED | OUTPATIENT
Start: 2020-09-11 | End: 2020-09-11

## 2020-09-11 RX ORDER — CLOPIDOGREL BISULFATE 75 MG/1
75 TABLET ORAL DAILY
Status: DISCONTINUED | OUTPATIENT
Start: 2020-09-11 | End: 2020-09-12

## 2020-09-11 RX ADMIN — Medication 10 ML: at 05:55

## 2020-09-11 RX ADMIN — IOPAMIDOL 100 ML: 755 INJECTION, SOLUTION INTRAVENOUS at 08:00

## 2020-09-11 RX ADMIN — ASPIRIN 81 MG CHEWABLE TABLET 81 MG: 81 TABLET CHEWABLE at 09:21

## 2020-09-11 RX ADMIN — CLOPIDOGREL BISULFATE 75 MG: 75 TABLET ORAL at 13:56

## 2020-09-11 RX ADMIN — Medication 10 ML: at 21:10

## 2020-09-11 RX ADMIN — Medication 10 ML: at 21:15

## 2020-09-11 RX ADMIN — SODIUM CHLORIDE 40 MG: 9 INJECTION INTRAMUSCULAR; INTRAVENOUS; SUBCUTANEOUS at 21:10

## 2020-09-11 RX ADMIN — ATORVASTATIN CALCIUM 20 MG: 20 TABLET, FILM COATED ORAL at 21:10

## 2020-09-11 RX ADMIN — METOPROLOL TARTRATE 25 MG: 25 TABLET, FILM COATED ORAL at 09:22

## 2020-09-11 RX ADMIN — SODIUM CHLORIDE 100 ML: 900 INJECTION, SOLUTION INTRAVENOUS at 08:00

## 2020-09-11 RX ADMIN — SODIUM CHLORIDE 40 MG: 9 INJECTION INTRAMUSCULAR; INTRAVENOUS; SUBCUTANEOUS at 09:22

## 2020-09-11 RX ADMIN — Medication 10 ML: at 13:57

## 2020-09-11 RX ADMIN — Medication 10 ML: at 08:00

## 2020-09-11 RX ADMIN — METOPROLOL TARTRATE 25 MG: 25 TABLET, FILM COATED ORAL at 21:10

## 2020-09-11 NOTE — CONSULTS
Neurology Progress Note    Patient ID:  Adarsh Bolanos  [id number]  69 y.o.  1950    Chief Complaint: Stroke    Subjective:     78-year-old gentleman I evaluated a few days ago for concern of new left-sided weakness. When I visited with him that time he told me he was back to his baseline. He does have chronic right-sided weakness due to old stroke. I was reconsulted because an MRI was completed showing new acute infarcts in the left hemisphere. He was not on aspirin prior to admission he tells me. I completed a CTA this morning showing severe bilateral carotid disease more on the left. Objective:       ROS:  Per HPI  All other 12 pt ROS negative    Meds:  Current Facility-Administered Medications   Medication Dose Route Frequency    sodium chloride (NS) flush 5-40 mL  5-40 mL IntraVENous Q8H    sodium chloride (NS) flush 5-40 mL  5-40 mL IntraVENous PRN    aspirin chewable tablet 81 mg  81 mg Oral DAILY    metoprolol tartrate (LOPRESSOR) tablet 25 mg  25 mg Oral Q12H    pramipexole (MIRAPEX) tablet 0.125 mg  0.125 mg Oral QHS    atorvastatin (LIPITOR) tablet 20 mg  20 mg Oral QHS    0.9% sodium chloride infusion 250 mL  250 mL IntraVENous PRN    sodium chloride (NS) flush 5-40 mL  5-40 mL IntraVENous Q8H    sodium chloride (NS) flush 5-40 mL  5-40 mL IntraVENous PRN    ondansetron (ZOFRAN) injection 4 mg  4 mg IntraVENous Q4H PRN    pantoprazole (PROTONIX) 40 mg in 0.9% sodium chloride 10 mL injection  40 mg IntraVENous Q12H       MRI Results (maximum last 3): Results from [place] encounter on 09/07/20   MRI BRAIN WO CONT    Narrative *PRELIMINARY REPORT*  Focal diffusion restriction in the left frontal, parietal, and occipital lobes,  compatible with acute infarcts. No evidence of intracranial hemorrhage or mass. Periventricular white matter disease, compatible with chronic small vessels can  be a.   Preliminary report was provided by Dr. Timmy Hahn, the on-call radiologist, at 3:44  AM.  Final report to follow. *END PRELIMINARY REPORT*    *FINAL REPORT BELOW*  EXAM:  MRI BRAIN WO CONT  INDICATION:  cva, patient with prior history of stroke presented to the ED with  acute onset of fatigue and dehydration. Brought by EMS from assisted living  facility. Slurred speech. TECHNIQUE: Sagittal T1, axial FLAIR, T2,T1 and gradient echo images as well as  coronal T2 weighted images and axial diffusion weighted images of the head were  obtained. COMPARISON:  CT head same day. FINDINGS:  Generalized prominence of the ventricles and sulci consistent with volume loss  greater than expected for age. Abnormal restricted diffusion involving the left posterior lateral frontal  cortex and left parietal lobe. Small focus in the left posterior inferior  occipital lobe. Majority of the infarction is in the left middle cerebral artery  territory with focus in the left occipital lobe in the posterior circulation. This suggests the possibility of an embolic source in the cardiac or aortic in  origin. Chronic encephalomalacia left basal ganglia and corona radiata and to lesser  extent anteriorly around both heads of the caudate nucleus and a few foci in the  right thalamus and basal ganglia consistent with old small vessel lacunar  infarcts. No evidence of acute intracranial hemorrhage, mass or abnormal extra-axial fluid  collections. Flow voids are present in vertebral basilar and carotid artery systems. Mild  chronic findings of cervical spondylosis otherwise craniocervical junction is  unremarkable. Structures of the skull base including paranasal sinuses and  temporal bones are unremarkable. Impression IMPRESSION:  1. Acute left hemisphere cortical infarction primarily involving frontal and  parietal lobes but with small acute cortical infarct in the posterior inferior  occipital lobe. See above. 2. Evidence of previous old small vessel ischemic injury left greater than  right.        Lab Review Recent Results (from the past 24 hour(s))   GLUCOSE, POC    Collection Time: 09/10/20  5:14 PM   Result Value Ref Range    Glucose (POC) 111 (H) 65 - 100 mg/dL    Performed by Sheyla Tan, POC    Collection Time: 09/10/20 11:07 PM   Result Value Ref Range    Glucose (POC) 111 (H) 65 - 100 mg/dL    Performed by [address] [place] [address], BASIC    Collection Time: 09/11/20  6:40 AM   Result Value Ref Range    Sodium 140 136 - 145 mmol/L    Potassium 3.7 3.5 - 5.1 mmol/L    Chloride 108 97 - 108 mmol/L    CO2 26 21 - 32 mmol/L    Anion gap 6 5 - 15 mmol/L    Glucose 98 65 - 100 mg/dL    BUN 31 (H) 6 - 20 MG/DL    Creatinine 1.27 0.70 - 1.30 MG/DL    BUN/Creatinine ratio 24 (H) 12 - 20      GFR est AA >60 >60 ml/min/1.73m2    GFR est non-AA 56 (L) >60 ml/min/1.73m2    Calcium 8.0 (L) 8.5 - 10.1 MG/DL   SAMPLES BEING HELD    Collection Time: 09/11/20  6:40 AM   Result Value Ref Range    SAMPLES BEING HELD 1PST     COMMENT        Add-on orders for these samples will be processed based on acceptable specimen integrity and analyte stability, which may vary by analyte. GLUCOSE, POC    Collection Time: 09/11/20  6:49 AM   Result Value Ref Range    Glucose (POC) 123 (H) 65 - 100 mg/dL    Performed by Gian Maher RN        Additional comments:I reviewed the patient's new clinical lab test results. and I reviewed the patients new imaging test results. Patient Vitals for the past 8 hrs:   BP Temp Pulse Resp SpO2 Weight   09/11/20 0921 139/64  82      09/11/20 0738 142/80 98.4 °F (36.9 °C) 76 14 98 %    09/11/20 0430      73.6 kg (162 lb 4.1 oz)   09/11/20 0356 149/83 97.8 °F (36.6 °C) 94 18 96 %        No intake/output data recorded.   09/09 1901 - 09/11 0700  In: 901.3 [I.V.:901.3]  Out: 750 [Urine:750]    Exam:  Visit Vitals  /64   Pulse 82   Temp 98.4 °F (36.9 °C)   Resp 14   Ht 5' 9\" (1.753 m)   Wt 73.6 kg (162 lb 4.1 oz)   SpO2 98%   BMI 23.96 kg/m²     Gen: Well developed  CV: RRR  Lungs: non labored breathing  Abd: soft, non distended  Neuro: A&O x 3, slurred speech but no clear aphasia. He can repeat and name. Comprehension intact and can follow commands. CN II-XII: PERRL, EOMI, face right asymmetry, tongue/palate midline  Motor: Right hemiparesis right lower extremity approximately 4, right upper extremity approximately 3   sensory: intact to LT  DTRs: symmetric  COOR: no limb/truncal ataxia  Gait: Deferred    PROBLEM LIST:     Patient Active Problem List   Diagnosis Code    NSTEMI (non-ST elevated myocardial infarction) ([facility] [address] I21.4       Assessment/Plan:      39-year-old gentleman with new embolic infarcts on the left I suspect due to symptomatic carotid disease. I am going to start dual antiplatelets. Neuro interventional consult. Stroke work-up stay on telemetry, any acute changes activate code stroke. I discussed the results with him personally. Questions answered. During this evaluation, we also discussed stroke education to include signs and symptoms of stroke and TIA. This clinical note was dictated with an electronic dictation software that can make unintentional errors. If there are any questions, please contact me directly for clarification.       Signed:  [address] [facility], DO  9/11/2020  11:23 AM

## 2020-09-11 NOTE — CONSULTS
Alexa Pearl is a 21-year-old right-handed male admitted for right-sided weakness (stroke), NSTEMI, and severe anemia. His admission hemoglobin was 5.2 on admission but has been transfused up to 8.3. Admission CTA imaging demonstrates critical stenosis of the left internal carotid artery origin resulting from soft plaque. There is slightly reduced caliber of the ICA distally suggesting severe low flow limitation. The right carotid artery is narrowed approximately 60%. Intracranially, there is a large anterior communicating artery and small bilateral posterior communicating arteries. The left A1 segment is smaller caliber. His MRI demonstrates acute on chronic left cerebral hemisphere watershed infarctions suggesting flow limitation from the carotid stenosis and apparently inadequate intracranial collaterals (although severe anemia is also a contributing factor). Although the left carotid artery would be straightforward technically for endovascular stenting, my main concern is the risks of dual antiplatelet therapy in this patient with an esophageal ulcer and severe anemia. His systolic blood pressures are currently ranging 130150. It is possible that he is pressure dependent. He is at high risk for vessel to vessel thromboembolic events or recurrent hypoperfusion. He would benefit from aspirin when clinically feasible. I think his left carotid should be revascularized as soon as clinically possible. Carotid endarterectomy is probably a better choice than stenting in this particular patient. I have discussed the case with Dr. Noah Tovar on the vascular surgery service and he and his partners agreed to see the patient. Thank you for this consult. Matilda Curling, MD  [facility]  [phone]      Past Medical History:   Diagnosis Date    Skin cancer 12/19/2019    bcc-left forehead, left neck, left lateral cheek     Stroke (cerebrum) ([facility] [address]      History reviewed. No pertinent surgical history. History reviewed. No pertinent family history. Social History     Tobacco Use    Smoking status: Current Every Day Smoker    Smokeless tobacco: Never Used   Substance Use Topics    Alcohol use: Yes      Current Facility-Administered Medications   Medication Dose Route Frequency Provider Last Rate Last Dose    clopidogreL (PLAVIX) tablet 75 mg  75 mg Oral DAILY Dory Sanchez DO   75 mg at 09/11/20 1356    sodium chloride (NS) flush 5-40 mL  5-40 mL IntraVENous Q8H Edna GUZMAN MD   10 mL at 09/11/20 1357    sodium chloride (NS) flush 5-40 mL  5-40 mL IntraVENous PRN Dione Metcalf MD        aspirin chewable tablet 81 mg  81 mg Oral DAILY Lexx Arias MD   81 mg at 09/11/20 0921    metoprolol tartrate (LOPRESSOR) tablet 25 mg  25 mg Oral Q12H Candice HEATH PA-C   25 mg at 09/11/20 3355    pramipexole (MIRAPEX) tablet 0.125 mg  0.125 mg Oral QHS Phyllis Carlton MD   0.125 mg at 09/10/20 2142    atorvastatin (LIPITOR) tablet 20 mg  20 mg Oral QHS Phyllis Carlton MD   20 mg at 09/10/20 2141    0.9% sodium chloride infusion 250 mL  250 mL IntraVENous PRN Edmundo Saucedo MD        sodium chloride (NS) flush 5-40 mL  5-40 mL IntraVENous Q8H Danny Hunt MD   10 mL at 09/11/20 1357    sodium chloride (NS) flush 5-40 mL  5-40 mL IntraVENous PRN Danny Hunt MD        ondansetron (ZOFRAN) injection 4 mg  4 mg IntraVENous Q4H PRN Danny Hunt MD        pantoprazole (PROTONIX) 40 mg in 0.9% sodium chloride 10 mL injection  40 mg IntraVENous Q12H Danny Hunt MD   40 mg at 09/11/20 3610        No Known Allergies    Review of Systems:  A comprehensive review of systems was negative except for that written in the History of Present Illness.     Objective:     Vitals:    09/11/20 0921 09/11/20 1132 09/11/20 1349 09/11/20 1540   BP: 139/64 117/69  (!) 140/69   Pulse: 82 75  82   Resp:  16  15   Temp:  98.1 °F (36.7 °C)  98.3 °F (36.8 °C)   SpO2:  100% 99% 100%   Weight:       Height: Physical Exam:  GENERAL: alert, cooperative, no distress, appears stated age, pale  HEENT:  Ulcerated chronic lesion/CA on right cheek    Neurologic Exam: NIH = 5    Mental Status:  Alert and oriented x 4. Appropriate affect, mood and behavior. Language:    Normal fluency, repetition, comprehension and naming. Cranial Nerves:   Pupils equal, round and reactive to light. Visual fields full to confrontation. Grossly decreased vision in the right eye (chronic)     Extraocular movements intact. Facial sensation intact V1 - V3. Full facial strength, no asymmetry. Hearing intact bilaterally. No dysarthria. Tongue protrudes to midline, palate elevates symmetrically. Shoulder shrug 5/5 bilaterally. Motor:    Right arm and leg pronator drift with decreased tone     Bulk and tone normal are normal on the right side with no drift. 4/5 right bicep/tri, wrist extensors, weak  3/5 intraosseous     5/5 on the left     No involuntary movements. Sensation:    Decreased sensation in the right lower extremity. Normal otherwise. No extinction or neglect. Coordination & Gait: Gait not attempted. Finger-to-nose and heel-to-shin is abnormal on the right side due to weakness. Normal on the left. Assessment:     Hospital Problems  Date Reviewed: 9/8/2020          Codes Class Noted POA    * (Principal) NSTEMI (non-ST elevated myocardial infarction) [facility]) ICD-10-CM: I21.4  ICD-9-CM: 410.70  9/7/2020 Yes              Thank you for this consult and participating in the care of this patient. I have discussed the diagnosis with the patient and the intended plan as seen in the above orders. Patient is in agreement.       Signed By: Shira Lopez MD     September 11, 2020

## 2020-09-11 NOTE — PROGRESS NOTES
2000: Bedside shift change report given to Keyanna RN (oncoming nurse) by Elyssa Cantu RN (offgoing nurse). Report included the following information SBAR, Kardex, ED Summary, Intake/Output, MAR, Accordion, Recent Results and Cardiac Rhythm NSR.     1930: Spoke with neuro on-call Roger Moore MD and explained situation. Roger Moore MD stated the blood for the aspirin test is not pertinent to receive tonight and to continue to wait for now and re-evaluate tomorrow. Will pass on to night shift. 1530-1900: Notified by lab that the blood sent down for the aspirin test is not usable. Pt hard stick, stuck by multiple nurses, unable to get blood. Arterial blood not suitable for test. Notified Esla Reese MD.  Elsa Reese MD stated the test is from neurology and that usually the blood is wanted a certain time after the aspirin dose. Neurology paged. Problem: Falls - Risk of  Goal: *Absence of Falls  Description: Document Jolene Cifuentes Fall Risk and appropriate interventions in the flowsheet. Outcome: Progressing Towards Goal  Note: Fall Risk Interventions:  Mobility Interventions: Communicate number of staff needed for ambulation/transfer, Patient to call before getting OOB    Mentation Interventions: Adequate sleep, hydration, pain control, Door open when patient unattended, More frequent rounding, Toileting rounds    Medication Interventions: Patient to call before getting OOB    Elimination Interventions: Patient to call for help with toileting needs, Call light in reach    History of Falls Interventions: Door open when patient unattended    Problem: Pressure Injury - Risk of  Goal: *Prevention of pressure injury  Description: Document Judson Scale and appropriate interventions in the flowsheet.   Outcome: Progressing Towards Goal  Note: Pressure Injury Interventions:  Sensory Interventions: Assess changes in LOC, Assess need for specialty bed, Check visual cues for pain, Minimize linen layers    Moisture Interventions: Internal/External urinary devices, Absorbent underpads, Apply protective barrier, creams and emollients    Activity Interventions: Increase time out of bed, Pressure redistribution bed/mattress(bed type)    Mobility Interventions: HOB 30 degrees or less, Pressure redistribution bed/mattress (bed type)    Nutrition Interventions: Offer support with meals,snacks and hydration    Friction and Shear Interventions: HOB 30 degrees or less    Problem: Risk for Spread of Infection  Goal: Prevent transmission of infectious organism to others  Description: Prevent the transmission of infectious organisms to other patients, staff members, and visitors.   Outcome: Progressing Towards Goal     Problem: General Medical Care Plan  Goal: *Vital signs within specified parameters  Outcome: Progressing Towards Goal  Goal: *Labs within defined limits  Outcome: Progressing Towards Goal  Goal: *Absence of infection signs and symptoms  Outcome: Progressing Towards Goal  Goal: *Optimal pain control at patient's stated goal  Outcome: Progressing Towards Goal  Goal: *Skin integrity maintained  Outcome: Progressing Towards Goal  Goal: *Optimize nutritional status  Outcome: Progressing Towards Goal     Problem: Anemia Care Plan (Adult and Pediatric)  Goal: *Labs within defined limits  Outcome: Progressing Towards Goal

## 2020-09-11 NOTE — CARDIO/PULMONARY
Cardiac Rehab: CAD education folder and MI book to bedside (Per cardiology 9/8/20 pt is NSTEMI type 2 and for cath when stable). Pt has nursing staff with him and is unavailable.

## 2020-09-11 NOTE — PROGRESS NOTES
[facility]  Hospitalist Group                                                                                          Hospitalist Progress Note  Sabas Paige MD  Answering service: [phone] from in house phone        Date of Service:  2020  NAME:  Shanelle Rosa  :  1950  MRN:  [id number]      Admission Summary:   Shanelle Rosa is a 69 y.o. male who presents with fatigue and dehydration     Patient is dysarthric and is a very poor historian, not much history could be obtained from the patient. I tried calling the patient's next to kin Luis Rinaldi, as listed on the chart but did not get a response. Thus history was extremely limited, patient apparently lives in assisted living facility by himself, was noted to be fatigued and dehydrated, has not been eating drinking much, has not been taking his medication, and thus was sent to the hospital for further management and evaluation. Apparently patient has left-sided weakness after his stroke currently has some slurred speech, unclear whether this is new or old. No further history can be obtained from the patient. Patient came to the ER, was found to have a hemoglobin of 5.2 and was requested to be admitted to the hospitalist service.     Interval history / Subjective:     F/u anemia   -No complaints. Assessment & Plan:     Acute blood loss anemia  -unclear etiology  -h/h  -s/p 2 units PRBC  -transfuse for hb<7    Probable GI bleed  -FOBT negative  -EGD showed esophageal stricture with overlying ulcer       NSTEMI:  -troponins elevated. Echocardiogram with left ventricle ejection fraction of 45-50%. Wall motion was not assessed due to poor image quality. Cardiology consulted, does not feel further cardiac work-up is needed.        Elevated creatinine, creatinine improving. Most augustin CKD stage III  -Monitor    Metabolic acidosis, non anion gap resolved with bicarbonate drip.   Discontinue bicarb drip and monitor       Acute metabolic encephalopathy  -CT head 9/7 Asymmetric white matter disease and lacunar infarcts. No acute process  identified by noncontrast CT  -MRI brain showed acute left hemisphere cortical infarction primarily involving frontal and  parietal lobes but with small acute cortical infarct in the posterior inferior  occipital lobe. See above. 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 He is admitted for severe anemia and onset of the stroke is unknown, but this test was planned couple days ago and was done today after his SARS-CoV-2 test result was negative as such patient is not a candidate for TPA. History obtained  -Echocardiogram with LVEF of 45-50%. -Seen by neurologist.  He was already aspirin and neurologist added Plavix. -CT angiogram of the head and neck showed bilateral carotid stenosis mild to moderate on the right and severe on the left. -NIS and vascular surgery consulted. SARS-CoV-2 not detected  -Taken off droplet plus isolation. Large left inguinal hernia  -noted on CT abd  -Outpatient follow up    Scrotal edema  -Appreciate Urology, elevates scrotum    Bilateral pleural effusion reported CT angiogram done for the head neck. -LVEF 45 to 50%. -We will check chest x-ray  -He is not clinically fluid overloaded so we will hold off diuretics specially since he got IV dye today. The patient is quite critical and is a high risk of clinical decline     Code status: FULL CODE  DVT prophylaxis: scd    Plan: Follow Neurology, Cardiology, GI    Care Plan discussed with: Patient/Family  Anticipated Disposition: Needs rehab  Anticipated Discharge: Early next week.      Hospital Problems  Date Reviewed: 9/8/2020 Codes Class Noted POA    * (Principal) NSTEMI (non-ST elevated myocardial infarction) [facility]) ICD-10-CM: I21.4  ICD-9-CM: 410.70  9/7/2020 Yes                Review of Systems:   A comprehensive review of systems was negative except for that written in the HPI. Vital Signs:    Last 24hrs VS reviewed since prior progress note. Most recent are:  Visit Vitals  BP (!) 140/69 (BP 1 Location: Right arm, BP Patient Position: At rest)   Pulse 82   Temp 98.3 °F (36.8 °C)   Resp 15   Ht 5' 9\" (1.753 m)   Wt 73.6 kg (162 lb 4.1 oz)   SpO2 100%   BMI 23.96 kg/m²         Intake/Output Summary (Last 24 hours) at 9/11/2020 1557  Last data filed at 9/11/2020 0557  Gross per 24 hour   Intake    Output 175 ml   Net -175 ml        Physical Examination:             Constitutional:  No acute distress, cooperative, pleasant    ENT:  Oral mucosa moist, oropharynx benign. Resp:  CTA bilaterally. No wheezing/rhonchi/rales. No accessory muscle use   CV:  Regular rhythm, normal rate, no murmurs, gallops, rubs    GI:  Soft, non distended, non tender. normoactive bowel sounds, no hepatosplenomegaly     Musculoskeletal:  No edema, warm, 2+ pulses throughout    Neurologic:  Alert and oriented. RUE 3/4. RLE 4+/5     Skin:  Good turgor, no rashes or ulcers       Data Review:    Review and/or order of clinical lab test      Labs:     Recent Labs     09/10/20  0600 09/09/20  0548   WBC 8.5 10.6   HGB 8.3* 8.3*   HCT 27.5* 26.2*    358     Recent Labs     09/11/20  0640 09/10/20  0600 09/09/20  0548    139 141   K 3.7 3.8 3.5    108 111*   CO2 26 21 19*   BUN 31* 35* 44*   CREA 1.27 1.32* 1.37*   GLU 98 100 127*   CA 8.0* 7.9* 8.2*     Recent Labs     09/09/20  0548   ALT 20   AP 97   TBILI 0.5   TP 5.9*   ALB 2.2*   GLOB 3.7     No results for input(s): INR, PTP, APTT, INREXT, INREXT in the last 72 hours. No results for input(s): FE, TIBC, PSAT, FERR in the last 72 hours.    No results found for: FOL, RBCF   No results for input(s): PH, PCO2, PO2 in the last 72 hours.   Recent Labs     09/09/20  0548 09/08/20 2101   TROIQ 8.74* 10.40*     No results found for: CHOL, CHOLX, CHLST, CHOLV, HDL, HDLP, LDL, LDLC, DLDLP, TGLX, TRIGL, TRIGP, CHHD, CHHDX  Lab Results   Component Value Date/Time    Glucose (POC) 146 (H) 09/11/2020 11:30 AM    Glucose (POC) 123 (H) 09/11/2020 06:49 AM    Glucose (POC) 111 (H) 09/10/2020 11:07 PM    Glucose (POC) 111 (H) 09/10/2020 05:14 PM    Glucose (POC) 98 09/10/2020 11:20 AM     No results found for: COLOR, APPRN, SPGRU, REFSG, YESY, PROTU, GLUCU, KETU, BILU, UROU, PRADEEP, LEUKU, GLUKE, EPSU, BACTU, WBCU, RBCU, CASTS, UCRY      Medications Reviewed:     Current Facility-Administered Medications   Medication Dose Route Frequency    clopidogreL (PLAVIX) tablet 75 mg  75 mg Oral DAILY    sodium chloride (NS) flush 5-40 mL  5-40 mL IntraVENous Q8H    sodium chloride (NS) flush 5-40 mL  5-40 mL IntraVENous PRN    aspirin chewable tablet 81 mg  81 mg Oral DAILY    metoprolol tartrate (LOPRESSOR) tablet 25 mg  25 mg Oral Q12H    pramipexole (MIRAPEX) tablet 0.125 mg  0.125 mg Oral QHS    atorvastatin (LIPITOR) tablet 20 mg  20 mg Oral QHS    0.9% sodium chloride infusion 250 mL  250 mL IntraVENous PRN    sodium chloride (NS) flush 5-40 mL  5-40 mL IntraVENous Q8H    sodium chloride (NS) flush 5-40 mL  5-40 mL IntraVENous PRN    ondansetron (ZOFRAN) injection 4 mg  4 mg IntraVENous Q4H PRN    pantoprazole (PROTONIX) 40 mg in 0.9% sodium chloride 10 mL injection  40 mg IntraVENous Q12H     ______________________________________________________________________  EXPECTED LENGTH OF STAY: 4d 9h  ACTUAL LENGTH OF STAY:          4                 Omid Mckay MD

## 2020-09-11 NOTE — PROGRESS NOTES
[address]  [address] [facility], [address]       GI PROGRESS NOTE  Will Pako [place]  [phone] office  [phone] NP/PA in-hospital cell phone M-F until 4:30PM  After 5PM or on weekends, please call  for physician on call      NAME: Óscar Robertson   :  1950   MRN:  [id number]       Subjective:   Patient denies dysphagia. No nausea, vomiting, or abdominal pain. No bowel movement today. EGD was done yesterday. He is on full liquids. Objective:     VITALS:   Last 24hrs VS reviewed since prior progress note. Most recent are:  Visit Vitals  /80 (BP 1 Location: Left arm, BP Patient Position: At rest)   Pulse 76   Temp 98.4 °F (36.9 °C)   Resp 14   Ht 5' 9\" (1.753 m)   Wt 73.6 kg (162 lb 4.1 oz)   SpO2 98%   BMI 23.96 kg/m²       PHYSICAL EXAM:  General:          Cooperative, no acute distress    Neurologic:      Alert and oriented  HEENT:           EOMI, no scleral icterus  Lungs:             No respiratory distress  Heart:              S1 S2  Abdomen:        Soft, non-distended, no tenderness, no guarding, no rebound. +Bowel sounds.   Extremities:     Warm  Psych:             Not anxious or agitated      Lab Data Reviewed:     Recent Results (from the past 24 hour(s))   GLUCOSE, POC    Collection Time: 09/10/20 11:20 AM   Result Value Ref Range    Glucose (POC) 98 65 - 100 mg/dL    Performed by Brenda Rodas.     GLUCOSE, POC    Collection Time: 09/10/20  5:14 PM   Result Value Ref Range    Glucose (POC) 111 (H) 65 - 100 mg/dL    Performed by Jaret Avina, POC    Collection Time: 09/10/20 11:07 PM   Result Value Ref Range    Glucose (POC) 111 (H) 65 - 100 mg/dL    Performed by BILLY Torres, BASIC    Collection Time: 20  6:40 AM   Result Value Ref Range    Sodium 140 136 - 145 mmol/L    Potassium 3.7 3.5 - 5.1 mmol/L    Chloride 108 97 - 108 mmol/L    CO2 26 21 - 32 mmol/L    Anion gap 6 5 - 15 mmol/L Glucose 98 65 - 100 mg/dL    BUN 31 (H) 6 - 20 MG/DL    Creatinine 1.27 0.70 - 1.30 MG/DL    BUN/Creatinine ratio 24 (H) 12 - 20      GFR est AA >60 >60 ml/min/1.73m2    GFR est non-AA 56 (L) >60 ml/min/1.73m2    Calcium 8.0 (L) 8.5 - 10.1 MG/DL   SAMPLES BEING HELD    Collection Time: 09/11/20  6:40 AM   Result Value Ref Range    SAMPLES BEING HELD 1PST     COMMENT        Add-on orders for these samples will be processed based on acceptable specimen integrity and analyte stability, which may vary by analyte. GLUCOSE, POC    Collection Time: 09/11/20  6:49 AM   Result Value Ref Range    Glucose (POC) 123 (H) 65 - 100 mg/dL    Performed by Keyon Bryant RN        Assessment:   · Anemia with hemoccult negative stool: Hgb 8.3 yesterday<--8.3<--7.5<--5.2 status post 2 units PRBCs, platelets 358, INR 1.4. CT abdomen/pelvis without contrast (9/7/20): large left inguinal hernia; bilateral pleural effusions with subsegmental atelectasis; nonbostructing left renal calculus; small right kidney. EGD (9/10/20): esophageal stricture with overlying clean-based ulcer, (biopsied), dilation deferred in setting of ulceration. · NSTEMI: cardiology following  · Acute metabolic encephalopathy  · Acute CVA  · COVID-19 negative     Patient Active Problem List   Diagnosis Code    [facility]non-ST elevated [facility]) [facility] I21.4     Plan:   · Continue full liquids for now  · PPI  · Trend CBC and transfuse as necessary  · EGD with above findings which were discussed with the patient. Follow EGD pathology. Signed By: Melvern Cranker, [place]     9/11/2020  8:45 AM       GI Attending: Agree with above plan. Will await pathology results. If full liquid diet is tolerated, could advance to mechanical soft diet next. Weekend team to see on request. Please call with any questions. Jayant P. Wannetta Hamman, MD

## 2020-09-11 NOTE — PROGRESS NOTES
Bedside and Verbal shift change report given to Sara Wolf RN (oncoming nurse) by Abelardo Brady RN (offgoing nurse). Report included the following information SBAR, Kardex, ED Summary, Intake/Output, Recent Results and Cardiac Rhythm NSR. Problem: Falls - Risk of  Goal: *Absence of Falls  Description: Document [facility] Fall Risk and appropriate interventions in the flowsheet. Outcome: Progressing Towards Goal  Note: Fall Risk Interventions:  Mobility Interventions: Bed/chair exit alarm, Communicate number of staff needed for ambulation/transfer, OT consult for ADLs, PT Consult for mobility concerns, Patient to call before getting OOB, Utilize gait belt for transfers/ambulation    Mentation Interventions: Adequate sleep, hydration, pain control, Bed/chair exit alarm, Door open when patient unattended, Eyeglasses and hearing aids, Evaluate medications/consider consulting pharmacy, Increase mobility, Reorient patient, More frequent rounding, Toileting rounds, Update white board    Medication Interventions: Assess postural VS orthostatic hypotension, Bed/chair exit alarm, Evaluate medications/consider consulting pharmacy, Patient to call before getting OOB, Teach patient to arise slowly    Elimination Interventions: Bed/chair exit alarm, Call light in reach, Patient to call for help with toileting needs, Toileting schedule/hourly rounds    History of Falls Interventions: Bed/chair exit alarm, Door open when patient unattended, Evaluate medications/consider consulting pharmacy, Investigate reason for fall, Room close to nurse's station, Vital signs minimum Q4HRs X 24 hrs (comment for end date)         Problem: Pressure Injury - Risk of  Goal: *Prevention of pressure injury  Description: Document Judson Scale and appropriate interventions in the flowsheet.   Outcome: Progressing Towards Goal  Note: Pressure Injury Interventions:  Sensory Interventions: Assess changes in LOC, Assess need for specialty bed, Discuss PT/OT consult with provider, Float heels, Keep linens dry and wrinkle-free, Minimize linen layers, Turn and reposition approx. every two hours (pillows and wedges if needed)    Moisture Interventions: Absorbent underpads, Check for incontinence Q2 hours and as needed, Internal/External urinary devices, Limit adult briefs, Minimize layers    Activity Interventions: Increase time out of bed, Pressure redistribution bed/mattress(bed type), PT/OT evaluation    Mobility Interventions: Assess need for specialty bed, Float heels, HOB 30 degrees or less, PT/OT evaluation, Turn and reposition approx.  every two hours(pillow and wedges)    Nutrition Interventions: Offer support with meals,snacks and hydration, Document food/fluid/supplement intake    Friction and Shear Interventions: HOB 30 degrees or less, Lift sheet, Minimize layers                Problem: Anemia Care Plan (Adult and Pediatric)  Goal: *Labs within defined limits  Outcome: Progressing Towards Goal  Note: HGB x> 7     Problem: Unstable Angina/NSTEMI: Discharge Outcomes  Goal: *Stable cardiac rhythm  Outcome: Progressing Towards Goal  Note: NSR

## 2020-09-12 ENCOUNTER — APPOINTMENT (OUTPATIENT)
Dept: GENERAL RADIOLOGY | Age: 70
DRG: 064 | End: 2020-09-12
Attending: ANESTHESIOLOGY
Payer: MEDICARE

## 2020-09-12 LAB
ALBUMIN SERPL-MCNC: 2.2 G/DL (ref 3.5–5)
ALBUMIN/GLOB SERPL: 0.6 {RATIO} (ref 1.1–2.2)
ALP SERPL-CCNC: 102 U/L (ref 45–117)
ALT SERPL-CCNC: 19 U/L (ref 12–78)
ANION GAP SERPL CALC-SCNC: 7 MMOL/L (ref 5–15)
AST SERPL-CCNC: 18 U/L (ref 15–37)
BACTERIA SPEC CULT: NORMAL
BASOPHILS # BLD: 0 K/UL (ref 0–0.1)
BASOPHILS NFR BLD: 1 % (ref 0–1)
BILIRUB SERPL-MCNC: 0.3 MG/DL (ref 0.2–1)
BUN SERPL-MCNC: 29 MG/DL (ref 6–20)
BUN/CREAT SERPL: 23 (ref 12–20)
CALCIUM SERPL-MCNC: 7.9 MG/DL (ref 8.5–10.1)
CHLORIDE SERPL-SCNC: 107 MMOL/L (ref 97–108)
CHOLEST SERPL-MCNC: 108 MG/DL
CO2 SERPL-SCNC: 22 MMOL/L (ref 21–32)
CREAT SERPL-MCNC: 1.25 MG/DL (ref 0.7–1.3)
DIFFERENTIAL METHOD BLD: ABNORMAL
EOSINOPHIL # BLD: 0.1 K/UL (ref 0–0.4)
EOSINOPHIL NFR BLD: 1 % (ref 0–7)
ERYTHROCYTE [DISTWIDTH] IN BLOOD BY AUTOMATED COUNT: 15.3 % (ref 11.5–14.5)
GLOBULIN SER CALC-MCNC: 3.8 G/DL (ref 2–4)
GLUCOSE SERPL-MCNC: 131 MG/DL (ref 65–100)
HCT VFR BLD AUTO: 27.1 % (ref 36.6–50.3)
HDLC SERPL-MCNC: 38 MG/DL
HDLC SERPL: 2.8 {RATIO} (ref 0–5)
HGB BLD-MCNC: 8.5 G/DL (ref 12.1–17)
IMM GRANULOCYTES # BLD AUTO: 0 K/UL (ref 0–0.04)
IMM GRANULOCYTES NFR BLD AUTO: 1 % (ref 0–0.5)
INR PPP: 1.3 (ref 0.9–1.1)
LDLC SERPL CALC-MCNC: 43.6 MG/DL (ref 0–100)
LIPID PROFILE,FLP: NORMAL
LYMPHOCYTES # BLD: 1 K/UL (ref 0.8–3.5)
LYMPHOCYTES NFR BLD: 12 % (ref 12–49)
MCH RBC QN AUTO: 28 PG (ref 26–34)
MCHC RBC AUTO-ENTMCNC: 31.4 G/DL (ref 30–36.5)
MCV RBC AUTO: 89.1 FL (ref 80–99)
MONOCYTES # BLD: 0.6 K/UL (ref 0–1)
MONOCYTES NFR BLD: 8 % (ref 5–13)
NEUTS SEG # BLD: 6.2 K/UL (ref 1.8–8)
NEUTS SEG NFR BLD: 78 % (ref 32–75)
NRBC # BLD: 0 K/UL (ref 0–0.01)
NRBC BLD-RTO: 0 PER 100 WBC
PLATELET # BLD AUTO: 380 K/UL (ref 150–400)
PMV BLD AUTO: 12.1 FL (ref 8.9–12.9)
POTASSIUM SERPL-SCNC: 3.8 MMOL/L (ref 3.5–5.1)
PROT SERPL-MCNC: 6 G/DL (ref 6.4–8.2)
PROTHROMBIN TIME: 13.3 SEC (ref 9–11.1)
RBC # BLD AUTO: 3.04 M/UL (ref 4.1–5.7)
SERVICE CMNT-IMP: NORMAL
SODIUM SERPL-SCNC: 136 MMOL/L (ref 136–145)
TRIGL SERPL-MCNC: 132 MG/DL (ref ?–150)
VLDLC SERPL CALC-MCNC: 26.4 MG/DL
WBC # BLD AUTO: 8 K/UL (ref 4.1–11.1)

## 2020-09-12 PROCEDURE — 97161 PT EVAL LOW COMPLEX 20 MIN: CPT

## 2020-09-12 PROCEDURE — 76210000016 HC OR PH I REC 1 TO 1.5 HR

## 2020-09-12 PROCEDURE — 80053 COMPREHEN METABOLIC PANEL: CPT

## 2020-09-12 PROCEDURE — 74011250637 HC RX REV CODE- 250/637: Performed by: PSYCHIATRY & NEUROLOGY

## 2020-09-12 PROCEDURE — 99233 SBSQ HOSP IP/OBS HIGH 50: CPT | Performed by: PSYCHIATRY & NEUROLOGY

## 2020-09-12 PROCEDURE — 74011250637 HC RX REV CODE- 250/637: Performed by: PHYSICIAN ASSISTANT

## 2020-09-12 PROCEDURE — 80061 LIPID PANEL: CPT

## 2020-09-12 PROCEDURE — 94760 N-INVAS EAR/PLS OXIMETRY 1: CPT

## 2020-09-12 PROCEDURE — 85025 COMPLETE CBC W/AUTO DIFF WBC: CPT

## 2020-09-12 PROCEDURE — 74011250637 HC RX REV CODE- 250/637: Performed by: HOSPITALIST

## 2020-09-12 PROCEDURE — C9113 INJ PANTOPRAZOLE SODIUM, VIA: HCPCS | Performed by: FAMILY MEDICINE

## 2020-09-12 PROCEDURE — 97530 THERAPEUTIC ACTIVITIES: CPT

## 2020-09-12 PROCEDURE — 36415 COLL VENOUS BLD VENIPUNCTURE: CPT

## 2020-09-12 PROCEDURE — 65660000000 HC RM CCU STEPDOWN

## 2020-09-12 PROCEDURE — 85610 PROTHROMBIN TIME: CPT

## 2020-09-12 PROCEDURE — 05HM33Z INSERTION OF INFUSION DEVICE INTO RIGHT INTERNAL JUGULAR VEIN, PERCUTANEOUS APPROACH: ICD-10-PCS | Performed by: ANESTHESIOLOGY

## 2020-09-12 PROCEDURE — 74011250636 HC RX REV CODE- 250/636: Performed by: FAMILY MEDICINE

## 2020-09-12 PROCEDURE — 77010033678 HC OXYGEN DAILY

## 2020-09-12 PROCEDURE — 36556 INSERT NON-TUNNEL CV CATH: CPT

## 2020-09-12 PROCEDURE — 74011000250 HC RX REV CODE- 250: Performed by: FAMILY MEDICINE

## 2020-09-12 PROCEDURE — 71045 X-RAY EXAM CHEST 1 VIEW: CPT

## 2020-09-12 PROCEDURE — C1751 CATH, INF, PER/CENT/MIDLINE: HCPCS

## 2020-09-12 RX ORDER — ATORVASTATIN CALCIUM 40 MG/1
40 TABLET, FILM COATED ORAL
Status: DISCONTINUED | OUTPATIENT
Start: 2020-09-12 | End: 2020-09-17 | Stop reason: HOSPADM

## 2020-09-12 RX ADMIN — Medication 10 ML: at 06:13

## 2020-09-12 RX ADMIN — Medication 10 ML: at 21:18

## 2020-09-12 RX ADMIN — METOPROLOL TARTRATE 25 MG: 25 TABLET, FILM COATED ORAL at 08:12

## 2020-09-12 RX ADMIN — Medication 10 ML: at 13:05

## 2020-09-12 RX ADMIN — SODIUM CHLORIDE 40 MG: 9 INJECTION INTRAMUSCULAR; INTRAVENOUS; SUBCUTANEOUS at 08:12

## 2020-09-12 RX ADMIN — ATORVASTATIN CALCIUM 40 MG: 40 TABLET, FILM COATED ORAL at 21:17

## 2020-09-12 RX ADMIN — PRAMIPEXOLE DIHYDROCHLORIDE 0.12 MG: 0.25 TABLET ORAL at 22:06

## 2020-09-12 RX ADMIN — Medication 10 ML: at 13:04

## 2020-09-12 RX ADMIN — METOPROLOL TARTRATE 25 MG: 25 TABLET, FILM COATED ORAL at 21:17

## 2020-09-12 RX ADMIN — CLOPIDOGREL BISULFATE 75 MG: 75 TABLET ORAL at 08:12

## 2020-09-12 RX ADMIN — SODIUM CHLORIDE 40 MG: 9 INJECTION INTRAMUSCULAR; INTRAVENOUS; SUBCUTANEOUS at 21:17

## 2020-09-12 RX ADMIN — ASPIRIN 81 MG CHEWABLE TABLET 81 MG: 81 TABLET CHEWABLE at 08:12

## 2020-09-12 NOTE — PROGRESS NOTES
[address]  [address] [facility], [address]       GI PROGRESS NOTE  :  1950   MRN:  [id number]       Subjective:   Patient denies dysphagia. No nausea, vomiting, or abdominal pain. No bowel movement today. EGD was done yesterday. He is on full liquids. Objective:     VITALS:   Last 24hrs VS reviewed since prior progress note. Most recent are:  Visit Vitals  BP (!) 173/89 (BP 1 Location: Right arm, BP Patient Position: At rest)   Pulse 95   Temp 98.4 °F (36.9 °C)   Resp 20   Ht 5' 9\" (1.753 m)   Wt 73.7 kg (162 lb 7.7 oz)   SpO2 100%   BMI 23.99 kg/m²       PHYSICAL EXAM:  General:          Cooperative, no acute distress    Neurologic:      Alert and oriented  HEENT:           EOMI, no scleral icterus  Lungs:             No respiratory distress  Heart:              S1 S2  Abdomen:        Soft, non-distended, no tenderness, no guarding, no rebound. +Bowel sounds.   Extremities:     Warm  Psych:             Not anxious or agitated      Lab Data Reviewed:     Recent Results (from the past 24 hour(s))   GLUCOSE, POC    Collection Time: 20  8:04 PM   Result Value Ref Range    Glucose (POC) 197 (H) 65 - 100 mg/dL    Performed by Jason Cabrera    CBC WITH AUTOMATED DIFF    Collection Time: 20 12:35 PM   Result Value Ref Range    WBC 8.0 4.1 - 11.1 K/uL    RBC 3.04 (L) 4.10 - 5.70 M/uL    HGB 8.5 (L) 12.1 - 17.0 g/dL    HCT 27.1 (L) 36.6 - 50.3 %    MCV 89.1 80.0 - 99.0 FL    MCH 28.0 26.0 - 34.0 PG    MCHC 31.4 30.0 - 36.5 g/dL    RDW 15.3 (H) 11.5 - 14.5 %    PLATELET 496 583 - 273 K/uL    MPV 12.1 8.9 - 12.9 FL    NRBC 0.0 0  WBC    ABSOLUTE NRBC 0.00 0.00 - 0.01 K/uL    NEUTROPHILS 78 (H) 32 - 75 %    LYMPHOCYTES 12 12 - 49 %    MONOCYTES 8 5 - 13 %    EOSINOPHILS 1 0 - 7 %    BASOPHILS 1 0 - 1 %    IMMATURE GRANULOCYTES 1 (H) 0.0 - 0.5 %    ABS. NEUTROPHILS 6.2 1.8 - 8.0 K/UL    ABS. LYMPHOCYTES 1.0 0.8 - 3.5 K/UL    ABS. MONOCYTES 0.6 0.0 - 1.0 K/UL    ABS. EOSINOPHILS 0.1 0.0 - 0.4 K/UL    ABS. BASOPHILS 0.0 0.0 - 0.1 K/UL    ABS. IMM. GRANS. 0.0 0.00 - 0.04 K/UL    DF AUTOMATED     METABOLIC PANEL, COMPREHENSIVE    Collection Time: 09/12/20 12:35 PM   Result Value Ref Range    Sodium 136 136 - 145 mmol/L    Potassium 3.8 3.5 - 5.1 mmol/L    Chloride 107 97 - 108 mmol/L    CO2 22 21 - 32 mmol/L    Anion gap 7 5 - 15 mmol/L    Glucose 131 (H) 65 - 100 mg/dL    BUN 29 (H) 6 - 20 MG/DL    Creatinine 1.25 0.70 - 1.30 MG/DL    BUN/Creatinine ratio 23 (H) 12 - 20      GFR est AA >60 >60 ml/min/1.73m2    GFR est non-AA 57 (L) >60 ml/min/1.73m2    Calcium 7.9 (L) 8.5 - 10.1 MG/DL    Bilirubin, total 0.3 0.2 - 1.0 MG/DL    ALT (SGPT) 19 12 - 78 U/L    AST (SGOT) 18 15 - 37 U/L    Alk. phosphatase 102 45 - 117 U/L    Protein, total 6.0 (L) 6.4 - 8.2 g/dL    Albumin 2.2 (L) 3.5 - 5.0 g/dL    Globulin 3.8 2.0 - 4.0 g/dL    A-G Ratio 0.6 (L) 1.1 - 2.2     PROTHROMBIN TIME + INR    Collection Time: 09/12/20 12:35 PM   Result Value Ref Range    INR 1.3 (H) 0.9 - 1.1      Prothrombin time 13.3 (H) 9.0 - 11.1 sec   LIPID PANEL    Collection Time: 09/12/20 12:35 PM   Result Value Ref Range    LIPID PROFILE          Cholesterol, total 108 <200 MG/DL    Triglyceride 132 <150 MG/DL    HDL Cholesterol 38 MG/DL    LDL, calculated 43.6 0 - 100 MG/DL    VLDL, calculated 26.4 MG/DL    CHOL/HDL Ratio 2.8 0.0 - 5.0         Assessment:   · Anemia with hemoccult negative stool: Hgb 8.3 yesterday<--8.3<--7.5<--5.2 status post 2 units PRBCs, platelets 358, INR 1.4. CT abdomen/pelvis without contrast (9/7/20): large left inguinal hernia; bilateral pleural effusions with subsegmental atelectasis; nonbostructing left renal calculus; small right kidney. EGD (9/10/20): esophageal stricture with overlying clean-based ulcer, (biopsied), dilation deferred in setting of ulceration.    · NSTEMI: cardiology following  · Acute metabolic encephalopathy  · Acute CVA  · COVID-19 negative     Patient Active Problem List   Diagnosis Code    NSTEMI (non-ST elevated myocardial infarction) ([facility] [address] I21.4     Plan:   · Continue full liquids for now  · PPI  · Trend CBC and transfuse as necessary  · I am available if needed tomorrow Andrewsalex Bills will  again on Monday      Signed By: Manuel Simpson MD     9/12/2020  8:45 AM       GI Attending: Agree with above plan. Will await pathology results. If full liquid diet is tolerated, could advance to mechanical soft diet next. Weekend team to see on request. Please call with any questions.     Manuel Simpson MD

## 2020-09-12 NOTE — PROGRESS NOTES
Patient seen and examined  Full consult to be dictated  69 yowm with high grade left carotid stenosis and completed stroke  He would benefit from CEA   The question is timing. .. Also he has had a NSTEMI which complicates surgical timing as well.   Thank you for asking us to see him  Will discuss with Dr Marcelo Trimble who will be performing the surgery  Plans to follow

## 2020-09-12 NOTE — PROGRESS NOTES
Bedside shift change report given to Mick Trimble RN (oncoming nurse) by Thalia Saldaña RN (offgoing nurse). Report included the following information SBAR, Kardex, ED Summary, Intake/Output, MAR, Med Rec Status and Cardiac Rhythm NSR. Patient Vitals for the past 12 hrs:   Temp Pulse Resp BP SpO2   09/12/20 0325 98.3 °F (36.8 °C) 81 16 (!) 152/76 98 %   09/11/20 2317 97.8 °F (36.6 °C) 83 16 (!) 154/76 96 %   09/11/20 2110  81  (!) 140/76    09/11/20 2029 98.2 °F (36.8 °C) 90 18 120/75 95 %     Last 3 Recorded Weights in this Encounter    09/10/20 0329 09/11/20 0430 09/12/20 0410   Weight: 77.8 kg (171 lb 8.3 oz) 73.6 kg (162 lb 4.1 oz) 73.7 kg (162 lb 7.7 oz)   Pt weighed in bed with one sheet, one blanket, one pillow; SCD machine and condom cath bag not on bed, after just being bathed. Problem: Falls - Risk of  Goal: *Absence of Falls  Description: Document Rosa Blackwell Fall Risk and appropriate interventions in the flowsheet.   Outcome: Progressing Towards Goal  Note: Fall Risk Interventions:  Mobility Interventions: Bed/chair exit alarm, Communicate number of staff needed for ambulation/transfer, Patient to call before getting OOB, Utilize walker, cane, or other assistive device    Mentation Interventions: Adequate sleep, hydration, pain control, Bed/chair exit alarm, Door open when patient unattended, Eyeglasses and hearing aids, Increase mobility, More frequent rounding, Reorient patient, Room close to nurse's station, Update white board, Toileting rounds    Medication Interventions: Patient to call before getting OOB, Teach patient to arise slowly, Bed/chair exit alarm    Elimination Interventions: Bed/chair exit alarm, Call light in reach, Patient to call for help with toileting needs, Stay With Me (per policy), Toileting schedule/hourly rounds    History of Falls Interventions: Bed/chair exit alarm, Door open when patient unattended, Investigate reason for fall, Room close to nurse's station         Problem: Pressure Injury - Risk of  Goal: *Prevention of pressure injury  Description: Document Judson Scale and appropriate interventions in the flowsheet.   Outcome: Progressing Towards Goal  Note: Pressure Injury Interventions:  Sensory Interventions: Assess changes in LOC, Assess need for specialty bed, Float heels, Keep linens dry and wrinkle-free, Minimize linen layers    Moisture Interventions: Absorbent underpads, Check for incontinence Q2 hours and as needed, Internal/External urinary devices, Minimize layers    Activity Interventions: Assess need for specialty bed, Increase time out of bed, Pressure redistribution bed/mattress(bed type)    Mobility Interventions: Assess need for specialty bed, Float heels, Pressure redistribution bed/mattress (bed type)    Nutrition Interventions: Document food/fluid/supplement intake    Friction and Shear Interventions: Minimize layers                Problem: General Medical Care Plan  Goal: *Vital signs within specified parameters  Outcome: Progressing Towards Goal  Goal: *Labs within defined limits  Outcome: Progressing Towards Goal  Goal: *Absence of infection signs and symptoms  Outcome: Progressing Towards Goal  Goal: *Optimal pain control at patient's stated goal  Outcome: Progressing Towards Goal  Goal: *Skin integrity maintained  Outcome: Progressing Towards Goal     Problem: Anemia Care Plan (Adult and Pediatric)  Goal: *Labs within defined limits  Outcome: Progressing Towards Goal     Problem: Unstable Angina/NSTEMI: Discharge Outcomes  Goal: *Hemodynamically stable  Outcome: Progressing Towards Goal  Goal: *Stable cardiac rhythm  Outcome: Progressing Towards Goal  Goal: *Lungs clear or at baseline  Outcome: Progressing Towards Goal  Goal: *Optimal pain control at patient's stated goal  Outcome: Progressing Towards Goal  Goal: *Anxiety reduced or absent  Outcome: Progressing Towards Goal  Goal: *Understands and describes signs and symptoms to report to providers(Stroke Metric)  Outcome: Progressing Towards Goal         Problem: Risk for Spread of Infection  Goal: Prevent transmission of infectious organism to others  Description: Prevent the transmission of infectious organisms to other patients, staff members, and visitors. Outcome: Resolved/Met  COVID negative, isolation precautions d/c.

## 2020-09-12 NOTE — PROGRESS NOTES
Problem: Falls - Risk of  Goal: *Absence of Falls  Description: Document Ruiz Somerset Fall Risk and appropriate interventions in the flowsheet. Outcome: Progressing Towards Goal  Note: Fall Risk Interventions:  Mobility Interventions: Bed/chair exit alarm, Communicate number of staff needed for ambulation/transfer, PT Consult for mobility concerns, PT Consult for assist device competence, Patient to call before getting OOB    Mentation Interventions: Adequate sleep, hydration, pain control, Bed/chair exit alarm, More frequent rounding, Toileting rounds    Medication Interventions: Patient to call before getting OOB, Bed/chair exit alarm    Elimination Interventions: Patient to call for help with toileting needs, Toileting schedule/hourly rounds, Call light in reach, Bed/chair exit alarm    History of Falls Interventions: Bed/chair exit alarm, Door open when patient unattended         Problem: Patient Education: Go to Patient Education Activity  Goal: Patient/Family Education  Outcome: Progressing Towards Goal     Problem: Pressure Injury - Risk of  Goal: *Prevention of pressure injury  Description: Document Judson Scale and appropriate interventions in the flowsheet.   Outcome: Progressing Towards Goal  Note: Pressure Injury Interventions:  Sensory Interventions: Assess changes in LOC, Assess need for specialty bed, Check visual cues for pain, Minimize linen layers    Moisture Interventions: Absorbent underpads, Apply protective barrier, creams and emollients, Internal/External urinary devices, Minimize layers    Activity Interventions: Increase time out of bed, Pressure redistribution bed/mattress(bed type), PT/OT evaluation    Mobility Interventions: HOB 30 degrees or less, Pressure redistribution bed/mattress (bed type), PT/OT evaluation    Nutrition Interventions: Document food/fluid/supplement intake    Friction and Shear Interventions: HOB 30 degrees or less, Minimize layers                Problem: Patient Education: Go to Patient Education Activity  Goal: Patient/Family Education  Outcome: Progressing Towards Goal     Problem: Patient Education:  Go to Education Activity  Goal: Patient/Family Education  Outcome: Progressing Towards Goal     Problem: General Medical Care Plan  Goal: *Vital signs within specified parameters  Outcome: Progressing Towards Goal  Goal: *Labs within defined limits  Outcome: Progressing Towards Goal  Goal: *Absence of infection signs and symptoms  Outcome: Progressing Towards Goal  Goal: *Optimal pain control at patient's stated goal  Outcome: Progressing Towards Goal  Goal: *Skin integrity maintained  Outcome: Progressing Towards Goal  Goal: *Fluid volume balance  Outcome: Progressing Towards Goal  Goal: *Optimize nutritional status  Outcome: Progressing Towards Goal  Goal: *Anxiety reduced or absent  Outcome: Progressing Towards Goal  Goal: *Progressive mobility and function (eg: ADL's)  Outcome: Progressing Towards Goal     Problem: Patient Education: Go to Patient Education Activity  Goal: Patient/Family Education  Outcome: Progressing Towards Goal     Problem: Anemia Care Plan (Adult and Pediatric)  Goal: *Labs within defined limits  Outcome: Progressing Towards Goal  Goal: *Tolerates increased activity  Outcome: Progressing Towards Goal     Problem: Unstable Angina/NSTEMI: Discharge Outcomes  Goal: *Hemodynamically stable  Outcome: Progressing Towards Goal  Goal: *Stable cardiac rhythm  Outcome: Progressing Towards Goal  Goal: *Lungs clear or at baseline  Outcome: Progressing Towards Goal  Goal: *Optimal pain control at patient's stated goal  Outcome: Progressing Towards Goal  Goal: *Identifies cardiac risk factors  Outcome: Progressing Towards Goal  Goal: *Verbalizes home exercise program, activity guidelines, cardiac precautions  Outcome: Progressing Towards Goal  Goal: *Verbalizes understanding and describes prescribed diet  Outcome: Progressing Towards Goal  Goal: *Verbalizes name, dosage, time, side effects, and number of days to continue medications  Outcome: Progressing Towards Goal  Goal: *Anxiety reduced or absent  Outcome: Progressing Towards Goal  Goal: *Understands and describes signs and symptoms to report to providers(Stroke Metric)  Outcome: Progressing Towards Goal  Goal: *Describes follow-up/return visits to physicians  Outcome: Progressing Towards Goal  Goal: *Describes available resources and support systems  Outcome: Progressing Towards Goal  Goal: *Influenza immunization  Outcome: Progressing Towards Goal  Goal: *Pneumococcal immunization  Outcome: Progressing Towards Goal  Goal: *Describes smoking cessation resources  Outcome: Progressing Towards Goal     Problem: Patient Education: Go to Patient Education Activity  Goal: Patient/Family Education  Outcome: Progressing Towards Goal

## 2020-09-12 NOTE — PROGRESS NOTES
Neurology Progress Note    Patient ID:  Yuriy Ramires  [id number]  69 y.o.  1950    Subjective:   Patient denies new focal neurologic deficits overnight. H/H stable this AM.  EGD with esophageal stricture/ulcer, no identified source of hemorrhage. Current Facility-Administered Medications   Medication Dose Route Frequency    sodium chloride (NS) flush 5-40 mL  5-40 mL IntraVENous Q8H    sodium chloride (NS) flush 5-40 mL  5-40 mL IntraVENous PRN    aspirin chewable tablet 81 mg  81 mg Oral DAILY    metoprolol tartrate (LOPRESSOR) tablet 25 mg  25 mg Oral Q12H    pramipexole (MIRAPEX) tablet 0.125 mg  0.125 mg Oral QHS    atorvastatin (LIPITOR) tablet 20 mg  20 mg Oral QHS    0.9% sodium chloride infusion 250 mL  250 mL IntraVENous PRN    sodium chloride (NS) flush 5-40 mL  5-40 mL IntraVENous Q8H    sodium chloride (NS) flush 5-40 mL  5-40 mL IntraVENous PRN    ondansetron (ZOFRAN) injection 4 mg  4 mg IntraVENous Q4H PRN    pantoprazole (PROTONIX) 40 mg in 0.9% sodium chloride 10 mL injection  40 mg IntraVENous Q12H          Objective:     Patient Vitals for the past 8 hrs:   BP Temp Pulse Resp SpO2   09/12/20 1217 133/66 97.9 °F (36.6 °C) 90 22 97 %       No intake/output data recorded.   09/10 1901 - 09/12 0700  In: -   Out: 18 [Urine:575]    Lab Review   Recent Results (from the past 24 hour(s))   GLUCOSE, POC    Collection Time: 09/11/20  8:04 PM   Result Value Ref Range    Glucose (POC) 197 (H) 65 - 100 mg/dL    Performed by Adithya Hay    CBC WITH AUTOMATED DIFF    Collection Time: 09/12/20 12:35 PM   Result Value Ref Range    WBC 8.0 4.1 - 11.1 K/uL    RBC 3.04 (L) 4.10 - 5.70 M/uL    HGB 8.5 (L) 12.1 - 17.0 g/dL    HCT 27.1 (L) 36.6 - 50.3 %    MCV 89.1 80.0 - 99.0 FL    MCH 28.0 26.0 - 34.0 PG    MCHC 31.4 30.0 - 36.5 g/dL    RDW 15.3 (H) 11.5 - 14.5 %    PLATELET 520 167 - 938 K/uL    MPV 12.1 8.9 - 12.9 FL    NRBC 0.0 0  WBC    ABSOLUTE NRBC 0.00 0.00 - 0.01 K/uL NEUTROPHILS 78 (H) 32 - 75 %    LYMPHOCYTES 12 12 - 49 %    MONOCYTES 8 5 - 13 %    EOSINOPHILS 1 0 - 7 %    BASOPHILS 1 0 - 1 %    IMMATURE GRANULOCYTES 1 (H) 0.0 - 0.5 %    ABS. NEUTROPHILS 6.2 1.8 - 8.0 K/UL    ABS. LYMPHOCYTES 1.0 0.8 - 3.5 K/UL    ABS. MONOCYTES 0.6 0.0 - 1.0 K/UL    ABS. EOSINOPHILS 0.1 0.0 - 0.4 K/UL    ABS. BASOPHILS 0.0 0.0 - 0.1 K/UL    ABS. IMM. GRANS. 0.0 0.00 - 0.04 K/UL    DF AUTOMATED     METABOLIC PANEL, COMPREHENSIVE    Collection Time: 09/12/20 12:35 PM   Result Value Ref Range    Sodium 136 136 - 145 mmol/L    Potassium 3.8 3.5 - 5.1 mmol/L    Chloride 107 97 - 108 mmol/L    CO2 22 21 - 32 mmol/L    Anion gap 7 5 - 15 mmol/L    Glucose 131 (H) 65 - 100 mg/dL    BUN 29 (H) 6 - 20 MG/DL    Creatinine 1.25 0.70 - 1.30 MG/DL    BUN/Creatinine ratio 23 (H) 12 - 20      GFR est AA >60 >60 ml/min/1.73m2    GFR est non-AA 57 (L) >60 ml/min/1.73m2    Calcium 7.9 (L) 8.5 - 10.1 MG/DL    Bilirubin, total 0.3 0.2 - 1.0 MG/DL    ALT (SGPT) 19 12 - 78 U/L    AST (SGOT) 18 15 - 37 U/L    Alk. phosphatase 102 45 - 117 U/L    Protein, total 6.0 (L) 6.4 - 8.2 g/dL    Albumin 2.2 (L) 3.5 - 5.0 g/dL    Globulin 3.8 2.0 - 4.0 g/dL    A-G Ratio 0.6 (L) 1.1 - 2.2     PROTHROMBIN TIME + INR    Collection Time: 09/12/20 12:35 PM   Result Value Ref Range    INR 1.3 (H) 0.9 - 1.1      Prothrombin time 13.3 (H) 9.0 - 11.1 sec   LIPID PANEL    Collection Time: 09/12/20 12:35 PM   Result Value Ref Range    LIPID PROFILE          Cholesterol, total 108 <200 MG/DL    Triglyceride 132 <150 MG/DL    HDL Cholesterol 38 MG/DL    LDL, calculated 43.6 0 - 100 MG/DL    VLDL, calculated 26.4 MG/DL    CHOL/HDL Ratio 2.8 0.0 - 5.0       NEUROLOGICAL EXAM:        Mental Status: Alert, follows commands appropriately. No aphasia, dysarthria. Cranial Nerves:   Intact visual fields. AVERY, EOM's full, no nystagmus, no ptosis. Facial sensation is normal.  Facial movement is symmetric. Hearing is normal bilaterally.  Palate is midline with normal sternocleidomastoid and trapezius muscles are normal. Tongue is midline. Motor:  Mild R-HP   Reflexes:   Deep tendon reflexes deferred       Assessment/Plan:     Principal Problem:    NSTEMI (non-ST elevated myocardial infarction) ([facility] [address] (9/7/2020)    69year old male admitted with NSTEMI, severe anemia, acute on chronic L cerebral watershed infarcts with critical stenosis of the L ICA for which vascular surgery is now following. Placed on ASA and Plavix 9/11 due to additional infarcts noted on imaging. Discussed risks/benefits with RICKI Vela) this morning and given his rather severe acute bleed with unidentified source of hemorrhage, we agreed ASA monotherapy would be most appropriate at this time due to high risk for hemorrhage on DAPT. Awaiting vascular input on timing for L CEA. Increase Lipitor to 40mg/daily. Allow for permissive -482 given critical LICA stenosis. PT/OT/ST evaluations.         Signed:  Denia Lima DO  9/12/2020  4:16 PM

## 2020-09-12 NOTE — PROGRESS NOTES
[address] [facility] Adult  Hospitalist Group                                                                                          Hospitalist Progress Note  Magalie Gordon MD  Answering service: [phone] from in house phone        Date of Service:  2020  NAME:  Anuel Martin  :  1950  MRN:  [id number]      Admission Summary:   Anuel Martin is a 69 y.o. male who presents with fatigue and dehydration     Patient is dysarthric and is a very poor historian, not much history could be obtained from the patient. I tried calling the patient's next to kin Silva Del Cid, as listed on the chart but did not get a response. Thus history was extremely limited, patient apparently lives in assisted living facility by himself, was noted to be fatigued and dehydrated, has not been eating drinking much, has not been taking his medication, and thus was sent to the hospital for further management and evaluation. Apparently patient has left-sided weakness after his stroke currently has some slurred speech, unclear whether this is new or old. No further history can be obtained from the patient. Patient came to the ER, was found to have a hemoglobin of 5.2 and was requested to be admitted to the hospitalist service.     Interval history / Subjective:     F/u anemia   -Sitting in reclined chair by the bedside. Denied sob,dizziness or chest pressure  -He is difficult stick,multiple nurses on multiple attempts could not get blood work. He has severe anemia,GIB and need close monitoring with labs. I think he needs a central line,pt okay with this. Discussed with RN. Assessment & Plan:     Acute blood loss anemia  -unclear etiology  -h/h  -s/p 2 units PRBC  -transfuse for hb<7. No blood work for 2 days due to very difficult stick. Asking anesthesia for central line,if not may ask RT for arterial stick    Probable GI bleed  -FOBT negative  -EGD showed esophageal stricture with overlying ulcer -Tolerating FLQ, advance for GI lite. NSTEMI:  -troponins elevated. Echocardiogram with left ventricle ejection fraction of 45-50%. Wall motion was not assessed due to poor image quality. Cardiology consulted, does not feel further cardiac work-up is needed.        Elevated creatinine, creatinine improving. Most gillinaley CKD stage III  -Monitor    Metabolic acidosis, non anion gap resolved with bicarbonate drip. Discontinue bicarb drip and monitor       Acute metabolic encephalopathy  -CT head 9/7 Asymmetric white matter disease and lacunar infarcts. No acute process  identified by noncontrast CT  -MRI brain showed acute left hemisphere cortical infarction primarily involving frontal and  parietal lobes but with small acute cortical infarct in the posterior inferior  occipital lobe. See above. 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 He is admitted for severe anemia and onset of the stroke is unknown, but this test was planned couple days ago and was done today after his SARS-CoV-2 test result was negative as such patient is not a candidate for TPA. History obtained  -Echocardiogram with LVEF of 45-50%. -Seen by neurologist.  He was already aspirin and neurologist added Plavix. -CT angiogram of the head and neck showed bilateral carotid stenosis mild to moderate on the right and severe on the left. -NIS and vascular surgery consulted. SARS-CoV-2 not detected  -Taken off droplet plus isolation. Large left inguinal hernia  -noted on CT abd  -Outpatient follow up    Scrotal edema  -Appreciate Urology, elevates scrotum    Bilateral pleural effusion reported CT angiogram done for the head neck. -LVEF 45 to 50%.   -CXR on 9/11 Bilateral pleural effusions with associated passive atelectasis and/or  consolidation. Pulmonary vascular congestion  -He is not clinically fluid overloaded so we will hold off diuretics specially since he got IV. The patient is quite critical and is a high risk of clinical decline     Code status: FULL CODE  DVT prophylaxis: scd    Plan: Follow Neurology, Cardiology, GI    Care Plan discussed with: Patient/Family  Anticipated Disposition: Needs rehab  Anticipated Discharge: Early next week. Hospital Problems  Date Reviewed: 9/8/2020          Codes Class Noted POA    * (Principal) NSTEMI (non-ST elevated myocardial infarction) [facility]) ICD-10-CM: I21.4  ICD-9-CM: 410.70  9/7/2020 Yes                Review of Systems:   A comprehensive review of systems was negative except for that written in the HPI. Vital Signs:    Last 24hrs VS reviewed since prior progress note. Most recent are:  Visit Vitals  BP (!) 154/76 (BP 1 Location: Right arm, BP Patient Position: At rest)   Pulse 83   Temp 98 °F (36.7 °C)   Resp 16   Ht 5' 9\" (1.753 m)   Wt 73.7 kg (162 lb 7.7 oz)   SpO2 98%   BMI 23.99 kg/m²         Intake/Output Summary (Last 24 hours) at 9/12/2020 1155  Last data filed at 9/12/2020 0000  Gross per 24 hour   Intake    Output 400 ml   Net -400 ml        Physical Examination:             Constitutional:  No acute distress, cooperative, pleasant    ENT:  Oral mucosa moist, oropharynx benign. Resp:  CTA bilaterally. No wheezing/rhonchi/rales. No accessory muscle use   CV:  Regular rhythm, normal rate, no murmurs, gallops, rubs    GI:  Soft, non distended, non tender. normoactive bowel sounds, no hepatosplenomegaly     Musculoskeletal:  No edema, warm, 2+ pulses throughout    Neurologic:  Alert and oriented. RUE 3/4. RLE 4+/5     Skin:  Good turgor, no rashes or ulcers       Data Review:    Review and/or order of clinical lab test      Labs:     Recent Labs     09/10/20  0600   WBC 8.5   HGB 8.3*   HCT 27.5*      Recent Labs     09/11/20  0640 09/10/20  0600    139   K 3.7 3.8    108   CO2 26 21   BUN 31* 35*   CREA 1.27 1.32*   GLU 98 100   CA 8.0* 7.9*     No results for input(s): ALT, AP, TBIL, TBILI, TP, ALB, GLOB, GGT, AML, LPSE in the last 72 hours. No lab exists for component: SGOT, GPT, AMYP, HLPSE  No results for input(s): INR, PTP, APTT, INREXT, INREXT in the last 72 hours. No results for input(s): FE, TIBC, PSAT, FERR in the last 72 hours. No results found for: FOL, RBCF   No results for input(s): PH, PCO2, PO2 in the last 72 hours. No results for input(s): CPK, CKNDX, TROIQ in the last 72 hours.     No lab exists for component: CPKMB  No results found for: CHOL, CHOLX, CHLST, CHOLV, HDL, HDLP, LDL, LDLC, DLDLP, TGLX, TRIGL, TRIGP, CHHD, CHHDX  Lab Results   Component Value Date/Time    Glucose (POC) 197 (H) 09/11/2020 08:04 PM    Glucose (POC) 146 (H) 09/11/2020 11:30 AM    Glucose (POC) 123 (H) 09/11/2020 06:49 AM    Glucose (POC) 111 (H) 09/10/2020 11:07 PM    Glucose (POC) 111 (H) 09/10/2020 05:14 PM     No results found for: COLOR, APPRN, SPGRU, REFSG, YESY, PROTU, GLUCU, KETU, BILU, UROU, PRADEEP, LEUKU, GLUKE, EPSU, BACTU, WBCU, RBCU, CASTS, UCRY      Medications Reviewed:     Current Facility-Administered Medications   Medication Dose Route Frequency    clopidogreL (PLAVIX) tablet 75 mg  75 mg Oral DAILY    sodium chloride (NS) flush 5-40 mL  5-40 mL IntraVENous Q8H    sodium chloride (NS) flush 5-40 mL  5-40 mL IntraVENous PRN    aspirin chewable tablet 81 mg  81 mg Oral DAILY    metoprolol tartrate (LOPRESSOR) tablet 25 mg  25 mg Oral Q12H    pramipexole (MIRAPEX) tablet 0.125 mg  0.125 mg Oral QHS    atorvastatin (LIPITOR) tablet 20 mg  20 mg Oral QHS    0.9% sodium chloride infusion 250 mL  250 mL IntraVENous PRN    sodium chloride (NS) flush 5-40 mL  5-40 mL IntraVENous Q8H    sodium chloride (NS) flush 5-40 mL  5-40 mL IntraVENous PRN    ondansetron (ZOFRAN) injection 4 mg  4 mg IntraVENous Q4H PRN    pantoprazole (PROTONIX) 40 mg in 0.9% sodium chloride 10 mL injection  40 mg IntraVENous Q12H     ______________________________________________________________________  EXPECTED LENGTH OF STAY: 4d 9h  ACTUAL LENGTH OF STAY:          5                 Harry Azul MD

## 2020-09-12 NOTE — PROGRESS NOTES
Problem: Mobility Impaired (Adult and Pediatric)  Goal: *Acute Goals and Plan of Care (Insert Text)  Description:   FUNCTIONAL STATUS PRIOR TO ADMISSION: Patient was modified independent using a single point cane for functional mobility. HOME SUPPORT PRIOR TO ADMISSION: The patient lived alone with no local support. Physical Therapy Goals  Initiated 9/12/2020  1. Patient will move from supine to sit and sit to supine  and scoot up and down in bed with supervision/set-up within 7 day(s). 2.  Patient will transfer from bed to chair and chair to bed with minimal assistance/contact guard assist using the least restrictive device within 7 day(s). 3.  Patient will perform sit to stand with minimal assistance/contact guard assist within 7 day(s). 4.  Patient will ambulate with minimal assistance/contact guard assist for 50 feet with the least restrictive device within 7 day(s). 5.  Patient will improve Rivera Balance score by 7 points within 7 days. Outcome: Progressing Towards Goal    PHYSICAL THERAPY EVALUATION- NEURO POPULATION  Patient: Meli Butler (43 y.o. male)  Date: 9/12/2020  Primary Diagnosis: NSTEM[facility] (non-ST elevated [facility]) [facility] [I21.4]  Procedure(s) (LRB):  ESOPHAGOGASTRODUODENOSCOPY (EGD)   :- (N/A)  ESOPHAGOGASTRODUODENAL (EGD) BIOPSY (N/A) 2 Days Post-Op   Precautions:          ASSESSMENT  Based on the objective data described below, the patient presents with impaired functional mobility as compared to baseline level 2* R sided weakness, impaired coordination, impaired balance, impaired gait, and decreased tolerance to activity (2L O2 via NC) following admission for NSTEMI and acute L frontal/parietal CVA. COVID 19 negative. Additionally, note likely plans for carotid endarterectomy. At baseline, he reports that he lived in [facility] apartment alone and was Mod I with ADLs/mobility using a SPC. Denies recent falls and did not wear home O2.     Today, he was able to mobilize to EOB with increased time and min A, cues for sequencing and integration of R UE. Completed sit<>stands x2 reps from EOB with mod A x2 plus assist to obtain fully upright posture in stance. Ultimately, he was able to take several small, shuffled steps to recliner, +R knee buckling in stance. Endorses quick fatigue. Remained up in chair at end of session, VSS, NAD. At this time, he is below his baseline level and a high falls risk - recommend discharge to acute [facility] once medically cleared to maximize neuro recovery and return to baseline. Will follow. Current Level of Function Impacting Discharge (mobility/balance): mod A x2 for transfer; new R weakness and knee eunice     Functional Outcome Measure: The patient scored Total: 2/56 on the [facility] Assessment which is indicative of high fall risk. Other factors to consider for discharge: lives alone; mod I with SPC at baseline; new R HP     Patient will benefit from skilled therapy intervention to address the above noted impairments. PLAN :  Recommendations and Planned Interventions: bed mobility training, transfer training, gait training, therapeutic exercises, neuromuscular re-education, patient and family training/education, and therapeutic activities      Frequency/Duration: Patient will be followed by physical therapy:  5 times a week to address goals. Recommendation for discharge: (in order for the patient to meet his/her long term goals)  Therapy 3 hours per day 5-7 days per week    This discharge recommendation:  A follow-up discussion with the attending provider and/or case management is planned    IF patient discharges home will need the following DME: to be determined (TBD)         SUBJECTIVE:   Patient stated I feel so weak and tired.     OBJECTIVE DATA SUMMARY:   HISTORY:    Past Medical History:   Diagnosis Date    Skin cancer 12/19/2019    bcc-left forehead, left neck, left lateral cheek     Stroke (cerebrum) ([facility]    History reviewed. No pertinent surgical history. Personal factors and/or comorbidities impacting plan of care: PMHx    Home Situation  Home Environment: Assisted living  One/Two Story Residence: One story  Living Alone: Yes  Support Systems: Assisted living  Patient Expects to be Discharged to[de-identified] Rehabilitation facility  Current DME Used/Available at Home: Cane, straight    EXAMINATION/PRESENTATION/DECISION MAKING:   Critical Behavior:  Neurologic State: Alert  Orientation Level: Oriented X4  Cognition: Follows commands  Safety/Judgement: Decreased awareness of environment, Decreased awareness of need for assistance, Decreased awareness of need for safety, Decreased insight into deficits  Hearing: Auditory  Auditory Impairment: None  Skin:    Edema:   Range Of Motion:  AROM: Generally decreased, functional  PROM: Within functional limits     Strength:    Strength: Generally decreased, functional(R sided weakness)     Tone & Sensation:   Tone: Abnormal(hypo on R)  Sensation: Intact        Coordination:  Coordination: Generally decreased, functional(impaired R side)  Vision:   Tracking: Unable to track right of midline(reports baseline)  Diplopia: No  Functional Mobility:  Bed Mobility:  Supine to Sit: Minimum assistance; Additional time  Scooting: Contact guard assistance    Transfers:  Sit to Stand: Assist x2; Moderate assistance  Stand to Sit: Assist x2; Moderate assistance  Bed to Chair: Assist x2; Moderate assistance     Balance:   Sitting: Impaired; Without support  Sitting - Static: Good (unsupported)  Sitting - Dynamic: Fair (occasional)  Standing: Impaired; With support  Standing - Static: Poor;Constant support  Standing - Dynamic : Poor;Constant support      Functional Measure  Rivera Balance Test:    Sitting to Standin  Standing Unsupported: 0  Sitting with Back Unsupported: 2  Standing to Sittin  Transfers: 0  Standing Unsupported with Eyes Closed: 0  Standing Unsupported with Feet Together: 0  Reach Forward with Outstretched Arm: 0   Object: 0  Turn to Look Over Shoulders: 0  Turn 360 Degrees: 0  Alternate Foot on Step/Stool: 0  Standing Unsupported One Foot in Front: 0  Stand on One Le  Total: 2/         56=Maximum possible score;   0-20=High fall risk  21-40=Moderate fall risk   41-56=Low fall risk        Physical Therapy Evaluation Charge Determination   History Examination Presentation Decision-Making   MEDIUM  Complexity : 1-2 comorbidities / personal factors will impact the outcome/ POC  MEDIUM Complexity : 3 Standardized tests and measures addressing body structure, function, activity limitation and / or participation in recreation  LOW Complexity : Stable, uncomplicated  HIGH Complexity : FOTO score of 1- 25       Based on the above components, the patient evaluation is determined to be of the following complexity level: LOW     Pain Rating:  none    Activity Tolerance:   Good, desaturates with exertion and requires oxygen, and requires rest breaks  Please refer to the flowsheet for vital signs taken during this treatment. After treatment patient left in no apparent distress:   Sitting in chair, Call bell within reach, and Bed / chair alarm activated    COMMUNICATION/EDUCATION:   The patients plan of care was discussed with: Registered nurse and Physician. Patient was educated regarding his deficit(s) of R weakness as this relates to his diagnosis of CVA. He demonstrated Good understanding as evidenced by nodding. Patient and/or family was verbally educated on the BE FAST acronym for signs/symptoms of CVA and TIA. BE FAST was written on patient's communication board  for visual education and reinforcement. All questions answered with patient indicating good understanding. Fall prevention education was provided and the patient/caregiver indicated understanding., Patient/family have participated as able in goal setting and plan of care. , and Patient/family agree to work toward stated goals and plan of care.     Thank you for this referral.  Regan Cuenca, PT, DPT   Time Calculation: 23 mins

## 2020-09-12 NOTE — PROGRESS NOTES
1830: TRANSFER - OUT REPORT:    Verbal report given to Glenn Lyons RN(name) on Anuel Martin  being transferred to Neuro(unit) for routine progression of care       Report consisted of patients Situation, Background, Assessment and   Recommendations(SBAR). Information from the following report(s) SBAR, Kardex, ED Summary, Intake/Output, MAR, Accordion, Recent Results and Cardiac Rhythm NSR was reviewed with the receiving nurse. Opportunity for questions and clarification was provided. Patient transported with:   O2 @ 2 liters  Registered Nurse    1400: Pt has periodic phases of slight confusion, but is currently A&Ox4, with occasional delayed responses. Pt understands purpose, risks and benefits of central line insertion and able to teach back to this RN. Pt consents to having procedure done. [phone] Garcia MD paged. Order received for central line insertion. Anesthesia paged. [phone]: Overnight several RN's unable to get blood from pt. CCU RN attempted, unsuccessful. Rhonda Valladares MD notified. No new orders received. Problem: Falls - Risk of  Goal: *Absence of Falls  Description: Document Blackata Carrel Fall Risk and appropriate interventions in the flowsheet.   Outcome: Progressing Towards Goal  Note: Fall Risk Interventions:  Mobility Interventions: Bed/chair exit alarm, Communicate number of staff needed for ambulation/transfer, PT Consult for mobility concerns, PT Consult for assist device competence, Patient to call before getting OOB    Mentation Interventions: Adequate sleep, hydration, pain control, Bed/chair exit alarm, More frequent rounding, Toileting rounds    Medication Interventions: Patient to call before getting OOB, Bed/chair exit alarm    Elimination Interventions: Patient to call for help with toileting needs, Toileting schedule/hourly rounds, Call light in reach, Bed/chair exit alarm    History of Falls Interventions: Bed/chair exit alarm, Door open when patient unattended    Problem: Pressure Injury - Risk of  Goal: *Prevention of pressure injury  Description: Document Judson Scale and appropriate interventions in the flowsheet.   Outcome: Progressing Towards Goal  Note: Pressure Injury Interventions:  Sensory Interventions: Assess changes in LOC, Assess need for specialty bed, Check visual cues for pain, Minimize linen layers    Moisture Interventions: Absorbent underpads, Apply protective barrier, creams and emollients, Internal/External urinary devices, Minimize layers    Activity Interventions: Increase time out of bed, Pressure redistribution bed/mattress(bed type), PT/OT evaluation    Mobility Interventions: HOB 30 degrees or less, Pressure redistribution bed/mattress (bed type), PT/OT evaluation    Nutrition Interventions: Document food/fluid/supplement intake    Friction and Shear Interventions: HOB 30 degrees or less, Minimize layers     Problem: General Medical Care Plan  Goal: *Vital signs within specified parameters  Outcome: Progressing Towards Goal  Goal: *Absence of infection signs and symptoms  Outcome: Progressing Towards Goal  Goal: *Optimal pain control at patient's stated goal  Outcome: Progressing Towards Goal  Goal: *Skin integrity maintained  Outcome: Progressing Towards Goal  Goal: *Optimize nutritional status  Outcome: Progressing Towards Goal     Problem: Unstable Angina/NSTEMI: Discharge Outcomes  Goal: *Stable cardiac rhythm  Outcome: Progressing Towards Goal  Goal: *Lungs clear or at baseline  Outcome: Progressing Towards Goal

## 2020-09-12 NOTE — PROGRESS NOTES
Bedside and Verbal shift change report given to Rommel Balderrama 1626 (oncoming nurse) by Reji Nunez (offgoing nurse). Report included the following information SBAR, Kardex, MAR, Recent Results, Cardiac Rhythm NSR and Dual Neuro Assessment.

## 2020-09-12 NOTE — PROGRESS NOTES
TRANSFER - IN REPORT:    Verbal report received from [facility]) on Dwana Mario  being received from [facility]) for routine progression of care      Report consisted of patients Situation, Background, Assessment and   Recommendations(SBAR). Information from the following report(s) SBAR, Kardex, Intake/Output, MAR, Recent Results, Cardiac Rhythm NSR and Dual Neuro Assessment was reviewed with the receiving nurse. Opportunity for questions and clarification was provided. Assessment completed upon patients arrival to unit and care assumed.

## 2020-09-12 NOTE — PROGRESS NOTES
I called the numbers listed for his contacts,Jacque, the first contact did not answer. I was able to speak with [facility]. [facility] told me her [employer] Acosta is his sister ,but she is currently very sick and hospitalized. She gave me his brother's number. I called and spoke with his brother [email or web address]. Tierney Galeano tells me [facility] but [facility] to  [facility] poor health.      [email or web address] would be the primary contact

## 2020-09-12 NOTE — PROGRESS NOTES
Orders received, chart reviewed and patient evaluated by physical therapy. Pending progression with skilled acute physical therapy, recommend:  Therapy 3 hours per day 5-7 days per week    Recommend with nursing patient to complete as able in order to maintain strength, endurance and independence: OOB to chair 3x/day with ASSIST X2 VIA PIVOT TOWARDS LEFT. Thank you for your assistance. Full evaluation to follow.      Hailey Antonio, PT, DPT

## 2020-09-12 NOTE — PROCEDURES
Central Line Placement    Start time: 9/12/2020 5:16 PM  Performed by: Mitzy Saavedra MD  Authorized by: Mitzy Saavedra MD     Indications: vascular access, central pressure monitoring and need for vasopressors  Preanesthetic Checklist: patient identified, risks and benefits discussed, anesthesia consent, site marked, patient being monitored and timeout performed    Timeout Time: 17:16       Pre-procedure: All elements of maximal sterile barrier technique followed?  Yes    2% Chlorhexidine for cutaneous antisepsis, Hand hygiene performed prior to catheter insertion and Ultrasound guidance    Sterile Ultrasound Technique followed?: Yes            Procedure:   Prep:  Chlorhexidine  Location: internal jugular  Orientation:  Right  Patient position:  Trendelenburg  Catheter type:  Triple lumen  Catheter size:  7 Fr  Catheter length:  16 cm  Number of attempts:  1  Successful placement: Yes      Assessment:   Post-procedure:  Catheter secured and sterile dressing applied  Assessment:  Blood return through all ports, free fluid flow and guidewire removal verified  Insertion:  Uncomplicated  Patient tolerance:  Patient tolerated the procedure well with no immediate complications

## 2020-09-13 LAB
ANION GAP SERPL CALC-SCNC: 6 MMOL/L (ref 5–15)
ASPIRIN TEST, ASPIRN: 549 ARU
BASOPHILS # BLD: 0.1 K/UL (ref 0–0.1)
BASOPHILS NFR BLD: 1 % (ref 0–1)
BUN SERPL-MCNC: 26 MG/DL (ref 6–20)
BUN/CREAT SERPL: 22 (ref 12–20)
CALCIUM SERPL-MCNC: 7.9 MG/DL (ref 8.5–10.1)
CHLORIDE SERPL-SCNC: 108 MMOL/L (ref 97–108)
CO2 SERPL-SCNC: 24 MMOL/L (ref 21–32)
CREAT SERPL-MCNC: 1.19 MG/DL (ref 0.7–1.3)
DIFFERENTIAL METHOD BLD: ABNORMAL
EOSINOPHIL # BLD: 0.3 K/UL (ref 0–0.4)
EOSINOPHIL NFR BLD: 4 % (ref 0–7)
ERYTHROCYTE [DISTWIDTH] IN BLOOD BY AUTOMATED COUNT: 15.4 % (ref 11.5–14.5)
GLUCOSE SERPL-MCNC: 98 MG/DL (ref 65–100)
HCT VFR BLD AUTO: 25.9 % (ref 36.6–50.3)
HGB BLD-MCNC: 8 G/DL (ref 12.1–17)
IMM GRANULOCYTES # BLD AUTO: 0.1 K/UL (ref 0–0.04)
IMM GRANULOCYTES NFR BLD AUTO: 1 % (ref 0–0.5)
LYMPHOCYTES # BLD: 1 K/UL (ref 0.8–3.5)
LYMPHOCYTES NFR BLD: 13 % (ref 12–49)
MCH RBC QN AUTO: 28 PG (ref 26–34)
MCHC RBC AUTO-ENTMCNC: 30.9 G/DL (ref 30–36.5)
MCV RBC AUTO: 90.6 FL (ref 80–99)
MONOCYTES # BLD: 0.6 K/UL (ref 0–1)
MONOCYTES NFR BLD: 8 % (ref 5–13)
NEUTS SEG # BLD: 5.5 K/UL (ref 1.8–8)
NEUTS SEG NFR BLD: 73 % (ref 32–75)
NRBC # BLD: 0 K/UL (ref 0–0.01)
NRBC BLD-RTO: 0 PER 100 WBC
PLATELET # BLD AUTO: 370 K/UL (ref 150–400)
PMV BLD AUTO: 11.5 FL (ref 8.9–12.9)
POTASSIUM SERPL-SCNC: 3.8 MMOL/L (ref 3.5–5.1)
RBC # BLD AUTO: 2.86 M/UL (ref 4.1–5.7)
SODIUM SERPL-SCNC: 138 MMOL/L (ref 136–145)
WBC # BLD AUTO: 7.5 K/UL (ref 4.1–11.1)

## 2020-09-13 PROCEDURE — 85025 COMPLETE CBC W/AUTO DIFF WBC: CPT

## 2020-09-13 PROCEDURE — 74011250636 HC RX REV CODE- 250/636: Performed by: FAMILY MEDICINE

## 2020-09-13 PROCEDURE — 74011250637 HC RX REV CODE- 250/637: Performed by: PHYSICIAN ASSISTANT

## 2020-09-13 PROCEDURE — 74011000250 HC RX REV CODE- 250: Performed by: FAMILY MEDICINE

## 2020-09-13 PROCEDURE — 74011250637 HC RX REV CODE- 250/637: Performed by: PSYCHIATRY & NEUROLOGY

## 2020-09-13 PROCEDURE — C9113 INJ PANTOPRAZOLE SODIUM, VIA: HCPCS | Performed by: FAMILY MEDICINE

## 2020-09-13 PROCEDURE — 97166 OT EVAL MOD COMPLEX 45 MIN: CPT

## 2020-09-13 PROCEDURE — 36415 COLL VENOUS BLD VENIPUNCTURE: CPT

## 2020-09-13 PROCEDURE — 74011250637 HC RX REV CODE- 250/637: Performed by: HOSPITALIST

## 2020-09-13 PROCEDURE — 97535 SELF CARE MNGMENT TRAINING: CPT

## 2020-09-13 PROCEDURE — 65660000000 HC RM CCU STEPDOWN

## 2020-09-13 PROCEDURE — 85576 BLOOD PLATELET AGGREGATION: CPT

## 2020-09-13 PROCEDURE — 77010033678 HC OXYGEN DAILY

## 2020-09-13 PROCEDURE — 80048 BASIC METABOLIC PNL TOTAL CA: CPT

## 2020-09-13 RX ADMIN — PRAMIPEXOLE DIHYDROCHLORIDE 0.12 MG: 0.25 TABLET ORAL at 22:29

## 2020-09-13 RX ADMIN — Medication 10 ML: at 06:00

## 2020-09-13 RX ADMIN — Medication 10 ML: at 13:41

## 2020-09-13 RX ADMIN — SODIUM CHLORIDE 40 MG: 9 INJECTION INTRAMUSCULAR; INTRAVENOUS; SUBCUTANEOUS at 22:19

## 2020-09-13 RX ADMIN — Medication 10 ML: at 22:19

## 2020-09-13 RX ADMIN — METOPROLOL TARTRATE 25 MG: 25 TABLET, FILM COATED ORAL at 09:04

## 2020-09-13 RX ADMIN — Medication 10 ML: at 22:20

## 2020-09-13 RX ADMIN — ATORVASTATIN CALCIUM 40 MG: 40 TABLET, FILM COATED ORAL at 22:23

## 2020-09-13 RX ADMIN — SODIUM CHLORIDE 40 MG: 9 INJECTION INTRAMUSCULAR; INTRAVENOUS; SUBCUTANEOUS at 09:05

## 2020-09-13 RX ADMIN — ASPIRIN 81 MG CHEWABLE TABLET 81 MG: 81 TABLET CHEWABLE at 09:04

## 2020-09-13 RX ADMIN — METOPROLOL TARTRATE 25 MG: 25 TABLET, FILM COATED ORAL at 22:19

## 2020-09-13 NOTE — PROGRESS NOTES
Bedside and Verbal shift change report given to [address] [place] [address] (oncoming nurse) by Sen Vegas RN (offgoing nurse). Report included the following information SBAR, Kardex, ED Summary, Procedure Summary, Intake/Output, MAR, Recent Results, Cardiac Rhythm NSR, Quality Measures and Dual Neuro Assessment.

## 2020-09-13 NOTE — PROGRESS NOTES
Problem: Falls - Risk of  Goal: *Absence of Falls  Description: Document María Ruts Fall Risk and appropriate interventions in the flowsheet. Outcome: Progressing Towards Goal  Note: Fall Risk Interventions:  Mobility Interventions: Assess mobility with egress test, Bed/chair exit alarm, Patient to call before getting OOB, OT consult for ADLs    Mentation Interventions: Adequate sleep, hydration, pain control, Bed/chair exit alarm, More frequent rounding, Toileting rounds    Medication Interventions: Bed/chair exit alarm, Patient to call before getting OOB, Teach patient to arise slowly    Elimination Interventions: Bed/chair exit alarm, Call light in reach, Urinal in reach    History of Falls Interventions: Consult care management for discharge planning, Door open when patient unattended, Room close to nurse's station         Problem: Pressure Injury - Risk of  Goal: *Prevention of pressure injury  Description: Document Judson Scale and appropriate interventions in the flowsheet.   Outcome: Progressing Towards Goal  Note: Pressure Injury Interventions:  Sensory Interventions: Assess changes in LOC, Assess need for specialty bed, Check visual cues for pain, Float heels, Keep linens dry and wrinkle-free, Minimize linen layers    Moisture Interventions: Absorbent underpads, Apply protective barrier, creams and emollients, Minimize layers    Activity Interventions: Assess need for specialty bed, Increase time out of bed, PT/OT evaluation    Mobility Interventions: Assess need for specialty bed, HOB 30 degrees or less, PT/OT evaluation    Nutrition Interventions: Document food/fluid/supplement intake    Friction and Shear Interventions: Apply protective barrier, creams and emollients, Lift sheet, Minimize layers                Problem: General Medical Care Plan  Goal: *Vital signs within specified parameters  Outcome: Progressing Towards Goal  Goal: *Labs within defined limits  Outcome: Progressing Towards Goal  Goal: *Absence of infection signs and symptoms  Outcome: Progressing Towards Goal  Goal: *Optimal pain control at patient's stated goal  Outcome: Progressing Towards Goal     Problem: Unstable Angina/NSTEMI: Discharge Outcomes  Goal: *Hemodynamically stable  Outcome: Progressing Towards Goal  Goal: *Stable cardiac rhythm  Outcome: Progressing Towards Goal  Goal: *Verbalizes name, dosage, time, side effects, and number of days to continue medications  Outcome: Progressing Towards Goal  Goal: *Anxiety reduced or absent  Outcome: Progressing Towards Goal

## 2020-09-13 NOTE — PROGRESS NOTES
[address] [facility] Adult  Hospitalist Group                                                                                          Hospitalist Progress Note  Chi Murphy MD  Answering service: [phone] OR 36 from in house phone        Date of Service:  2020  NAME:  Brian Carrera  :  1950  MRN:  [id number]      Admission Summary:   Brian Carrera is a 69 y.o. male who presents with fatigue and dehydration     Patient is dysarthric and is a very poor historian, not much history could be obtained from the patient. I tried calling the patient's next to kin Kingamatt Kaila, as listed on the chart but did not get a response. Thus history was extremely limited, patient apparently lives in assisted living facility by himself, was noted to be fatigued and dehydrated, has not been eating drinking much, has not been taking his medication, and thus was sent to the hospital for further management and evaluation. Apparently patient has left-sided weakness after his stroke currently has some slurred speech, unclear whether this is new or old. No further history can be obtained from the patient. Patient came to the ER, was found to have a hemoglobin of 5.2 and was requested to be admitted to the hospitalist service.     Interval history / Subjective:     F/u anemia   -Patient is lying in bed eating lunch, he is alert oriented x3.  -I told him that I called his brother Nando Mccoy yesterday, he already knew. He asked me not to call his Sister Matthew Luna who currently very sick and hospitalized. His brother Nando Mccoy would be his . He said he is not in contact with his children. Assessment & Plan:     Acute blood loss anemia  -unclear etiology  -h/h  -s/p 2 units PRBC  -transfuse for hb<7. No blood work for 2 days due to very difficult stick. Asking anesthesia for central line,if not may ask RT for arterial stick    Probable GI bleed  -FOBT negative  -EGD showed esophageal stricture with overlying ulcer   -Tolerating GI light diet. .      NSTEMI:  -troponins elevated. Echocardiogram with left ventricle ejection fraction of 45-50%. Wall motion was not assessed due to poor image quality. Cardiology consulted, does not feel further cardiac work-up is needed.        Elevated creatinine, creatinine improving. Most augustin CKD stage III  -Monitor    Metabolic acidosis, non anion gap resolved with bicarbonate drip. Discontinue bicarb drip and monitor       Acute metabolic encephalopathy  -CT head 9/7 Asymmetric white matter disease and lacunar infarcts. No acute process  identified by noncontrast CT  -MRI brain showed acute left hemisphere cortical infarction primarily involving frontal and  parietal lobes but with small acute cortical infarct in the posterior inferior  occipital lobe. See above. 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 He is admitted for severe anemia and onset of the stroke is unknown, but this test was planned couple days ago and was done today after his SARS-CoV-2 test result was negative as such patient is not a candidate for TPA. History obtained  -Echocardiogram with LVEF of 45-50%. -CT angiogram of the head and neck showed bilateral carotid stenosis mild to moderate on the right and severe on the left. -NIS and vascular surgery consulted.  -Neurologist and NIS recommended aspirin monotherapy due to patient's presumed high risk of bleeding on DAPT. Increase Lipitor to 40 mg daily. Permissive hypertension 100 [id number] the critical LICA stenosis. SARS-CoV-2 not detected  -Taken off droplet plus isolation.     Large left inguinal hernia  -noted on CT abd  -Outpatient follow up    Scrotal edema  -Appreciate Urology, elevates scrotum    Bilateral pleural effusion reported CT angiogram done for the head neck. -LVEF 45 to 50%. -CXR on 9/11 Bilateral pleural effusions with associated passive atelectasis and/or  consolidation. Pulmonary vascular congestion  -He is not clinically fluid overloaded so we will hold off diuretics specially since he got IV. The patient is quite critical and is a high risk of clinical decline     Code status: FULL CODE  DVT prophylaxis: scd    Plan: Follow Neurology, Cardiology, GI  Next of kin: Anjali Burnettenorbert. Phone [phone]. Care Plan discussed with: Patient/Family  Anticipated Disposition: Needs rehab  Anticipated Discharge: Early next week. Hospital Problems  Date Reviewed: 9/8/2020          Codes Class Noted POA    * (Principal) NSTEMI (non-ST elevated myocardial infarction) [facility]) ICD-10-CM: I21.4  ICD-9-CM: 410.70  9/7/2020 Yes                Review of Systems:   A comprehensive review of systems was negative except for that written in the HPI. Vital Signs:    Last 24hrs VS reviewed since prior progress note. Most recent are:  Visit Vitals  BP (!) 148/75 (BP 1 Location: Left arm, BP Patient Position: At rest)   Pulse 75   Temp 97.8 °F (36.6 °C)   Resp 13   Ht 5' 9\" (1.753 m)   Wt 73.4 kg (161 lb 13.1 oz)   SpO2 99%   BMI 23.90 kg/m²         Intake/Output Summary (Last 24 hours) at 9/13/2020 0710  Last data filed at 9/12/2020 1600  Gross per 24 hour   Intake    Output 250 ml   Net -250 ml        Physical Examination:             Constitutional:  No acute distress, cooperative, pleasant    ENT:  Oral mucosa moist, oropharynx benign. Resp:  CTA bilaterally. No wheezing/rhonchi/rales. No accessory muscle use   CV:  Regular rhythm, normal rate, no murmurs, gallops, rubs    GI:  Soft, non distended, non tender. normoactive bowel sounds, no hepatosplenomegaly     Musculoskeletal:  No edema, warm, 2+ pulses throughout    Neurologic:  Alert and oriented. RUE 3/4. RLE 4+/5 Skin:  Good turgor, no rashes or ulcers       Data Review:    Review and/or order of clinical lab test      Labs:     Recent Labs     09/12/20  1235   WBC 8.0   HGB 8.5*   HCT 27.1*        Recent Labs     09/12/20  1235 09/11/20  0640    140   K 3.8 3.7    108   CO2 22 26   BUN 29* 31*   CREA 1.25 1.27   * 98   CA 7.9* 8.0*     Recent Labs     09/12/20  1235   ALT 19      TBILI 0.3   TP 6.0*   ALB 2.2*   GLOB 3.8     Recent Labs     09/12/20  1235   INR 1.3*   PTP 13.3*      No results for input(s): FE, TIBC, PSAT, FERR in the last 72 hours. No results found for: FOL, RBCF   No results for input(s): PH, PCO2, PO2 in the last 72 hours. No results for input(s): CPK, CKNDX, TROIQ in the last 72 hours.     No lab exists for component: CPKMB  Lab Results   Component Value Date/Time    Cholesterol, total 108 09/12/2020 12:35 PM    HDL Cholesterol 38 09/12/2020 12:35 PM    LDL, calculated 43.6 09/12/2020 12:35 PM    Triglyceride 132 09/12/2020 12:35 PM    CHOL/HDL Ratio 2.8 09/12/2020 12:35 PM     Lab Results   Component Value Date/Time    Glucose (POC) 197 (H) 09/11/2020 08:04 PM    Glucose (POC) 146 (H) 09/11/2020 11:30 AM    Glucose (POC) 123 (H) 09/11/2020 06:49 AM    Glucose (POC) 111 (H) 09/10/2020 11:07 PM    Glucose (POC) 111 (H) 09/10/2020 05:14 PM     No results found for: COLOR, APPRN, SPGRU, REFSG, YESY, PROTU, GLUCU, KETU, BILU, UROU, PRADEEP, LEUKU, GLUKE, EPSU, BACTU, WBCU, RBCU, CASTS, UCRY      Medications Reviewed:     Current Facility-Administered Medications   Medication Dose Route Frequency    atorvastatin (LIPITOR) tablet 40 mg  40 mg Oral QHS    sodium chloride (NS) flush 5-40 mL  5-40 mL IntraVENous Q8H    sodium chloride (NS) flush 5-40 mL  5-40 mL IntraVENous PRN    aspirin chewable tablet 81 mg  81 mg Oral DAILY    metoprolol tartrate (LOPRESSOR) tablet 25 mg  25 mg Oral Q12H    pramipexole (MIRAPEX) tablet 0.125 mg  0.125 mg Oral QHS    0.9% sodium chloride infusion 250 mL  250 mL IntraVENous PRN    sodium chloride (NS) flush 5-40 mL  5-40 mL IntraVENous Q8H    sodium chloride (NS) flush 5-40 mL  5-40 mL IntraVENous PRN    ondansetron (ZOFRAN) injection 4 mg  4 mg IntraVENous Q4H PRN    pantoprazole (PROTONIX) 40 mg in 0.9% sodium chloride 10 mL injection  40 mg IntraVENous Q12H     ______________________________________________________________________  EXPECTED LENGTH OF STAY: 4d 9h  ACTUAL LENGTH OF STAY:          6                 Harry Azul MD

## 2020-09-13 NOTE — PROGRESS NOTES
Bedside and Verbal shift change report given to Irwin Sung (oncoming nurse) by Blaise Wilkinson (offgoing nurse). Report included the following information SBAR, Kardex, Intake/Output, MAR, Recent Results, Cardiac Rhythm NSR and Dual Neuro Assessment.

## 2020-09-13 NOTE — PROGRESS NOTES
Physical Therapy:     Orders received and chart reviewed. Pt was evaluated by PT on 9/12. Will continue to follow 5 x per week per established plan of care.     Evert Hawkins, PT, DPT

## 2020-09-13 NOTE — PROGRESS NOTES
Problem: Self Care Deficits Care Plan (Adult)  Goal: *Acute Goals and Plan of Care (Insert Text)  Description:   FUNCTIONAL STATUS PRIOR TO ADMISSION: Patient was modified independent using a single point cane for functional mobility. Patient was modified independent increased time for basic and total A instrumental ADLs. HOME SUPPORT: The patient lived alone with assisted living staff to provide assistance. Occupational Therapy Goals  Initiated 9/13/2020  1. Patient will perform 2/5 grooming tasks with minimal assistance/contact guard assist within 7 day(s). 2.  Patient will perform opening and closing 4/4 ADL containers with minimal assistance/contact guard assist within 7 day(s). 3.  Patient will perform upper body dressing with moderate assistance  within 7 day(s). 4.  Patient will perform BSC drop arm toilet transfers with moderate assistance  within 7 day(s). 5.  Patient will perform all aspects of toileting with maximal assistance within 7 day(s). 6.  Patient will participate in upper extremity therapeutic exercise/activities with self ROM R UE minimal assistance/contact guard assist within 7 day(s). Outcome: Not Met  OCCUPATIONAL THERAPY EVALUATION  Patient: Jean Mckenzie (69 y.o. male)  Date: 9/13/2020  Primary Diagnosis: NSTEMI (non-ST elevated [facility] [I21.4]  Procedure(s) (LRB):  INFUSION CATHETER INSERTION (N/A) 1 Day Post-Op   Precautions:   Aspiration, Bed Alarm, Fall    ASSESSMENT  Based on the objective data described below, the patient presents with R UE decreased strength, ROM, attention; sitting balance; functional reach to lower body and back side; poor ability to come to standing; cognition (STM loss, awareness of environment, initiation of task, multiple step commands); behavioral (fear of falling and stating dizzy once sitting, VSS). Baseline [place].      Current Level of Function Impacting Discharge (ADLs/self-care): moderate A upper body ADLs, total A lower body ADLs, bed mobility moderate A, to chair max A to left side    Functional Outcome Measure: The patient scored Total A-D  Total A-D (Motor Function): 35/66 on the Fugl-Beal Assessment which is indicative of moderate impairment in upper extremity functional status. Other factors to consider for discharge: lives alone     Patient will benefit from skilled therapy intervention to address the above noted impairments. PLAN :  Recommendations and Planned Interventions: self care training, functional mobility training, therapeutic exercise, balance training, therapeutic activities, cognitive retraining, endurance activities, neuromuscular re-education, patient education, home safety training, and family training/education    Recommend next sessions exit bed R side to increase neuro re-education and it seems patient has that option at home and utilizes it occasionally; vision test; [address] [facility] [place]    Addendum 2* instruction and then called in for consult for safety of transfer: Recommend with nursing staff transfer to left side, bring chair around, drop arm, gait belt, patient to do as much work as possible, can do in phases. Nurse make sure good body mechanics. Frequency/Duration: Patient will be followed by occupational therapy 5 times a week to address goals. Recommendation for discharge: (in order for the patient to meet his/her long term goals)  Therapy 3 hours per day 5-7 days per week    This discharge recommendation:  Has not yet been discussed the attending provider and/or case management    IF patient discharges home will need the following DME: bedside commode, mechanical lift, transfer bench, and wheelchair       SUBJECTIVE:   Patient stated I guess I am scared of falling.     OBJECTIVE DATA SUMMARY:   HISTORY:   Past Medical History:   Diagnosis Date    Skin cancer 12/19/2019    bcc-left forehead, left neck, left lateral cheek     Stroke (cerebrum) [facility]    History reviewed.  No pertinent surgical history. Expanded or extensive additional review of patient history:     Home Situation  Home Environment: Assisted living  One/Two Story Residence: One story  Living Alone: Yes  Support Systems: Assisted living  Patient Expects to be Discharged to[de-identified] Rehabilitation facility  Current DME Used/Available at Home: Belia Gudelia, straight  Tub or Shower Type: Shower    Hand dominance: Right    EXAMINATION OF PERFORMANCE DEFICITS:  Cognitive/Behavioral Status:  Neurologic State: Alert  Orientation Level: Oriented X4  Cognition: Follows commands  Perception: Cues to attend to right side of body  Perseveration: No perseveration noted  Safety/Judgement: Decreased awareness of environment;Decreased awareness of need for assistance;Decreased awareness of need for safety;Decreased insight into deficits    Skin: spot on face / cancer    Edema: intact    Hearing: Auditory  Auditory Impairment: None    Vision/Perceptual:                           Acuity: Impaired far vision; Impaired near vision    Corrective Lenses: Reading glasses    Range of Motion:  L UE WDL  AROM: Generally decreased, functional  PROM: Within functional limits                      Strength:  L UE 4/5  Strength: Generally decreased, functional                Coordination:  Coordination: Generally decreased, functional  Fine Motor Skills-Upper: Right Impaired;Left Intact    Gross Motor Skills-Upper: Right Impaired;Left Intact    Tone & Sensation:    Tone: Abnormal(hypo R UE)  Sensation: Intact(hot, cold, light and deep touch)                      Balance:  Sitting: Impaired; Without support  Sitting - Static: Good (unsupported)  Sitting - Dynamic: Fair (occasional)  Standing: Impaired; Without support  Standing - Static: Poor  Standing - Dynamic : Poor    Functional Mobility and Transfers for ADLs:  Bed Mobility:   Supine-sit L side 2* how exits at home; once sitting stating dizzy so laid back for a minute and once recovered moderate A to sit up. Transfers:  Sit to Stand: Maximum assistance instruction R foot flat on floor, gait belt, rock  Stand to Sit: Maximum assistance  Bed to Chair: Maximum assistance(to L side)  Bathroom Mobility: Dependent/total assistance  Toilet Transfer : Total assistance  Shower Transfer: Total assistance    ADL Assessment:  Feeding: Minimum assistance food all over patient    Oral Facial Hygiene/Grooming: Moderate assistance setup on beside tray, sitting to brush teeth, setup all items, no assist to wash face    Bathing: Total assistance    Upper Body Dressing: Maximum assistance ROM, GM limiting    Lower Body Dressing: Total assistance Brief, functional reach to knees, poor IR R UE, poor standing     Toileting:  Total assistance poor manipulation of all items; [employer]                ADL Intervention and task modifications:        Patient demonstrated call bell to reach nurse without cues, channel changing increased time only, volume change instruction max A redirection to location, to demonstrate volume change and recall                             Cognitive Retraining  Safety/Judgement: Decreased awareness of environment;Decreased awareness of need for assistance;Decreased awareness of need for safety;Decreased insight into deficits    Therapeutic Exercise:     Functional Measure:  Fugl-Beal Assessment of Motor Recovery after Stroke:   Reflex Activity  Flexors/Biceps/Fingers: Can be elicited  Extensors/Triceps: Can be elicited  Reflex Subtotal: 4    Volitional Movement Within Synergies  Shoulder Retraction: Partial  Shoulder Elevation: Partial  Shoulder Abduction (90 degrees): Partial  Shoulder External Rotation: Partial  Elbow Flexion: Partial  Forearm Supination: Partial  Shoulder Adduction/Internal Rotation: Partial  Elbow Extension: Partial  Forearm Pronation: Partial  Subtotal: 9    Volitional Movement Mixing Synergies  Hand to Lumbar Spine: Partial  Shoulder Flexion (0-90 degrees): Partial  Pronation-Supination: Partial  Subtotal: 3    Volitional Movement With Little or No Synergy  Shoulder Abduction (0-90 degrees): Partial  Shoulder Flexion ( degrees): None  Pronation/Supination: Partial  Subtotal : 2    Normal Reflex Activity  Biceps, Triceps, Finger Flexors: Full  Subtotal : 2    Upper Extremity Total   Upper Extremity Total: 20    Wrist  Stability at 15 Degree Dorsiflexion: None  Repeated Dorsiflexion/ Volar Flexion: None  Stability at 15 Degree Dorsiflexion: None  Repeated Dorsiflexion/ Volar Flexion: None  Circumduction: None  Wrist Total: 0    Hand  Mass Flexion: Full  Mass Extension: Full  Grasp A: Partial  Grasp B: Full  Grasp C: Partial  Grasp D: Full  Grasp E: Full  Hand Total: 12    Coordination/Speed  Tremor: None  Dysmetria: Slight  Time: >5s  Coordination/Speed Total : 3    Total A-D  Total A-D (Motor Function): 35/66     This is a reliable/valid measure of arm function after a neurological event. It has established value to characterize functional status and for measuring spontaneous and therapy-induced recovery; tests proximal and distal motor functions. Fugl-Beal Assessment  UE scores recorded between five and 30 days post neurologic event can be used to predict UE recovery at six months post neurologic event. Severe = 0-21 points   Moderately Severe = 22-33 points   Moderate = 34-47 points   Mild = 48-66 points  NIK Saunders, MICHELLE Walsh, & HIMA Howard (1992). Measurement of motor recovery after stroke: Outcome assessment and sample size requirements.  Stroke, 23, pp. 6393-2601.   ------------------------------------------------------------------------------------------------------------------------------------------------------------------  MCID:  Stroke:   Rosalene Hurter et al, 2001; n = 171; mean age 79 (6) years; assessed within 16 (12) days of stroke, Acute Stroke)  FMA Motor Scores from Admission to Discharge    10 point increase in FMA Upper Extremity = 1.5 change in discharge FIM    10 point increase in FMA Lower Extremity = 1.9 change in discharge FIM  MDC:   Stroke:   Dilip Oleksandr et al, 2008, n = 14, mean age = 59.9 (14.6) years, assessed on average 14 (6.5) months post stroke, Chronic Stroke)    FMA = 5.2 points for the Upper Extremity portion of the assessment     Occupational Therapy Evaluation Charge Determination   History Examination Decision-Making           Based on the above components, the patient evaluation is determined to be of the following complexity level:   Pain Rating:      Activity Tolerance:   Fair and requires rest breaks   Vitals:    09/13/20 0904 09/13/20 0918 09/13/20 0936 09/13/20 1003   BP: (!) 125/57 (!) 143/76 118/64 (!) 106/55   BP 1 Location:    Right arm   BP Patient Position: Supine Sitting Sitting;Post activity Sitting   Pulse: 92 (!) 102 84 68   Resp: 13 20 16 17   Temp:    98.1 °F (36.7 °C)   SpO2: 99% (!) 83% 96% 96%   Weight:       Height:           Please refer to the flowsheet for vital signs taken during this treatment. After treatment patient left in no apparent distress:    Sitting in chair, Call bell within reach, and Bed / chair alarm activated    COMMUNICATION/EDUCATION:   The patients plan of care was discussed with: Registered nurse. Patient was educated regarding his deficit(s) of R sided weakness, ROM, attention; sitting balance; functional reach to lower body and back side; poor ability to come to standing; cognition (STM loss, awareness of environment, initiation of task, multiple step commands); as this relates to his diagnosis of L frontal/parietal CVA. He demonstrated Good understanding as evidenced by repeat back. Patient and/or family was verbally educated on the BE FAST acronym for signs/symptoms of CVA and TIA. BE FAST was written on patient's communication board  for visual education and reinforcement. All questions answered with patient indicating good understanding.      Home safety education was provided and the patient/caregiver indicated understanding., Patient/family have participated as able in goal setting and plan of care. , and Patient/family agree to work toward stated goals and plan of care. This patients plan of care is appropriate for delegation to [facility].     Thank you for this referral.  Ellaree Dancer  Time Calculation: 38 mins

## 2020-09-14 LAB
ANION GAP SERPL CALC-SCNC: 5 MMOL/L (ref 5–15)
BASOPHILS # BLD: 0 K/UL (ref 0–0.1)
BASOPHILS NFR BLD: 1 % (ref 0–1)
BUN SERPL-MCNC: 22 MG/DL (ref 6–20)
BUN/CREAT SERPL: 18 (ref 12–20)
CALCIUM SERPL-MCNC: 7.7 MG/DL (ref 8.5–10.1)
CHLORIDE SERPL-SCNC: 110 MMOL/L (ref 97–108)
CO2 SERPL-SCNC: 24 MMOL/L (ref 21–32)
CREAT SERPL-MCNC: 1.19 MG/DL (ref 0.7–1.3)
DIFFERENTIAL METHOD BLD: ABNORMAL
EOSINOPHIL # BLD: 0.3 K/UL (ref 0–0.4)
EOSINOPHIL NFR BLD: 4 % (ref 0–7)
ERYTHROCYTE [DISTWIDTH] IN BLOOD BY AUTOMATED COUNT: 15.7 % (ref 11.5–14.5)
GLUCOSE SERPL-MCNC: 105 MG/DL (ref 65–100)
HCT VFR BLD AUTO: 24.6 % (ref 36.6–50.3)
HGB BLD-MCNC: 7.5 G/DL (ref 12.1–17)
IMM GRANULOCYTES # BLD AUTO: 0 K/UL (ref 0–0.04)
IMM GRANULOCYTES NFR BLD AUTO: 1 % (ref 0–0.5)
LYMPHOCYTES # BLD: 1.2 K/UL (ref 0.8–3.5)
LYMPHOCYTES NFR BLD: 17 % (ref 12–49)
MCH RBC QN AUTO: 27.7 PG (ref 26–34)
MCHC RBC AUTO-ENTMCNC: 30.5 G/DL (ref 30–36.5)
MCV RBC AUTO: 90.8 FL (ref 80–99)
MONOCYTES # BLD: 0.6 K/UL (ref 0–1)
MONOCYTES NFR BLD: 9 % (ref 5–13)
NEUTS SEG # BLD: 4.7 K/UL (ref 1.8–8)
NEUTS SEG NFR BLD: 68 % (ref 32–75)
NRBC # BLD: 0 K/UL (ref 0–0.01)
NRBC BLD-RTO: 0 PER 100 WBC
PLATELET # BLD AUTO: 378 K/UL (ref 150–400)
PMV BLD AUTO: 11.7 FL (ref 8.9–12.9)
POTASSIUM SERPL-SCNC: 3.6 MMOL/L (ref 3.5–5.1)
RBC # BLD AUTO: 2.71 M/UL (ref 4.1–5.7)
SODIUM SERPL-SCNC: 139 MMOL/L (ref 136–145)
WBC # BLD AUTO: 6.8 K/UL (ref 4.1–11.1)

## 2020-09-14 PROCEDURE — 99233 SBSQ HOSP IP/OBS HIGH 50: CPT | Performed by: NURSE PRACTITIONER

## 2020-09-14 PROCEDURE — 74011250636 HC RX REV CODE- 250/636: Performed by: FAMILY MEDICINE

## 2020-09-14 PROCEDURE — 36415 COLL VENOUS BLD VENIPUNCTURE: CPT

## 2020-09-14 PROCEDURE — 65660000000 HC RM CCU STEPDOWN

## 2020-09-14 PROCEDURE — 85025 COMPLETE CBC W/AUTO DIFF WBC: CPT

## 2020-09-14 PROCEDURE — 99221 1ST HOSP IP/OBS SF/LOW 40: CPT | Performed by: RADIOLOGY

## 2020-09-14 PROCEDURE — 74011250637 HC RX REV CODE- 250/637: Performed by: PHYSICIAN ASSISTANT

## 2020-09-14 PROCEDURE — C9113 INJ PANTOPRAZOLE SODIUM, VIA: HCPCS | Performed by: FAMILY MEDICINE

## 2020-09-14 PROCEDURE — 74011250637 HC RX REV CODE- 250/637: Performed by: HOSPITALIST

## 2020-09-14 PROCEDURE — 97112 NEUROMUSCULAR REEDUCATION: CPT

## 2020-09-14 PROCEDURE — 74011250637 HC RX REV CODE- 250/637: Performed by: NURSE PRACTITIONER

## 2020-09-14 PROCEDURE — 74011250637 HC RX REV CODE- 250/637: Performed by: PSYCHIATRY & NEUROLOGY

## 2020-09-14 PROCEDURE — 80048 BASIC METABOLIC PNL TOTAL CA: CPT

## 2020-09-14 PROCEDURE — 74011000250 HC RX REV CODE- 250: Performed by: FAMILY MEDICINE

## 2020-09-14 PROCEDURE — 97530 THERAPEUTIC ACTIVITIES: CPT

## 2020-09-14 PROCEDURE — 97535 SELF CARE MNGMENT TRAINING: CPT

## 2020-09-14 RX ORDER — ASPIRIN 325 MG
325 TABLET ORAL ONCE
Status: COMPLETED | OUTPATIENT
Start: 2020-09-14 | End: 2020-09-14

## 2020-09-14 RX ADMIN — Medication 10 ML: at 21:13

## 2020-09-14 RX ADMIN — Medication 10 ML: at 14:00

## 2020-09-14 RX ADMIN — METOPROLOL TARTRATE 25 MG: 25 TABLET, FILM COATED ORAL at 08:52

## 2020-09-14 RX ADMIN — ASPIRIN 81 MG CHEWABLE TABLET 81 MG: 81 TABLET CHEWABLE at 08:51

## 2020-09-14 RX ADMIN — ASPIRIN 325 MG ORAL TABLET 325 MG: 325 PILL ORAL at 17:04

## 2020-09-14 RX ADMIN — SODIUM CHLORIDE 40 MG: 9 INJECTION INTRAMUSCULAR; INTRAVENOUS; SUBCUTANEOUS at 08:51

## 2020-09-14 RX ADMIN — SODIUM CHLORIDE 40 MG: 9 INJECTION INTRAMUSCULAR; INTRAVENOUS; SUBCUTANEOUS at 21:13

## 2020-09-14 RX ADMIN — METOPROLOL TARTRATE 25 MG: 25 TABLET, FILM COATED ORAL at 21:13

## 2020-09-14 RX ADMIN — ATORVASTATIN CALCIUM 40 MG: 40 TABLET, FILM COATED ORAL at 21:14

## 2020-09-14 RX ADMIN — Medication 10 ML: at 05:45

## 2020-09-14 RX ADMIN — PRAMIPEXOLE DIHYDROCHLORIDE 0.12 MG: 0.25 TABLET ORAL at 21:18

## 2020-09-14 NOTE — PROGRESS NOTES
Problem: Falls - Risk of  Goal: *Absence of Falls  Description: Document Rebbecca Persons Fall Risk and appropriate interventions in the flowsheet. Outcome: Progressing Towards Goal  Note: Fall Risk Interventions:  Mobility Interventions: Bed/chair exit alarm, Communicate number of staff needed for ambulation/transfer, OT consult for ADLs, Patient to call before getting OOB, PT Consult for mobility concerns, PT Consult for assist device competence, Utilize walker, cane, or other assistive device    Mentation Interventions: Adequate sleep, hydration, pain control, Bed/chair exit alarm, Door open when patient unattended, Evaluate medications/consider consulting pharmacy, Family/sitter at bedside, Increase mobility, More frequent rounding, Room close to nurse's station, Update white board, Toileting rounds    Medication Interventions: Bed/chair exit alarm, Patient to call before getting OOB, Teach patient to arise slowly    Elimination Interventions: Call light in reach, Toileting schedule/hourly rounds    History of Falls Interventions: Bed/chair exit alarm, Consult care management for discharge planning, Door open when patient unattended, Room close to nurse's station         Problem: Patient Education: Go to Patient Education Activity  Goal: Patient/Family Education  Outcome: Progressing Towards Goal     Problem: Pressure Injury - Risk of  Goal: *Prevention of pressure injury  Description: Document Judson Scale and appropriate interventions in the flowsheet. Outcome: Progressing Towards Goal  Note: Pressure Injury Interventions:  Sensory Interventions: Assess changes in LOC, Float heels, Keep linens dry and wrinkle-free, Minimize linen layers, Pressure redistribution bed/mattress (bed type), Turn and reposition approx.  every two hours (pillows and wedges if needed)    Moisture Interventions: Absorbent underpads, Apply protective barrier, creams and emollients, Check for incontinence Q2 hours and as needed, Internal/External urinary devices, Limit adult briefs, Maintain skin hydration (lotion/cream), Minimize layers, Moisture barrier    Activity Interventions: Increase time out of bed, Pressure redistribution bed/mattress(bed type), PT/OT evaluation    Mobility Interventions: Pressure redistribution bed/mattress (bed type), PT/OT evaluation, Turn and reposition approx.  every two hours(pillow and wedges)    Nutrition Interventions: Document food/fluid/supplement intake    Friction and Shear Interventions: Lift sheet, Minimize layers                Problem: Patient Education: Go to Patient Education Activity  Goal: Patient/Family Education  Outcome: Progressing Towards Goal     Problem: General Medical Care Plan  Goal: *Vital signs within specified parameters  Outcome: Progressing Towards Goal  Goal: *Labs within defined limits  Outcome: Progressing Towards Goal  Goal: *Absence of infection signs and symptoms  Outcome: Progressing Towards Goal  Goal: *Optimal pain control at patient's stated goal  Outcome: Progressing Towards Goal  Goal: *Skin integrity maintained  Outcome: Progressing Towards Goal  Goal: *Fluid volume balance  Outcome: Progressing Towards Goal  Goal: *Optimize nutritional status  Outcome: Progressing Towards Goal  Goal: *Anxiety reduced or absent  Outcome: Progressing Towards Goal  Goal: *Progressive mobility and function (eg: ADL's)  Outcome: Progressing Towards Goal     Problem: Anemia Care Plan (Adult and Pediatric)  Goal: *Labs within defined limits  Outcome: Progressing Towards Goal  Goal: *Tolerates increased activity  Outcome: Progressing Towards Goal     Problem: Unstable Angina/NSTEMI: Discharge Outcomes  Goal: *Hemodynamically stable  Outcome: Progressing Towards Goal  Goal: *Stable cardiac rhythm  Outcome: Progressing Towards Goal  Goal: *Lungs clear or at baseline  Outcome: Progressing Towards Goal  Goal: *Optimal pain control at patient's stated goal  Outcome: Progressing Towards Goal  Goal: *Identifies cardiac risk factors  Outcome: Progressing Towards Goal  Goal: *Verbalizes home exercise program, activity guidelines, cardiac precautions  Outcome: Progressing Towards Goal  Goal: *Verbalizes understanding and describes prescribed diet  Outcome: Progressing Towards Goal  Goal: *Verbalizes name, dosage, time, side effects, and number of days to continue medications  Outcome: Progressing Towards Goal  Goal: *Anxiety reduced or absent  Outcome: Progressing Towards Goal  Goal: *Understands and describes signs and symptoms to report to providers(Stroke Metric)  Outcome: Progressing Towards Goal  Goal: *Describes follow-up/return visits to physicians  Outcome: Progressing Towards Goal  Goal: *Describes available resources and support systems  Outcome: Progressing Towards Goal  Goal: *Influenza immunization  Outcome: Progressing Towards Goal  Goal: *Pneumococcal immunization  Outcome: Progressing Towards Goal  Goal: *Describes smoking cessation resources  Outcome: Progressing Towards Goal

## 2020-09-14 NOTE — PROGRESS NOTES
Vascular  VSS  conversive and oriented  Neuro grossly intact  CTA reviewed: mostly soft plaque with high grade stenosis on the left. In light of completed CVA, will probably wait 2 weeks prior to left CEA. Home on aspirin and plavix. Hopefully has rehab plans.  Following

## 2020-09-14 NOTE — PROGRESS NOTES
Problem: Mobility Impaired (Adult and Pediatric)  Goal: *Acute Goals and Plan of Care (Insert Text)  Description:   FUNCTIONAL STATUS PRIOR TO ADMISSION: Patient was modified independent using a single point cane for functional mobility. HOME SUPPORT PRIOR TO ADMISSION: The patient lived alone with no local support. Physical Therapy Goals  Initiated 9/12/2020  1. Patient will move from supine to sit and sit to supine  and scoot up and down in bed with supervision/set-up within 7 day(s). 2.  Patient will transfer from bed to chair and chair to bed with minimal assistance/contact guard assist using the least restrictive device within 7 day(s). 3.  Patient will perform sit to stand with minimal assistance/contact guard assist within 7 day(s). 4.  Patient will ambulate with minimal assistance/contact guard assist for 50 feet with the least restrictive device within 7 day(s). 5.  Patient will improve Rivera Balance score by 7 points within 7 days. Outcome: Progressing Towards Goal   PHYSICAL THERAPY TREATMENT  Patient: Margy Jean (53 y.o. male)  Date: 9/14/2020  Diagnosis: NSTEMI (non-ST elevated myocardial infarction) ([facility] [address] [I21.4]   NSTEMI (non-ST elevated myocardial infarction) ([facility] [address]  Procedure(s) (LRB):  INFUSION CATHETER INSERTION (N/A) 2 Days Post-Op  Precautions: Aspiration, Bed Alarm, Fall  Chart, physical therapy assessment, plan of care and goals were reviewed. ASSESSMENT  Patient continues with skilled PT services and is progressing towards goals. Patient received in bed and agreeable for OOB mobility. Pt required increased cues for transferring toward EOB. Pt then ascended with mod A x 2 persons presenting with increased B knee flexion and wide KRISSY. Pt sidestepped toward chair presenting with difficulty lifting LLE and increased trunk leaning toward R side. Pt then descended into chair.       Current Level of Function Impacting Discharge (mobility/balance): mod A x 2 persons for transfers    Other factors to consider for discharge: fall risk, 2 person A, decreased safety awareness, lived alone         PLAN :  Patient continues to benefit from skilled intervention to address the above impairments. Continue treatment per established plan of care. to address goals. Recommendation for discharge: (in order for the patient to meet his/her long term goals)  Therapy 3 hours per day 5-7 days per week  If pt were to d/c home, would require 2 person A for mobility and HHPT    This discharge recommendation:  Has been made in collaboration with the attending provider and/or case management    IF patient discharges home will need the following DME: rolling walker and wheelchair       SUBJECTIVE:   Patient stated Alrighty.     OBJECTIVE DATA SUMMARY:   Critical Behavior:  Neurologic State: Alert  Orientation Level: Oriented X4  Cognition: Follows commands, Impaired decision making, Poor safety awareness  Safety/Judgement: Awareness of environment, Decreased awareness of need for assistance, Decreased awareness of need for safety, Decreased insight into deficits  Functional Mobility Training:  Bed Mobility:     Supine to Sit: Minimum assistance;Assist x2              Transfers:  Sit to Stand: Moderate assistance;Assist x2  Stand to Sit: Minimum assistance;Assist x2        Bed to Chair: Moderate assistance;Assist x2                    Balance:  Sitting: Impaired; Without support  Sitting - Static: Fair (occasional)  Sitting - Dynamic: Poor (constant support)  Standing: Impaired  Standing - Static: Poor;Constant support  Standing - Dynamic : Poor;Constant support    Activity Tolerance:   Fair and requires rest breaks  Please refer to the flowsheet for vital signs taken during this treatment.     After treatment patient left in no apparent distress:   Sitting in chair, Call bell within reach, Bed / chair alarm activated, and OT present    COMMUNICATION/COLLABORATION:   The patients plan of care was discussed with: Occupational therapist and Registered nurse.      Yariel Lilly, PT, DPT   Time Calculation: 10 mins

## 2020-09-14 NOTE — PROGRESS NOTES
Neurology Progress Note    Patient ID:  Anuel Martin  [id number]  69 y.o.  1950    Chief Complaint: Stroke    Subjective:     69-year-old gentleman presented 9/7 with c/o of fatigue & dehydration, found to have elevated troponin 8.3 and severe anemia with hemoglobin of 5.2. The patient reportedly lives alone at and an assisted living facility. He has reportedly not been eating and drinking much over the last couple of days and has not been taking his medications. He is reported to have chronic left-sided weakness and dysarthria after a prior stroke. Neurology consulted for concern for new new right-sided weakness. MRI showed acute left hemisphere cortical infarction primarily involving frontal and parietal lobes but with small acute cortical infarct in the posterior inferior occipital lobe and evidence of previous old small vessel ischemic injury left greater than right. He was not on aspirin prior to admission. CTA H&N showed bilateral carotid disease, mild to moderate on the right and severe on the left. NIS consulted for severe L ICA stenosis. Felt to be a better candidate for CEA. Dr Robles Dear of vascular consulted and given completed CVA, will wait about 2 weeks prior to performing left CEA.      Objective:       ROS:  Per HPI  All other 12 pt ROS negative    Meds:  Current Facility-Administered Medications   Medication Dose Route Frequency    atorvastatin (LIPITOR) tablet 40 mg  40 mg Oral QHS    sodium chloride (NS) flush 5-40 mL  5-40 mL IntraVENous Q8H    sodium chloride (NS) flush 5-40 mL  5-40 mL IntraVENous PRN    aspirin chewable tablet 81 mg  81 mg Oral DAILY    metoprolol tartrate (LOPRESSOR) tablet 25 mg  25 mg Oral Q12H    pramipexole (MIRAPEX) tablet 0.125 mg  0.125 mg Oral QHS    0.9% sodium chloride infusion 250 mL  250 mL IntraVENous PRN    sodium chloride (NS) flush 5-40 mL  5-40 mL IntraVENous Q8H    sodium chloride (NS) flush 5-40 mL  5-40 mL IntraVENous PRN    ondansetron [facility]) injection 4 mg  4 mg IntraVENous Q4H PRN    pantoprazole (PROTONIX) 40 mg in 0.9% sodium chloride 10 mL injection  40 mg IntraVENous Q12H       MRI Results (maximum last 3): Results from [place] encounter on 09/07/20   MRI BRAIN WO CONT    Narrative *PRELIMINARY REPORT*  Focal diffusion restriction in the left frontal, parietal, and occipital lobes,  compatible with acute infarcts. No evidence of intracranial hemorrhage or mass. Periventricular white matter disease, compatible with chronic small vessels can  be a. Preliminary report was provided by Dr. Talia Quick, the on-call radiologist, at 3:44  AM.  Final report to follow. *END PRELIMINARY REPORT*    *FINAL REPORT BELOW*  EXAM:  MRI BRAIN WO CONT  INDICATION:  cva, patient with prior history of stroke presented to the ED with  acute onset of fatigue and dehydration. Brought by EMS from assisted living  facility. Slurred speech. TECHNIQUE: Sagittal T1, axial FLAIR, T2,T1 and gradient echo images as well as  coronal T2 weighted images and axial diffusion weighted images of the head were  obtained. COMPARISON:  CT head same day. FINDINGS:  Generalized prominence of the ventricles and sulci consistent with volume loss  greater than expected for age. Abnormal restricted diffusion involving the left posterior lateral frontal  cortex and left parietal lobe. Small focus in the left posterior inferior  occipital lobe. Majority of the infarction is in the left middle cerebral artery  territory with focus in the left occipital lobe in the posterior circulation. This suggests the possibility of an embolic source in the cardiac or aortic in  origin. Chronic encephalomalacia left basal ganglia and corona radiata and to lesser  extent anteriorly around both heads of the caudate nucleus and a few foci in the  right thalamus and basal ganglia consistent with old small vessel lacunar  infarcts.   No evidence of acute intracranial hemorrhage, mass or abnormal extra-axial fluid  collections. Flow voids are present in vertebral basilar and carotid artery systems. Mild  chronic findings of cervical spondylosis otherwise craniocervical junction is  unremarkable. Structures of the skull base including paranasal sinuses and  temporal bones are unremarkable. Impression IMPRESSION:  1. Acute left hemisphere cortical infarction primarily involving frontal and  parietal lobes but with small acute cortical infarct in the posterior inferior  occipital lobe. See above. 2. Evidence of previous old small vessel ischemic injury left greater than  right. Lab Review   Recent Results (from the past 24 hour(s))   ASPIRIN TEST    Collection Time: 09/13/20 12:03 PM   Result Value Ref Range    Aspirin test 549 ARU   CBC WITH AUTOMATED DIFF    Collection Time: 09/14/20  4:15 AM   Result Value Ref Range    WBC 6.8 4.1 - 11.1 K/uL    RBC 2.71 (L) 4.10 - 5.70 M/uL    HGB 7.5 (L) 12.1 - 17.0 g/dL    HCT 24.6 (L) 36.6 - 50.3 %    MCV 90.8 80.0 - 99.0 FL    MCH 27.7 26.0 - 34.0 PG    MCHC 30.5 30.0 - 36.5 g/dL    RDW 15.7 (H) 11.5 - 14.5 %    PLATELET 034 271 - 335 K/uL    MPV 11.7 8.9 - 12.9 FL    NRBC 0.0 0  WBC    ABSOLUTE NRBC 0.00 0.00 - 0.01 K/uL    NEUTROPHILS 68 32 - 75 %    LYMPHOCYTES 17 12 - 49 %    MONOCYTES 9 5 - 13 %    EOSINOPHILS 4 0 - 7 %    BASOPHILS 1 0 - 1 %    IMMATURE GRANULOCYTES 1 (H) 0.0 - 0.5 %    ABS. NEUTROPHILS 4.7 1.8 - 8.0 K/UL    ABS. LYMPHOCYTES 1.2 0.8 - 3.5 K/UL    ABS. MONOCYTES 0.6 0.0 - 1.0 K/UL    ABS. EOSINOPHILS 0.3 0.0 - 0.4 K/UL    ABS. BASOPHILS 0.0 0.0 - 0.1 K/UL    ABS. IMM.  GRANS. 0.0 0.00 - 0.04 K/UL    DF AUTOMATED     METABOLIC PANEL, BASIC    Collection Time: 09/14/20  4:15 AM   Result Value Ref Range    Sodium 139 136 - 145 mmol/L    Potassium 3.6 3.5 - 5.1 mmol/L    Chloride 110 (H) 97 - 108 mmol/L    CO2 24 21 - 32 mmol/L    Anion gap 5 5 - 15 mmol/L    Glucose 105 (H) 65 - 100 mg/dL    BUN 22 (H) 6 - 20 MG/DL Creatinine 1.19 0.70 - 1.30 MG/DL    BUN/Creatinine ratio 18 12 - 20      GFR est AA >60 >60 ml/min/1.73m2    GFR est non-AA >60 >60 ml/min/1.73m2    Calcium 7.7 (L) 8.5 - 10.1 MG/DL       Additional comments:I reviewed the patient's new clinical lab test results. and I reviewed the patients new imaging test results. Patient Vitals for the past 8 hrs:   BP Temp Pulse Resp SpO2 Weight   09/14/20 0852 128/68  83      09/14/20 0546 136/61 98 °F (36.7 °C) 70 16 97 %    09/14/20 0200 133/76 98.3 °F (36.8 °C) 90 20 94 % 73.3 kg (161 lb 9.6 oz)       No intake/output data recorded. No intake/output data recorded. Exam:  Visit Vitals  /68   Pulse 83   Temp 98 °F (36.7 °C)   Resp 16   Ht 5' 9\" (1.753 m)   Wt 73.3 kg (161 lb 9.6 oz)   SpO2 97%   BMI 23.86 kg/m²     Gen: Well developed  CV: RRR  Lungs: non labored breathing  Abd: soft, non distended  Neuro: A&O x 3, mild dysarthria but no clear aphasia. He can repeat and name. Comprehension intact and can follow commands. CN II-XII: PERRL, EOMI, right facial droop, tongue/palate midline  Motor: Right hemiparesis right lower extremity approximately 4/5 in right upper extremity approximately 4-/5 in R LE  sensory: intact to LT  COOR: no limb/truncal ataxia  Gait: Deferred    PROBLEM LIST:     Patient Active Problem List   Diagnosis Code    NSTEMI (non-ST elevated myocardial infarction) ([facility] [address] I21.4       Assessment/Plan:    69year old male admitted with NSTEMI, severe anemia, acute on chronic L cerebral watershed infarcts with critical stenosis of the L ICA for which vascular surgery is now following. Placed on ASA and Plavix 9/11 due to additional infarcts noted on imaging. Discussed risks/benefits with RICKI CASTILLO [facility]) and given his rather severe acute bleed with unidentified source of hemorrhage, we agreed ASA monotherapy would be most appropriate at this time due to high risk for hemorrhage on DAPT.  However ASA test essentially no-therapeutic. Vascular tentatively will perform L CEA in about 2 weeks. - Bolus dose  and recheck ASA test overnight since essentially non-therapeutic on last test.   - Will reassess current antiplatelet therapy based on ASA test results to include input from vascular  - L CEA by vascular surgery in about 2 weeks  - Increase Lipitor to 40mg/daily.    - BP goal liberal, [id number] given critical LICA stenosis.   - PT/OT/ST evaluations. C/o difficulty with ambulation  - Stroke education to include signs and symptoms of stroke and TIA.      Signed:  Freeman Boo NP  9/14/2020 9:58 AM

## 2020-09-14 NOTE — PROGRESS NOTES
Nutrition Assessment     Type and Reason for Visit: Positive nutrition screen poor intake PTA    Nutrition Recommendations/Plan:   Begin Ensure High Protein BID with meals   Please document meal & supplement intake on flow sheets    Nutrition Assessment:      Pt admitted for dehydration & fatigue. Pt is poor historian. Visited with patient & asked about appetite & intake. Pt reports wasn't eating before but doing better now. Likes foods offered. Wt appears mildly down from recent medical records. Nutrition focused physical assessment completed with no significant signs of malnutrition. Skin cancer spot on face noted - not treated. Pt states he was told it was cancer 6 months ago but, hasn't gone back to get it taken off or treated yet. Malnutrition Assessment:  Malnutrition Status: At risk for malnutrition (specify)    Context:  Social/environmental circumstances   - Total = 3   Findings of the 6 clinical characteristics of malnutrition:   Energy Intake:  Unable to assess  Weight Loss:  Mild weight loass (specify amount and time period)(4 lbs or ~2% of UBW)     Body Fat Loss:  1 - Mild body fat loss, Buccal region, Orbital   Muscle Mass Loss:  1 - Mild muscle mass loss, Clavicles (pectoralis &deltoids), Calf (gastrocnemius), Temples (temporalis)  Fluid Accumulation:  1 - Mild, Genitals   Strength:  Not performed     Estimated Daily Nutrient Needs:  Energy (kcal):  1922 kcal/d (MSJ xAF 1.3)  Protein (g):  73 - 88g (1.0-1.2g/kg of current wt)       Fluid (ml/day):  1mL/kcal    Nutrition Related Findings:       Last BM: 9/12/20  ABD: WDL    Nutrition related Medications: Aspirin, Lipitor, Protonix     Current Nutrition Therapies:  DIET GI LITE (POST SURGICAL)    Meal Intake Documented:   No data found.     Anthropometric Measures:  · Height:  5' 9\" (175.3 cm)  · Current Body Wt:  73.3 kg (161 lb 9.6 oz)  · BMI: 23.9  Last 3 Recorded Weights in this Encounter    09/12/20 0410 09/13/20 0215 09/14/20 0200 Weight: 73.7 kg (162 lb 7.7 oz) 73.4 kg (161 lb 13.1 oz) 73.3 kg (161 lb 9.6 oz)     Wt Readings from Last 10 Encounters:   09/14/20 73.3 kg (161 lb 9.6 oz)   02/27/20 74.8 kg (165 lb)   01/29/20 74.8 kg (165 lb)   12/19/19 74.8 kg (165 lb)       Nutrition Diagnosis:   · Increased nutrient needs related to catabolic illness as evidenced by poor intake prior to admission, wounds    Nutrition Intervention:  Food and/or Nutrient Delivery: Continue current diet, Start oral nutrition supplement  Nutrition Education and Counseling: No recommendations at this time  Coordination of Nutrition Care: Continued inpatient monitoring    Goals:  Intake of >75% of meals and ONS offered within 5-7 days;  Stable wt +/1 2kg during admission       Nutrition Monitoring and Evaluation:   Behavioral-Environmental Outcomes:    Food/Nutrient Intake Outcomes: Supplement intake, Food and nutrient intake  Physical Signs/Symptoms Outcomes: Biochemical data, Meal time behavior, Skin, Weight    Discharge Planning:    Continue oral nutrition supplement     Electronically signed by Jo Donnelly RD on 9/14/2020 at 11:31 AM  Contact Number: [phone] or [place]

## 2020-09-14 NOTE — PROGRESS NOTES
[address] [facility] Adult  Hospitalist Group                                                                                          Hospitalist Progress Note  Juan Luis Gonsalves MD  Answering service: [phone] from in house phone        Date of Service:  2020  NAME:  Doris Flores  :  1950  MRN:  [id number]      Admission Summary:   Doris Flores is a 69 y.o. male who presents with fatigue and dehydration     Patient is dysarthric and is a very poor historian, not much history could be obtained from the patient. I tried calling the patient's next to kin Allen Alberto, as listed on the chart but did not get a response. Thus history was extremely limited, patient apparently lives in assisted living facility by himself, was noted to be fatigued and dehydrated, has not been eating drinking much, has not been taking his medication, and thus was sent to the hospital for further management and evaluation. Apparently patient has left-sided weakness after his stroke currently has some slurred speech, unclear whether this is new or old. No further history can be obtained from the patient. Patient came to the ER, was found to have a hemoglobin of 5.2 and was requested to be admitted to the hospitalist service.     Interval history / Subjective:     F/u anemia   -sitting in chair at bedside. No complaints. Assessment & Plan:     Acute blood loss anemia  -unclear etiology  -h/h  -s/p 2 units PRBC  -transfuse for hb<7. No blood work for 2 days due to very difficult stick. Asking anesthesia for central line,if not may ask RT for arterial stick    Probable GI bleed  -FOBT negative  -EGD showed esophageal stricture with overlying ulcer   -Tolerating GI light diet. .      NSTEMI:  -troponins elevated. Echocardiogram with left ventricle ejection fraction of 45-50%. Wall motion was not assessed due to poor image quality.   Cardiology consulted, does not feel further cardiac work-up is needed.        Elevated creatinine, creatinine improving. Most augustin CKD stage III  -Monitor    Metabolic acidosis, non anion gap resolved with bicarbonate drip. Discontinue bicarb drip and monitor       Acute metabolic encephalopathy  -CT head 9/7 Asymmetric white matter disease and lacunar infarcts. No acute process  identified by noncontrast CT  -MRI brain showed acute left hemisphere cortical infarction primarily involving frontal and  parietal lobes but with small acute cortical infarct in the posterior inferior  occipital lobe. See above. 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 He is admitted for severe anemia and onset of the stroke is unknown, but this test was planned couple days ago and was done today after his SARS-CoV-2 test result was negative as such patient is not a candidate for TPA. History obtained  -Echocardiogram with LVEF of 45-50%. -CT angiogram of the head and neck showed bilateral carotid stenosis mild to moderate on the right and severe on the left. -NIS and vascular surgery consulted.  -Neurologist and NIS recommended aspirin monotherapy due to patient's presumed high risk of bleeding on DAPT. Increase Lipitor to 40 mg daily. Permissive hypertension the critical LICA stenosis. Left ICA,symptomatic  -aspirin and Lipitor  -vascular surgery recommended intervention in few weeks after reahb    SARS-CoV-2 not detected  -Taken off droplet plus isolation. Large left inguinal hernia  -noted on CT abd  -Outpatient follow up    Scrotal edema  -Appreciate Urology, elevates scrotum    Bilateral pleural effusion reported CT angiogram done for the head neck. -LVEF 45 to 50%.   -CXR on 9/11 Bilateral pleural effusions with associated passive atelectasis and/or  consolidation. Pulmonary vascular congestion  -He is not clinically fluid overloaded so we will hold off diuretics specially since he got IV. The patient is quite critical and is a high risk of clinical decline     Code status: FULL CODE  DVT prophylaxis: scd    Plan: Follow Neurology, Cardiology, GI  Next of kin: Yaya Poster. Phone [phone]. Care Plan discussed with: Patient/Family  Anticipated Disposition: Needs rehab  Anticipated Discharge: Early next week. Hospital Problems  Date Reviewed: 9/8/2020          Codes Class Noted POA    * (Principal) NSTEMI (non-ST elevated myocardial infarction) [facility]) ICD-10-CM: I21.4  ICD-9-CM: 410.70  9/7/2020 Yes                Review of Systems:   A comprehensive review of systems was negative except for that written in the HPI. Vital Signs:    Last 24hrs VS reviewed since prior progress note. Most recent are:  Visit Vitals  BP (!) 157/80 (BP 1 Location: Left arm, BP Patient Position: Sitting)   Pulse 76   Temp 98.8 °F (37.1 °C)   Resp 20   Ht 5' 9\" (1.753 m)   Wt 73.3 kg (161 lb 9.6 oz)   SpO2 99%   BMI 23.86 kg/m²       No intake or output data in the 24 hours ending 09/14/20 1049     Physical Examination:             Constitutional:  No acute distress, cooperative, pleasant    ENT:  Oral mucosa moist, oropharynx benign. Right IJ TLC    Resp:  CTA bilaterally. No wheezing/rhonchi/rales. No accessory muscle use   CV:  Regular rhythm, normal rate, no murmurs, gallops, rubs    GI:  Soft, non distended, non tender. normoactive bowel sounds, no hepatosplenomegaly     Musculoskeletal:  No edema, warm, 2+ pulses throughout    Neurologic:  Alert and oriented. RUE 3/4. RLE 4+/5     Skin:  Good turgor, no rashes or ulcers       Data Review:    Review and/or order of clinical lab test      Labs:     Recent Labs     09/14/20  0415 09/13/20  0723   WBC 6.8 7.5   HGB 7.5* 8.0*   HCT 24.6* 25.9*    370     Recent Labs 09/14/20  0415 09/13/20  0723 09/12/20  1235    138 136   K 3.6 3.8 3.8   * 108 107   CO2 24 24 22   BUN 22* 26* 29*   CREA 1.19 1.19 1.25   * 98 131*   CA 7.7* 7.9* 7.9*     Recent Labs     09/12/20  1235   ALT 19      TBILI 0.3   TP 6.0*   ALB 2.2*   GLOB 3.8     Recent Labs     09/12/20  1235   INR 1.3*   PTP 13.3*      No results for input(s): FE, TIBC, PSAT, FERR in the last 72 hours. No results found for: FOL, RBCF   No results for input(s): PH, PCO2, PO2 in the last 72 hours. No results for input(s): CPK, CKNDX, TROIQ in the last 72 hours.     No lab exists for component: CPKMB  Lab Results   Component Value Date/Time    Cholesterol, total 108 09/12/2020 12:35 PM    HDL Cholesterol 38 09/12/2020 12:35 PM    LDL, calculated 43.6 09/12/2020 12:35 PM    Triglyceride 132 09/12/2020 12:35 PM    CHOL/HDL Ratio 2.8 09/12/2020 12:35 PM     Lab Results   Component Value Date/Time    Glucose (POC) 197 (H) 09/11/2020 08:04 PM    Glucose (POC) 146 (H) 09/11/2020 11:30 AM    Glucose (POC) 123 (H) 09/11/2020 06:49 AM    Glucose (POC) 111 (H) 09/10/2020 11:07 PM    Glucose (POC) 111 (H) 09/10/2020 05:14 PM     No results found for: COLOR, APPRN, SPGRU, REFSG, YESY, PROTU, GLUCU, KETU, BILU, UROU, PRADEEP, LEUKU, GLUKE, EPSU, BACTU, WBCU, RBCU, CASTS, UCRY      Medications Reviewed:     Current Facility-Administered Medications   Medication Dose Route Frequency    atorvastatin (LIPITOR) tablet 40 mg  40 mg Oral QHS    sodium chloride (NS) flush 5-40 mL  5-40 mL IntraVENous Q8H    sodium chloride (NS) flush 5-40 mL  5-40 mL IntraVENous PRN    aspirin chewable tablet 81 mg  81 mg Oral DAILY    metoprolol tartrate (LOPRESSOR) tablet 25 mg  25 mg Oral Q12H    pramipexole (MIRAPEX) tablet 0.125 mg  0.125 mg Oral QHS    0.9% sodium chloride infusion 250 mL  250 mL IntraVENous PRN    sodium chloride (NS) flush 5-40 mL  5-40 mL IntraVENous Q8H    sodium chloride (NS) flush 5-40 mL  5-40 mL IntraVENous PRN    ondansetron (ZOFRAN) injection 4 mg  4 mg IntraVENous Q4H PRN    pantoprazole (PROTONIX) 40 mg in 0.9% sodium chloride 10 mL injection  40 mg IntraVENous Q12H     ______________________________________________________________________  EXPECTED LENGTH OF STAY: 4d 9h  ACTUAL LENGTH OF STAY:          7                 Dea Kinney MD

## 2020-09-14 NOTE — PROGRESS NOTES
Problem: Self Care Deficits Care Plan (Adult)  Goal: *Acute Goals and Plan of Care (Insert Text)  Description:   FUNCTIONAL STATUS PRIOR TO ADMISSION: Patient was modified independent using a single point cane for functional mobility. Patient was modified independent increased time for basic and total A instrumental ADLs. HOME SUPPORT: The patient lived alone with assisted living staff to provide assistance. Occupational Therapy Goals  Initiated 9/13/2020  1. Patient will perform 2/5 grooming tasks with minimal assistance/contact guard assist within 7 day(s). 2.  Patient will perform opening and closing 4/4 ADL containers with minimal assistance/contact guard assist within 7 day(s). 3.  Patient will perform upper body dressing with moderate assistance  within 7 day(s). 4.  Patient will perform BSC drop arm toilet transfers with moderate assistance  within 7 day(s). 5.  Patient will perform all aspects of toileting with maximal assistance within 7 day(s). 6.  Patient will participate in upper extremity therapeutic exercise/activities with self ROM R UE minimal assistance/contact guard assist within 7 day(s). Outcome: Progressing Towards Goal     OCCUPATIONAL THERAPY TREATMENT  Patient: Carlito Anaya (30 y.o. male)  Date: 9/14/2020  Diagnosis: NSTEMI (non-ST elevated myocardial infarction) ([facility] [address] [I21.4]   NSTEMI (non-ST elevated myocardial infarction) ([facility] [address]  Procedure(s) (LRB):  INFUSION CATHETER INSERTION (N/A) 2 Days Post-Op  Precautions: Aspiration, Bed Alarm, Fall  Chart, occupational therapy assessment, plan of care, and goals were reviewed. ASSESSMENT  Patient continues with skilled OT services and is progressing towards goals however remains limited by RUE/LE weakness, impaired balance, cognition & coordination, and decreased independence with ADLs & functional mobility. Pt received supine in bed, required min assist x 2 to sit EOB with constant support to maintain sitting balance.   Pt donned socks in bed requiring Max A & repeated verbal cues for successful command following & task completion. Pt transferred bed>chair with Mod A x 2 d/t impaired dynamic balance, R knee buckling, & poor safety awareness. Pt performed grooming tasks seated in chair with setup, initiating use of RUE for functional tasks but with poor grasp/FM. Pt demos good attention to R side with head turns & tracking. Pt educated on RUE exercises, encouraged to complete independently (specifics below). Recommend d/c to IPR to maximize independence & safety with ADLs. Current Level of Function Impacting Discharge (ADLs): up to Mod A x 2 for functional transfers    Other factors to consider for discharge: pt is far below baseline LOF, pt from senior care         PLAN :  Patient continues to benefit from skilled intervention to address the above impairments. Continue treatment per established plan of care. to address goals. Recommend with staff: OOB to chair TID for meals with assist x2 & gait belt, use BSC, participation in ADLs as much as possible with encouragement for functional use of RUE    Recommend next OT session: toileting, dressing, bathing    Recommendation for discharge: (in order for the patient to meet his/her long term goals)  Therapy 3 hours per day 5-7 days per week    This discharge recommendation:  A follow-up discussion with the attending provider and/or case management is planned    IF patient discharges home will need the following DME: TBD       SUBJECTIVE:   Patient stated Taylor Kaufman  when told he sounded like an actor    OBJECTIVE DATA SUMMARY:   Cognitive/Behavioral Status:  Neurologic State: Alert  Orientation Level: Oriented X4  Cognition: Follows commands; Impaired decision making;Poor safety awareness  Perception: Appears intact  Perseveration: No perseveration noted  Safety/Judgement: Awareness of environment;Decreased awareness of need for assistance;Decreased awareness of need for safety;Decreased insight into deficits    Functional Mobility and Transfers for ADLs:  Bed Mobility:  Supine to Sit: Minimum assistance;Assist x2    Transfers:  Sit to Stand: Moderate assistance;Assist x2     Bed to Chair: Moderate assistance;Assist x2    Balance:  Sitting: Impaired; Without support  Sitting - Static: Fair (occasional)  Sitting - Dynamic: Poor (constant support)  Standing: Impaired  Standing - Static: Poor;Constant support  Standing - Dynamic : Poor;Constant support    ADL Intervention:       Grooming  Grooming Assistance: Set-up  Position Performed: Seated in chair  Washing Face: Set-up  Brushing Teeth: Set-up      Lower Body Dressing Assistance  Dressing Assistance: Maximum assistance  Socks: Maximum assistance; Compensatory technique training(max cues for technique & carryover)  Leg Crossed Method Used: Yes  Position Performed: Supine  Cues: Tactile cues provided;Verbal cues provided;Visual cues provided         Cognitive Retraining  Safety/Judgement: Awareness of environment;Decreased awareness of need for assistance;Decreased awareness of need for safety;Decreased insight into deficits        Therapeutic Exercises:   Pt performed 5 reps of shoulder ab/adduction, & shoulder, elbow, wrist & digital flexion/extension, requiring repeated verbal & visual cues to successfully complete exercises when cued to support RUE with LUE. Pain:  No c/o pain    Activity Tolerance:   Fair, SpO2 stable on RA, and additional time for seated ADLs  Please refer to the flowsheet for vital signs taken during this treatment. After treatment patient left in no apparent distress:   Sitting in chair and Call bell within reach    COMMUNICATION/COLLABORATION:   The patients plan of care was discussed with: Physical therapist.       KATIE Barr  Time Calculation: 38 mins     Regarding student involvement in patient care:  A student participated in this treatment session.  Per CMS Medicare statements and AOTA guidelines I certify that the following was true:  1. I was present and directly observed the entire session. 2. I made all skilled judgments and clinical decisions regarding care. 3. I am the practitioner responsible for assessment, treatment, and documentation.     ISAMAR Cordoba, OTR/L

## 2020-09-15 LAB
ANION GAP SERPL CALC-SCNC: 6 MMOL/L (ref 5–15)
ASPIRIN TEST, ASPIRN: 560 ARU
BASOPHILS # BLD: 0.1 K/UL (ref 0–0.1)
BASOPHILS NFR BLD: 1 % (ref 0–1)
BUN SERPL-MCNC: 22 MG/DL (ref 6–20)
BUN/CREAT SERPL: 17 (ref 12–20)
CALCIUM SERPL-MCNC: 7.8 MG/DL (ref 8.5–10.1)
CHLORIDE SERPL-SCNC: 111 MMOL/L (ref 97–108)
CO2 SERPL-SCNC: 23 MMOL/L (ref 21–32)
CREAT SERPL-MCNC: 1.3 MG/DL (ref 0.7–1.3)
DIFFERENTIAL METHOD BLD: ABNORMAL
EOSINOPHIL # BLD: 0.2 K/UL (ref 0–0.4)
EOSINOPHIL NFR BLD: 3 % (ref 0–7)
ERYTHROCYTE [DISTWIDTH] IN BLOOD BY AUTOMATED COUNT: 15.7 % (ref 11.5–14.5)
GLUCOSE SERPL-MCNC: 92 MG/DL (ref 65–100)
HCT VFR BLD AUTO: 24 % (ref 36.6–50.3)
HGB BLD-MCNC: 7.4 G/DL (ref 12.1–17)
IMM GRANULOCYTES # BLD AUTO: 0 K/UL (ref 0–0.04)
IMM GRANULOCYTES NFR BLD AUTO: 1 % (ref 0–0.5)
LYMPHOCYTES # BLD: 1 K/UL (ref 0.8–3.5)
LYMPHOCYTES NFR BLD: 16 % (ref 12–49)
MCH RBC QN AUTO: 27.9 PG (ref 26–34)
MCHC RBC AUTO-ENTMCNC: 30.8 G/DL (ref 30–36.5)
MCV RBC AUTO: 90.6 FL (ref 80–99)
MONOCYTES # BLD: 0.5 K/UL (ref 0–1)
MONOCYTES NFR BLD: 8 % (ref 5–13)
NEUTS SEG # BLD: 4.7 K/UL (ref 1.8–8)
NEUTS SEG NFR BLD: 71 % (ref 32–75)
NRBC # BLD: 0 K/UL (ref 0–0.01)
NRBC BLD-RTO: 0 PER 100 WBC
PLATELET # BLD AUTO: 369 K/UL (ref 150–400)
PMV BLD AUTO: 11.7 FL (ref 8.9–12.9)
POTASSIUM SERPL-SCNC: 3.6 MMOL/L (ref 3.5–5.1)
RBC # BLD AUTO: 2.65 M/UL (ref 4.1–5.7)
SODIUM SERPL-SCNC: 140 MMOL/L (ref 136–145)
WBC # BLD AUTO: 6.5 K/UL (ref 4.1–11.1)

## 2020-09-15 PROCEDURE — 85025 COMPLETE CBC W/AUTO DIFF WBC: CPT

## 2020-09-15 PROCEDURE — 74011000250 HC RX REV CODE- 250: Performed by: FAMILY MEDICINE

## 2020-09-15 PROCEDURE — 87635 SARS-COV-2 COVID-19 AMP PRB: CPT

## 2020-09-15 PROCEDURE — 97530 THERAPEUTIC ACTIVITIES: CPT

## 2020-09-15 PROCEDURE — 74011250636 HC RX REV CODE- 250/636: Performed by: FAMILY MEDICINE

## 2020-09-15 PROCEDURE — C9113 INJ PANTOPRAZOLE SODIUM, VIA: HCPCS | Performed by: FAMILY MEDICINE

## 2020-09-15 PROCEDURE — 36415 COLL VENOUS BLD VENIPUNCTURE: CPT

## 2020-09-15 PROCEDURE — 74011250637 HC RX REV CODE- 250/637: Performed by: HOSPITALIST

## 2020-09-15 PROCEDURE — 74011250637 HC RX REV CODE- 250/637: Performed by: PHYSICIAN ASSISTANT

## 2020-09-15 PROCEDURE — 80048 BASIC METABOLIC PNL TOTAL CA: CPT

## 2020-09-15 PROCEDURE — 85576 BLOOD PLATELET AGGREGATION: CPT

## 2020-09-15 PROCEDURE — 65660000000 HC RM CCU STEPDOWN

## 2020-09-15 PROCEDURE — 99232 SBSQ HOSP IP/OBS MODERATE 35: CPT | Performed by: NURSE PRACTITIONER

## 2020-09-15 PROCEDURE — 97530 THERAPEUTIC ACTIVITIES: CPT | Performed by: PHYSICAL THERAPIST

## 2020-09-15 PROCEDURE — 74011250637 HC RX REV CODE- 250/637: Performed by: PSYCHIATRY & NEUROLOGY

## 2020-09-15 RX ORDER — CLOPIDOGREL BISULFATE 75 MG/1
75 TABLET ORAL DAILY
Status: DISCONTINUED | OUTPATIENT
Start: 2020-09-15 | End: 2020-09-17 | Stop reason: HOSPADM

## 2020-09-15 RX ADMIN — Medication 10 ML: at 07:01

## 2020-09-15 RX ADMIN — Medication 10 ML: at 13:09

## 2020-09-15 RX ADMIN — ATORVASTATIN CALCIUM 40 MG: 40 TABLET, FILM COATED ORAL at 21:48

## 2020-09-15 RX ADMIN — METOPROLOL TARTRATE 25 MG: 25 TABLET, FILM COATED ORAL at 21:48

## 2020-09-15 RX ADMIN — METOPROLOL TARTRATE 25 MG: 25 TABLET, FILM COATED ORAL at 09:24

## 2020-09-15 RX ADMIN — ASPIRIN 81 MG CHEWABLE TABLET 81 MG: 81 TABLET CHEWABLE at 09:24

## 2020-09-15 RX ADMIN — PRAMIPEXOLE DIHYDROCHLORIDE 0.12 MG: 0.25 TABLET ORAL at 21:48

## 2020-09-15 RX ADMIN — SODIUM CHLORIDE 40 MG: 9 INJECTION INTRAMUSCULAR; INTRAVENOUS; SUBCUTANEOUS at 21:47

## 2020-09-15 RX ADMIN — CLOPIDOGREL BISULFATE 75 MG: 75 TABLET ORAL at 11:22

## 2020-09-15 RX ADMIN — SODIUM CHLORIDE 40 MG: 9 INJECTION INTRAMUSCULAR; INTRAVENOUS; SUBCUTANEOUS at 09:25

## 2020-09-15 NOTE — PROGRESS NOTES
Bedside and Verbal shift change report given to Zeeshan Aviles RN (oncoming nurse) by Phuong Mendosa RN (offgoing nurse). Report included the following information SBAR, Kardex, Procedure Summary, Intake/Output, MAR, Recent Results, Cardiac Rhythm NSR and Dual Neuro Assessment.

## 2020-09-15 NOTE — PROGRESS NOTES
Bedside and Verbal shift change report given to Carmina Berger RN (oncoming nurse) by Rebekah Worrell RN (offgoing nurse). Report included the following information SBAR, Kardex, Intake/Output, MAR, Recent Results, Med Rec Status, Cardiac Rhythm SR and Dual Neuro Assessment.

## 2020-09-15 NOTE — PROGRESS NOTES
Problem: Falls - Risk of  Goal: *Absence of Falls  Description: Document Schuyler López Fall Risk and appropriate interventions in the flowsheet. Outcome: Progressing Towards Goal  Note: Fall Risk Interventions:  Mobility Interventions: Bed/chair exit alarm, Communicate number of staff needed for ambulation/transfer, Patient to call before getting OOB, PT Consult for mobility concerns, PT Consult for assist device competence, Strengthening exercises (ROM-active/passive)    Mentation Interventions: Adequate sleep, hydration, pain control, Bed/chair exit alarm, Door open when patient unattended, Eyeglasses and hearing aids, Update white board    Medication Interventions: Evaluate medications/consider consulting pharmacy, Patient to call before getting OOB    Elimination Interventions: Bed/chair exit alarm, Call light in reach, Toileting schedule/hourly rounds    History of Falls Interventions: Bed/chair exit alarm, Consult care management for discharge planning, Door open when patient unattended, Room close to nurse's station         Problem: Patient Education: Go to Patient Education Activity  Goal: Patient/Family Education  Outcome: Progressing Towards Goal     Problem: Pressure Injury - Risk of  Goal: *Prevention of pressure injury  Description: Document Judson Scale and appropriate interventions in the flowsheet. Outcome: Progressing Towards Goal  Note: Pressure Injury Interventions:  Sensory Interventions: Assess changes in LOC, Discuss PT/OT consult with provider, Keep linens dry and wrinkle-free, Maintain/enhance activity level, Minimize linen layers    Moisture Interventions: Absorbent underpads, Check for incontinence Q2 hours and as needed, Minimize layers, Limit adult briefs    Activity Interventions: Increase time out of bed, PT/OT evaluation    Mobility Interventions: Float heels, PT/OT evaluation, Turn and reposition approx.  every two hours(pillow and wedges)    Nutrition Interventions: Document food/fluid/supplement intake    Friction and Shear Interventions: HOB 30 degrees or less, Lift sheet, Minimize layers                Problem: Patient Education: Go to Patient Education Activity  Goal: Patient/Family Education  Outcome: Progressing Towards Goal     Problem: General Medical Care Plan  Goal: *Vital signs within specified parameters  Outcome: Progressing Towards Goal  Goal: *Labs within defined limits  Outcome: Progressing Towards Goal  Goal: *Absence of infection signs and symptoms  Outcome: Progressing Towards Goal  Goal: *Optimal pain control at patient's stated goal  Outcome: Progressing Towards Goal  Goal: *Skin integrity maintained  Outcome: Progressing Towards Goal  Goal: *Fluid volume balance  Outcome: Progressing Towards Goal  Goal: *Optimize nutritional status  Outcome: Progressing Towards Goal  Goal: *Progressive mobility and function (eg: ADL's)  Outcome: Progressing Towards Goal     Problem: Patient Education: Go to Patient Education Activity  Goal: Patient/Family Education  Outcome: Progressing Towards Goal     Problem: Anemia Care Plan (Adult and Pediatric)  Goal: *Tolerates increased activity  Outcome: Progressing Towards Goal

## 2020-09-15 NOTE — PROGRESS NOTES
Problem: Mobility Impaired (Adult and Pediatric)  Goal: *Acute Goals and Plan of Care (Insert Text)  Description:   FUNCTIONAL STATUS PRIOR TO ADMISSION: Patient was modified independent using a single point cane for functional mobility. HOME SUPPORT PRIOR TO ADMISSION: The patient lived alone with no local support. Physical Therapy Goals  Initiated 9/12/2020  1. Patient will move from supine to sit and sit to supine  and scoot up and down in bed with supervision/set-up within 7 day(s). 2.  Patient will transfer from bed to chair and chair to bed with minimal assistance/contact guard assist using the least restrictive device within 7 day(s). 3.  Patient will perform sit to stand with minimal assistance/contact guard assist within 7 day(s). 4.  Patient will ambulate with minimal assistance/contact guard assist for 50 feet with the least restrictive device within 7 day(s). 5.  Patient will improve Rivera Balance score by 7 points within 7 days. Outcome: Progressing Towards Goal   PHYSICAL THERAPY TREATMENT  Patient: Merissa Wu (23 y.o. male)  Date: 9/15/2020  Diagnosis: NSTEMI (non-ST elevated myocardial infarction) ([facility] [address] [I21.4]   NSTEMI (non-ST elevated myocardial infarction) ([facility] [address]  Procedure(s) (LRB):  INFUSION CATHETER INSERTION (N/A) 3 Days Post-Op  Precautions: Aspiration, Bed Alarm, Fall  Chart, physical therapy assessment, plan of care and goals were reviewed. ASSESSMENT  Patient continues with skilled PT services and is progressing towards goals. Patient currently needing Yulaina for supine to sit with extra time to complete task. Sit to stand with Yuliana-modA x 2 with cues for technique. Utilized RW to assist with transfer and has improved stability but continues to buckle with each step. Able to take approx 4 steps to the chair. He continues to be well below his functional baseline. Recommend inpt rehab setting to progress patient to more independent level.      Other factors to consider for discharge: at risk for falls, well below functional baseline, lives alone with minimal assistance at home         PLAN :  Patient continues to benefit from skilled intervention to address the above impairments. Continue treatment per established plan of care. to address goals. Recommendation for discharge: (in order for the patient to meet his/her long term goals)  Therapy 3 hours per day 5-7 days per week        IF patient discharges home will need the following DME: to be determined (TBD)       SUBJECTIVE:   Patient stated Manuela bautista for spending the time with me.     OBJECTIVE DATA SUMMARY:   Critical Behavior:  Neurologic State: Alert, Eyes open spontaneously  Orientation Level: Oriented X4  Cognition: Appropriate for age attention/concentration, Appropriate safety awareness, Follows commands  Safety/Judgement: Awareness of environment, Decreased awareness of need for assistance, Decreased awareness of need for safety, Decreased insight into deficits  Functional Mobility Training:  Bed Mobility:     Supine to Sit: Minimum assistance     Scooting: Minimum assistance; Additional time;Assist x1        Transfers:  Sit to Stand: Minimum assistance;Assist x1(modA of another)  Stand to Sit: Minimum assistance        Bed to Chair: Moderate assistance;Assist x2(buckling R knee with each step)                    Balance:  Sitting: Intact  Sitting - Static: Good (unsupported)  Sitting - Dynamic: Fair (occasional)  Standing: Impaired  Standing - Static: Constant support; Fair  Standing - Dynamic : Constant support;Poor  Ambulation/Gait Training:  Distance (ft): 4 Feet (ft)  Assistive Device: Gait belt;Walker, rolling  Ambulation - Level of Assistance: Moderate assistance;Assist x2(knees buckling)        Gait Abnormalities: Decreased step clearance;Shuffling gait        Base of Support: Widened     Speed/Belen: Pace decreased (<100 feet/min); Shuffled; Slow    Pain Rating:  No c/o pain    Activity Tolerance: Fair and requires rest breaks  Please refer to the flowsheet for vital signs taken during this treatment. After treatment patient left in no apparent distress:   Sitting in chair, Call bell within reach, and Bed / chair alarm activated    COMMUNICATION/COLLABORATION:   The patients plan of care was discussed with: Physical therapist, Occupational therapist, and Registered nurse.      Charleen Sandoval, PT, DPT   Time Calculation: 19 mins

## 2020-09-15 NOTE — PROGRESS NOTES
Problem: Falls - Risk of  Goal: *Absence of Falls  Description: Document Vipul Andersen Fall Risk and appropriate interventions in the flowsheet.   9/15/2020 1016 by Deonte Vega  Outcome: Progressing Towards Goal  Note: Fall Risk Interventions:  Mobility Interventions: Bed/chair exit alarm, Communicate number of staff needed for ambulation/transfer, Patient to call before getting OOB, PT Consult for mobility concerns, PT Consult for assist device competence, Strengthening exercises (ROM-active/passive)    Mentation Interventions: Adequate sleep, hydration, pain control, Bed/chair exit alarm, Door open when patient unattended, Eyeglasses and hearing aids, Update white board    Medication Interventions: Evaluate medications/consider consulting pharmacy, Patient to call before getting OOB    Elimination Interventions: Bed/chair exit alarm, Call light in reach, Toileting schedule/hourly rounds    History of Falls Interventions: Bed/chair exit alarm, Consult care management for discharge planning, Door open when patient unattended, Room close to nurse's station      9/15/2020 0935 by Deonte Vega  Outcome: Progressing Towards Goal  Note: Fall Risk Interventions:  Mobility Interventions: Bed/chair exit alarm, Communicate number of staff needed for ambulation/transfer, Patient to call before getting OOB, PT Consult for mobility concerns, PT Consult for assist device competence, Strengthening exercises (ROM-active/passive)    Mentation Interventions: Adequate sleep, hydration, pain control, Bed/chair exit alarm, Door open when patient unattended, Eyeglasses and hearing aids, Update white board    Medication Interventions: Evaluate medications/consider consulting pharmacy, Patient to call before getting OOB    Elimination Interventions: Bed/chair exit alarm, Call light in reach, Toileting schedule/hourly rounds    History of Falls Interventions: Bed/chair exit alarm, Consult care management for discharge planning, Door open when patient unattended, Room close to nurse's station      9/15/2020 0920 by Azael Gonzalez  Outcome: Progressing Towards Goal  Note: Fall Risk Interventions:  Mobility Interventions: Bed/chair exit alarm, Communicate number of staff needed for ambulation/transfer, Patient to call before getting OOB, PT Consult for mobility concerns, PT Consult for assist device competence, Strengthening exercises (ROM-active/passive)    Mentation Interventions: Adequate sleep, hydration, pain control, Bed/chair exit alarm, Door open when patient unattended, Eyeglasses and hearing aids, Update white board    Medication Interventions: Evaluate medications/consider consulting pharmacy, Patient to call before getting OOB    Elimination Interventions: Bed/chair exit alarm, Call light in reach, Toileting schedule/hourly rounds    History of Falls Interventions: Bed/chair exit alarm, Consult care management for discharge planning, Door open when patient unattended, Room close to nurse's station         Problem: Pressure Injury - Risk of  Goal: *Prevention of pressure injury  Description: Document Judson Scale and appropriate interventions in the flowsheet. 9/15/2020 1016 by Azael Gonzalez  Outcome: Progressing Towards Goal  Note: Pressure Injury Interventions:  Sensory Interventions: Assess changes in LOC, Discuss PT/OT consult with provider, Keep linens dry and wrinkle-free, Maintain/enhance activity level, Minimize linen layers    Moisture Interventions: Absorbent underpads, Check for incontinence Q2 hours and as needed, Minimize layers, Limit adult briefs    Activity Interventions: Increase time out of bed, PT/OT evaluation    Mobility Interventions: Float heels, PT/OT evaluation, Turn and reposition approx.  every two hours(pillow and wedges)    Nutrition Interventions: Document food/fluid/supplement intake    Friction and Shear Interventions: HOB 30 degrees or less, Lift sheet, Minimize layers             9/15/2020 0920 by Kaleb Mcgrath Danny  Outcome: Progressing Towards Goal  Note: Pressure Injury Interventions:  Sensory Interventions: Assess changes in LOC, Discuss PT/OT consult with provider, Keep linens dry and wrinkle-free, Maintain/enhance activity level, Minimize linen layers    Moisture Interventions: Absorbent underpads, Check for incontinence Q2 hours and as needed, Minimize layers, Limit adult briefs    Activity Interventions: Increase time out of bed, PT/OT evaluation    Mobility Interventions: Float heels, PT/OT evaluation, Turn and reposition approx.  every two hours(pillow and wedges)    Nutrition Interventions: Document food/fluid/supplement intake    Friction and Shear Interventions: HOB 30 degrees or less, Lift sheet, Minimize layers                Problem: General Medical Care Plan  Goal: *Vital signs within specified parameters  Outcome: Progressing Towards Goal  Goal: *Labs within defined limits  Outcome: Progressing Towards Goal  Goal: *Absence of infection signs and symptoms  Outcome: Progressing Towards Goal  Goal: *Optimal pain control at patient's stated goal  Outcome: Progressing Towards Goal  Goal: *Skin integrity maintained  Outcome: Progressing Towards Goal  Goal: *Fluid volume balance  Outcome: Progressing Towards Goal  Goal: *Optimize nutritional status  Outcome: Progressing Towards Goal  Goal: *Anxiety reduced or absent  Outcome: Progressing Towards Goal  Goal: *Progressive mobility and function (eg: ADL's)  Outcome: Progressing Towards Goal     Problem: Anemia Care Plan (Adult and Pediatric)  Goal: *Labs within defined limits  9/15/2020 1016 by Shadi Currie  Outcome: Progressing Towards Goal  9/15/2020 0935 by Shadi Currie  Outcome: Progressing Towards Goal     Problem: Unstable Angina/NSTEMI: Discharge Outcomes  Goal: *Hemodynamically stable  Outcome: Progressing Towards Goal  Goal: *Stable cardiac rhythm  Outcome: Progressing Towards Goal  Goal: *Lungs clear or at baseline  Outcome: Progressing Towards Goal  Goal: *Optimal pain control at patient's stated goal  Outcome: Progressing Towards Goal

## 2020-09-15 NOTE — PROGRESS NOTES
[address] [facility] Adult  Hospitalist Group                                                                                          Hospitalist Progress Note  Dean Sun MD  Answering service: [phone] from in house phone        Date of Service:  9/15/2020  NAME:  Sherrie Medina  :  1950  MRN:  [id number]      Admission Summary:   Sherrie Medina is a 69 y.o. male who presents with fatigue and dehydration     Patient is dysarthric and is a very poor historian, not much history could be obtained from the patient. I tried calling the patient's next to kin Arben Morris, as listed on the chart but did not get a response. Thus history was extremely limited, patient apparently lives in assisted living facility by himself, was noted to be fatigued and dehydrated, has not been eating drinking much, has not been taking his medication, and thus was sent to the hospital for further management and evaluation. Apparently patient has left-sided weakness after his stroke currently has some slurred speech, unclear whether this is new or old. No further history can be obtained from the patient. Patient came to the ER, was found to have a hemoglobin of 5.2 and was requested to be admitted to the hospitalist service.     Interval history / Subjective:     F/u anemia   -Apparently patient is aspirin nonresponsive. Plavix is recommended. I have discussed with patient, GI. Weighing the risk of stroke versus bleeding, it is believed now it is reasonable for patient to go on Plavix. Patient expressed understanding and would prefer to be on Plavix    Assessment & Plan:     Acute blood loss anemia  -unclear etiology  -h/h  -s/p 2 units PRBC  -transfuse for hb<7. No blood work for 2 days due to very difficult stick. Asking anesthesia for central line,if not may ask RT for arterial stick    Probable GI bleed  -FOBT negative  -EGD showed esophageal stricture with overlying ulcer   -Tolerating GI light diet. .      NSTEMI:  -troponins elevated. Echocardiogram with left ventricle ejection fraction of 45-50%. Wall motion was not assessed due to poor image quality. Cardiology consulted, does not feel further cardiac work-up is needed.        Elevated creatinine, creatinine improving. Most augustin CKD stage III  -Monitor    Metabolic acidosis, non anion gap resolved with bicarbonate drip. Discontinue bicarb drip and monitor       Acute metabolic encephalopathy  -CT head 9/7 Asymmetric white matter disease and lacunar infarcts. No acute process  identified by noncontrast CT  -MRI brain showed acute left hemisphere cortical infarction primarily involving frontal and  parietal lobes but with small acute cortical infarct in the posterior inferior  occipital lobe. See above. 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 He is admitted for severe anemia and onset of the stroke is unknown, but this test was planned couple days ago and was done today after his SARS-CoV-2 test result was negative as such patient is not a candidate for TPA. History obtained  -Echocardiogram with LVEF of 45-50%. -CT angiogram of the head and neck showed bilateral carotid stenosis mild to moderate on the right and severe on the left. -NIS and vascular surgery consulted. - Increase Lipitor to 40 mg daily. Permissive hypertension the critical LICA stenosis. -Patient is aspirin none responsive. Vascular recommended dual antiplatelet.   Plavix added 9/15 after extensive discussion with patient, GI and neurology    Left ICA,symptomatic  -aspirin and Lipitor  -vascular surgery recommended intervention in few weeks after reahb          SARS-CoV-2 not detected  -Taken off droplet plus isolation. Large left inguinal hernia  -noted on CT abd  -Outpatient follow up    Scrotal edema  -Appreciate Urology, elevates scrotum    Bilateral pleural effusion reported CT angiogram done for the head neck. -LVEF 45 to 50%. -CXR on 9/11 Bilateral pleural effusions with associated passive atelectasis and/or  consolidation. Pulmonary vascular congestion  -He is not clinically fluid overloaded so we will hold off diuretics specially since he got IV. The patient is quite critical and is a high risk of clinical decline     Code status: FULL CODE  DVT prophylaxis: scd    Plan: Follow Neurology, Cardiology, GI  Next of kin: Mike Dumont. Phone [phone]. Care Plan discussed with: Patient/Family  Anticipated Disposition: Needs rehab  Anticipated Discharge: Whenever accepted. Hospital Problems  Date Reviewed: 9/8/2020          Codes Class Noted POA    * (Principal) NSTEMI (non-ST elevated myocardial infarction) [facility]) ICD-10-CM: I21.4  ICD-9-CM: 410.70  9/7/2020 Yes                Review of Systems:   A comprehensive review of systems was negative except for that written in the HPI. Vital Signs:    Last 24hrs VS reviewed since prior progress note. Most recent are:  Visit Vitals  BP (!) 158/83 (BP 1 Location: Left arm, BP Patient Position: At rest)   Pulse 93   Temp 97.7 °F (36.5 °C)   Resp 15   Ht 5' 9\" (1.753 m)   Wt 75.2 kg (165 lb 11.2 oz)   SpO2 95%   BMI 24.47 kg/m²         Intake/Output Summary (Last 24 hours) at 9/15/2020 1831  Last data filed at 9/15/2020 0900  Gross per 24 hour   Intake 240 ml   Output    Net 240 ml        Physical Examination:             Constitutional:  No acute distress, cooperative, pleasant    ENT:  Oral mucosa moist, oropharynx benign. Right IJ TLC    Resp:  CTA bilaterally. No wheezing/rhonchi/rales. No accessory muscle use   CV:  Regular rhythm, normal rate, no murmurs, gallops, rubs    GI:  Soft, non distended, non tender.  normoactive bowel sounds, no hepatosplenomegaly     Musculoskeletal:  No edema, warm, 2+ pulses throughout    Neurologic:  Alert and oriented. RUE 3/4. RLE 4+/5     Skin:  Good turgor, no rashes or ulcers       Data Review:    Review and/or order of clinical lab test      Labs:     Recent Labs     09/15/20  0512 09/14/20  0415   WBC 6.5 6.8   HGB 7.4* 7.5*   HCT 24.0* 24.6*    378     Recent Labs     09/15/20  0512 09/14/20  0415 09/13/20  0723    139 138   K 3.6 3.6 3.8   * 110* 108   CO2 23 24 24   BUN 22* 22* 26*   CREA 1.30 1.19 1.19   GLU 92 105* 98   CA 7.8* 7.7* 7.9*     No results for input(s): ALT, AP, TBIL, TBILI, TP, ALB, GLOB, GGT, AML, LPSE in the last 72 hours. No lab exists for component: SGOT, GPT, AMYP, HLPSE  No results for input(s): INR, PTP, APTT, INREXT, INREXT in the last 72 hours. No results for input(s): FE, TIBC, PSAT, FERR in the last 72 hours. No results found for: FOL, RBCF   No results for input(s): PH, PCO2, PO2 in the last 72 hours. No results for input(s): CPK, CKNDX, TROIQ in the last 72 hours.     No lab exists for component: CPKMB  Lab Results   Component Value Date/Time    Cholesterol, total 108 09/12/2020 12:35 PM    HDL Cholesterol 38 09/12/2020 12:35 PM    LDL, calculated 43.6 09/12/2020 12:35 PM    Triglyceride 132 09/12/2020 12:35 PM    CHOL/HDL Ratio 2.8 09/12/2020 12:35 PM     Lab Results   Component Value Date/Time    Glucose (POC) 197 (H) 09/11/2020 08:04 PM    Glucose (POC) 146 (H) 09/11/2020 11:30 AM    Glucose (POC) 123 (H) 09/11/2020 06:49 AM    Glucose (POC) 111 (H) 09/10/2020 11:07 PM    Glucose (POC) 111 (H) 09/10/2020 05:14 PM     No results found for: COLOR, APPRN, SPGRU, REFSG, YESY, PROTU, GLUCU, KETU, BILU, UROU, PRADEEP, LEUKU, GLUKE, EPSU, BACTU, WBCU, RBCU, CASTS, UCRY      Medications Reviewed:     Current Facility-Administered Medications   Medication Dose Route Frequency    clopidogreL (PLAVIX) tablet 75 mg  75 mg Oral DAILY    atorvastatin (LIPITOR) tablet 40 mg  40 mg Oral QHS    sodium chloride (NS) flush 5-40 mL  5-40 mL IntraVENous Q8H    sodium chloride (NS) flush 5-40 mL  5-40 mL IntraVENous PRN    aspirin chewable tablet 81 mg  81 mg Oral DAILY    metoprolol tartrate (LOPRESSOR) tablet 25 mg  25 mg Oral Q12H    pramipexole (MIRAPEX) tablet 0.125 mg  0.125 mg Oral QHS    0.9% sodium chloride infusion 250 mL  250 mL IntraVENous PRN    sodium chloride (NS) flush 5-40 mL  5-40 mL IntraVENous Q8H    sodium chloride (NS) flush 5-40 mL  5-40 mL IntraVENous PRN    ondansetron (ZOFRAN) injection 4 mg  4 mg IntraVENous Q4H PRN    pantoprazole (PROTONIX) 40 mg in 0.9% sodium chloride 10 mL injection  40 mg IntraVENous Q12H     ______________________________________________________________________  EXPECTED LENGTH OF STAY: 4d 9h  ACTUAL LENGTH OF STAY:          8                 Rosario Haque MD

## 2020-09-15 NOTE — PROGRESS NOTES
Vascular  VSS  Home or rehab on aspirin and plavix  Have patient see me next week please [phone])  Thank you

## 2020-09-15 NOTE — PROGRESS NOTES
Neurology Progress Note    Patient ID:  Brad Mai  [id number]  69 y.o.  1950    Chief Complaint: Stroke    Subjective:     79-year-old gentleman presented 9/7 with c/o of fatigue & dehydration, found to have elevated troponin 8.3 and severe anemia with hemoglobin of 5.2. The patient reportedly lives alone at and an assisted living facility. He has reportedly not been eating and drinking much over the last couple of days and has not been taking his medications. He is reported to have chronic left-sided weakness and dysarthria after a prior stroke. Neurology consulted for concern for new new right-sided weakness. MRI showed acute left hemisphere cortical infarction primarily involving frontal and parietal lobes but with small acute cortical infarct in the posterior inferior occipital lobe and evidence of previous old small vessel ischemic injury left greater than right. He was not on aspirin prior to admission. CTA H&N showed bilateral carotid disease, mild to moderate on the right and severe on the left. NIS consulted for severe L ICA stenosis. Felt to be a better candidate for CEA. Dr Loren Canales of vascular consulted and given completed CVA, will wait about 2 weeks prior to performing left CEA. Interval 9/15:   Agree w/ vascular re DAPT, but found to be ASA non-responder on ASA test. Plavix held due to concern for hemorrhage due to anemia of unknown origin. Given ASA non-responder, recommend Plavix initiation. Pt Hgb stable last 2 days. Pt neurologically stable with slightly improved strength today. Can discharge from neurological standpoint.     Objective:       ROS:  Per HPI  All other 12 pt ROS negative    Meds:  Current Facility-Administered Medications   Medication Dose Route Frequency    atorvastatin (LIPITOR) tablet 40 mg  40 mg Oral QHS    sodium chloride (NS) flush 5-40 mL  5-40 mL IntraVENous Q8H    sodium chloride (NS) flush 5-40 mL  5-40 mL IntraVENous PRN    aspirin chewable tablet 81 mg  81 mg Oral DAILY    metoprolol tartrate (LOPRESSOR) tablet 25 mg  25 mg Oral Q12H    pramipexole (MIRAPEX) tablet 0.125 mg  0.125 mg Oral QHS    0.9% sodium chloride infusion 250 mL  250 mL IntraVENous PRN    sodium chloride (NS) flush 5-40 mL  5-40 mL IntraVENous Q8H    sodium chloride (NS) flush 5-40 mL  5-40 mL IntraVENous PRN    ondansetron (ZOFRAN) injection 4 mg  4 mg IntraVENous Q4H PRN    pantoprazole (PROTONIX) 40 mg in 0.9% sodium chloride 10 mL injection  40 mg IntraVENous Q12H       MRI Results (maximum last 3): Results from [place] encounter on 09/07/20   MRI BRAIN WO CONT    Narrative *PRELIMINARY REPORT*  Focal diffusion restriction in the left frontal, parietal, and occipital lobes,  compatible with acute infarcts. No evidence of intracranial hemorrhage or mass. Periventricular white matter disease, compatible with chronic small vessels can  be a. Preliminary report was provided by Dr. Libertad Parra, the on-call radiologist, at 3:44  AM.  Final report to follow. *END PRELIMINARY REPORT*    *FINAL REPORT BELOW*  EXAM:  MRI BRAIN WO CONT  INDICATION:  cva, patient with prior history of stroke presented to the ED with  acute onset of fatigue and dehydration. Brought by EMS from assisted living  facility. Slurred speech. TECHNIQUE: Sagittal T1, axial FLAIR, T2,T1 and gradient echo images as well as  coronal T2 weighted images and axial diffusion weighted images of the head were  obtained. COMPARISON:  CT head same day. FINDINGS:  Generalized prominence of the ventricles and sulci consistent with volume loss  greater than expected for age. Abnormal restricted diffusion involving the left posterior lateral frontal  cortex and left parietal lobe. Small focus in the left posterior inferior  occipital lobe. Majority of the infarction is in the left middle cerebral artery  territory with focus in the left occipital lobe in the posterior circulation.   This suggests the possibility of an embolic source in the cardiac or aortic in  origin. Chronic encephalomalacia left basal ganglia and corona radiata and to lesser  extent anteriorly around both heads of the caudate nucleus and a few foci in the  right thalamus and basal ganglia consistent with old small vessel lacunar  infarcts. No evidence of acute intracranial hemorrhage, mass or abnormal extra-axial fluid  collections. Flow voids are present in vertebral basilar and carotid artery systems. Mild  chronic findings of cervical spondylosis otherwise craniocervical junction is  unremarkable. Structures of the skull base including paranasal sinuses and  temporal bones are unremarkable. Impression IMPRESSION:  1. Acute left hemisphere cortical infarction primarily involving frontal and  parietal lobes but with small acute cortical infarct in the posterior inferior  occipital lobe. See above. 2. Evidence of previous old small vessel ischemic injury left greater than  right. Lab Review   Recent Results (from the past 24 hour(s))   CBC WITH AUTOMATED DIFF    Collection Time: 09/15/20  5:12 AM   Result Value Ref Range    WBC 6.5 4.1 - 11.1 K/uL    RBC 2.65 (L) 4.10 - 5.70 M/uL    HGB 7.4 (L) 12.1 - 17.0 g/dL    HCT 24.0 (L) 36.6 - 50.3 %    MCV 90.6 80.0 - 99.0 FL    MCH 27.9 26.0 - 34.0 PG    MCHC 30.8 30.0 - 36.5 g/dL    RDW 15.7 (H) 11.5 - 14.5 %    PLATELET 901 504 - 329 K/uL    MPV 11.7 8.9 - 12.9 FL    NRBC 0.0 0  WBC    ABSOLUTE NRBC 0.00 0.00 - 0.01 K/uL    NEUTROPHILS 71 32 - 75 %    LYMPHOCYTES 16 12 - 49 %    MONOCYTES 8 5 - 13 %    EOSINOPHILS 3 0 - 7 %    BASOPHILS 1 0 - 1 %    IMMATURE GRANULOCYTES 1 (H) 0.0 - 0.5 %    ABS. NEUTROPHILS 4.7 1.8 - 8.0 K/UL    ABS. LYMPHOCYTES 1.0 0.8 - 3.5 K/UL    ABS. MONOCYTES 0.5 0.0 - 1.0 K/UL    ABS. EOSINOPHILS 0.2 0.0 - 0.4 K/UL    ABS. BASOPHILS 0.1 0.0 - 0.1 K/UL    ABS. IMM.  GRANS. 0.0 0.00 - 0.04 K/UL    DF AUTOMATED     METABOLIC PANEL, BASIC    Collection Time: 09/15/20  5:12 AM   Result Value Ref Range    Sodium 140 136 - 145 mmol/L    Potassium 3.6 3.5 - 5.1 mmol/L    Chloride 111 (H) 97 - 108 mmol/L    CO2 23 21 - 32 mmol/L    Anion gap 6 5 - 15 mmol/L    Glucose 92 65 - 100 mg/dL    BUN 22 (H) 6 - 20 MG/DL    Creatinine 1.30 0.70 - 1.30 MG/DL    BUN/Creatinine ratio 17 12 - 20      GFR est AA >60 >60 ml/min/1.73m2    GFR est non-AA 54 (L) >60 ml/min/1.73m2    Calcium 7.8 (L) 8.5 - 10.1 MG/DL   ASPIRIN TEST    Collection Time: 09/15/20  5:13 AM   Result Value Ref Range    Aspirin test 560 ARU       Additional comments:I reviewed the patient's new clinical lab test results. and I reviewed the patients new imaging test results. Patient Vitals for the past 8 hrs:   BP Temp Pulse Resp SpO2 Weight   09/15/20 0610 126/63 97.7 °F (36.5 °C) 69 16 99 %    09/15/20 0200 128/62 98.3 °F (36.8 °C) 75 17 97 % 75.2 kg (165 lb 11.2 oz)       No intake/output data recorded. No intake/output data recorded. Exam:  Visit Vitals  /63 (BP 1 Location: Left arm, BP Patient Position: At rest)   Pulse 69   Temp 97.7 °F (36.5 °C)   Resp 16   Ht 5' 9\" (1.753 m)   Wt 75.2 kg (165 lb 11.2 oz)   SpO2 99%   BMI 24.47 kg/m²     Gen: Well developed  CV: RRR  Lungs: non labored breathing  Abd: soft, non distended  Neuro: A&O x 3, mild dysarthria but no clear aphasia. He can repeat and name. Comprehension intact and can follow commands.   CN II-XII: PERRL, EOMI, right facial droop, tongue/palate midline  Motor: Right hemiparesis right lower extremity approximately 4 to 4+/5 in right upper extremity approximately 4/5 in R LE  sensory: intact to LT  COOR: no limb/truncal ataxia  Gait: Deferred    PROBLEM LIST:     Patient Active Problem List   Diagnosis Code    NSTEMI (non-ST elevated myocardial infarction) ([facility] [address] I21.4       Assessment/Plan:    69year old male admitted with NSTEMI, severe anemia, acute on chronic L cerebral watershed infarcts with critical stenosis of the L ICA for which vascular surgery is now following. Placed on ASA and Plavix 9/11 due to additional infarcts noted on imaging. Discussed risks/benefits with RICKI CASTILLO [facility]) and given his rather severe acute bleed with unidentified source of hemorrhage, we agreed ASA monotherapy. However found to be ASA non-responder. Agree with vascular re Plavix, kamilah given ASA non-responder. Vascular tentatively will perform L CEA in about 2 weeks.     - Agree w/ vascular surgery re: DAPT, however non-responder to ASA thus ASA will be of no therapeutic benefit  - Needs Plavix initiated - recommend P2Y12 test to confirm therapeutic response  - L CEA by vascular surgery in about 2 weeks  - Cont Lipitor to 40mg/daily.    - BP goal liberal, [id number] given critical LICA stenosis.   - PT/OT/ST evaluations.  (C/o difficulty with ambulation)  - Stroke education performed  - Can discharge from neurological standpoint    Signed:  Ion Calle NP  9/15/2020 9:58 AM

## 2020-09-15 NOTE — PROGRESS NOTES
Problem: Self Care Deficits Care Plan (Adult)  Goal: *Acute Goals and Plan of Care (Insert Text)  Description:   FUNCTIONAL STATUS PRIOR TO ADMISSION: Patient was modified independent using a single point cane for functional mobility. Patient was modified independent increased time for basic and total A instrumental ADLs. HOME SUPPORT: The patient lived alone with assisted living staff to provide assistance. Occupational Therapy Goals  Initiated 9/13/2020  1. Patient will perform 2/5 grooming tasks with minimal assistance/contact guard assist within 7 day(s). 2.  Patient will perform opening and closing 4/4 ADL containers with minimal assistance/contact guard assist within 7 day(s). 3.  Patient will perform upper body dressing with moderate assistance  within 7 day(s). 4.  Patient will perform BSC drop arm toilet transfers with moderate assistance  within 7 day(s). 5.  Patient will perform all aspects of toileting with maximal assistance within 7 day(s). 6.  Patient will participate in upper extremity therapeutic exercise/activities with self ROM R UE minimal assistance/contact guard assist within 7 day(s). Outcome: Progressing Towards Goal     OCCUPATIONAL THERAPY TREATMENT  Patient: Kartik Winter (83 y.o. male)  Date: 9/15/2020  Diagnosis: NSTEMI (non-ST elevated myocardial infarction) ([facility] [address] [I21.4]   NSTEMI (non-ST elevated myocardial infarction) ([facility] [address]  Procedure(s) (LRB):  INFUSION CATHETER INSERTION (N/A) 3 Days Post-Op  Precautions: Aspiration, Bed Alarm, Fall  Chart, occupational therapy assessment, plan of care, and goals were reviewed. ASSESSMENT  Patient continues with skilled OT services and is progressing towards goals however remains limited by global debility, RUE hemiparesis with slight inattention, decreased safety awareness, and decreased distal reach noted with functional transfers completed this session.  He demo'd improved mobility with RW support this session, however requiring mod-max cues for technique throughout, especially RUE placement. He continues to have difficulty with distal lower body dressing, requiring cues for adaptive techniques, no RUE initiation noted despite cues. Continue to recommend d/c to IPR to maximize safety & functional independence as he remains far from his Mod I baseline. Current Level of Function Impacting Discharge (ADLs): Min-Max A for ADLs, Min-Mod A x2 for mobility with RW support    Other factors to consider for discharge: RUE hemiparesis, debility, PMH, PLOF, physical assist of 2         PLAN :  Patient continues to benefit from skilled intervention to address the above impairments. Continue treatment per established plan of care. to address goals. Recommend with staff: Recommend with nursing patient to complete as able in order to maintain strength, endurance and independence: ADLs with assist PRN, OOB to chair 3x/day and mobilizing to the [facility] as appropriate for toileting with 2 assist & RW. Thank you for your assistance. Recommend next OT session: RUE neuro re-ed, standing tolerance, lower body adaptive techniques    Recommendation for discharge: (in order for the patient to meet his/her long term goals)  Therapy 3 hours per day 5-7 days per week    This discharge recommendation:  Has been made in collaboration with the attending provider and/or case management    IF patient discharges home will need the following DME: To be determined (TBD)         SUBJECTIVE:   Patient stated Alright, alright.     OBJECTIVE DATA SUMMARY:   Cognitive/Behavioral Status:  Neurologic State: Alert  Orientation Level: Oriented X4  Cognition: Appropriate for age attention/concentration; Follows commands  Perception: Appears intact  Perseveration: No perseveration noted  Safety/Judgement: Awareness of environment;Decreased awareness of need for assistance;Decreased awareness of need for safety;Decreased insight into deficits    Functional Mobility and Transfers for ADLs:  Bed Mobility:  Supine to Sit: Minimum assistance  Sit to Supine: (in chair)  Scooting: Minimum assistance; Additional time;Assist x1    Transfers:  Sit to Stand: Minimum assistance;Assist x1(modA of another)     Bed to Chair: Moderate assistance;Assist x2(buckling R knee with each step)    Balance:  Sitting: Intact  Sitting - Static: Good (unsupported)  Sitting - Dynamic: Fair (occasional)  Standing: Impaired  Standing - Static: Constant support; Fair  Standing - Dynamic : Constant support;Poor    ADL Intervention:                           Lower Body Dressing Assistance  Dressing Assistance: Maximum assistance  Socks: Maximum assistance; Compensatory technique training(decr distal reach w/ 1 hand technique)  Leg Crossed Method Used: Yes  Position Performed: Long sitting on bed  Cues: Physical assistance; Tactile cues provided;Verbal cues provided;Visual cues provided         Cognitive Retraining  Safety/Judgement: Awareness of environment;Decreased awareness of need for assistance;Decreased awareness of need for safety;Decreased insight into deficits    Therapeutic Exercises:   Sit<>stand x2 with Min-Mod A x2 and RW support, transferring to the chair with Min A x2 and slight R knee buckling with each step    Pain:  None reported    Activity Tolerance:   Good  Please refer to the flowsheet for vital signs taken during this treatment. After treatment patient left in no apparent distress:   Sitting in chair, Call bell within reach, and Bed / chair alarm activated    COMMUNICATION/COLLABORATION:   The patients plan of care was discussed with: Physical therapist and Registered nurse.      ISAMAR Cao, OTR/L  Time Calculation: 18 mins

## 2020-09-15 NOTE — PROGRESS NOTES
TRANSITION OF CARE PLAN   RUR 11% LOW RISK    Chart reviewed. IPR recommended. Met with patient at bedside to discuss discharge plan and offer Freedom of choice. Patient was alert but pleasantly confused. [facility] Roommate [phone] to discuss discharge plan. No answer. Unable to leave . Call placed and spoke with Yumiko Simentalick [phone] to discuss discharge plan. Earl Noland stated that she's unable to discuss discharge plan at this moment. However, She reported that Jamee Cai [address] Maker/ Roommate is currently hospitalized at [facility] (condition unknown). Please note, Pt is unaware of this information at this time. CM to follow.      BOOGIE Blanton,CRM

## 2020-09-16 LAB
ANION GAP SERPL CALC-SCNC: 6 MMOL/L (ref 5–15)
BASOPHILS # BLD: 0.1 K/UL (ref 0–0.1)
BASOPHILS NFR BLD: 1 % (ref 0–1)
BUN SERPL-MCNC: 20 MG/DL (ref 6–20)
BUN/CREAT SERPL: 15 (ref 12–20)
CALCIUM SERPL-MCNC: 8 MG/DL (ref 8.5–10.1)
CHLORIDE SERPL-SCNC: 109 MMOL/L (ref 97–108)
CO2 SERPL-SCNC: 23 MMOL/L (ref 21–32)
COVID-19, XGCOVT: NOT DETECTED
CREAT SERPL-MCNC: 1.36 MG/DL (ref 0.7–1.3)
DIFFERENTIAL METHOD BLD: ABNORMAL
EOSINOPHIL # BLD: 0.2 K/UL (ref 0–0.4)
EOSINOPHIL NFR BLD: 3 % (ref 0–7)
ERYTHROCYTE [DISTWIDTH] IN BLOOD BY AUTOMATED COUNT: 15.6 % (ref 11.5–14.5)
GLUCOSE SERPL-MCNC: 98 MG/DL (ref 65–100)
HCT VFR BLD AUTO: 24.3 % (ref 36.6–50.3)
HEALTH STATUS, XMCV2T: NORMAL
HGB BLD-MCNC: 7.6 G/DL (ref 12.1–17)
IMM GRANULOCYTES # BLD AUTO: 0 K/UL (ref 0–0.04)
IMM GRANULOCYTES NFR BLD AUTO: 1 % (ref 0–0.5)
LYMPHOCYTES # BLD: 1.1 K/UL (ref 0.8–3.5)
LYMPHOCYTES NFR BLD: 17 % (ref 12–49)
MCH RBC QN AUTO: 27.8 PG (ref 26–34)
MCHC RBC AUTO-ENTMCNC: 31.3 G/DL (ref 30–36.5)
MCV RBC AUTO: 89 FL (ref 80–99)
MONOCYTES # BLD: 0.6 K/UL (ref 0–1)
MONOCYTES NFR BLD: 9 % (ref 5–13)
NEUTS SEG # BLD: 4.5 K/UL (ref 1.8–8)
NEUTS SEG NFR BLD: 69 % (ref 32–75)
NRBC # BLD: 0 K/UL (ref 0–0.01)
NRBC BLD-RTO: 0 PER 100 WBC
P2Y12 PLT RESPONSE,PPPR: 76 PRU (ref 194–418)
PLATELET # BLD AUTO: 391 K/UL (ref 150–400)
PMV BLD AUTO: 11.4 FL (ref 8.9–12.9)
POTASSIUM SERPL-SCNC: 3.6 MMOL/L (ref 3.5–5.1)
RBC # BLD AUTO: 2.73 M/UL (ref 4.1–5.7)
SODIUM SERPL-SCNC: 138 MMOL/L (ref 136–145)
SOURCE, COVRS: NORMAL
SPECIMEN SOURCE, FCOV2M: NORMAL
SPECIMEN TYPE, XMCV1T: NORMAL
WBC # BLD AUTO: 6.5 K/UL (ref 4.1–11.1)

## 2020-09-16 PROCEDURE — 74011250636 HC RX REV CODE- 250/636: Performed by: FAMILY MEDICINE

## 2020-09-16 PROCEDURE — 74011250637 HC RX REV CODE- 250/637: Performed by: HOSPITALIST

## 2020-09-16 PROCEDURE — 80048 BASIC METABOLIC PNL TOTAL CA: CPT

## 2020-09-16 PROCEDURE — 74011000250 HC RX REV CODE- 250: Performed by: FAMILY MEDICINE

## 2020-09-16 PROCEDURE — 74011250637 HC RX REV CODE- 250/637: Performed by: PSYCHIATRY & NEUROLOGY

## 2020-09-16 PROCEDURE — 97116 GAIT TRAINING THERAPY: CPT

## 2020-09-16 PROCEDURE — 99232 SBSQ HOSP IP/OBS MODERATE 35: CPT | Performed by: NURSE PRACTITIONER

## 2020-09-16 PROCEDURE — 85576 BLOOD PLATELET AGGREGATION: CPT

## 2020-09-16 PROCEDURE — 85025 COMPLETE CBC W/AUTO DIFF WBC: CPT

## 2020-09-16 PROCEDURE — 65660000000 HC RM CCU STEPDOWN

## 2020-09-16 PROCEDURE — 74011250637 HC RX REV CODE- 250/637: Performed by: PHYSICIAN ASSISTANT

## 2020-09-16 PROCEDURE — C9113 INJ PANTOPRAZOLE SODIUM, VIA: HCPCS | Performed by: FAMILY MEDICINE

## 2020-09-16 PROCEDURE — 36415 COLL VENOUS BLD VENIPUNCTURE: CPT

## 2020-09-16 PROCEDURE — 97530 THERAPEUTIC ACTIVITIES: CPT

## 2020-09-16 RX ADMIN — PRAMIPEXOLE DIHYDROCHLORIDE 0.12 MG: 0.25 TABLET ORAL at 22:21

## 2020-09-16 RX ADMIN — CLOPIDOGREL BISULFATE 75 MG: 75 TABLET ORAL at 09:27

## 2020-09-16 RX ADMIN — Medication 10 ML: at 02:07

## 2020-09-16 RX ADMIN — ASPIRIN 81 MG CHEWABLE TABLET 81 MG: 81 TABLET CHEWABLE at 09:27

## 2020-09-16 RX ADMIN — METOPROLOL TARTRATE 25 MG: 25 TABLET, FILM COATED ORAL at 09:27

## 2020-09-16 RX ADMIN — Medication 10 ML: at 21:47

## 2020-09-16 RX ADMIN — ATORVASTATIN CALCIUM 40 MG: 40 TABLET, FILM COATED ORAL at 21:43

## 2020-09-16 RX ADMIN — Medication 10 ML: at 06:28

## 2020-09-16 RX ADMIN — Medication 10 ML: at 13:02

## 2020-09-16 RX ADMIN — SODIUM CHLORIDE 40 MG: 9 INJECTION INTRAMUSCULAR; INTRAVENOUS; SUBCUTANEOUS at 21:43

## 2020-09-16 RX ADMIN — METOPROLOL TARTRATE 25 MG: 25 TABLET, FILM COATED ORAL at 21:46

## 2020-09-16 RX ADMIN — SODIUM CHLORIDE 40 MG: 9 INJECTION INTRAMUSCULAR; INTRAVENOUS; SUBCUTANEOUS at 09:28

## 2020-09-16 NOTE — PROGRESS NOTES
[facility] Adult  Hospitalist Group                                                                                          Hospitalist Progress Note  Michelle Krishna MD  Answering service: [phone] from in house phone        Date of Service:  2020  NAME:  Carlito Anaya  :  1950  MRN:  [id number]      Admission Summary:   Carlito Anaya is a 69 y.o. male who presents with fatigue and dehydration     Patient is dysarthric and is a very poor historian, not much history could be obtained from the patient. I tried [facility] the [facility], as listed on the chart but did not get a response. Thus history was extremely limited, patient apparently lives in assisted living facility by himself, was noted to be fatigued and dehydrated, has not been eating drinking much, has not been taking his medication, and thus was sent to the hospital for further management and evaluation. Apparently patient has left-sided weakness after his stroke currently has some slurred speech, unclear whether this is new or old. No further history can be obtained from the patient. Patient came to the ER, was found to have a hemoglobin of 5.2 and was requested to be admitted to the hospitalist service.     Interval history / Subjective:     F/u anemia   Seen and examined this morning. LYing in bed comfortably,on room air. He has no complaints. We discussed he is ready for discharge anytime pending acceptance to rehab. Assessment & Plan:     Acute blood loss anemia  -unclear etiology  -S/p 2 units of prbc on . Hb stable >7. Closely monitor now he is on DAPT. Probable GI bleed  -FOBT negative  -EGD showed esophageal stricture with overlying ulcer   -Tolerating GI light diet. .      NSTEMI:  -troponins elevated. Echocardiogram with left ventricle ejection fraction of 45-50%. Wall motion was not assessed due to poor image quality.   Cardiology consulted, does not feel further cardiac work-up is needed.        Elevated creatinine, creatinine improving. Most augustin CKD stage III  -Monitor    Metabolic acidosis, non anion gap resolved with bicarbonate drip. Discontinue bicarb drip and monitor       Acute metabolic encephalopathy. Resolved  -CT head 9/7 Asymmetric white matter disease and lacunar infarcts. No acute process  identified by noncontrast CT  -MRI brain showed acute left hemisphere cortical infarction primarily involving frontal and  parietal lobes but with small acute cortical infarct in the posterior inferior  occipital lobe. See above. 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 He is admitted for severe anemia and onset of the stroke is unknown, but this test was planned couple days ago and was done today after his SARS-CoV-2 test result was negative as such patient is not a candidate for TPA. History obtained  -Echocardiogram with LVEF of 45-50%. -CT angiogram of the head and neck showed bilateral carotid stenosis mild to moderate on the right and severe on the left. -NIS and vascular surgery consulted. - Increase Lipitor to 40 mg daily. Permissive hypertension the critical LICA stenosis. -Patient is aspirin none responsive. Vascular recommended dual antiplatelet. Plavix added 9/15 after extensive discussion with patient, GI and neurology    Left ICA,symptomatic  -aspirin and Lipitor  -vascular surgery recommended intervention in few weeks after reahb          SARS-CoV-2 not detected  -Taken off droplet plus isolation.     Large left inguinal hernia  -noted on CT abd  -Outpatient follow up    Scrotal edema  -Appreciate Urology, elevates scrotum    Bilateral pleural effusion reported CT angiogram done for the head neck. -LVEF 45 to 50%. -CXR on 9/11 Bilateral pleural effusions with associated passive atelectasis and/or  consolidation. Pulmonary vascular congestion  -He is not clinically fluid overloaded so we will hold off diuretics specially since he got IV. Right cheek skin lesion. Patient said he was told it was cancer,no treatment per pt. He needs to continue out patient.       Difficult IV access and blood draw: R IJ Triple lumen in place. Code status: FULL CODE  DVT prophylaxis: scd    Plan: Follow Neurology, Cardiology, GI  Next of kin: Daphne Gray. Phone [phone]. Care Plan discussed with: Patient/Family  Anticipated Disposition: Needs rehab  Anticipated Discharge: Whenever accepted. Hospital Problems  Date Reviewed: 9/8/2020          Codes Class Noted POA    * (Principal) NSTEMI (non-ST elevated myocardial infarction) [facility]) ICD-10-CM: I21.4  ICD-9-CM: 410.70  9/7/2020 Yes                Review of Systems:   A comprehensive review of systems was negative except for that written in the HPI. Vital Signs:    Last 24hrs VS reviewed since prior progress note. Most recent are:  Visit Vitals  /82 (BP 1 Location: Left arm, BP Patient Position: At rest)   Pulse 72   Temp 98.2 °F (36.8 °C)   Resp 21   Ht 5' 9\" (1.753 m)   Wt 73.7 kg (162 lb 6.4 oz)   SpO2 95%   BMI 23.98 kg/m²         Intake/Output Summary (Last 24 hours) at 9/16/2020 0857  Last data filed at 9/15/2020 0900  Gross per 24 hour   Intake 240 ml   Output    Net 240 ml        Physical Examination:             Constitutional:  No acute distress, cooperative, pleasant    HEENT:  Raised lesion on the right cheek with crusted surface. Oral mucosa moist, oropharynx benign. Right IJ TLC    Resp:  CTA bilaterally. No wheezing/rhonchi/rales. No accessory muscle use   CV:  Regular rhythm, normal rate, no murmurs, gallops, rubs    GI:  Soft, non distended, non tender.  normoactive bowel sounds, no hepatosplenomegaly Musculoskeletal:  No edema, warm, 2+ pulses throughout    Neurologic:  Alert and oriented. RUE 3/4. RLE 4+/5            Data Review:    Review and/or order of clinical lab test      Labs:     Recent Labs     09/16/20  0406 09/15/20  0512   WBC 6.5 6.5   HGB 7.6* 7.4*   HCT 24.3* 24.0*    369     Recent Labs     09/16/20  0406 09/15/20  0512 09/14/20  0415    140 139   K 3.6 3.6 3.6   * 111* 110*   CO2 23 23 24   BUN 20 22* 22*   CREA 1.36* 1.30 1.19   GLU 98 92 105*   CA 8.0* 7.8* 7.7*     No results for input(s): ALT, AP, TBIL, TBILI, TP, ALB, GLOB, GGT, AML, LPSE in the last 72 hours. No lab exists for component: SGOT, GPT, AMYP, HLPSE  No results for input(s): INR, PTP, APTT, INREXT, INREXT in the last 72 hours. No results for input(s): FE, TIBC, PSAT, FERR in the last 72 hours. No results found for: FOL, RBCF   No results for input(s): PH, PCO2, PO2 in the last 72 hours. No results for input(s): CPK, CKNDX, TROIQ in the last 72 hours.     No lab exists for component: CPKMB  Lab Results   Component Value Date/Time    Cholesterol, total 108 09/12/2020 12:35 PM    HDL Cholesterol 38 09/12/2020 12:35 PM    LDL, calculated 43.6 09/12/2020 12:35 PM    Triglyceride 132 09/12/2020 12:35 PM    CHOL/HDL Ratio 2.8 09/12/2020 12:35 PM     Lab Results   Component Value Date/Time    Glucose (POC) 197 (H) 09/11/2020 08:04 PM    Glucose (POC) 146 (H) 09/11/2020 11:30 AM    Glucose (POC) 123 (H) 09/11/2020 06:49 AM    Glucose (POC) 111 (H) 09/10/2020 11:07 PM    Glucose (POC) 111 (H) 09/10/2020 05:14 PM     No results found for: COLOR, APPRN, SPGRU, REFSG, YESY, PROTU, GLUCU, KETU, BILU, UROU, PRADEEP, LEUKU, GLUKE, EPSU, BACTU, WBCU, RBCU, CASTS, UCRY      Medications Reviewed:     Current Facility-Administered Medications   Medication Dose Route Frequency    clopidogreL (PLAVIX) tablet 75 mg  75 mg Oral DAILY    atorvastatin (LIPITOR) tablet 40 mg  40 mg Oral QHS    sodium chloride (NS) flush 5-40 mL 5-40 mL IntraVENous Q8H    sodium chloride (NS) flush 5-40 mL  5-40 mL IntraVENous PRN    aspirin chewable tablet 81 mg  81 mg Oral DAILY    metoprolol tartrate (LOPRESSOR) tablet 25 mg  25 mg Oral Q12H    pramipexole (MIRAPEX) tablet 0.125 mg  0.125 mg Oral QHS    0.9% sodium chloride infusion 250 mL  250 mL IntraVENous PRN    sodium chloride (NS) flush 5-40 mL  5-40 mL IntraVENous Q8H    sodium chloride (NS) flush 5-40 mL  5-40 mL IntraVENous PRN    ondansetron (ZOFRAN) injection 4 mg  4 mg IntraVENous Q4H PRN    pantoprazole (PROTONIX) 40 mg in 0.9% sodium chloride 10 mL injection  40 mg IntraVENous Q12H     ______________________________________________________________________  EXPECTED LENGTH OF STAY: 4d 9h  ACTUAL LENGTH OF STAY:          9                 Bakari Ortiz MD

## 2020-09-16 NOTE — PROGRESS NOTES
Bedside and Verbal shift change report given to Zach Darby RN (oncoming nurse) by Blanka Pérez RN (offgoing nurse). Report included the following information SBAR, Kardex, Intake/Output, MAR, Recent Results, Med Rec Status, Cardiac Rhythm SR and Dual Neuro Assessment.

## 2020-09-16 NOTE — PROGRESS NOTES
Problem: Mobility Impaired (Adult and Pediatric)  Goal: *Acute Goals and Plan of Care (Insert Text)  Description:   FUNCTIONAL STATUS PRIOR TO ADMISSION: Patient was modified independent using a single point cane for functional mobility. HOME SUPPORT PRIOR TO ADMISSION: The patient lived alone with no local support. Physical Therapy Goals  Initiated 9/12/2020  1. Patient will move from supine to sit and sit to supine  and scoot up and down in bed with supervision/set-up within 7 day(s). 2.  Patient will transfer from bed to chair and chair to bed with minimal assistance/contact guard assist using the least restrictive device within 7 day(s). 3.  Patient will perform sit to stand with minimal assistance/contact guard assist within 7 day(s). 4.  Patient will ambulate with minimal assistance/contact guard assist for 50 feet with the least restrictive device within 7 day(s). 5.  Patient will improve Rivera Balance score by 7 points within 7 days. Outcome: Progressing Towards Goal   PHYSICAL THERAPY TREATMENT  Patient: Fallon Stevenson (46 y.o. male)  Date: 9/16/2020  Diagnosis: NSTEMI (non-ST elevated myocardial infarction) ([facility] [address] [I21.4]   NSTEMI (non-ST elevated myocardial infarction) ([facility] [address]  Procedure(s) (LRB):  INFUSION CATHETER INSERTION (N/A) 4 Days Post-Op  Precautions: Aspiration, Bed Alarm, Fall  Chart, physical therapy assessment, plan of care and goals were reviewed. ASSESSMENT  Patient continues with skilled PT services and is progressing towards goals. Patient continues with general weakness requiring assist for all mobility. Although no overt knee buckling was noted with ambulation, he had near episodes but was able to correct and prevent buckling. Patient able to stand x 4 today with 2 min to mod assists. He continued to need verbal cues with each attempt to push to stand vs pull. He was able to stand 1-2 minutes using a walker for support while his brief was changed.  He ambulated 4 feet to chair with walker and 2 min assists and after seated rest was able to ambulate 5 feet forwards then backwards with 2 min to mod assists with gait deviations outlined below. Current Level of Function Impacting Discharge (mobility/balance): 2 person assist with transfers and ambulation    Other factors to consider for discharge:          PLAN :  Patient continues to benefit from skilled intervention to address the above impairments. Continue treatment per established plan of care. to address goals. Recommendation for discharge: (in order for the patient to meet his/her long term goals)  Therapy 3 hours per day 5-7 days per week    This discharge recommendation:  A follow-up discussion with the attending provider and/or case management is planned    IF patient discharges home will need the following DME: will continue to assess       SUBJECTIVE:   Patient stated Bharat Cancel will try.     OBJECTIVE DATA SUMMARY:   Critical Behavior:  Neurologic State: Alert, Eyes open spontaneously  Orientation Level: Oriented X4  Cognition: Follows commands  Safety/Judgement: Awareness of environment, Decreased awareness of need for assistance, Decreased awareness of need for safety, Decreased insight into deficits  Functional Mobility Training:  Bed Mobility:   Supine to sit with min assist   Transfers:   Sit to Stand with 2 min to mod assists. Stood x 4   Stand to Sit with 2 min assists. Poor eccentric control            Balance:  Sitting: Good  Standing: Poor     Ambulation/Gait Training:   Patient able to ambulate 4 feet to transfers to chair then another 5 feet forwards/backwards. He demonstrates flexed knees( almost buckling), decreased foot clearance, step to gait and narrow base of support.  He needed 2 mod assists for ambulation     Therapeutic Exercises:   Standing: Mini squats using walker for support min assist, foot forward and back with min assist using walker for support all x 10 reps  Sitting: LAQ x 10 reps  Pain Rating:  No complaint    Activity Tolerance:   Good. HR 89 /81 post activity  Please refer to the flowsheet for vital signs taken during this treatment. After treatment patient left in no apparent distress:   Sitting in chair, Call bell within reach, and Bed / chair alarm activated    COMMUNICATION/COLLABORATION:   The patients plan of care was discussed with: Registered nurse.      Terrence Kulkarni, PT   Time Calculation: 24 mins

## 2020-09-16 NOTE — PROGRESS NOTES
Neurology Progress Note    Patient ID:  Jason Harley  [id number]  69 y.o.  1950    Chief Complaint: Stroke    Subjective:     76-year-old gentleman presented 9/7 with c/o of fatigue & dehydration, found to have elevated troponin 8.3 and severe anemia with hemoglobin of 5.2. The patient reportedly lives alone at and an assisted living facility. He has reportedly not been eating and drinking much over the last couple of days and has not been taking his medications. He is reported to have chronic left-sided weakness and dysarthria after a prior stroke. Neurology consulted for concern for new new right-sided weakness. MRI showed acute left hemisphere cortical infarction primarily involving frontal and parietal lobes but with small acute cortical infarct in the posterior inferior occipital lobe and evidence of previous old small vessel ischemic injury left greater than right. He was not on aspirin prior to admission. CTA H&N showed bilateral carotid disease, mild to moderate on the right and severe on the left. NIS consulted for severe L ICA stenosis. Felt to be a better candidate for CEA. Dr Tianna Miller of vascular consulted and given completed CVA, will wait about 2 weeks prior to performing left CEA. Interval 9/16:   Started on Plavix 9/15. P2Y12 shows good therapeutic response. Continue ASA 81 per vascular surgery's preference. Hgb grossly stable since 9/9. Exam stable. Pt feels his strength is improving. It has improved on exam since admission .     Objective:       ROS:  Per HPI  All other 12 pt ROS negative    Meds:  Current Facility-Administered Medications   Medication Dose Route Frequency    clopidogreL (PLAVIX) tablet 75 mg  75 mg Oral DAILY    atorvastatin (LIPITOR) tablet 40 mg  40 mg Oral QHS    sodium chloride (NS) flush 5-40 mL  5-40 mL IntraVENous Q8H    sodium chloride (NS) flush 5-40 mL  5-40 mL IntraVENous PRN    aspirin chewable tablet 81 mg  81 mg Oral DAILY    metoprolol tartrate (LOPRESSOR) tablet 25 mg  25 mg Oral Q12H    pramipexole (MIRAPEX) tablet 0.125 mg  0.125 mg Oral QHS    0.9% sodium chloride infusion 250 mL  250 mL IntraVENous PRN    sodium chloride (NS) flush 5-40 mL  5-40 mL IntraVENous Q8H    sodium chloride (NS) flush 5-40 mL  5-40 mL IntraVENous PRN    ondansetron (ZOFRAN) injection 4 mg  4 mg IntraVENous Q4H PRN    pantoprazole (PROTONIX) 40 mg in 0.9% sodium chloride 10 mL injection  40 mg IntraVENous Q12H       MRI Results (maximum last 3): Results from [place] encounter on 09/07/20   MRI BRAIN WO CONT    Narrative *PRELIMINARY REPORT*  Focal diffusion restriction in the left frontal, parietal, and occipital lobes,  compatible with acute infarcts. No evidence of intracranial hemorrhage or mass. Periventricular white matter disease, compatible with chronic small vessels can  be a. Preliminary report was provided by Dr. Mandi Cortes, the on-call radiologist, at 3:44  AM.  Final report to follow. *END PRELIMINARY REPORT*    *FINAL REPORT BELOW*  EXAM:  MRI BRAIN WO CONT  INDICATION:  cva, patient with prior history of stroke presented to the ED with  acute onset of fatigue and dehydration. Brought by EMS from assisted living  facility. Slurred speech. TECHNIQUE: Sagittal T1, axial FLAIR, T2,T1 and gradient echo images as well as  coronal T2 weighted images and axial diffusion weighted images of the head were  obtained. COMPARISON:  CT head same day. FINDINGS:  Generalized prominence of the ventricles and sulci consistent with volume loss  greater than expected for age. Abnormal restricted diffusion involving the left posterior lateral frontal  cortex and left parietal lobe. Small focus in the left posterior inferior  occipital lobe. Majority of the infarction is in the left middle cerebral artery  territory with focus in the left occipital lobe in the posterior circulation.   This suggests the possibility of an embolic source in the cardiac or aortic in  origin. Chronic encephalomalacia left basal ganglia and corona radiata and to lesser  extent anteriorly around both heads of the caudate nucleus and a few foci in the  right thalamus and basal ganglia consistent with old small vessel lacunar  infarcts. No evidence of acute intracranial hemorrhage, mass or abnormal extra-axial fluid  collections. Flow voids are present in vertebral basilar and carotid artery systems. Mild  chronic findings of cervical spondylosis otherwise craniocervical junction is  unremarkable. Structures of the skull base including paranasal sinuses and  temporal bones are unremarkable. Impression IMPRESSION:  1. Acute left hemisphere cortical infarction primarily involving frontal and  parietal lobes but with small acute cortical infarct in the posterior inferior  occipital lobe. See above. 2. Evidence of previous old small vessel ischemic injury left greater than  right. Lab Review   Recent Results (from the past 24 hour(s))   SARS-COV-2    Collection Time: 09/15/20  4:15 PM   Result Value Ref Range    Specimen source Nasopharyngeal      Specimen source Nasopharyngeal      Specimen type NP Swab      Health status Symptomatic Testing      COVID-19 PENDING    CBC WITH AUTOMATED DIFF    Collection Time: 09/16/20  4:06 AM   Result Value Ref Range    WBC 6.5 4.1 - 11.1 K/uL    RBC 2.73 (L) 4.10 - 5.70 M/uL    HGB 7.6 (L) 12.1 - 17.0 g/dL    HCT 24.3 (L) 36.6 - 50.3 %    MCV 89.0 80.0 - 99.0 FL    MCH 27.8 26.0 - 34.0 PG    MCHC 31.3 30.0 - 36.5 g/dL    RDW 15.6 (H) 11.5 - 14.5 %    PLATELET 952 930 - 468 K/uL    MPV 11.4 8.9 - 12.9 FL    NRBC 0.0 0  WBC    ABSOLUTE NRBC 0.00 0.00 - 0.01 K/uL    NEUTROPHILS 69 32 - 75 %    LYMPHOCYTES 17 12 - 49 %    MONOCYTES 9 5 - 13 %    EOSINOPHILS 3 0 - 7 %    BASOPHILS 1 0 - 1 %    IMMATURE GRANULOCYTES 1 (H) 0.0 - 0.5 %    ABS. NEUTROPHILS 4.5 1.8 - 8.0 K/UL    ABS. LYMPHOCYTES 1.1 0.8 - 3.5 K/UL    ABS.  MONOCYTES 0.6 0.0 - 1.0 K/UL ABS. EOSINOPHILS 0.2 0.0 - 0.4 K/UL    ABS. BASOPHILS 0.1 0.0 - 0.1 K/UL    ABS. IMM. GRANS. 0.0 0.00 - 0.04 K/UL    DF AUTOMATED     METABOLIC PANEL, BASIC    Collection Time: 09/16/20  4:06 AM   Result Value Ref Range    Sodium 138 136 - 145 mmol/L    Potassium 3.6 3.5 - 5.1 mmol/L    Chloride 109 (H) 97 - 108 mmol/L    CO2 23 21 - 32 mmol/L    Anion gap 6 5 - 15 mmol/L    Glucose 98 65 - 100 mg/dL    BUN 20 6 - 20 MG/DL    Creatinine 1.36 (H) 0.70 - 1.30 MG/DL    BUN/Creatinine ratio 15 12 - 20      GFR est AA >60 >60 ml/min/1.73m2    GFR est non-AA 52 (L) >60 ml/min/1.73m2    Calcium 8.0 (L) 8.5 - 10.1 MG/DL       Additional comments:I reviewed the patient's new clinical lab test results. and I reviewed the patients new imaging test results. Patient Vitals for the past 8 hrs:   BP Temp Pulse Resp SpO2   09/16/20 0952 138/71 98.1 °F (36.7 °C) 87 17 95 %   09/16/20 0927 138/71  85     09/16/20 0600 134/82 98.2 °F (36.8 °C) 72 21        No intake/output data recorded. 09/14 1901 - 09/16 0700  In: 240 [P.O.:240]  Out: -     Exam:  Visit Vitals  /71 (BP 1 Location: Left arm, BP Patient Position: At rest;Sitting)   Pulse 87   Temp 98.1 °F (36.7 °C)   Resp 17   Ht 5' 9\" (1.753 m)   Wt 73.7 kg (162 lb 6.4 oz)   SpO2 95%   BMI 23.98 kg/m²     Gen: Well developed  CV: RRR  Lungs: non labored breathing  Abd: soft, non distended  Neuro: A&O x 3, mild dysarthria but no clear aphasia. He can repeat and name. Comprehension intact and can follow commands.   CN II-XII: PERRL, EOMI, right facial droop, tongue/palate midline  Motor: Right hemiparesis right lower extremity approximately 4 to 4+/5 in right upper extremity approximately 4/5 in R LE  sensory: intact to LT  COOR: no limb/truncal ataxia  Gait: Deferred    PROBLEM LIST:     Patient Active Problem List   Diagnosis Code    NSTEMI (non-ST elevated myocardial infarction) ([facility] [address] I21.4       Assessment/Plan:    69year old male admitted with NSTEMI, severe anemia, acute on chronic L cerebral watershed infarcts with critical stenosis of the L ICA for which vascular surgery is now following. Placed on ASA and Plavix 9/11 due to additional infarcts noted on imaging. Discussed risks/benefits with RICKI CASTILLO [facility]) and given his rather severe acute bleed with unidentified source of hemorrhage, we agreed ASA monotherapy. However found to be ASA non-responder. Agree with vascular re Plavix, kamilah given ASA non-responder. Vascular tentatively will perform L CEA in about 2 weeks.     - Plavix initiated with good therapeutic response  - L CEA by vascular surgery in about 2 weeks  - Cont Lipitor to 40 mg/daily.    - BP goal liberal, 662-159 given critical LICA stenosis.   - PT/OT/ST evaluations.  (C/o difficulty with ambulation)  - Stroke education performed  - Can discharge from neurological standpoint    Signed:  Santana Soriano NP  9/16/2020 9:58 AM

## 2020-09-16 NOTE — PROGRESS NOTES
TRANSITION OF CARE  RUR--15%  Disposition--To inpatient rehab. Referred to [facility] IR with acceptance pending. Will not need covid test.  Transport--BLS stretcher with [facility]. Note: per hospitalist, hospitalist spoke with patient's brother Flori Hammond ([phone]) who is now identified as the primary family contact. Note: when CM spoke with patent today, he was aware that Decision Maker/ Alea Lopez is very ill at [address] [place] [address] noted that PT and OT have recommended inpatient rehab. CM spoke with the hospitalist who also agreed with the recommendation for inpatient rehab. CM met with patient who agreed with the recommendation. CM offered choice of providers. Patient selected [facility] and signed Freedom of Choice form which CM placed on chart. CM sent referral via [employer] to [facility], and response was received that review of referral was started.

## 2020-09-16 NOTE — WOUND CARE
WOCN Note:  
  
Follow up assessment of Right cheek, right knee and scrotum. Assessed in room 662. PPE: face shield, mask and gloves. 
  
Chart reviewed. Admitted DX:  NSTEMI  
  
Assessment:  
Patient is A&O x 3, communicative and requires assist of 2 with repositioning. Bed: foam mattress Patient wearing briefs for incontinence Patient reports no pain. Heels intact without erythema. Sacrum and buttocks intact without erythema.  
  
1. POA Right cheek, dry lesion: 1.5 x 1 x 0.1 cm. Patient reports that his PCP suspects skin cancer. Left open to air. 2.  POA Right knee, dry abrasion:   2.5 x 2 x 0.1 cm. 
3.  POA scrotum: intact with swelling. 
  
Wound, Pressure Prevention & Skin Care Recommendations: 1. Minimize layers of linen/pads under patient to optimize support surface. 2.  Turn/reposition approximately every 2 hours and offload heels. 3.  Manage moisture/ Keep skin folds clean and dry/minimize use of briefs when able. 4.  Right knee:  Daily cleanse and apply vaseline. 5.  Scrotum:  TID and as needed cleanse and apply Zinc cream. 
  
Transition of Care: Plan to follow as needed while admitted to hospital. 
  
LADA Lau RN [facility] Inpatient Wound Care Available on [employer] Pager [phone] Office [phone]

## 2020-09-17 VITALS
BODY MASS INDEX: 24.23 KG/M2 | HEART RATE: 81 BPM | DIASTOLIC BLOOD PRESSURE: 65 MMHG | WEIGHT: 163.58 LBS | SYSTOLIC BLOOD PRESSURE: 145 MMHG | OXYGEN SATURATION: 98 % | RESPIRATION RATE: 17 BRPM | HEIGHT: 69 IN | TEMPERATURE: 98.8 F

## 2020-09-17 LAB
ANION GAP SERPL CALC-SCNC: 7 MMOL/L (ref 5–15)
BASOPHILS # BLD: 0.1 K/UL (ref 0–0.1)
BASOPHILS NFR BLD: 1 % (ref 0–1)
BUN SERPL-MCNC: 18 MG/DL (ref 6–20)
BUN/CREAT SERPL: 13 (ref 12–20)
CALCIUM SERPL-MCNC: 8 MG/DL (ref 8.5–10.1)
CHLORIDE SERPL-SCNC: 109 MMOL/L (ref 97–108)
CO2 SERPL-SCNC: 24 MMOL/L (ref 21–32)
CREAT SERPL-MCNC: 1.36 MG/DL (ref 0.7–1.3)
DIFFERENTIAL METHOD BLD: ABNORMAL
EOSINOPHIL # BLD: 0.2 K/UL (ref 0–0.4)
EOSINOPHIL NFR BLD: 3 % (ref 0–7)
ERYTHROCYTE [DISTWIDTH] IN BLOOD BY AUTOMATED COUNT: 15.6 % (ref 11.5–14.5)
GLUCOSE SERPL-MCNC: 92 MG/DL (ref 65–100)
HCT VFR BLD AUTO: 23.3 % (ref 36.6–50.3)
HGB BLD-MCNC: 7.2 G/DL (ref 12.1–17)
IMM GRANULOCYTES # BLD AUTO: 0 K/UL (ref 0–0.04)
IMM GRANULOCYTES NFR BLD AUTO: 1 % (ref 0–0.5)
LYMPHOCYTES # BLD: 1.2 K/UL (ref 0.8–3.5)
LYMPHOCYTES NFR BLD: 18 % (ref 12–49)
MCH RBC QN AUTO: 27.7 PG (ref 26–34)
MCHC RBC AUTO-ENTMCNC: 30.9 G/DL (ref 30–36.5)
MCV RBC AUTO: 89.6 FL (ref 80–99)
MONOCYTES # BLD: 0.6 K/UL (ref 0–1)
MONOCYTES NFR BLD: 9 % (ref 5–13)
NEUTS SEG # BLD: 4.6 K/UL (ref 1.8–8)
NEUTS SEG NFR BLD: 68 % (ref 32–75)
NRBC # BLD: 0 K/UL (ref 0–0.01)
NRBC BLD-RTO: 0 PER 100 WBC
PLATELET # BLD AUTO: 395 K/UL (ref 150–400)
PMV BLD AUTO: 11.8 FL (ref 8.9–12.9)
POTASSIUM SERPL-SCNC: 3.3 MMOL/L (ref 3.5–5.1)
RBC # BLD AUTO: 2.6 M/UL (ref 4.1–5.7)
SODIUM SERPL-SCNC: 140 MMOL/L (ref 136–145)
WBC # BLD AUTO: 6.6 K/UL (ref 4.1–11.1)

## 2020-09-17 PROCEDURE — 74011250636 HC RX REV CODE- 250/636: Performed by: FAMILY MEDICINE

## 2020-09-17 PROCEDURE — 80048 BASIC METABOLIC PNL TOTAL CA: CPT

## 2020-09-17 PROCEDURE — 74011250637 HC RX REV CODE- 250/637: Performed by: HOSPITALIST

## 2020-09-17 PROCEDURE — 97535 SELF CARE MNGMENT TRAINING: CPT

## 2020-09-17 PROCEDURE — 85025 COMPLETE CBC W/AUTO DIFF WBC: CPT

## 2020-09-17 PROCEDURE — C9113 INJ PANTOPRAZOLE SODIUM, VIA: HCPCS | Performed by: FAMILY MEDICINE

## 2020-09-17 PROCEDURE — 36415 COLL VENOUS BLD VENIPUNCTURE: CPT

## 2020-09-17 PROCEDURE — 74011250637 HC RX REV CODE- 250/637: Performed by: PHYSICIAN ASSISTANT

## 2020-09-17 PROCEDURE — 97530 THERAPEUTIC ACTIVITIES: CPT

## 2020-09-17 PROCEDURE — 97110 THERAPEUTIC EXERCISES: CPT

## 2020-09-17 RX ORDER — GUAIFENESIN 100 MG/5ML
81 LIQUID (ML) ORAL DAILY
Qty: 90 TAB | Refills: 0 | Status: SHIPPED
Start: 2020-09-18

## 2020-09-17 RX ORDER — ATORVASTATIN CALCIUM 40 MG/1
40 TABLET, FILM COATED ORAL
Qty: 90 TAB | Refills: 0 | Status: SHIPPED
Start: 2020-09-17

## 2020-09-17 RX ORDER — METOPROLOL TARTRATE 25 MG/1
25 TABLET, FILM COATED ORAL EVERY 12 HOURS
Qty: 90 TAB | Refills: 0 | Status: SHIPPED | OUTPATIENT
Start: 2020-09-17 | End: 2021-02-18

## 2020-09-17 RX ORDER — PANTOPRAZOLE SODIUM 40 MG/1
40 TABLET, DELAYED RELEASE ORAL 2 TIMES DAILY
Qty: 90 TAB | Refills: 0 | Status: SHIPPED
Start: 2020-09-17

## 2020-09-17 RX ORDER — CLOPIDOGREL BISULFATE 75 MG/1
75 TABLET ORAL DAILY
Qty: 90 TAB | Refills: 0 | Status: SHIPPED
Start: 2020-09-18

## 2020-09-17 RX ORDER — LANOLIN ALCOHOL/MO/W.PET/CERES
325 CREAM (GRAM) TOPICAL
Qty: 90 TAB | Refills: 0 | Status: SHIPPED
Start: 2020-09-17

## 2020-09-17 RX ADMIN — METOPROLOL TARTRATE 25 MG: 25 TABLET, FILM COATED ORAL at 08:23

## 2020-09-17 RX ADMIN — SODIUM CHLORIDE 40 MG: 9 INJECTION INTRAMUSCULAR; INTRAVENOUS; SUBCUTANEOUS at 08:23

## 2020-09-17 RX ADMIN — Medication 10 ML: at 06:52

## 2020-09-17 RX ADMIN — Medication 10 ML: at 13:42

## 2020-09-17 RX ADMIN — CLOPIDOGREL BISULFATE 75 MG: 75 TABLET ORAL at 08:23

## 2020-09-17 RX ADMIN — ASPIRIN 81 MG CHEWABLE TABLET 81 MG: 81 TABLET CHEWABLE at 08:23

## 2020-09-17 NOTE — PROGRESS NOTES
Bedside and Verbal shift change report given to [address], RN (oncoming nurse) by Aditi St RN (offgoing nurse). Report included the following information SBAR, Kardex, Recent Results, Cardiac Rhythm NSR and Quality Measures.

## 2020-09-17 NOTE — DISCHARGE INSTRUCTIONS
Patient Education   Learning About Coronavirus [phone])  Coronavirus [phone]): Overview  What is coronavirus (MUILP-93)? The coronavirus disease (COVID-19) is caused by a virus. It is an illness that was first found in [place], [place], in December 2019. It has since spread worldwide. The virus can cause fever, cough, and trouble breathing. In severe cases, it can cause pneumonia and make it hard to breathe without help. It can cause death. Coronaviruses are a large group of viruses. They cause the common cold. They also cause more serious illnesses like Middle East respiratory syndrome (MERS) and severe acute respiratory syndrome (SARS). COVID-19 is caused by a novel coronavirus. That means it's a new type that has not been seen in people before. This virus spreads person-to-person through droplets from coughing and sneezing. It can also spread when you are close to someone who is infected. And it can spread when you touch something that has the virus on it, such as a doorknob or a tabletop. What can you do to protect yourself from coronavirus (COVID-19)? The best way to protect yourself from getting sick is to:  · Avoid areas where there is an outbreak. · Avoid contact with people who may be infected. · Wash your hands often with soap or alcohol-based hand sanitizers. · Avoid crowds and try to stay at least 6 feet away from other people. · Wash your hands often, especially after you cough or sneeze. Use soap and water, and scrub for at least 20 seconds. If soap and water aren't available, use an alcohol-based hand . · Avoid touching your mouth, nose, and eyes. What can you do to avoid spreading the virus to others? To help avoid spreading the virus to others:  · Cover your mouth with a tissue when you cough or sneeze. Then throw the tissue in the trash. · Use a disinfectant to clean things that you touch often. · Stay home if you are sick or have been exposed to the virus.  Don't go to school, work, or public areas. And don't use public transportation. · If you are sick:  ? Leave your home only if you need to get medical care. But call the doctor's office first so they know you're coming. And wear a face mask, if you have one.  ? If you have a face mask, wear it whenever you're around other people. It can help stop the spread of the virus when you cough or sneeze. ? Clean and disinfect your home every day. Use household  and disinfectant wipes or sprays. Take special care to clean things that you grab with your hands. These include doorknobs, remote controls, phones, and handles on your refrigerator and microwave. And don't forget countertops, tabletops, bathrooms, and computer keyboards. When to call for help  Call 911 anytime you think you may need emergency care. For example, call if:  · You have severe trouble breathing. (You can't talk at all.)  · You have constant chest pain or pressure. · You are severely dizzy or lightheaded. · You are confused or can't think clearly. · Your face and lips have a blue color. · You pass out (lose consciousness) or are very hard to wake up. Call your doctor now if you develop symptoms such as:  · Shortness of breath. · Fever. · Cough. If you need to get care, call ahead to the doctor's office for instructions before you go. Make sure you wear a face mask, if you have one, to prevent exposing other people to the virus. Where can you get the latest information? The following health organizations are tracking and studying this virus. Their websites contain the most up-to-date information. Chuy Mcgrath also learn what to do if you think you may have been exposed to the virus. · U.S. Centers for Disease Control and Prevention (CDC): The CDC provides updated news about the disease and travel advice. The website also tells you how to prevent the spread of infection.  www.cdc.gov  · World Health Organization [facility]): WHO offers information about the virus outbreaks. WHO also has travel advice. www.who.int  Current as of: April 1, 2020               Content Version: 12.4  © 2006-2020 Healthwise[employer] Incorporated. Care instructions adapted under license by your healthcare professional. If you have questions about a medical condition or this instruction, always ask your healthcare professional. [address] any warranty or liability for your use of this information. Discharge Instructions       PATIENT ID: Sherrie Medina  MRN: [id number]   YOB: 1950    DATE OF ADMISSION: 9/7/2020 12:44 PM    DATE OF DISCHARGE: 9/17/2020    PRIMARY CARE PROVIDER: Valentino Christen, MD     ATTENDING PHYSICIAN: Eugenia Patino MD  DISCHARGING PROVIDER: Jairo Riggs MD    To contact this individual call [phone] and ask the  to page. If unavailable ask to be transferred the Adult Hospitalist Department. DISCHARGE DIAGNOSES   Stroke and anemia     CONSULTATIONS: IP CONSULT TO CARDIOLOGY  IP CONSULT TO CARDIOLOGY  IP CONSULT TO CARDIOLOGY  IP CONSULT TO NEUROLOGY  IP CONSULT TO GASTROENTEROLOGY  IP CONSULT TO UROLOGY  IP CONSULT TO NEUROINTERVENTIONAL SURGERY  IP CONSULT TO VASCULAR SURGERY    PROCEDURES/SURGERIES: Procedure(s):   INFUSION CATHETER INSERTION    PENDING TEST RESULTS:   At the time of discharge the following test results are still pending:     FOLLOW UP APPOINTMENTS:   Follow-up Information     Follow up With Specialties Details Why Contact Info    Other, MD Ryan    Patient can only remember the practice name and not the physician      Grady Rodriguez MD Vascular Surgery In 2 weeks for CEA  [address] [facility]  [phone]             ADDITIONAL CARE RECOMMENDATIONS:   Take medications as prescribed   Please follow up with vascular surgery in 2 weeks for procedure /surgery     DIET: Regular Diet  Oral Nutritional Supplements:   ACTIVITY: Activity as tolerated    WOUND CARE:     EQUIPMENT needed:       DISCHARGE MEDICATIONS:   See Medication Reconciliation Form    · It is important that you take the medication exactly as they are prescribed. · Keep your medication in the bottles provided by the pharmacist and keep a list of the medication names, dosages, and times to be taken in your wallet. · Do not take other medications without consulting your doctor. NOTIFY YOUR PHYSICIAN FOR ANY OF THE FOLLOWING:   Fever over 101 degrees for 24 hours. Chest pain, shortness of breath, fever, chills, nausea, vomiting, diarrhea, change in mentation, falling, weakness, bleeding. Severe pain or pain not relieved by medications. Or, any other signs or symptoms that you may have questions about.       DISPOSITION:  xx  Home With:   OT  PT  HH  RN       SNF/Inpatient Rehab/LTAC    Independent/assisted living    Hospice    Other:     CDMP Checked:   Yes x     PROBLEM LIST Updated:  Yes x       Signed:   Natalie Crook MD  9/17/2020  12:38 PM

## 2020-09-17 NOTE — PROGRESS NOTES
SANDRA   -RUR 15%  -D/C to [facility] today  -Covid negative   -[facility] will transport at 4PM    CM spoke with [facility]. Confirmed patient can come today. Accepting MD is Dr. Maine Adame. Nurse to call report to [phone]. D/C envelope to go with patient. D/C clinical sent via [employer]. Ambulance form is on the chart.  [place] completed 1234    CM will follow     GWENDOLYN Sutherland/CRM

## 2020-09-17 NOTE — PROGRESS NOTES
Problem: Falls - Risk of  Goal: *Absence of Falls  Description: Document Ayesha Faria Fall Risk and appropriate interventions in the flowsheet. 9/17/2020 1151 by Danita Cintron RN  Outcome: Progressing Towards Goal  Note: Fall Risk Interventions:  Mobility Interventions: PT Consult for mobility concerns, Patient to call before getting OOB    Mentation Interventions: Adequate sleep, hydration, pain control, Door open when patient unattended    Medication Interventions: Patient to call before getting OOB, Teach patient to arise slowly    Elimination Interventions: Call light in reach, Toileting schedule/hourly rounds    History of Falls Interventions: Door open when patient unattended, Consult care management for discharge planning      9/17/2020 1150 by Danita Cintron RN  Outcome: Progressing Towards Goal  Note: Fall Risk Interventions:  Mobility Interventions: PT Consult for mobility concerns, Patient to call before getting OOB    Mentation Interventions: Adequate sleep, hydration, pain control, Door open when patient unattended    Medication Interventions: Patient to call before getting OOB, Teach patient to arise slowly    Elimination Interventions: Call light in reach, Toileting schedule/hourly rounds    History of Falls Interventions: Door open when patient unattended, Consult care management for discharge planning         Problem: Pressure Injury - Risk of  Goal: *Prevention of pressure injury  Description: Document Judson Scale and appropriate interventions in the flowsheet.   9/17/2020 1151 by Danita Cintron RN  Outcome: Progressing Towards Goal  Note: Pressure Injury Interventions:  Sensory Interventions: Assess changes in LOC, Float heels, Keep linens dry and wrinkle-free, Maintain/enhance activity level, Minimize linen layers, Monitor skin under medical devices, Pad between skin to skin, Pressure redistribution bed/mattress (bed type)    Moisture Interventions: Absorbent underpads, Apply protective barrier, creams and emollients, Limit adult briefs, Maintain skin hydration (lotion/cream), Minimize layers    Activity Interventions: Pressure redistribution bed/mattress(bed type)    Mobility Interventions: HOB 30 degrees or less, Pressure redistribution bed/mattress (bed type), Float heels, PT/OT evaluation    Nutrition Interventions: Document food/fluid/supplement intake    Friction and Shear Interventions: Apply protective barrier, creams and emollients, HOB 30 degrees or less, Lift sheet, Minimize layers             9/17/2020 1150 by Madhuri Bazan RN  Outcome: Progressing Towards Goal  Note: Pressure Injury Interventions:  Sensory Interventions: Assess changes in LOC, Float heels, Keep linens dry and wrinkle-free, Maintain/enhance activity level, Minimize linen layers, Monitor skin under medical devices, Pad between skin to skin, Pressure redistribution bed/mattress (bed type)    Moisture Interventions: Absorbent underpads, Apply protective barrier, creams and emollients, Limit adult briefs, Maintain skin hydration (lotion/cream), Minimize layers    Activity Interventions: Pressure redistribution bed/mattress(bed type)    Mobility Interventions: HOB 30 degrees or less, Pressure redistribution bed/mattress (bed type), Float heels, PT/OT evaluation    Nutrition Interventions: Document food/fluid/supplement intake    Friction and Shear Interventions: Apply protective barrier, creams and emollients, HOB 30 degrees or less, Lift sheet, Minimize layers                Problem: Anemia Care Plan (Adult and Pediatric)  Goal: *Labs within defined limits  Outcome: Progressing Towards Goal  Goal: *Tolerates increased activity  Outcome: Progressing Towards Goal     Problem: Unstable Angina/NSTEMI: Discharge Outcomes  Goal: *Hemodynamically stable  Outcome: Progressing Towards Goal  Goal: *Stable cardiac rhythm  Outcome: Progressing Towards Goal  Goal: *Lungs clear or at baseline  Outcome: Progressing Towards Goal  Goal: *Optimal pain control at patient's stated goal  Outcome: Progressing Towards Goal  Goal: *Identifies cardiac risk factors  Outcome: Progressing Towards Goal  Goal: *Verbalizes home exercise program, activity guidelines, cardiac precautions  Outcome: Progressing Towards Goal  Goal: *Verbalizes understanding and describes prescribed diet  Outcome: Progressing Towards Goal  Goal: *Verbalizes name, dosage, time, side effects, and number of days to continue medications  Outcome: Progressing Towards Goal  Goal: *Anxiety reduced or absent  Outcome: Progressing Towards Goal  Goal: *Understands and describes signs and symptoms to report to providers(Stroke Metric)  Outcome: Progressing Towards Goal  Goal: *Describes follow-up/return visits to physicians  Outcome: Progressing Towards Goal  Goal: *Describes available resources and support systems  Outcome: Progressing Towards Goal     Problem: Ischemic Stroke: Discharge Outcomes  Goal: *Verbalizes anxiety and depression are reduced or absent  Outcome: Progressing Towards Goal  Goal: *Verbalize understanding of risk factor modification(Stroke Metric)  Outcome: Progressing Towards Goal  Goal: *Hemodynamically stable  Outcome: Progressing Towards Goal  Goal: *Absence of aspiration pneumonia  Outcome: Progressing Towards Goal  Goal: *Aware of needed dietary changes  Outcome: Progressing Towards Goal  Goal: *Verbalize understanding of prescribed medications including anti-coagulants, anti-lipid, and/or anti-platelets(Stroke Metric)  Outcome: Progressing Towards Goal  Goal: *Tolerating diet  Outcome: Progressing Towards Goal  Goal: *Aware of follow-up diagnostics related to anticoagulants  Outcome: Progressing Towards Goal  Goal: *Ability to perform ADLs and demonstrates progressive mobility and function  Outcome: Progressing Towards Goal  Goal: *Absence of DVT(Stroke Metric)  Outcome: Progressing Towards Goal  Goal: *Absence of aspiration  Outcome: Progressing Towards Goal  Goal: *Optimal pain control at patient's stated goal  Outcome: Progressing Towards Goal  Goal: *Home safety concerns addressed  Outcome: Progressing Towards Goal  Goal: *Describes available resources and support systems  Outcome: Progressing Towards Goal  Goal: *Verbalizes understanding of activation of EMS(911) for stroke symptoms(Stroke Metric)  Outcome: Progressing Towards Goal  Goal: *Understands and describes signs and symptoms to report to providers(Stroke Metric)  Outcome: Progressing Towards Goal  Goal: *Neurolgocially stable (absence of additional neurological deficits)  Outcome: Progressing Towards Goal  Goal: *Verbalizes importance of follow-up with primary care physician(Stroke Metric)  Outcome: Progressing Towards Goal     Problem: TIA/CVA Stroke: Day 2 Until Discharge  Goal: Activity/Safety  Outcome: Progressing Towards Goal  Goal: Diagnostic Test/Procedures  Outcome: Progressing Towards Goal  Goal: Nutrition/Diet  Outcome: Progressing Towards Goal  Goal: Discharge Planning  Outcome: Progressing Towards Goal  Goal: Medications  Outcome: Progressing Towards Goal  Goal: Respiratory  Outcome: Progressing Towards Goal  Goal: Treatments/Interventions/Procedures  Outcome: Progressing Towards Goal  Goal: Psychosocial  Outcome: Progressing Towards Goal  Goal: *Verbalizes anxiety and depression are reduced or absent  Outcome: Progressing Towards Goal  Goal: *Absence of aspiration  Outcome: Progressing Towards Goal  Goal: *Absence of deep venous thrombosis signs and symptoms(Stroke Metric)  Outcome: Progressing Towards Goal  Goal: *Optimal pain control at patient's stated goal  Outcome: Progressing Towards Goal  Goal: *Tolerating diet  Outcome: Progressing Towards Goal  Goal: *Ability to perform ADLs and demonstrates progressive mobility and function  Outcome: Progressing Towards Goal  Goal: *Stroke education continued(Stroke Metric)  Outcome: Progressing Towards Goal

## 2020-09-17 NOTE — PROGRESS NOTES
Bedside RN performed patient education and medication education. Discharge concerns initiated and discussed with patient, including clarification on \"who\" assists the patient at their home and instructions for when the home going patient should call their provider after discharge. Opportunity for questions and clarification was provided. Patient receptive to education: YES  Patient stated: verbal w/ teachback  Barriers to Education: none  Diagnosis Education given:  YES    Length of stay: 10  Expected Day of Discharge: 10  Ask if they have \"Help at Home\" & add to white board?   YES    Education Day #: 10    Medication Education Given:  YES  M in the box Medication name: aspirin    Pt aware of HCAHPS survey: YES            Stroke Education documented in Patient Education: YES  Core Measures Documented in [facility]:  Risk Factors: YES  Warning signs of stroke: YES  When to Activate 911: YES  Medication Education for Risk Factors: YES  Smoking cessation if applicable: NO  Written Education Given:  YES    Discharge NIH Completed: YES  Score: 2    BRAINS: YES    ALL D/C INFO GIVEN TO ESTHER Christine [facility]

## 2020-09-17 NOTE — DISCHARGE SUMMARY
Discharge Summary       PATIENT ID: Cassandra Gamez  MRN: [id number]   YOB: 1950    DATE OF ADMISSION: 9/7/2020 12:44 PM    DATE OF DISCHARGE: 9/17/2020   PRIMARY CARE PROVIDER: Amber Purcell MD     ATTENDING PHYSICIAN: Jessica Robin   DISCHARGING PROVIDER: Jr Hanson MD    To contact this individual call [phone] and ask the  to page. If unavailable ask to be transferred the Adult Hospitalist Department. CONSULTATIONS: IP CONSULT TO CARDIOLOGY  IP CONSULT TO CARDIOLOGY  IP CONSULT TO CARDIOLOGY  IP CONSULT TO NEUROLOGY  IP CONSULT TO GASTROENTEROLOGY  IP CONSULT TO UROLOGY  IP CONSULT TO NEUROINTERVENTIONAL SURGERY  IP CONSULT TO VASCULAR SURGERY    PROCEDURES/SURGERIES: Procedure(s): INFUSION CATHETER INSERTION    ADMITTING [address] [place] [address] COURSE:     Patient is dysarthric and is a very poor historian, not much history could be obtained from the patient. Thus history was extremely limited, patient apparently lives in assisted living facility by himself, was noted to be fatigued and dehydrated, has not been eating drinking much, has not been taking his medication, and thus was sent to the hospital for further management and evaluation. Apparently patient has left-sided weakness after his stroke currently has some slurred speech, unclear whether this is new or old. No further history can be obtained from the patient. Patient came to the ER, was found to have a hemoglobin of 5.2 and was requested to be admitted to the hospitalist service. hOSPITAL COURSE         Acute blood loss anemia  -unclear etiology  -S/p 2 units of prbc on 9/7. Hb stable >7. Closely monitor now he is on DAPT.          Probable GI bleed  -FOBT negative  -EGD showed esophageal stricture with overlying ulcer   -Tolerating GI light diet. .        NSTEMI:  -troponins elevated. Echocardiogram with left ventricle ejection fraction of 45-50%. Wall motion was not assessed due to poor image quality.   Cardiology consulted, does not feel further cardiac work-up is needed.        Elevated creatinine, creatinine improving. Most gillianley CKD stage III  -Monitor     Metabolic acidosis, non anion gap resolved with bicarbonate drip. Discontinue bicarb drip and monitor        Acute metabolic encephalopathy. Resolved  -CT head 9/7 Asymmetric white matter disease and lacunar infarcts. No acute process  identified by noncontrast CT  -MRI brain showed acute left hemisphere cortical infarction primarily involving frontal and  parietal lobes but with small acute cortical infarct in the posterior inferior  occipital lobe. See above.     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 He is admitted for severe anemia and onset of the stroke is unknown, but this test was planned couple days ago and was done today after his SARS-CoV-2 test result was negative as such patient is not a candidate for TPA. History obtained  -Echocardiogram with LVEF of 45-50%. -CT angiogram of the head and neck showed bilateral carotid stenosis mild to moderate on the right and severe on the left. -NIS and vascular surgery consulted. - Increase Lipitor to 40 mg daily. Permissive hypertension the critical LICA stenosis. -Patient is aspirin none responsive. Vascular recommended dual antiplatelet.   Plavix added 9/15 after extensive discussion with patient, GI and neurology by Dr June Cuellar     Left ICA,symptomatic  -aspirin and Lipitor  -vascular surgery recommended intervention in 2  weeks after reahb              SARS-CoV-2 not detected  -Taken off droplet plus isolation.     Large left inguinal hernia  -noted on CT abd  -Outpatient follow up     Scrotal edema  -Appreciate Urology, elevates scrotum     Bilateral pleural effusion reported CT angiogram done for the head neck. -LVEF 45 to 50%. -CXR on 9/11 Bilateral pleural effusions with associated passive atelectasis and/or  consolidation. Pulmonary vascular congestion  -He is not clinically fluid overloaded so we will hold off diuretics specially since he got IV.     Right cheek skin lesion  . Patient said he was told it was cancer,no treatment per pt. He needs to continue out patient.       DISCHARGE DIAGNOSES / PLAN:      41-year-old male with multiple medical problems presents to the hospital with a stroke and anemia. MRI showed a possibility of embolic stroke. And also showed that he had a ICA stenosis. He was seen by vascular surgery for left ICA stenosis and needs to follow-up with them for CEA in 2 weeks. Multiple discussions with various subspecialties were done regarding the use of dual antiplatelet. After the aspirin test the decision was made to discharge him on both aspirin and Plavix. Plavix test can be done outpatient in a week. He underwent EGD and was noted to have an ulcer for which he needs to continue taking Protonix  His hemoglobin is ranges between 7 and 8 at the time of discharge and would benefit with oral iron supplementation.   He also probably had non-STEMI during this hospitalization but did not require any intervention per cardiology     ADDITIONAL CARE RECOMMENDATIONS:      Follow-up with vascular surgery in 2 weeks for CEA  PENDING TEST RESULTS:   At the time of discharge the following test results are still pending:     FOLLOW UP APPOINTMENTS:    Follow-up Information     Follow up With Specialties Details Why Contact Info    Other, MD Ryan    Patient can only remember the practice name and not the physician      Knute Simmonds., MD Vascular Surgery In 2 weeks for CEA  [id number]  [address] [facility] [address]  [phone]             DIET: Regular Diet  Oral Nutritional Supplements    ACTIVITY: Activity as tolerated    WOUND CARE:     EQUIPMENT needed:       DISCHARGE MEDICATIONS:  Current Discharge Medication List      START taking these medications    Details   aspirin 81 mg chewable tablet Take 1 Tab by mouth daily. Qty: 90 Tab, Refills: 0      clopidogreL (PLAVIX) 75 mg tab Take 1 Tab by mouth daily. Qty: 90 Tab, Refills: 0      metoprolol tartrate (LOPRESSOR) 25 mg tablet Take 1 Tab by mouth every twelve (12) hours. Qty: 90 Tab, Refills: 0      pantoprazole (Protonix) 40 mg tablet Take 1 Tab by mouth two (2) times a day. Qty: 90 Tab, Refills: 0      ferrous sulfate (Iron) 325 mg (65 mg iron) tablet Take 1 Tab by mouth Daily (before breakfast). Qty: 90 Tab, Refills: 0         CONTINUE these medications which have CHANGED    Details   atorvastatin (LIPITOR) 40 mg tablet Take 1 Tab by mouth nightly. Qty: 90 Tab, Refills: 0         CONTINUE these medications which have NOT CHANGED    Details   pramipexole (MIRAPEX) 0.125 mg tablet          STOP taking these medications       labetalol (NORMODYNE) 200 mg tablet Comments:   Reason for Stopping:         irbesartan (AVAPRO) 150 mg tablet Comments:   Reason for Stopping:         raNITIdine (ZANTAC) 150 mg tablet Comments:   Reason for Stopping:                 NOTIFY YOUR PHYSICIAN FOR ANY OF THE FOLLOWING:   Fever over 101 degrees for 24 hours. Chest pain, shortness of breath, fever, chills, nausea, vomiting, diarrhea, change in mentation, falling, weakness, bleeding. Severe pain or pain not relieved by medications. Or, any other signs or symptoms that you may have questions about.     DISPOSITION:    Home With:   OT  PT  HH  RN      X Long term SNF/Inpatient Rehab    Independent/assisted living    Hospice    Other:       PATIENT CONDITION AT DISCHARGE:     Functional status    Poor    X Deconditioned     Independent Cognition   X  Lucid     Forgetful     Dementia      Catheters/lines (plus indication)    Castaneda     PICC     PEG    X None      Code status    X Full code     DNR      PHYSICAL EXAMINATION AT DISCHARGE:  General:          Alert, cooperative, no distress, appears stated age. HEENT:           Atraumatic, anicteric sclerae, pink conjunctivae                          No oral ulcers, mucosa moist, throat clear, dentition fair  Neck:               Supple, symmetrical  Lungs:             Clear to auscultation bilaterally. No Wheezing or Rhonchi. No rales. Chest wall:      No tenderness  No Accessory muscle use. Heart:              Regular  rhythm,  No  murmur   No edema  Abdomen:        Soft, non-tender. Not distended. Bowel sounds normal  Extremities:     No cyanosis. No clubbing,                            Skin turgor normal, Capillary refill normal  Skin:                Not pale. Not Jaundiced  No rashes   Psych:             Not anxious or agitated.   Neurologic:      Alert, moves all extremities, answers questions appropriately and responds to commands       CHRONIC MEDICAL DIAGNOSES:  Problem List as of 9/17/2020 Date Reviewed: 9/8/2020          Codes Class Noted - Resolved    * (Principal) NSTEMI (non-ST elevated myocardial infarction) [facility]) ICD-10-CM: I21.4  ICD-9-CM: 410.70  9/7/2020 - Present              Greater than 30  minutes were spent with the patient on counseling and coordination of care    Signed:   Do Shetty MD  9/17/2020  12:19 PM

## 2020-09-17 NOTE — PROGRESS NOTES
Problem: Self Care Deficits Care Plan (Adult)  Goal: *Acute Goals and Plan of Care (Insert Text)  Description:   FUNCTIONAL STATUS PRIOR TO ADMISSION: Patient was modified independent using a single point cane for functional mobility. Patient was modified independent increased time for basic and total A instrumental ADLs. HOME SUPPORT: The patient lived alone with assisted living staff to provide assistance. Occupational Therapy Goals  Initiated 9/13/2020  1. Patient will perform 2/5 grooming tasks with minimal assistance/contact guard assist within 7 day(s). 2.  Patient will perform opening and closing 4/4 ADL containers with minimal assistance/contact guard assist within 7 day(s). 3.  Patient will perform upper body dressing with moderate assistance  within 7 day(s). 4.  Patient will perform BSC drop arm toilet transfers with moderate assistance  within 7 day(s). 5.  Patient will perform all aspects of toileting with maximal assistance within 7 day(s). 6.  Patient will participate in upper extremity therapeutic exercise/activities with self ROM R UE minimal assistance/contact guard assist within 7 day(s). Outcome: Progressing Towards Goal     OCCUPATIONAL THERAPY TREATMENT  Patient: Kiki Patel (9 y.o. male)  Date: 9/17/2020  Diagnosis: NSTEMI (non-ST elevated myocardial infarction) ([facility] [address] [I21.4]   NSTEMI (non-ST elevated myocardial infarction) ([facility] [address]  Procedure(s) (LRB):  INFUSION CATHETER INSERTION (N/A) 5 Days Post-Op  Precautions: Aspiration, Bed Alarm, Fall  Chart, occupational therapy assessment, plan of care, and goals were reviewed. ASSESSMENT  Patient continues with skilled OT services and is progressing towards goals however remains limited by R side weakness, impaired cognition, balance, problem solving, & coordination, and decreased activity tolerance & independence with ADLs/functional mobility.   Pt received on bed pan with RN in room, pt requiring total A with bowel hygiene, Madeline for rolling in bed. Pt requiring Min A x 2 for bed<>chair transfers today, completed UE exercises in chair (described below). Pt with better command following & attention to R side today, initiating use of RUE without cues during LB dressing seated EOB. Returned pt to modified chair position in bed, requiring Min A for bed mobility, able to bridge and scoot when cued. Pt will benefit from IPR upon d/c (has been accepted already to [facility]). Current Level of Function Impacting Discharge (ADLs): Min A x 2 for transfers, poor activity tolerance    Other factors to consider for discharge: far below Mod I baseline, decreased safety awareness d/t impaired cognition         PLAN :  Patient continues to benefit from skilled intervention to address the above impairments. Continue treatment per established plan of care. to address goals. Recommend with staff: OOB to chair TID for meals with assist x 2, bedpan, participation in ADLs    Recommend next OT session: RUE neuro re-ed, standing ADLs to improve activity tolerance    Recommendation for discharge: (in order for the patient to meet his/her long term goals)  Therapy 3 hours per day 5-7 days per week    This discharge recommendation:  Has been made in collaboration with the attending provider and/or case management    IF patient discharges home will need the following DME: TBD       SUBJECTIVE:   Patient stated Fredo Finn that's fine.   when asked if he wanted modified chair position in bed    OBJECTIVE DATA SUMMARY:   Cognitive/Behavioral Status:  Neurologic State: Alert  Orientation Level: Oriented X4  Cognition: Follows commands; Impaired decision making;Poor safety awareness  Perception: Appears intact  Perseveration: No perseveration noted  Safety/Judgement: Awareness of environment; Insight into deficits    Functional Mobility and Transfers for ADLs:  Bed Mobility:  Rolling:  Moderate assistance  Supine to Sit: Minimum assistance;Assist x2  Sit to Supine: Minimum assistance;Assist x2(Simultaneous filing. User may not have seen previous data.)  Scooting: Minimum assistance(Simultaneous filing. User may not have seen previous data.)    Transfers:  Sit to Stand: Minimum assistance;Assist x2(Simultaneous filing. User may not have seen previous data.)     Bed to Chair: Moderate assistance;Assist x2    Balance:  Sitting: Impaired(Simultaneous filing. User may not have seen previous data.)  Sitting - Static: Fair (occasional)(Simultaneous filing. User may not have seen previous data.)  Sitting - Dynamic: Poor (constant support)(Simultaneous filing. User may not have seen previous data.)  Standing: Impaired; With support(HHA x 2 Simultaneous filing. User may not have seen previous data.)  Standing - Static: Poor;Constant support(x 2 Simultaneous filing. User may not have seen previous data.)  Standing - Dynamic : Poor;Constant support(x 2 Simultaneous filing. User may not have seen previous data.)    ADL Intervention:      Cognitive Retraining  Safety/Judgement: Awareness of environment; Insight into deficits      Therapeutic Exercises:   Pt performed BUE exercises seated in chair, requiring multimodal cues to initiate use of LUE to assist R. Pt with better ROM and command following today, able to complete 5 reps of shoulder flexion/abduction, elbow, wrist, & digital flexion/extension. Pain:  No c/o pain    Activity Tolerance:   Fair and observed SOB with activity, RR up to 38 with activity but recovered quickly  Please refer to the flowsheet for vital signs taken during this treatment. After treatment patient left in no apparent distress:   Call bell within reach, Bed / chair alarm activated, Side rails x 3, and bed in modified chair position    COMMUNICATION/COLLABORATION:   The patients plan of care was discussed with: Physical therapist.     Patient was educated regarding His deficit(s) of R side weakness as this relates to His diagnosis of CVA.   He demonstrated Fair understanding as evidenced by eye contact. Kia Roth, [facility]  Time Calculation: 18 mins     Regarding student involvement in patient care:  A student participated in this treatment session. Per CMS Medicare statements and AOTA guidelines I certify that the following was true:  1. I was present and directly observed the entire session. 2. I made all skilled judgments and clinical decisions regarding care. 3. I am the practitioner responsible for assessment, treatment, and documentation.

## 2020-09-17 NOTE — PROGRESS NOTES
Problem: Falls - Risk of  Goal: *Absence of Falls  Description: Document Elvie Keith Fall Risk and appropriate interventions in the flowsheet. 9/17/2020 1157 by Sheron Cowart RN  Outcome: Resolved/Met  9/17/2020 1151 by Sheron Cowart RN  Outcome: Progressing Towards Goal  Note: Fall Risk Interventions:  Mobility Interventions: PT Consult for mobility concerns, Patient to call before getting OOB    Mentation Interventions: Adequate sleep, hydration, pain control, Door open when patient unattended    Medication Interventions: Patient to call before getting OOB, Teach patient to arise slowly    Elimination Interventions: Call light in reach, Toileting schedule/hourly rounds    History of Falls Interventions: Door open when patient unattended, Consult care management for discharge planning      9/17/2020 1150 by Sheron Cowart RN  Outcome: Progressing Towards Goal  Note: Fall Risk Interventions:  Mobility Interventions: PT Consult for mobility concerns, Patient to call before getting OOB    Mentation Interventions: Adequate sleep, hydration, pain control, Door open when patient unattended    Medication Interventions: Patient to call before getting OOB, Teach patient to arise slowly    Elimination Interventions: Call light in reach, Toileting schedule/hourly rounds    History of Falls Interventions: Door open when patient unattended, Consult care management for discharge planning         Problem: Pressure Injury - Risk of  Goal: *Prevention of pressure injury  Description: Document Judson Scale and appropriate interventions in the flowsheet.   9/17/2020 1157 by Sheron Cowart RN  Outcome: Resolved/Met  9/17/2020 1151 by Sheron Cowart RN  Outcome: Progressing Towards Goal  Note: Pressure Injury Interventions:  Sensory Interventions: Assess changes in LOC, Float heels, Keep linens dry and wrinkle-free, Maintain/enhance activity level, Minimize linen layers, Monitor skin under medical devices, Pad between skin to skin, Pressure redistribution bed/mattress (bed type)    Moisture Interventions: Absorbent underpads, Apply protective barrier, creams and emollients, Limit adult briefs, Maintain skin hydration (lotion/cream), Minimize layers    Activity Interventions: Pressure redistribution bed/mattress(bed type)    Mobility Interventions: HOB 30 degrees or less, Pressure redistribution bed/mattress (bed type), Float heels, PT/OT evaluation    Nutrition Interventions: Document food/fluid/supplement intake    Friction and Shear Interventions: Apply protective barrier, creams and emollients, HOB 30 degrees or less, Lift sheet, Minimize layers             9/17/2020 1150 by Jr Lopez RN  Outcome: Progressing Towards Goal  Note: Pressure Injury Interventions:  Sensory Interventions: Assess changes in LOC, Float heels, Keep linens dry and wrinkle-free, Maintain/enhance activity level, Minimize linen layers, Monitor skin under medical devices, Pad between skin to skin, Pressure redistribution bed/mattress (bed type)    Moisture Interventions: Absorbent underpads, Apply protective barrier, creams and emollients, Limit adult briefs, Maintain skin hydration (lotion/cream), Minimize layers    Activity Interventions: Pressure redistribution bed/mattress(bed type)    Mobility Interventions: HOB 30 degrees or less, Pressure redistribution bed/mattress (bed type), Float heels, PT/OT evaluation    Nutrition Interventions: Document food/fluid/supplement intake    Friction and Shear Interventions: Apply protective barrier, creams and emollients, HOB 30 degrees or less, Lift sheet, Minimize layers                Problem: Patient Education:  Go to Education Activity  Goal: Patient/Family Education  Outcome: Resolved/Met     Problem: General Medical Care Plan  Goal: *Vital signs within specified parameters  Outcome: Resolved/Met  Goal: *Labs within defined limits  Outcome: Resolved/Met  Goal: *Absence of infection signs and symptoms  Outcome: Resolved/Met  Goal: *Optimal pain control at patient's stated goal  Outcome: Resolved/Met  Goal: *Skin integrity maintained  Outcome: Resolved/Met  Goal: *Fluid volume balance  Outcome: Resolved/Met  Goal: *Optimize nutritional status  Outcome: Resolved/Met  Goal: *Anxiety reduced or absent  Outcome: Resolved/Met  Goal: *Progressive mobility and function (eg: ADL's)  Outcome: Resolved/Met     Problem: Anemia Care Plan (Adult and Pediatric)  Goal: *Labs within defined limits  9/17/2020 1157 by Javier Arevalo RN  Outcome: Resolved/Met  9/17/2020 1151 by Javier Arevalo RN  Outcome: Progressing Towards Goal  Goal: *Tolerates increased activity  9/17/2020 1157 by Javier Arevalo RN  Outcome: Resolved/Met  9/17/2020 1151 by Javier Arevalo RN  Outcome: Progressing Towards Goal     Problem: Unstable Angina/NSTEMI: Discharge Outcomes  Goal: *Hemodynamically stable  9/17/2020 1157 by Javier Arevalo RN  Outcome: Resolved/Met  9/17/2020 1150 by Javier Arevalo RN  Outcome: Progressing Towards Goal  Goal: *Stable cardiac rhythm  9/17/2020 1157 by Javier Arevalo RN  Outcome: Resolved/Met  9/17/2020 1150 by Javier Arevalo RN  Outcome: Progressing Towards Goal  Goal: *Lungs clear or at baseline  9/17/2020 1157 by Javier Arevalo RN  Outcome: Resolved/Met  9/17/2020 1150 by Javier Arevalo RN  Outcome: Progressing Towards Goal  Goal: *Optimal pain control at patient's stated goal  9/17/2020 1157 by Javier Arevalo RN  Outcome: Resolved/Met  9/17/2020 1150 by Javier Arevalo RN  Outcome: Progressing Towards Goal  Goal: *Identifies cardiac risk factors  9/17/2020 1157 by Javier Arevalo RN  Outcome: Resolved/Met  9/17/2020 1150 by Javier Arevalo RN  Outcome: Progressing Towards Goal  Goal: *Verbalizes home exercise program, activity guidelines, cardiac precautions  9/17/2020 1157 by Javier Arevalo RN  Outcome: Resolved/Met  9/17/2020 1150 by Javier Arevalo RN  Outcome: Progressing Towards Goal  Goal: *Verbalizes understanding and describes prescribed diet  9/17/2020 1157 by Janie Mann RN  Outcome: Resolved/Met  9/17/2020 1150 by Janie Mann RN  Outcome: Progressing Towards Goal  Goal: *Verbalizes name, dosage, time, side effects, and number of days to continue medications  9/17/2020 1157 by Janie Mann RN  Outcome: Resolved/Met  9/17/2020 1150 by Janie Mann RN  Outcome: Progressing Towards Goal  Goal: *Anxiety reduced or absent  9/17/2020 1157 by Janie Mann RN  Outcome: Resolved/Met  9/17/2020 1150 by Janie Mann RN  Outcome: Progressing Towards Goal  Goal: *Understands and describes signs and symptoms to report to providers(Stroke Metric)  9/17/2020 1157 by Janie Mann RN  Outcome: Resolved/Met  9/17/2020 1150 by Janie Mann RN  Outcome: Progressing Towards Goal  Goal: *Describes follow-up/return visits to physicians  9/17/2020 1157 by Janie Mann RN  Outcome: Resolved/Met  9/17/2020 1150 by Janie Mann RN  Outcome: Progressing Towards Goal  Goal: *Describes available resources and support systems  9/17/2020 1157 by Janie Mann RN  Outcome: Resolved/Met  9/17/2020 1150 by Janie Mann RN  Outcome: Progressing Towards Goal  Goal: *Influenza immunization  Outcome: Resolved/Met  Goal: *Pneumococcal immunization  Outcome: Resolved/Met  Goal: *Describes smoking cessation resources  Outcome: Resolved/Met     Problem: Ischemic Stroke: Discharge Outcomes  Goal: *Verbalizes anxiety and depression are reduced or absent  9/17/2020 1157 by Janie Mann RN  Outcome: Resolved/Met  9/17/2020 1151 by Janie Mann RN  Outcome: Progressing Towards Goal  Goal: *Verbalize understanding of risk factor modification(Stroke Metric)  9/17/2020 1157 by Janie Mann RN  Outcome: Resolved/Met  9/17/2020 1151 by Janie Mann RN  Outcome: Progressing Towards Goal  Goal: *Hemodynamically stable  9/17/2020 1157 by Bernard Foreman RN  Outcome: Resolved/Met  9/17/2020 1151 by Bernard Foreman RN  Outcome: Progressing Towards Goal  Goal: *Absence of aspiration pneumonia  9/17/2020 1157 by Bernard Foreman RN  Outcome: Resolved/Met  9/17/2020 1151 by Bernard Foreman RN  Outcome: Progressing Towards Goal  Goal: *Aware of needed dietary changes  9/17/2020 1157 by Bernard Foreman RN  Outcome: Resolved/Met  9/17/2020 1151 by Bernard Foreman RN  Outcome: Progressing Towards Goal  Goal: *Verbalize understanding of prescribed medications including anti-coagulants, anti-lipid, and/or anti-platelets(Stroke Metric)  9/17/2020 1157 by Bernard Foreman RN  Outcome: Resolved/Met  9/17/2020 1151 by Bernard Foreman RN  Outcome: Progressing Towards Goal  Goal: *Tolerating diet  9/17/2020 1157 by Bernard Foreman RN  Outcome: Resolved/Met  9/17/2020 1151 by Bernard Foreman RN  Outcome: Progressing Towards Goal  Goal: *Aware of follow-up diagnostics related to anticoagulants  9/17/2020 1157 by Bernard Foreman RN  Outcome: Resolved/Met  9/17/2020 1151 by Bernard Foreman RN  Outcome: Progressing Towards Goal  Goal: *Ability to perform ADLs and demonstrates progressive mobility and function  9/17/2020 1157 by Bernard Foreman RN  Outcome: Resolved/Met  9/17/2020 1151 by Bernard Foreman RN  Outcome: Progressing Towards Goal  Goal: *Absence of DVT(Stroke Metric)  9/17/2020 1157 by Bernard Foreman RN  Outcome: Resolved/Met  9/17/2020 1151 by Bernard Foreman RN  Outcome: Progressing Towards Goal  Goal: *Absence of aspiration  9/17/2020 1157 by Bernard Foreman RN  Outcome: Resolved/Met  9/17/2020 1151 by Bernard Foreman RN  Outcome: Progressing Towards Goal  Goal: *Optimal pain control at patient's stated goal  9/17/2020 1157 by Bernard Foreman RN  Outcome: Resolved/Met  9/17/2020 1151 by Bernard Foreman RN  Outcome: Progressing Towards Goal  Goal: *Home safety concerns addressed  9/17/2020 1157 by Bernard Foreman RN  Outcome: Resolved/Met  9/17/2020 1151 by Evelyn Reyes RN  Outcome: Progressing Towards Goal  Goal: *Describes available resources and support systems  9/17/2020 1157 by Evelyn Reyes RN  Outcome: Resolved/Met  9/17/2020 1151 by Evelyn Reyes RN  Outcome: Progressing Towards Goal  Goal: *Verbalizes understanding of activation of EMS(911) for stroke symptoms(Stroke Metric)  9/17/2020 1157 by Evelyn Reyes RN  Outcome: Resolved/Met  9/17/2020 1151 by Evelyn Reyes RN  Outcome: Progressing Towards Goal  Goal: *Understands and describes signs and symptoms to report to providers(Stroke Metric)  9/17/2020 1157 by Evelyn Reyes RN  Outcome: Resolved/Met  9/17/2020 1151 by Evelyn Reyes RN  Outcome: Progressing Towards Goal  Goal: *Neurolgocially stable (absence of additional neurological deficits)  9/17/2020 1157 by Evelyn Reyes RN  Outcome: Resolved/Met  9/17/2020 1151 by Evelyn Reyes RN  Outcome: Progressing Towards Goal  Goal: *Verbalizes importance of follow-up with primary care physician(Stroke Metric)  9/17/2020 1157 by Evelyn Reyes RN  Outcome: Resolved/Met  9/17/2020 1151 by Evelyn Reyes RN  Outcome: Progressing Towards Goal  Goal: *Smoking cessation discussed,if applicable(Stroke Metric)  Outcome: Resolved/Met  Goal: *Depression screening completed(Stroke Metric)  Outcome: Resolved/Met     Problem: TIA/CVA Stroke: Day 2 Until Discharge  Goal: Off Pathway (Use only if patient is Off Pathway)  Outcome: Resolved/Met  Goal: Activity/Safety  9/17/2020 1157 by Evelyn Reyes RN  Outcome: Resolved/Met  9/17/2020 1151 by Evelyn Reyes RN  Outcome: Progressing Towards Goal  Goal: Diagnostic Test/Procedures  9/17/2020 1157 by Evelyn Reyes RN  Outcome: Resolved/Met  9/17/2020 1151 by Evelyn Reyes RN  Outcome: Progressing Towards Goal  Goal: Nutrition/Diet  9/17/2020 1157 by Evelyn Reyes RN  Outcome: Resolved/Met  9/17/2020 1151 by Evelyn Reyes RN  Outcome: Progressing Towards Goal  Goal: Discharge Planning  9/17/2020 1157 by Danita Cintron RN  Outcome: Resolved/Met  9/17/2020 1151 by Danita Cintron RN  Outcome: Progressing Towards Goal  Goal: Medications  9/17/2020 1157 by Danita Cintron, RN  Outcome: Resolved/Met  9/17/2020 1151 by Danita Cintron RN  Outcome: Progressing Towards Goal  Goal: Respiratory  9/17/2020 1157 by Danita Cintron, RN  Outcome: Resolved/Met  9/17/2020 1151 by Danita Cintron RN  Outcome: Progressing Towards Goal  Goal: Treatments/Interventions/Procedures  9/17/2020 1157 by Danita Cintron, RN  Outcome: Resolved/Met  9/17/2020 1151 by Danita Cintron RN  Outcome: Progressing Towards Goal  Goal: Psychosocial  9/17/2020 1157 by Danita Cintron, RN  Outcome: Resolved/Met  9/17/2020 1151 by Danita Cintron RN  Outcome: Progressing Towards Goal  Goal: *Verbalizes anxiety and depression are reduced or absent  9/17/2020 1157 by Danita Cintron, RN  Outcome: Resolved/Met  9/17/2020 1151 by Danita Cintron RN  Outcome: Progressing Towards Goal  Goal: *Absence of aspiration  9/17/2020 1157 by Danita Cintron, RN  Outcome: Resolved/Met  9/17/2020 1151 by Danita Cintron RN  Outcome: Progressing Towards Goal  Goal: *Absence of deep venous thrombosis signs and symptoms(Stroke Metric)  9/17/2020 1157 by Danita Cintron, RN  Outcome: Resolved/Met  9/17/2020 1151 by Danita Cintron RN  Outcome: Progressing Towards Goal  Goal: *Optimal pain control at patient's stated goal  9/17/2020 1157 by Danita Cintron, RN  Outcome: Resolved/Met  9/17/2020 1151 by Danita Cintron RN  Outcome: Progressing Towards Goal  Goal: *Tolerating diet  9/17/2020 1157 by Danita Cintron, RN  Outcome: Resolved/Met  9/17/2020 1151 by Danita Cintron RN  Outcome: Progressing Towards Goal  Goal: *Ability to perform ADLs and demonstrates progressive mobility and function  9/17/2020 1157 by Danita Cintron, RN  Outcome: Resolved/Met  9/17/2020 1151 by Elisabeth Linares RN  Outcome: Progressing Towards Goal  Goal: *Stroke education continued(Stroke Metric)  9/17/2020 1157 by Elisabeth Linares RN  Outcome: Resolved/Met  9/17/2020 1151 by Elisabeth Linares RN  Outcome: Progressing Towards Goal

## 2020-09-17 NOTE — PROGRESS NOTES
Problem: Mobility Impaired (Adult and Pediatric)  Goal: *Acute Goals and Plan of Care (Insert Text)  Description:   FUNCTIONAL STATUS PRIOR TO ADMISSION: Patient was modified independent using a single point cane for functional mobility. HOME SUPPORT PRIOR TO ADMISSION: The patient lived alone with no local support. Physical Therapy Goals  Initiated 9/12/2020  1. Patient will move from supine to sit and sit to supine  and scoot up and down in bed with supervision/set-up within 7 day(s). 2.  Patient will transfer from bed to chair and chair to bed with minimal assistance/contact guard assist using the least restrictive device within 7 day(s). 3.  Patient will perform sit to stand with minimal assistance/contact guard assist within 7 day(s). 4.  Patient will ambulate with minimal assistance/contact guard assist for 50 feet with the least restrictive device within 7 day(s). 5.  Patient will improve Rivera Balance score by 7 points within 7 days. Outcome: Progressing Towards Goal   PHYSICAL THERAPY TREATMENT  Patient: Natasha Johns (6 y.o. male)  Date: 9/17/2020  Diagnosis: NSTEMI (non-ST elevated myocardial infarction) ([facility] [address] [I21.4]   NSTEMI (non-ST elevated myocardial infarction) ([facility] [address]  Procedure(s) (LRB):  INFUSION CATHETER INSERTION (N/A) 5 Days Post-Op  Precautions: Aspiration, Bed Alarm, Fall  Chart, physical therapy assessment, plan of care and goals were reviewed. ASSESSMENT  Patient continues with skilled PT services and is progressing towards goals. He is received supine in bed and agreeable to mobility. Patient demonstrates decreased core strength and coordination requiring assistance to achieve supine to sit from flat bed. Patient demonstrates the need for assistance of two to complete stand pivot transfers secondary to decreased immediate standing balance and right sided weakness.  He locks the R leg during transfer for increased stability and demonstrates decreased R LE floor clearance. Patient performs seated UE and LE therex and sit to stands for improved strength. He demonstrates locking or bracing of the R LE during all attempts at standing and transfer. He is returned to bed and completes bridging with support for the R LE for repositioning. Current Level of Function Impacting Discharge (mobility/balance): Min- Mod A x2    Other factors to consider for discharge: medical stability, decreased balance, increased need for assistance, PLOF, inability to ambulate or transfer without assistance          PLAN :  Patient continues to benefit from skilled intervention to address the above impairments. Continue treatment per established plan of care. to address goals. Recommendation for discharge: (in order for the patient to meet his/her long term goals)  Therapy 3 hours per day 5-7 days per week    This discharge recommendation:  Has been made in collaboration with the attending provider and/or case management    IF patient discharges home will need the following DME: to be determined (TBD)       SUBJECTIVE:   Patient stated i'm OOk.     OBJECTIVE DATA SUMMARY:   Critical Behavior:  Neurologic State: Alert  Orientation Level: Oriented X4(poc)  Cognition: Follows commands  Safety/Judgement: Awareness of environment, Decreased awareness of need for assistance, Decreased awareness of need for safety, Decreased insight into deficits  Functional Mobility Training:  Bed Mobility:     Supine to Sit: Moderate assistance  Sit to Supine: Moderate assistance  Scooting: Stand-by assistance        Transfers:  Sit to Stand: Minimum assistance;Assist x2  Stand to Sit: Minimum assistance;Assist x2        Bed to Chair: Moderate assistance;Assist x2                    Balance:  Sitting: Intact; With support  Sitting - Static: Good (unsupported)  Sitting - Dynamic: Fair (occasional)  Standing: Impaired  Standing - Static: Fair  Standing - Dynamic : Poor  Ambulation/Gait Training:  Distance (ft): 4 Feet (ft)  Assistive Device: (bilateral HHA)  Ambulation - Level of Assistance: Moderate assistance;Assist x2        Gait Abnormalities: Decreased step clearance;Shuffling gait        Base of Support: Widened  Stance: Right decreased  Speed/Belen: Pace decreased (<100 feet/min)  Step Length: Right shortened  Swing Pattern: Right asymmetrical                 Stairs: Therapeutic Exercises:   Manual resisted R LE SAQ x5, Hip ADD x5, glute squeezes x5, bridging with R LE assistance x3  Sit to stands x3, last to reps deferred due to leg locking    Pain Rating:      Activity Tolerance:   Fair  Please refer to the flowsheet for vital signs taken during this treatment. After treatment patient left in no apparent distress:   Call bell within reach, Bed / chair alarm activated, Side rails x 3, and bed in chair position     COMMUNICATION/COLLABORATION:   The patients plan of care was discussed with: Occupational therapist and Registered nurse.      Nadege Puente, PT   Time Calculation: 23 mins

## 2020-10-22 NOTE — PERIOP NOTES
PT RESIDES AT [address]  WILL TRY TO OBTAIN PT'S PERSONAL PHONE NUMBER. PER , PT DOES NOT LIVE THERE. DR SUNSHINE'S OFFICE CALLED, [facility]X LISTED AS URGENT.  OFFICE HAS NO OTHER NUMBER LISTED FOR PT, OTHER THAN [facility]. DIEUDONNE. BRENDA WILL TRY TO CONNECT / [facility], [facility]. 1030: PER DR SUNSHINE AND BELLA, SX IS URGENT AND PT DOES NOT NEED A COVID TEST.

## 2020-10-28 ENCOUNTER — ANESTHESIA (OUTPATIENT)
Dept: SURGERY | Age: 70
DRG: 039 | End: 2020-10-28
Payer: MEDICARE

## 2020-10-28 ENCOUNTER — ANESTHESIA EVENT (OUTPATIENT)
Dept: SURGERY | Age: 70
DRG: 039 | End: 2020-10-28
Payer: MEDICARE

## 2020-10-28 ENCOUNTER — HOSPITAL ENCOUNTER (INPATIENT)
Age: 70
LOS: 1 days | Discharge: HOME | DRG: 039 | End: 2020-10-29
Attending: SURGERY | Admitting: SURGERY
Payer: MEDICARE

## 2020-10-28 PROBLEM — I65.22 CAROTID ARTERY STENOSIS, SYMPTOMATIC, LEFT: Status: ACTIVE | Noted: 2020-10-28

## 2020-10-28 PROCEDURE — 88304 TISSUE EXAM BY PATHOLOGIST: CPT

## 2020-10-28 PROCEDURE — 74011250636 HC RX REV CODE- 250/636: Performed by: SURGERY

## 2020-10-28 PROCEDURE — 77030031139 HC SUT VCRL2 J&J -A: Performed by: SURGERY

## 2020-10-28 PROCEDURE — 51798 US URINE CAPACITY MEASURE: CPT

## 2020-10-28 PROCEDURE — 03CL0ZZ EXTIRPATION OF MATTER FROM LEFT INTERNAL CAROTID ARTERY, OPEN APPROACH: ICD-10-PCS | Performed by: SURGERY

## 2020-10-28 PROCEDURE — 2709999900 HC NON-CHARGEABLE SUPPLY: Performed by: SURGERY

## 2020-10-28 PROCEDURE — 77030010507 HC ADH SKN DERMBND J&J -B: Performed by: SURGERY

## 2020-10-28 PROCEDURE — 77030020256 HC SOL INJ NACL 0.9%  500ML: Performed by: SURGERY

## 2020-10-28 PROCEDURE — 76010000171 HC OR TIME 2 TO 2.5 HR INTENSV-TIER 1: Performed by: SURGERY

## 2020-10-28 PROCEDURE — 74011250637 HC RX REV CODE- 250/637: Performed by: SURGERY

## 2020-10-28 PROCEDURE — 77030002916 HC SUT ETHLN J&J -A: Performed by: SURGERY

## 2020-10-28 PROCEDURE — 77030002933 HC SUT MCRYL J&J -A: Performed by: SURGERY

## 2020-10-28 PROCEDURE — 65660000000 HC RM CCU STEPDOWN

## 2020-10-28 PROCEDURE — 77030008684 HC TU ET CUF COVD -B: Performed by: ANESTHESIOLOGY

## 2020-10-28 PROCEDURE — 74011000250 HC RX REV CODE- 250: Performed by: NURSE ANESTHETIST, CERTIFIED REGISTERED

## 2020-10-28 PROCEDURE — 77030002987 HC SUT PROL J&J -B: Performed by: SURGERY

## 2020-10-28 PROCEDURE — 74011250636 HC RX REV CODE- 250/636: Performed by: NURSE ANESTHETIST, CERTIFIED REGISTERED

## 2020-10-28 PROCEDURE — 77030040361 HC SLV COMPR DVT MDII -B: Performed by: SURGERY

## 2020-10-28 PROCEDURE — 77030013079 HC BLNKT BAIR HGGR 3M -A: Performed by: ANESTHESIOLOGY

## 2020-10-28 PROCEDURE — 77030019905 HC CATH URETH INTMIT MDII -A

## 2020-10-28 PROCEDURE — 77030014008 HC SPNG HEMSTAT J&J -C: Performed by: SURGERY

## 2020-10-28 PROCEDURE — C1768 GRAFT, VASCULAR: HCPCS | Performed by: SURGERY

## 2020-10-28 PROCEDURE — 76210000017 HC OR PH I REC 1.5 TO 2 HR: Performed by: SURGERY

## 2020-10-28 PROCEDURE — 77030019905 HC CATH URETH INTMIT MDII -A: Performed by: SURGERY

## 2020-10-28 PROCEDURE — 77030026438 HC STYL ET INTUB CARD -A: Performed by: ANESTHESIOLOGY

## 2020-10-28 PROCEDURE — 74011250636 HC RX REV CODE- 250/636

## 2020-10-28 PROCEDURE — 77030040506 HC DRN WND MDII -A: Performed by: SURGERY

## 2020-10-28 PROCEDURE — 88311 DECALCIFY TISSUE: CPT

## 2020-10-28 PROCEDURE — 76060000036 HC ANESTHESIA 2.5 TO 3 HR: Performed by: SURGERY

## 2020-10-28 DEVICE — HEMASHIELD PLATINUM FINESSE ULTRA-THIN KNITTED CARDIOVASCULAR PATCH
Type: IMPLANTABLE DEVICE | Site: CAROTID | Status: FUNCTIONAL
Brand: HEMASHIELD

## 2020-10-28 RX ORDER — NEOSTIGMINE METHYLSULFATE 1 MG/ML
INJECTION, SOLUTION INTRAVENOUS AS NEEDED
Status: DISCONTINUED | OUTPATIENT
Start: 2020-10-28 | End: 2020-10-28 | Stop reason: HOSPADM

## 2020-10-28 RX ORDER — LANOLIN ALCOHOL/MO/W.PET/CERES
325 CREAM (GRAM) TOPICAL
Status: DISCONTINUED | OUTPATIENT
Start: 2020-10-29 | End: 2020-10-29 | Stop reason: HOSPADM

## 2020-10-28 RX ORDER — ROCURONIUM BROMIDE 10 MG/ML
INJECTION, SOLUTION INTRAVENOUS AS NEEDED
Status: DISCONTINUED | OUTPATIENT
Start: 2020-10-28 | End: 2020-10-28 | Stop reason: HOSPADM

## 2020-10-28 RX ORDER — PHENYLEPHRINE HCL IN 0.9% NACL 0.4MG/10ML
SYRINGE (ML) INTRAVENOUS AS NEEDED
Status: DISCONTINUED | OUTPATIENT
Start: 2020-10-28 | End: 2020-10-28 | Stop reason: HOSPADM

## 2020-10-28 RX ORDER — DEXAMETHASONE SODIUM PHOSPHATE 4 MG/ML
INJECTION, SOLUTION INTRA-ARTICULAR; INTRALESIONAL; INTRAMUSCULAR; INTRAVENOUS; SOFT TISSUE AS NEEDED
Status: DISCONTINUED | OUTPATIENT
Start: 2020-10-28 | End: 2020-10-28 | Stop reason: HOSPADM

## 2020-10-28 RX ORDER — HYDROMORPHONE HYDROCHLORIDE 1 MG/ML
0.5 INJECTION, SOLUTION INTRAMUSCULAR; INTRAVENOUS; SUBCUTANEOUS
Status: DISCONTINUED | OUTPATIENT
Start: 2020-10-28 | End: 2020-10-29 | Stop reason: HOSPADM

## 2020-10-28 RX ORDER — ATORVASTATIN CALCIUM 40 MG/1
40 TABLET, FILM COATED ORAL
Status: DISCONTINUED | OUTPATIENT
Start: 2020-10-28 | End: 2020-10-29 | Stop reason: HOSPADM

## 2020-10-28 RX ORDER — ONDANSETRON 2 MG/ML
4 INJECTION INTRAMUSCULAR; INTRAVENOUS
Status: DISCONTINUED | OUTPATIENT
Start: 2020-10-28 | End: 2020-10-29 | Stop reason: HOSPADM

## 2020-10-28 RX ORDER — ACETAMINOPHEN 325 MG/1
650 TABLET ORAL
Status: DISCONTINUED | OUTPATIENT
Start: 2020-10-28 | End: 2020-10-29 | Stop reason: HOSPADM

## 2020-10-28 RX ORDER — OXYCODONE HYDROCHLORIDE 5 MG/1
5 TABLET ORAL
Status: DISCONTINUED | OUTPATIENT
Start: 2020-10-28 | End: 2020-10-29 | Stop reason: HOSPADM

## 2020-10-28 RX ORDER — LIDOCAINE HYDROCHLORIDE 20 MG/ML
INJECTION, SOLUTION EPIDURAL; INFILTRATION; INTRACAUDAL; PERINEURAL AS NEEDED
Status: DISCONTINUED | OUTPATIENT
Start: 2020-10-28 | End: 2020-10-28 | Stop reason: HOSPADM

## 2020-10-28 RX ORDER — CLOPIDOGREL BISULFATE 75 MG/1
75 TABLET ORAL DAILY
Status: DISCONTINUED | OUTPATIENT
Start: 2020-10-29 | End: 2020-10-29 | Stop reason: HOSPADM

## 2020-10-28 RX ORDER — PROPOFOL 10 MG/ML
INJECTION, EMULSION INTRAVENOUS AS NEEDED
Status: DISCONTINUED | OUTPATIENT
Start: 2020-10-28 | End: 2020-10-28 | Stop reason: HOSPADM

## 2020-10-28 RX ORDER — PROTAMINE SULFATE 10 MG/ML
INJECTION, SOLUTION INTRAVENOUS AS NEEDED
Status: DISCONTINUED | OUTPATIENT
Start: 2020-10-28 | End: 2020-10-28 | Stop reason: HOSPADM

## 2020-10-28 RX ORDER — ACETAMINOPHEN 650 MG/1
650 SUPPOSITORY RECTAL
Status: DISCONTINUED | OUTPATIENT
Start: 2020-10-28 | End: 2020-10-29 | Stop reason: HOSPADM

## 2020-10-28 RX ORDER — MIDAZOLAM HYDROCHLORIDE 1 MG/ML
INJECTION, SOLUTION INTRAMUSCULAR; INTRAVENOUS AS NEEDED
Status: DISCONTINUED | OUTPATIENT
Start: 2020-10-28 | End: 2020-10-28 | Stop reason: HOSPADM

## 2020-10-28 RX ORDER — DEXTROSE, SODIUM CHLORIDE, AND POTASSIUM CHLORIDE 5; .45; .15 G/100ML; G/100ML; G/100ML
INJECTION INTRAVENOUS
Status: COMPLETED
Start: 2020-10-28 | End: 2020-10-28

## 2020-10-28 RX ORDER — CEFAZOLIN SODIUM/WATER 2 G/20 ML
2 SYRINGE (ML) INTRAVENOUS ONCE
Status: COMPLETED | OUTPATIENT
Start: 2020-10-28 | End: 2020-10-28

## 2020-10-28 RX ORDER — SODIUM CHLORIDE 0.9 % (FLUSH) 0.9 %
5-40 SYRINGE (ML) INJECTION EVERY 8 HOURS
Status: DISCONTINUED | OUTPATIENT
Start: 2020-10-28 | End: 2020-10-29 | Stop reason: HOSPADM

## 2020-10-28 RX ORDER — ONDANSETRON 2 MG/ML
INJECTION INTRAMUSCULAR; INTRAVENOUS AS NEEDED
Status: DISCONTINUED | OUTPATIENT
Start: 2020-10-28 | End: 2020-10-28 | Stop reason: HOSPADM

## 2020-10-28 RX ORDER — SODIUM CHLORIDE 0.9 % (FLUSH) 0.9 %
5-40 SYRINGE (ML) INJECTION AS NEEDED
Status: DISCONTINUED | OUTPATIENT
Start: 2020-10-28 | End: 2020-10-29 | Stop reason: HOSPADM

## 2020-10-28 RX ORDER — GLYCOPYRROLATE 0.2 MG/ML
INJECTION INTRAMUSCULAR; INTRAVENOUS AS NEEDED
Status: DISCONTINUED | OUTPATIENT
Start: 2020-10-28 | End: 2020-10-28 | Stop reason: HOSPADM

## 2020-10-28 RX ORDER — SODIUM CHLORIDE, SODIUM LACTATE, POTASSIUM CHLORIDE, CALCIUM CHLORIDE 600; 310; 30; 20 MG/100ML; MG/100ML; MG/100ML; MG/100ML
INJECTION, SOLUTION INTRAVENOUS
Status: DISCONTINUED | OUTPATIENT
Start: 2020-10-28 | End: 2020-10-28 | Stop reason: HOSPADM

## 2020-10-28 RX ORDER — METOPROLOL TARTRATE 25 MG/1
25 TABLET, FILM COATED ORAL EVERY 12 HOURS
Status: DISCONTINUED | OUTPATIENT
Start: 2020-10-28 | End: 2020-10-29 | Stop reason: HOSPADM

## 2020-10-28 RX ORDER — SUCCINYLCHOLINE CHLORIDE 20 MG/ML
INJECTION INTRAMUSCULAR; INTRAVENOUS AS NEEDED
Status: DISCONTINUED | OUTPATIENT
Start: 2020-10-28 | End: 2020-10-28 | Stop reason: HOSPADM

## 2020-10-28 RX ORDER — PANTOPRAZOLE SODIUM 40 MG/1
40 TABLET, DELAYED RELEASE ORAL 2 TIMES DAILY
Status: DISCONTINUED | OUTPATIENT
Start: 2020-10-28 | End: 2020-10-29 | Stop reason: HOSPADM

## 2020-10-28 RX ORDER — EPHEDRINE SULFATE/0.9% NACL/PF 50 MG/5 ML
SYRINGE (ML) INTRAVENOUS AS NEEDED
Status: DISCONTINUED | OUTPATIENT
Start: 2020-10-28 | End: 2020-10-28 | Stop reason: HOSPADM

## 2020-10-28 RX ORDER — GUAIFENESIN 100 MG/5ML
81 LIQUID (ML) ORAL DAILY
Status: DISCONTINUED | OUTPATIENT
Start: 2020-10-29 | End: 2020-10-29 | Stop reason: HOSPADM

## 2020-10-28 RX ORDER — ENOXAPARIN SODIUM 100 MG/ML
40 INJECTION SUBCUTANEOUS EVERY 24 HOURS
Status: DISCONTINUED | OUTPATIENT
Start: 2020-10-29 | End: 2020-10-29 | Stop reason: HOSPADM

## 2020-10-28 RX ORDER — HEPARIN SODIUM 1000 [USP'U]/ML
INJECTION, SOLUTION INTRAVENOUS; SUBCUTANEOUS AS NEEDED
Status: DISCONTINUED | OUTPATIENT
Start: 2020-10-28 | End: 2020-10-28 | Stop reason: HOSPADM

## 2020-10-28 RX ORDER — PRAMIPEXOLE DIHYDROCHLORIDE 0.12 MG/1
0.12 TABLET ORAL 3 TIMES DAILY
Status: DISCONTINUED | OUTPATIENT
Start: 2020-10-28 | End: 2020-10-29 | Stop reason: HOSPADM

## 2020-10-28 RX ORDER — FENTANYL CITRATE 50 UG/ML
INJECTION, SOLUTION INTRAMUSCULAR; INTRAVENOUS AS NEEDED
Status: DISCONTINUED | OUTPATIENT
Start: 2020-10-28 | End: 2020-10-28 | Stop reason: HOSPADM

## 2020-10-28 RX ORDER — MAGNESIUM SULFATE HEPTAHYDRATE 40 MG/ML
INJECTION, SOLUTION INTRAVENOUS AS NEEDED
Status: DISCONTINUED | OUTPATIENT
Start: 2020-10-28 | End: 2020-10-28 | Stop reason: HOSPADM

## 2020-10-28 RX ORDER — DEXTROSE, SODIUM CHLORIDE, AND POTASSIUM CHLORIDE 5; .45; .15 G/100ML; G/100ML; G/100ML
75 INJECTION INTRAVENOUS CONTINUOUS
Status: DISCONTINUED | OUTPATIENT
Start: 2020-10-28 | End: 2020-10-29 | Stop reason: HOSPADM

## 2020-10-28 RX ORDER — NALOXONE HYDROCHLORIDE 0.4 MG/ML
0.4 INJECTION, SOLUTION INTRAMUSCULAR; INTRAVENOUS; SUBCUTANEOUS AS NEEDED
Status: DISCONTINUED | OUTPATIENT
Start: 2020-10-28 | End: 2020-10-29 | Stop reason: HOSPADM

## 2020-10-28 RX ADMIN — ATORVASTATIN CALCIUM 40 MG: 40 TABLET, FILM COATED ORAL at 21:43

## 2020-10-28 RX ADMIN — Medication 3 MG: at 14:07

## 2020-10-28 RX ADMIN — FENTANYL CITRATE 75 MCG: 50 INJECTION, SOLUTION INTRAMUSCULAR; INTRAVENOUS at 12:30

## 2020-10-28 RX ADMIN — DEXAMETHASONE SODIUM PHOSPHATE 8 MG: 4 INJECTION, SOLUTION INTRAMUSCULAR; INTRAVENOUS at 12:26

## 2020-10-28 RX ADMIN — DEXTROSE MONOHYDRATE, SODIUM CHLORIDE, AND POTASSIUM CHLORIDE 75 ML/HR: 50; 4.5; 1.49 INJECTION, SOLUTION INTRAVENOUS at 15:28

## 2020-10-28 RX ADMIN — SODIUM CHLORIDE, POTASSIUM CHLORIDE, SODIUM LACTATE AND CALCIUM CHLORIDE: 600; 310; 30; 20 INJECTION, SOLUTION INTRAVENOUS at 11:48

## 2020-10-28 RX ADMIN — POTASSIUM CHLORIDE, DEXTROSE MONOHYDRATE AND SODIUM CHLORIDE 75 ML/HR: 150; 5; 450 INJECTION, SOLUTION INTRAVENOUS at 15:28

## 2020-10-28 RX ADMIN — PROPOFOL 40 MG: 10 INJECTION, EMULSION INTRAVENOUS at 14:10

## 2020-10-28 RX ADMIN — HEPARIN SODIUM 7500 UNITS: 1000 INJECTION, SOLUTION INTRAVENOUS; SUBCUTANEOUS at 12:37

## 2020-10-28 RX ADMIN — FENTANYL CITRATE 50 MCG: 50 INJECTION, SOLUTION INTRAMUSCULAR; INTRAVENOUS at 12:17

## 2020-10-28 RX ADMIN — GLYCOPYRROLATE 0.2 MG: 0.2 INJECTION, SOLUTION INTRAMUSCULAR; INTRAVENOUS at 12:40

## 2020-10-28 RX ADMIN — ONDANSETRON HYDROCHLORIDE 4 MG: 2 INJECTION, SOLUTION INTRAMUSCULAR; INTRAVENOUS at 14:05

## 2020-10-28 RX ADMIN — ROCURONIUM BROMIDE 45 MG: 10 SOLUTION INTRAVENOUS at 12:26

## 2020-10-28 RX ADMIN — Medication 40 MCG: at 14:16

## 2020-10-28 RX ADMIN — SUCCINYLCHOLINE CHLORIDE 120 MG: 20 INJECTION, SOLUTION INTRAMUSCULAR; INTRAVENOUS at 12:17

## 2020-10-28 RX ADMIN — PRAMIPEXOLE DIHYDROCHLORIDE 0.12 MG: 0.12 TABLET ORAL at 18:00

## 2020-10-28 RX ADMIN — GLYCOPYRROLATE 0.6 MG: 0.2 INJECTION, SOLUTION INTRAMUSCULAR; INTRAVENOUS at 14:07

## 2020-10-28 RX ADMIN — ROCURONIUM BROMIDE 5 MG: 10 SOLUTION INTRAVENOUS at 12:17

## 2020-10-28 RX ADMIN — SODIUM CHLORIDE 5 MG/HR: 9 INJECTION, SOLUTION INTRAVENOUS at 14:07

## 2020-10-28 RX ADMIN — LIDOCAINE HYDROCHLORIDE 40 MG: 20 INJECTION, SOLUTION EPIDURAL; INFILTRATION; INTRACAUDAL; PERINEURAL at 12:17

## 2020-10-28 RX ADMIN — PHENYLEPHRINE HYDROCHLORIDE 20 MCG/MIN: 10 INJECTION INTRAVENOUS at 12:26

## 2020-10-28 RX ADMIN — MAGNESIUM SULFATE 2 G: 2 INJECTION INTRAVENOUS at 12:26

## 2020-10-28 RX ADMIN — Medication 10 MG: at 12:39

## 2020-10-28 RX ADMIN — Medication 2 G: at 12:26

## 2020-10-28 RX ADMIN — PANTOPRAZOLE SODIUM 40 MG: 40 TABLET, DELAYED RELEASE ORAL at 18:00

## 2020-10-28 RX ADMIN — FENTANYL CITRATE 25 MCG: 50 INJECTION, SOLUTION INTRAMUSCULAR; INTRAVENOUS at 11:50

## 2020-10-28 RX ADMIN — PRAMIPEXOLE DIHYDROCHLORIDE 0.12 MG: 0.12 TABLET ORAL at 21:44

## 2020-10-28 RX ADMIN — PROTAMINE SULFATE 30 MG: 10 INJECTION, SOLUTION INTRAVENOUS at 13:44

## 2020-10-28 RX ADMIN — MIDAZOLAM 2 MG: 1 INJECTION INTRAMUSCULAR; INTRAVENOUS at 11:50

## 2020-10-28 RX ADMIN — PROTAMINE SULFATE 10 MG: 10 INJECTION, SOLUTION INTRAVENOUS at 13:51

## 2020-10-28 RX ADMIN — PROPOFOL 120 MG: 10 INJECTION, EMULSION INTRAVENOUS at 12:17

## 2020-10-28 RX ADMIN — Medication 10 ML: at 21:44

## 2020-10-28 RX ADMIN — PROPOFOL 50 MG: 10 INJECTION, EMULSION INTRAVENOUS at 14:08

## 2020-10-28 NOTE — ANESTHESIA PREPROCEDURE EVALUATION
Relevant Problems   No relevant active problems       Anesthetic History   No history of anesthetic complications            Review of Systems / Medical History  Patient summary reviewed, nursing notes reviewed and pertinent labs reviewed    Pulmonary          Smoker         Neuro/Psych       CVA       Cardiovascular              Past MI and PAD      Comments: · LV: Estimated LVEF is 45 - 50%. Normal cavity size. Upper normal wall thickness. Wall motion: unable to assess due to limited image quality. Inconclusive left ventricular diastolic function. · MV: Mild mitral valve regurgitation is present. · PA: Mild to moderate pulmonary hypertension. Pulmonary arterial systolic pressure is 50 mmHg.    GI/Hepatic/Renal  Within defined limits              Endo/Other        Anemia     Other Findings            Physical Exam    Airway  Mallampati: II  TM Distance: > 6 cm  Neck ROM: normal range of motion   Mouth opening: Normal     Cardiovascular  Regular rate and rhythm,  S1 and S2 normal,  no murmur, click, rub, or gallop             Dental  No notable dental hx       Pulmonary  Breath sounds clear to auscultation               Abdominal  GI exam deferred       Other Findings            Anesthetic Plan    ASA: 3  Anesthesia type: general    Monitoring Plan: Arterial line        Anesthetic plan and risks discussed with: Patient

## 2020-10-28 NOTE — PERIOP NOTES
Dr Sofya Toth to bedside to check on pt  Aware of wound on rt face   Notified of scrotum being red and lt testicle being swollen.    Pt states he needs to urinate  Dr Sofya Toth requests  Bladder scan and if need straight cath    Bladder scan shows > 800 ml  Pt to be st cathed

## 2020-10-28 NOTE — PERIOP NOTES
TRANSFER - OUT REPORT:    Verbal report given to Diane Burgess (name) on Valerie Britt  being transferred to [facility](unit) for routine post - op       Report consisted of patients Situation, Background, Assessment and   Recommendations(SBAR). Time Pre op antibiotic given:1226  Anesthesia Stop time: 4683  Castaneda Present on Transfer to floor:n  Order for Castaneda on Chart:n  Discharge Prescriptions with Chart:n    Information from the following report(s) SBAR, OR Summary, Intake/Output, MAR and Accordion was reviewed with the receiving nurse. Opportunity for questions and clarification was provided. Is the patient on 02? YES       L/Min 2       Other     Is the patient on a monitor? YES    Is the nurse transporting with the patient? YES    Surgical Waiting Area notified of patient's transfer from PACU?  YES      The following personal items collected during your admission accompanied patient upon transfer:   Dental Appliance:    Vision:    Hearing Aid:    Jewelry:    Clothing:    Other Valuables:    Valuables sent to safe:      Dentures, glasses, walker and clothes to floor with pt

## 2020-10-28 NOTE — PERIOP NOTES
1000ml sterile water to sterile field  Fibrillar to sterile field  Patient: Shaquille Luna MRN: [id number]  SSN: [id number]   YOB: 1950  Age: 69 y.o. Sex: male     Patient is status post Procedure(s):  LEFT CAROTID ENDARTERECTOMY. Surgeon(s) and Role:     * Gayle Ruvalcaba MD - Primary    Local/Dose/Irrigation:  See mar                  Peripheral IV 10/28/20 Left Hand (Active)   Site Assessment Clean, dry, & intact 10/28/20 1153   Phlebitis Assessment 0 10/28/20 1153   Infiltration Assessment 0 10/28/20 1153   Dressing Status New 10/28/20 1153   Dressing Type Tape;Transparent 10/28/20 1153   Hub Color/Line Status Pink; Infusing 10/28/20 1153      Arterial Line 10/28/20 Right Radial artery (Active)        Chetan-Rodriguez Drain 10/28/20 Left Neck (Active)      Airway - Endotracheal Tube 10/28/20 Oral (Active)                   Dressing/Packing:  Wound Knee Right-Dressing Type: Other (Comment)(none) (10/28/20 1125)  Wound Neck Left-Dressing Type: Topical skin adhesive/glue (10/28/20 1300)    Splint/Cast:  ]    Other:       .

## 2020-10-28 NOTE — BRIEF OP NOTE
Brief Postoperative Note    Patient: Jannie Dumont  YOB: 1950  MRN: [id number]    Date of Procedure: 10/28/2020     Pre-Op Diagnosis: Critical left ICA  STENOSIS, symptomatic    Post-Op Diagnosis: Same as preoperative diagnosis. Procedure(s):  LEFT CAROTID ENDARTERECTOMY    Surgeon(s):  Yeny Stewart MD    Surgical Assistant: Surg Asst-1: Danilo ZALDIVAR    Anesthesia: General     Estimated Blood Loss (mL): less than 473     Complications: None    Specimens:   ID Type Source Tests Collected by Time Destination   1 : left carotid plaque Fresh Other                  Yeny Stewart MD 10/28/2020 1240 Pathology        Implants:   Implant Name Type Inv. Item Serial No.  Lot No. LRB No. Used Action   GRAFT VASC W0.3XL3IN THK0. 36IN CLLGN ULT N [address] [id number]  GRAFT VASC W0.3XL3IN THK0. 36IN [facility]GN [facility] [id number] Tati Berger [facility] [id number] Left 1 Implanted       Drains:   Chetan-Rodriguez Drain 10/28/20 Left Neck (Active)       Findings: *high grade left bifurcation stenosis**    Electronically Signed by Jd العراقي MD on 10/28/2020 at 2:07 PM

## 2020-10-28 NOTE — ANESTHESIA PROCEDURE NOTES
Arterial Line Placement    Start time: 10/28/2020 11:50 AM  End time: 10/28/2020 12:04 PM  Performed by: Sunil Remy MD  Authorized by: Sunil Remy MD     Pre-Procedure  Indications:  Arterial pressure monitoring  Preanesthetic Checklist: patient identified, risks and benefits discussed, anesthesia consent, site marked, patient being monitored, timeout performed and patient being monitored    Timeout Time: 11:50        Procedure:   Prep:  ChloraPrep  Seldinger Technique?: Yes    Orientation:  Right  Location:  Radial artery  Catheter size:  20 G  Number of attempts:  1  Cont Cardiac Output Sensor: No      Assessment:   Post-procedure:  Line secured and sterile dressing applied  Patient Tolerance:  Patient tolerated the procedure well with no immediate complications  Comment:   Done by Silva Ram CRNA

## 2020-10-28 NOTE — ANESTHESIA POSTPROCEDURE EVALUATION
Procedure(s):  LEFT CAROTID ENDARTERECTOMY. general    Anesthesia Post Evaluation        Patient location during evaluation: PACU  Note status: Adequate. Level of consciousness: responsive to verbal stimuli and sleepy but conscious  Pain management: satisfactory to patient  Airway patency: patent  Anesthetic complications: no  Cardiovascular status: acceptable  Respiratory status: acceptable  Hydration status: acceptable  Comments: +Post-Anesthesia Evaluation and Assessment    Patient: Elmira Rausch MRN: [id number]  SSN: [id number]   YOB: 1950  Age: 69 y.o. Sex: male          Cardiovascular Function/Vital Signs    /60   Pulse 89   Temp 36.4 °C (97.6 °F)   Resp 15   Ht 5' 7\" (1.702 m)   Wt 74.8 kg (165 lb)   SpO2 99%   BMI 25.84 kg/m²     Patient is status post Procedure(s):  LEFT CAROTID ENDARTERECTOMY. Nausea/Vomiting: Controlled. Postoperative hydration reviewed and adequate. Pain:  Pain Scale 1: Numeric (0 - 10) (10/28/20 1106)  Pain Intensity 1: 0 (10/28/20 1106)   Managed. Neurological Status:   Neuro (WDL): Exceptions to WDL (10/28/20 1111)   At baseline. Mental Status and Level of Consciousness: Arousable. Pulmonary Status:   O2 Device: CO2 nasal cannula (10/28/20 1442)   Adequate oxygenation and airway patent. Complications related to anesthesia: None    Post-anesthesia assessment completed. No concerns. I have evaluated the patient and the patient is stable and ready to be discharged from PACU . Signed By: Maricruz Isidro MD    10/28/2020        INITIAL Post-op Vital signs:   Vitals Value Taken Time   /49 10/28/2020  2:50 PM   Temp 36.4 °C (97.6 °F) 10/28/2020  2:42 PM   Pulse 78 10/28/2020  2:58 PM   Resp 13 10/28/2020  2:58 PM   SpO2 97 % 10/28/2020  2:58 PM   Vitals shown include unvalidated device data.

## 2020-10-29 VITALS
WEIGHT: 160 LBS | OXYGEN SATURATION: 100 % | DIASTOLIC BLOOD PRESSURE: 81 MMHG | BODY MASS INDEX: 25.11 KG/M2 | SYSTOLIC BLOOD PRESSURE: 190 MMHG | RESPIRATION RATE: 18 BRPM | HEART RATE: 102 BPM | TEMPERATURE: 98.4 F | HEIGHT: 67 IN

## 2020-10-29 LAB
ANION GAP SERPL CALC-SCNC: 7 MMOL/L (ref 5–15)
BUN SERPL-MCNC: 16 MG/DL (ref 6–20)
BUN/CREAT SERPL: 12 (ref 12–20)
CALCIUM SERPL-MCNC: 8.8 MG/DL (ref 8.5–10.1)
CHLORIDE SERPL-SCNC: 110 MMOL/L (ref 97–108)
CO2 SERPL-SCNC: 22 MMOL/L (ref 21–32)
CREAT SERPL-MCNC: 1.32 MG/DL (ref 0.7–1.3)
ERYTHROCYTE [DISTWIDTH] IN BLOOD BY AUTOMATED COUNT: 17.4 % (ref 11.5–14.5)
GLUCOSE SERPL-MCNC: 155 MG/DL (ref 65–100)
HCT VFR BLD AUTO: 26.9 % (ref 36.6–50.3)
HGB BLD-MCNC: 8.2 G/DL (ref 12.1–17)
MCH RBC QN AUTO: 26.8 PG (ref 26–34)
MCHC RBC AUTO-ENTMCNC: 30.5 G/DL (ref 30–36.5)
MCV RBC AUTO: 87.9 FL (ref 80–99)
NRBC # BLD: 0 K/UL (ref 0–0.01)
NRBC BLD-RTO: 0 PER 100 WBC
PLATELET # BLD AUTO: 294 K/UL (ref 150–400)
PMV BLD AUTO: 11.1 FL (ref 8.9–12.9)
POTASSIUM SERPL-SCNC: 4.6 MMOL/L (ref 3.5–5.1)
RBC # BLD AUTO: 3.06 M/UL (ref 4.1–5.7)
SODIUM SERPL-SCNC: 139 MMOL/L (ref 136–145)
WBC # BLD AUTO: 7.9 K/UL (ref 4.1–11.1)

## 2020-10-29 PROCEDURE — 74011250637 HC RX REV CODE- 250/637: Performed by: SURGERY

## 2020-10-29 PROCEDURE — 74011250636 HC RX REV CODE- 250/636: Performed by: SURGERY

## 2020-10-29 PROCEDURE — 80048 BASIC METABOLIC PNL TOTAL CA: CPT

## 2020-10-29 PROCEDURE — 85027 COMPLETE CBC AUTOMATED: CPT

## 2020-10-29 PROCEDURE — 36415 COLL VENOUS BLD VENIPUNCTURE: CPT

## 2020-10-29 RX ADMIN — CLOPIDOGREL BISULFATE 75 MG: 75 TABLET ORAL at 09:09

## 2020-10-29 RX ADMIN — METOPROLOL TARTRATE 25 MG: 25 TABLET, FILM COATED ORAL at 09:09

## 2020-10-29 RX ADMIN — PANTOPRAZOLE SODIUM 40 MG: 40 TABLET, DELAYED RELEASE ORAL at 09:09

## 2020-10-29 RX ADMIN — Medication 10 ML: at 07:16

## 2020-10-29 RX ADMIN — DEXTROSE MONOHYDRATE, SODIUM CHLORIDE, AND POTASSIUM CHLORIDE 75 ML/HR: 50; 4.5; 1.49 INJECTION, SOLUTION INTRAVENOUS at 04:43

## 2020-10-29 RX ADMIN — Medication 10 ML: at 14:13

## 2020-10-29 RX ADMIN — PRAMIPEXOLE DIHYDROCHLORIDE 0.12 MG: 0.12 TABLET ORAL at 09:09

## 2020-10-29 RX ADMIN — ASPIRIN 81 MG: 81 TABLET, CHEWABLE ORAL at 09:09

## 2020-10-29 RX ADMIN — FERROUS SULFATE TAB 325 MG (65 MG ELEMENTAL FE) 325 MG: 325 (65 FE) TAB at 07:15

## 2020-10-29 RX ADMIN — ENOXAPARIN SODIUM 40 MG: 40 INJECTION SUBCUTANEOUS at 09:09

## 2020-10-29 NOTE — PROGRESS NOTES
Bedside and Verbal shift change report given to [address] [place] [address] (oncoming nurse) by Shyann Her RN (offgoing nurse). Report included the following information SBAR, Kardex, ED Summary, Intake/Output, MAR, Cardiac Rhythm NSR and Dual Neuro Assessment.

## 2020-10-29 NOTE — PROGRESS NOTES
I have reviewed discharge instructions with the patient. The patient verbalized understanding. Patient was extremely agitated and frustrated about leaving. His BP was elevated due to this. He was adamant he leave ASAP.

## 2020-10-29 NOTE — PROGRESS NOTES
Vascular  VSS  Neuro intact  Drain to be pulled  Neck flat  hgb 8.2 from hydration and small amount of acute blood loss  Home on usual meds including plavix  Tylenol for pain

## 2020-10-29 NOTE — PROGRESS NOTES
1430 -   scheduled [employer] transportation for patient  @ 3:00p to: [address] [facility] [address] Patient MUST be down at [facility] Main Entrance at 2:50p. Lyft ride will not wait. Update to 6S Nurse - Mariana MCGEE and patient. CM will continue to follow and assist with SANDRA needs as they arise. Regino Guardado  MSN, BSCS, RN, CRM - [phone]. 1659 - Significant Medical Information: See chart notes    Preliminary Discharge Plan/Identified;  Demographic and Primary Care Provider (PCP) Other, MD Ryan verified and correct. CM will continue to follow discharge planning needs for continuum of care. Leo Marin, RN, [facility]    Reason for Admission:  Melida Sommers is a 69 y.o. admitted to 6S for carotid stenosis - SEE HPI.                     RUR Score: 12 %           Plan for utilizing home health:    No    PCP: Other, MD Ryan  - MD at [place] [facility]    Are you a current patient: Yes/No:  N/A    Approximate date of last visit:   UNK                    Current Advanced Directive/Advance Care Plan:  No. Patient said \"he doesn't need one\"    CM One Stop/Healthcare Agent :  Nayeli Randolph                       Transition of Care Plan:    Home    Care Management Interventions  PCP Verified by CM: (MD at [place] [facility] per patient)  Cleo Signup: No  Discharge Durable Medical Equipment: No  Health Maintenance Reviewed: Yes  Physical Therapy Consult: No  Occupational Therapy Consult: No  Speech Therapy Consult: No  Current Support Network: Lives Alone, Assisted Living  Confirm Follow Up Transport: Other (see comment)([employer])  Discharge Location  Discharge Placement: Home

## 2020-10-29 NOTE — PROGRESS NOTES
Problem: Falls - Risk of  Goal: *Absence of Falls  Description: Document Richard Merlin Fall Risk and appropriate interventions in the flowsheet. Outcome: Progressing Towards Goal  Note: Fall Risk Interventions:  Mobility Interventions: Bed/chair exit alarm, Communicate number of staff needed for ambulation/transfer, Mechanical lift, OT consult for ADLs, Patient to call before getting OOB, Strengthening exercises (ROM-active/passive)         Medication Interventions: Bed/chair exit alarm, Evaluate medications/consider consulting pharmacy, Patient to call before getting OOB, Teach patient to arise slowly    Elimination Interventions: Call light in reach, Elevated toilet seat, Stay With Me (per policy), Toilet paper/wipes in reach, Urinal in reach              Problem: Patient Education: Go to Patient Education Activity  Goal: Patient/Family Education  Outcome: Progressing Towards Goal     Problem: Pressure Injury - Risk of  Goal: *Prevention of pressure injury  Description: Document Judson Scale and appropriate interventions in the flowsheet. Outcome: Progressing Towards Goal  Note: Pressure Injury Interventions:  Sensory Interventions: Assess changes in LOC, Assess need for specialty bed, Discuss PT/OT consult with provider, Keep linens dry and wrinkle-free, Minimize linen layers, Monitor skin under medical devices, Turn and reposition approx.  every two hours (pillows and wedges if needed)         Activity Interventions: Assess need for specialty bed, Increase time out of bed, Pressure redistribution bed/mattress(bed type), PT/OT evaluation    Mobility Interventions: Chair cushion, HOB 30 degrees or less, Pressure redistribution bed/mattress (bed type), PT/OT evaluation    Nutrition Interventions: Document food/fluid/supplement intake                     Problem: Patient Education: Go to Patient Education Activity  Goal: Patient/Family Education  Outcome: Progressing Towards Goal     Problem: Pain  Goal: *Control of Pain  Outcome: Progressing Towards Goal     Problem: Patient Education: Go to Patient Education Activity  Goal: Patient/Family Education  Outcome: Progressing Towards Goal

## 2020-10-29 NOTE — OP NOTES
[address]   OPERATIVE REPORT    Name:  Anabela Panda  MR#:  [id number]  :  1950  ACCOUNT #:  [de-identified]  DATE OF SERVICE:  10/28/2020      PREOPERATIVE DIAGNOSIS:  Critical left internal carotid artery stenosis, symptomatic. POSTOPERATIVE DIAGNOSIS:  Critical left internal carotid artery stenosis, symptomatic. PROCEDURE PERFORMED:  Left carotid endarterectomy with Dacron patch angioplasty. SURGEON:  Franca Burgos MD    ASSISTANT:  Yung Francisco SA    ANESTHESIOLOGIST:  Rema Cranker, MD    ANESTHESIA:  General endotracheal.    COMPLICATIONS:  None. SPECIMENS REMOVED:  Carotid bifurcation plaque. IMPLANTS:  Hemashield Dacron patch. ESTIMATED BLOOD LOSS:  80 mL. DRAIN:  Small Chetan-Rodriguez. INDICATIONS:  The patient is a 26-year-old gentleman who I saw in 2020 following a left hemispheric stroke. The patient had critical soft bifurcation stenosis on the left. He was allowed to go home. He was scheduled for surgery a week after his discharge but missed his appointment. He had some difficulties getting a ride from his nursing home. We then set him up for today. He arrived. Art line was placed in. PROCEDURE:  After informed consent, he was taken to the operating room and under general endotracheal anesthesia, he was prepped and draped. Thorough time-out was done. Following this, an incision was made parallel and medial to the sternocleidomastoid through the skin and subcutaneous tissue. Platysma was divided. Crossing facial veins were ligated between silk ties. Common, internal, and external carotid arteries were dissected free. The internal was controlled with double loop small vessel loop high *above** the bifurcation. The common and external were controlled with umbilical tapes and Rumel chokers. The patient was given 7500 units of heparin. After 3 minutes, the previously marked [place] shunt was readied for insertion.   The vessel loops were cinched down. Arteriotomy was made on the common, carried above the bifurcation and to normal internal carotid artery. The [place] shunt was placed into the common first, allowed to bleed to confirm patency and then into the internal.  Shunt patency was confirmed with the Doppler. Standard endarterectomy in the common and internal with a reasonable endpoint and external eversion carotid endarterectomy was performed. All floating debris was removed. One suture of 6-0 Prolene was used to tack down the mid part of the remaining small amount of plaque distally. Two sutures were used to reconstruct the lateral wall which had full-thickness ulceration. At this point, a Dacron patch was sutured circumferentially with 2 additional throws remaining. The shunt was removed. There was good backbleeding. The artery was flushed with heparin saline and the closure completed. Flow was restored initially to the external carotid artery and then to the internal.  Doppler signals were normal.  Hemostasis was obtained with 40 mg of protamine sulfate and Fibrillar. Final lap, Ray-Chinedu, instrument, and needle counts were announced as correct x2. Small inferior incision was used to allow drain to be placed. Deep structures were closed with interrupted Vicryl followed by running platysma 3-0 Vicryl over the drain, skin with subcuticular Monocryl. A drain stitch was placed and sterile dressings were applied. At the time of this dictation, neurological status is unknown.         Danyelle Mitchell MD      [id number]  D:  10/28/2020 14:14  T:  10/29/2020 0:22  JOB #:  [id number]  CC:  Kendrick Riggs MD

## 2020-10-29 NOTE — PROGRESS NOTES
Problem: Falls - Risk of  Goal: *Absence of Falls  Description: Document Sarika Bondsccasin Fall Risk and appropriate interventions in the flowsheet. Outcome: Progressing Towards Goal  Note: Fall Risk Interventions:  Mobility Interventions: Bed/chair exit alarm, Communicate number of staff needed for ambulation/transfer, OT consult for ADLs, Patient to call before getting OOB, PT Consult for mobility concerns, PT Consult for assist device competence, Utilize walker, cane, or other assistive device         Medication Interventions: Bed/chair exit alarm, Evaluate medications/consider consulting pharmacy, Patient to call before getting OOB, Teach patient to arise slowly    Elimination Interventions: Bed/chair exit alarm, Call light in reach, Elevated toilet seat, Patient to call for help with toileting needs, Stay With Me (per policy), Toilet paper/wipes in reach, Toileting schedule/hourly rounds, Urinal in reach              Problem: Pressure Injury - Risk of  Goal: *Prevention of pressure injury  Description: Document Judson Scale and appropriate interventions in the flowsheet. Outcome: Progressing Towards Goal  Note: Pressure Injury Interventions:  Sensory Interventions: Assess changes in LOC, Chair cushion, Check visual cues for pain, Discuss PT/OT consult with provider, Float heels, Keep linens dry and wrinkle-free, Maintain/enhance activity level, Minimize linen layers, Monitor skin under medical devices, Pad between skin to skin, Pressure redistribution bed/mattress (bed type), Turn and reposition approx. every two hours (pillows and wedges if needed)         Activity Interventions: Chair cushion, Increase time out of bed, Pressure redistribution bed/mattress(bed type), PT/OT evaluation    Mobility Interventions: Chair cushion, Float heels, HOB 30 degrees or less, Pressure redistribution bed/mattress (bed type), PT/OT evaluation, Turn and reposition approx.  every two hours(pillow and wedges)    Nutrition Interventions: Document food/fluid/supplement intake                     Problem: Pain  Goal: *Control of Pain  Outcome: Progressing Towards Goal

## 2020-10-30 NOTE — DISCHARGE SUMMARY
[address] SUMMARY    Name:  Dimitri Erazo  MR#:  [id number]  :  1950  ACCOUNT #:  [de-identified]  ADMIT DATE:  10/28/2020  DISCHARGE DATE:  10/29/2020      ADMITTING DIAGNOSIS:  On the  is critical left carotid stenosis, symptomatic. DISCHARGE DIAGNOSIS:  Critical left carotid stenosis, symptomatic. PROCEDURE:  Left carotid endarterectomy with Dacron patch angioplasty. HOSPITAL COURSE:  A very pleasant 48-year-old gentleman uses a walker to ambulate, has a large right facial tumor and was seen in the hospital 3 weeks ago with both subendocardial myocardial infarction and completed stroke in the left hemisphere. The patient had a very little residual and he was placed on aspirin and Plavix. CTA and duplex suggest a critical left carotid stenosis. He was brought in for left carotid endarterectomy with Dacron patch, which he tolerated well. He continues to have his baseline word searching, but is fluent and eloquent. He is neurologically intact following surgery. He voids well and has been limiting his urine. He walks very poorly with a walker and has no family unfortunately that is interested in his well being and will mostly likely need an ambulance to be transferred back to his room at [facility]. The patient will resume his aspirin and Plavix and usual medications. He will use Tylenol for pain. He was given instruction regarding activity and hygiene including waiting to get his neck wet for another 3-4 days. We will see him in the office in 2 weeks.         Samantha Brand MD      [id number]  D:  10/29/2020 7:47  T:  10/30/2020 6:13  JOB #:  [id number]  CC:  Natalie Arriaza MD       [facility]

## 2021-02-18 ENCOUNTER — HOSPITAL ENCOUNTER (INPATIENT)
Age: 71
LOS: 8 days | Discharge: NURSING, REHAB OR LONG-TERM CARE | DRG: 377 | End: 2021-02-26
Attending: STUDENT IN AN ORGANIZED HEALTH CARE EDUCATION/TRAINING PROGRAM | Admitting: FAMILY MEDICINE
Payer: MEDICARE

## 2021-02-18 ENCOUNTER — APPOINTMENT (OUTPATIENT)
Dept: CT IMAGING | Age: 71
DRG: 377 | End: 2021-02-18
Attending: FAMILY MEDICINE
Payer: MEDICARE

## 2021-02-18 ENCOUNTER — APPOINTMENT (OUTPATIENT)
Dept: GENERAL RADIOLOGY | Age: 71
DRG: 377 | End: 2021-02-18
Attending: FAMILY MEDICINE
Payer: MEDICARE

## 2021-02-18 DIAGNOSIS — R53.1 GENERALIZED WEAKNESS: ICD-10-CM

## 2021-02-18 DIAGNOSIS — I25.118 CORONARY ARTERY DISEASE OF NATIVE ARTERY OF NATIVE HEART WITH STABLE ANGINA PECTORIS (HCC): ICD-10-CM

## 2021-02-18 DIAGNOSIS — R29.898 WEAKNESS OF BOTH LOWER EXTREMITIES: ICD-10-CM

## 2021-02-18 DIAGNOSIS — D64.9 SYMPTOMATIC ANEMIA: ICD-10-CM

## 2021-02-18 DIAGNOSIS — K92.2 GASTROINTESTINAL HEMORRHAGE, UNSPECIFIED GASTROINTESTINAL HEMORRHAGE TYPE: Primary | ICD-10-CM

## 2021-02-18 LAB
ALBUMIN SERPL-MCNC: 2.6 G/DL (ref 3.5–5)
ALBUMIN/GLOB SERPL: 0.6 {RATIO} (ref 1.1–2.2)
ALP SERPL-CCNC: 99 U/L (ref 45–117)
ALT SERPL-CCNC: 11 U/L (ref 12–78)
ANION GAP SERPL CALC-SCNC: 10 MMOL/L (ref 5–15)
AST SERPL-CCNC: 9 U/L (ref 15–37)
ATRIAL RATE: 87 BPM
BASOPHILS # BLD: 0.1 K/UL (ref 0–0.1)
BASOPHILS NFR BLD: 1 % (ref 0–1)
BILIRUB SERPL-MCNC: 0.3 MG/DL (ref 0.2–1)
BUN SERPL-MCNC: 20 MG/DL (ref 6–20)
BUN/CREAT SERPL: 12 (ref 12–20)
CALCIUM SERPL-MCNC: 8.1 MG/DL (ref 8.5–10.1)
CALCULATED P AXIS, ECG09: 36 DEGREES
CALCULATED R AXIS, ECG10: 21 DEGREES
CALCULATED T AXIS, ECG11: 27 DEGREES
CHLORIDE SERPL-SCNC: 107 MMOL/L (ref 97–108)
CK SERPL-CCNC: 83 U/L (ref 39–308)
CO2 SERPL-SCNC: 22 MMOL/L (ref 21–32)
COMMENT, HOLDF: NORMAL
COMMENT, HOLDF: NORMAL
COVID-19 RAPID TEST, COVR: NOT DETECTED
CREAT SERPL-MCNC: 1.62 MG/DL (ref 0.7–1.3)
DIAGNOSIS, 93000: NORMAL
DIFFERENTIAL METHOD BLD: ABNORMAL
EOSINOPHIL # BLD: 0.1 K/UL (ref 0–0.4)
EOSINOPHIL NFR BLD: 1 % (ref 0–7)
ERYTHROCYTE [DISTWIDTH] IN BLOOD BY AUTOMATED COUNT: 16.1 % (ref 11.5–14.5)
GLOBULIN SER CALC-MCNC: 4.2 G/DL (ref 2–4)
GLUCOSE SERPL-MCNC: 100 MG/DL (ref 65–100)
HCT VFR BLD AUTO: 17.1 % (ref 36.6–50.3)
HCT VFR BLD AUTO: 22.9 % (ref 36.6–50.3)
HEMOCCULT STL QL: POSITIVE
HGB BLD-MCNC: 5 G/DL (ref 12.1–17)
HGB BLD-MCNC: 7 G/DL (ref 12.1–17)
HISTORY CHECKED?,CKHIST: NORMAL
IMM GRANULOCYTES # BLD AUTO: 0 K/UL (ref 0–0.04)
IMM GRANULOCYTES NFR BLD AUTO: 0 % (ref 0–0.5)
LYMPHOCYTES # BLD: 0.8 K/UL (ref 0.8–3.5)
LYMPHOCYTES NFR BLD: 10 % (ref 12–49)
MCH RBC QN AUTO: 24.2 PG (ref 26–34)
MCHC RBC AUTO-ENTMCNC: 29.2 G/DL (ref 30–36.5)
MCV RBC AUTO: 82.6 FL (ref 80–99)
MONOCYTES # BLD: 0.8 K/UL (ref 0–1)
MONOCYTES NFR BLD: 10 % (ref 5–13)
NEUTS SEG # BLD: 6.2 K/UL (ref 1.8–8)
NEUTS SEG NFR BLD: 78 % (ref 32–75)
NRBC # BLD: 0 K/UL (ref 0–0.01)
NRBC BLD-RTO: 0 PER 100 WBC
P-R INTERVAL, ECG05: 118 MS
PATH REV BLD -IMP: ABNORMAL
PLATELET # BLD AUTO: 418 K/UL (ref 150–400)
PMV BLD AUTO: 11.1 FL (ref 8.9–12.9)
POTASSIUM SERPL-SCNC: 3.8 MMOL/L (ref 3.5–5.1)
PROT SERPL-MCNC: 6.8 G/DL (ref 6.4–8.2)
Q-T INTERVAL, ECG07: 394 MS
QRS DURATION, ECG06: 88 MS
QTC CALCULATION (BEZET), ECG08: 474 MS
RBC # BLD AUTO: 2.07 M/UL (ref 4.1–5.7)
RBC MORPH BLD: ABNORMAL
SAMPLES BEING HELD,HOLD: NORMAL
SAMPLES BEING HELD,HOLD: NORMAL
SARS-COV-2, COV2: NORMAL
SODIUM SERPL-SCNC: 139 MMOL/L (ref 136–145)
SOURCE, COVRS: NORMAL
TROPONIN I SERPL-MCNC: <0.05 NG/ML
VENTRICULAR RATE, ECG03: 87 BPM
WBC # BLD AUTO: 8 K/UL (ref 4.1–11.1)

## 2021-02-18 PROCEDURE — 74011250636 HC RX REV CODE- 250/636: Performed by: FAMILY MEDICINE

## 2021-02-18 PROCEDURE — 99285 EMERGENCY DEPT VISIT HI MDM: CPT

## 2021-02-18 PROCEDURE — 87635 SARS-COV-2 COVID-19 AMP PRB: CPT

## 2021-02-18 PROCEDURE — 36415 COLL VENOUS BLD VENIPUNCTURE: CPT

## 2021-02-18 PROCEDURE — P9040 RBC LEUKOREDUCED IRRADIATED: HCPCS

## 2021-02-18 PROCEDURE — 84484 ASSAY OF TROPONIN QUANT: CPT

## 2021-02-18 PROCEDURE — 87040 BLOOD CULTURE FOR BACTERIA: CPT

## 2021-02-18 PROCEDURE — P9016 RBC LEUKOCYTES REDUCED: HCPCS

## 2021-02-18 PROCEDURE — 73630 X-RAY EXAM OF FOOT: CPT

## 2021-02-18 PROCEDURE — 86900 BLOOD TYPING SEROLOGIC ABO: CPT

## 2021-02-18 PROCEDURE — 74011250636 HC RX REV CODE- 250/636: Performed by: STUDENT IN AN ORGANIZED HEALTH CARE EDUCATION/TRAINING PROGRAM

## 2021-02-18 PROCEDURE — 86923 COMPATIBILITY TEST ELECTRIC: CPT

## 2021-02-18 PROCEDURE — 74011000258 HC RX REV CODE- 258: Performed by: FAMILY MEDICINE

## 2021-02-18 PROCEDURE — 82550 ASSAY OF CK (CPK): CPT

## 2021-02-18 PROCEDURE — 36430 TRANSFUSION BLD/BLD COMPNT: CPT

## 2021-02-18 PROCEDURE — 74011250637 HC RX REV CODE- 250/637: Performed by: FAMILY MEDICINE

## 2021-02-18 PROCEDURE — 85018 HEMOGLOBIN: CPT

## 2021-02-18 PROCEDURE — 82272 OCCULT BLD FECES 1-3 TESTS: CPT

## 2021-02-18 PROCEDURE — 65660000000 HC RM CCU STEPDOWN

## 2021-02-18 PROCEDURE — C9113 INJ PANTOPRAZOLE SODIUM, VIA: HCPCS | Performed by: STUDENT IN AN ORGANIZED HEALTH CARE EDUCATION/TRAINING PROGRAM

## 2021-02-18 PROCEDURE — 74011000250 HC RX REV CODE- 250: Performed by: STUDENT IN AN ORGANIZED HEALTH CARE EDUCATION/TRAINING PROGRAM

## 2021-02-18 PROCEDURE — 80053 COMPREHEN METABOLIC PANEL: CPT

## 2021-02-18 PROCEDURE — 93005 ELECTROCARDIOGRAM TRACING: CPT

## 2021-02-18 PROCEDURE — 85025 COMPLETE CBC W/AUTO DIFF WBC: CPT

## 2021-02-18 PROCEDURE — 70450 CT HEAD/BRAIN W/O DYE: CPT

## 2021-02-18 RX ORDER — SODIUM CHLORIDE 0.9 % (FLUSH) 0.9 %
5-40 SYRINGE (ML) INJECTION AS NEEDED
Status: DISCONTINUED | OUTPATIENT
Start: 2021-02-18 | End: 2021-02-26 | Stop reason: HOSPADM

## 2021-02-18 RX ORDER — METRONIDAZOLE 7.5 MG/G
GEL TOPICAL DAILY
Status: DISCONTINUED | OUTPATIENT
Start: 2021-02-19 | End: 2021-02-22

## 2021-02-18 RX ORDER — METOPROLOL TARTRATE 25 MG/1
25 TABLET, FILM COATED ORAL EVERY 12 HOURS
Status: DISCONTINUED | OUTPATIENT
Start: 2021-02-18 | End: 2021-02-26 | Stop reason: HOSPADM

## 2021-02-18 RX ORDER — METOPROLOL TARTRATE 50 MG/1
50 TABLET ORAL 2 TIMES DAILY
Status: ON HOLD | COMMUNITY
End: 2021-02-26 | Stop reason: SDUPTHER

## 2021-02-18 RX ORDER — SODIUM CHLORIDE 0.9 % (FLUSH) 0.9 %
5-40 SYRINGE (ML) INJECTION EVERY 8 HOURS
Status: DISCONTINUED | OUTPATIENT
Start: 2021-02-18 | End: 2021-02-26 | Stop reason: HOSPADM

## 2021-02-18 RX ORDER — ACETAMINOPHEN 500 MG
500 TABLET ORAL
COMMUNITY

## 2021-02-18 RX ORDER — SODIUM CHLORIDE 9 MG/ML
75 INJECTION, SOLUTION INTRAVENOUS CONTINUOUS
Status: DISCONTINUED | OUTPATIENT
Start: 2021-02-18 | End: 2021-02-22

## 2021-02-18 RX ORDER — ONDANSETRON 2 MG/ML
4 INJECTION INTRAMUSCULAR; INTRAVENOUS
Status: DISCONTINUED | OUTPATIENT
Start: 2021-02-18 | End: 2021-02-26 | Stop reason: HOSPADM

## 2021-02-18 RX ORDER — VANCOMYCIN/0.9 % SOD CHLORIDE 1.5G/250ML
1500 PLASTIC BAG, INJECTION (ML) INTRAVENOUS
Status: COMPLETED | OUTPATIENT
Start: 2021-02-18 | End: 2021-02-18

## 2021-02-18 RX ORDER — MICONAZOLE NITRATE 2 %
POWDER (GRAM) TOPICAL 2 TIMES DAILY
Status: DISCONTINUED | OUTPATIENT
Start: 2021-02-18 | End: 2021-02-26 | Stop reason: HOSPADM

## 2021-02-18 RX ADMIN — Medication 10 ML: at 13:27

## 2021-02-18 RX ADMIN — Medication 10 ML: at 15:54

## 2021-02-18 RX ADMIN — MICONAZOLE NITRATE 2 % TOPICAL POWDER: at 23:06

## 2021-02-18 RX ADMIN — Medication 10 ML: at 23:06

## 2021-02-18 RX ADMIN — SODIUM CHLORIDE 75 ML/HR: 9 INJECTION, SOLUTION INTRAVENOUS at 15:54

## 2021-02-18 RX ADMIN — SODIUM CHLORIDE 80 MG: 9 INJECTION, SOLUTION INTRAMUSCULAR; INTRAVENOUS; SUBCUTANEOUS at 13:26

## 2021-02-18 RX ADMIN — CEFEPIME HYDROCHLORIDE 2 G: 2 INJECTION, POWDER, FOR SOLUTION INTRAVENOUS at 17:20

## 2021-02-18 RX ADMIN — Medication 10 ML: at 22:22

## 2021-02-18 RX ADMIN — VANCOMYCIN HYDROCHLORIDE 1500 MG: 10 INJECTION, POWDER, LYOPHILIZED, FOR SOLUTION INTRAVENOUS at 17:20

## 2021-02-18 NOTE — ROUTINE PROCESS
TRANSFER - OUT REPORT: 
 
Verbal report given to [facility] RN (name) on Branden Mcneil  being transferred to [facility] (unit) for routine progression of care Report consisted of patients Situation, Background, Assessment and  
Recommendations(SBAR). Information from the following report(s) SBAR, ED Summary, MAR, Recent Results and Cardiac Rhythm NSR was reviewed with the receiving nurse. Lines:  
Peripheral IV 02/18/21 Right Hand (Active) Site Assessment Clean, dry, & intact 02/18/21 0830 Phlebitis Assessment 0 02/18/21 0921 Infiltration Assessment 0 02/18/21 0921 Dressing Status Clean, dry, & intact 02/18/21 2354 Peripheral IV 02/18/21 Left Antecubital (Active) Site Assessment Clean, dry, & intact 02/18/21 1712 Phlebitis Assessment 0 02/18/21 1712 Infiltration Assessment 0 02/18/21 1712 Dressing Status Clean, dry, & intact 02/18/21 1712 Dressing Type Transparent 02/18/21 1712 Hub Color/Line Status Pink;Patent; Flushed 02/18/21 1712 Action Taken Blood drawn 02/18/21 1712 Opportunity for questions and clarification was provided. Patient transported with: 
Belongings IV Fluids

## 2021-02-18 NOTE — ED NOTES
Pt brief changed, barrier cream applied and placed on waffle cushion to offload from buttocks. Repositioned in bed, heels elevated.

## 2021-02-18 NOTE — PROGRESS NOTES
Physical Therapy 2/18/2021    Orders received and chart reviewed up to date. Pt admitted, currently in ED. Hgb 5.0. Will hold PT and follow-up as appropriate. Thank you.   Gloria Canela, PT, DPT

## 2021-02-18 NOTE — WOUND CARE
WOCN Note:  
 
New consult for sacrum. Seen in ER. Chart shows: 
Admitted for multiple falls and weakness. Assessment:  
A&O x4. Reports no pain. Receiving blood. Required assistance to turn onto left. Reports he has been in recliner at home and unable to move. On stretcher. No edema or erythema to lower legs and feet. Red rash to groin consistent with candidiasis. Verbal consent given for wound photography. 1. POA, left heel = 2 x 2.1 cm  Base is 100% black eschar with some fluctuance and release of yellow purulence. No odor. Foot is without edema or redness. Cleaned with wound cleanser and cover dressing applied. 2. POA, right cheek erupting cancer = 4.1 x 4.5 x 0.5 cm with yellow exudate and malodor. Cheek otherwise without redness. Does not appear to go through to inside of mouth. Cleaned with spray cleanser. 3. POA, left ischial tuberosity unstageable pressure injury = 4 x 1.5 x 0.2 cm 100% yellow with malodor. Cleaned and applied petrolatum and cover dressing until Santyl is up from pharmacy. 4. POA, left central buttock stage 3 pressure injury = 0.9 x 0.9 x 0.2 cm  Pink with yellow glaze. Cleaned and hydrocolloid applied. POA, right central buttock stage 3 pressure injury = 1 x 1.2 x 0.2 cm  Pink with yellow glaze. Cleaned and hydrocolloid applied. Two pinpoint areas of red erosions left and right of sacrum. Right ischial dyschromia in linear pattern. Bilateral knees with dry abrasions. Wound Recommendations:   
Buttocks and ischial tuberosity: clean with saline, apply Santyl and cover dressing. Change daily. Left heel: moist gauze changed daily Right cheek: metrogel daily for odor management Antifungal powder to groin twice daily. PI Prevention: 
Turn/reposition approximately every 2 hours Offload heels with pillows at all times in bed. Will order Air mattress once room is designated. Discussed treatments and podiatry consult with Dr. Carmen Sandra. Perfect Serve message sent to Dr. Klever Aguero, podiatry. Transition of Care: Plan to follow weekly and as needed while admitted to hospital.  
 
STEPHANIE WolfeN, RN, [facility] Certified Wound, Ostomy, Continence Nurse 
office [phone] 
pager [phone] or call  to page

## 2021-02-18 NOTE — ED PROVIDER NOTES
65-year-old gentleman on aspirin and Plavix, history of stroke, presenting with worsening generalized weakness, has had difficulty walking over the past several weeks. He has not noticed any abdominal pain or vomiting. He denies noticing melena. He has not been able to get around his house for the past week and has had to use a walker and even with assistive devices been quite unsteady. He denies head injury or headache. No known history of GI bleeding. He has not had fevers or chills, denies dyspnea at rest. He does have dyspnea with exertion. He states that his lower extremities have both experienced decrease in sensation. Past Medical History:   Diagnosis Date    Skin cancer 12/19/2019    bcc-left forehead, left neck, left lateral cheek     Stroke (cerebrum) ([facility] [address]        No past surgical history on file. No family history on file. Social History     Socioeconomic History    Marital status:      Spouse name: Not on file    Number of children: Not on file    Years of education: Not on file    Highest education level: Not on file   Occupational History    Not on file   Social Needs    Financial resource strain: Not on file    Food insecurity     Worry: Not on file     Inability: Not on file    Transportation needs     Medical: Not on file     Non-medical: Not on file   Tobacco Use    Smoking status: Current Every Day Smoker    Smokeless tobacco: Never Used   Substance and Sexual Activity    Alcohol use:  Yes    Drug use: Not on file    Sexual activity: Not on file   Lifestyle    Physical activity     Days per week: Not on file     Minutes per session: Not on file    Stress: Not on file   Relationships    Social connections     Talks on phone: Not on file     Gets together: Not on file     Attends Yazdanism service: Not on file     Active member of club or organization: Not on file     Attends meetings of clubs or organizations: Not on file     Relationship status: Not on file    Intimate partner violence     Fear of current or ex partner: Not on file     Emotionally abused: Not on file     Physically abused: Not on file     Forced sexual activity: Not on file   Other Topics Concern    Not on file   Social History Narrative    Not on file         ALLERGIES: Patient has no known allergies. Review of Systems   Constitutional: Negative for chills, fatigue and fever. HENT: Negative for ear pain, sore throat and trouble swallowing. Eyes: Negative for visual disturbance. Respiratory: Negative for cough and shortness of breath. Cardiovascular: Negative for chest pain. Gastrointestinal: Negative for abdominal pain. Genitourinary: Negative for dysuria. Musculoskeletal: Negative for back pain. Skin: Negative for rash. Neurological: Negative for light-headedness and headaches. Psychiatric/Behavioral: Negative for confusion. All other systems reviewed and are negative. Vitals:    02/18/21 0915 02/18/21 0930 02/18/21 1000 02/18/21 1030   BP: (!) 149/75 (!) 146/68 139/61 (!) 150/63   Pulse: 90 85 77 83   Resp: 20 16 22 15   Temp: 98.9 °F (37.2 °C)      SpO2: 100% 100%  100%   Weight: 74.8 kg (165 lb)      Height: 5' 8\" (1.727 m)               Physical Exam  Vitals signs and nursing note reviewed. Constitutional:       General: He is not in acute distress. Appearance: He is well-developed. He is ill-appearing. Comments: Weak and chronically ill-appearing elderly man appears older than stated age   HENT:      Head: Normocephalic and atraumatic. Right Ear: External ear normal.      Left Ear: External ear normal.      Mouth/Throat:      Mouth: Mucous membranes are moist.      Pharynx: Oropharynx is clear. Eyes:      Pupils: Pupils are equal, round, and reactive to light. Neck:      Musculoskeletal: Normal range of motion. Cardiovascular:      Rate and Rhythm: Normal rate and regular rhythm. Heart sounds: Normal heart sounds.    Pulmonary: Effort: Pulmonary effort is normal.      Breath sounds: Normal breath sounds. Chest:      Chest wall: No tenderness. Abdominal:      General: There is no distension. Palpations: Abdomen is soft. There is no mass. Tenderness: There is no abdominal tenderness. There is no guarding or rebound. Genitourinary:     Comments: Patient examined with nurse Vasiliy Malone chaperone  Multiple superficial ulcerations from pressure and liquid exposure  Musculoskeletal: Normal range of motion. Right lower leg: Edema present. Left lower leg: Edema present. Comments: Patient has biphasic Doppler signal bilateral DP and PT.    2+ pitting edema bilaterally   Skin:     General: Skin is warm and dry. Capillary Refill: Capillary refill takes less than 2 seconds. Coloration: Skin is pale. Neurological:      General: No focal deficit present. Mental Status: He is alert and oriented to person, place, and time. Sensory: No sensory deficit. Psychiatric:         Mood and Affect: Mood normal.          MDM  Number of Diagnoses or Management Options      Perfect Serve Consult for Admission  12:03 PM    ED Room Number: ER17/17  Patient Name and age:  Herman Escalera 66 y.o.  male  Working Diagnosis:   1. Gastrointestinal hemorrhage, unspecified gastrointestinal hemorrhage type    2. Generalized weakness    3. Symptomatic anemia        COVID-19 Suspicion:  no  Sepsis present:  no  Reassessment needed: no  Code Status:  Full Code  Readmission: no  Isolation Requirements:  no  Recommended Level of Care:  telemetry  Department:[facility] Adult ED - 21   Other:    Patient complaining only of generalized weakness. He does have heme positive stool which is dark although not frankly melenic   GI consult placed as well as started with transfusion and PPI.        Procedures

## 2021-02-18 NOTE — PROGRESS NOTES
Admission Medication Reconciliation: In progress:    Unable to speak with patient face to face at this time due to general isolation precautions in the ED related to COVID-19 pandemic, likewise he does not have close family to call to assist with medication history interview. Chart notes and RX Query were therefore used exclusively to update medication list.    Medication changes (since last review): Added:  APAP     Revised:  Metoprolol tartrate to 50 mg (as per RX Query)    Deleted:  Pramipexole: per RX Query, last filled 11/11/2020 x 7 day supply      Thank you for allowing me to participate in the care of your patient. Genie Reaves PharmD, RN # [phone]       [facility] pharmacy benefit data reflects medications filled and processed through the patient's insurance, however   this data does NOT capture whether the medication was picked up or is currently being taken by the patient. Allergies:  Patient has no known allergies. Significant PMH/Disease States:   Past Medical History:   Diagnosis Date    Skin cancer 12/19/2019    bcc-left forehead, left neck, left lateral cheek     Stroke (cerebrum) [facility])      Chief Complaint for this Admission:    Chief Complaint   Patient presents with    Extremity Weakness     Prior to Admission Medications:   Prior to Admission Medications   Prescriptions Last Dose Informant Taking?   acetaminophen (TYLENOL) 500 mg tablet   Yes   Sig: Take 500 mg by mouth every six (6) hours as needed for Pain. aspirin 81 mg chewable tablet Unknown at Unknown time  No   Sig: Take 1 Tab by mouth daily. atorvastatin (LIPITOR) 40 mg tablet Unknown at Unknown time  No   Sig: Take 1 Tab by mouth nightly. clopidogreL (PLAVIX) 75 mg tab Unknown at Unknown time  No   Sig: Take 1 Tab by mouth daily. ferrous sulfate (Iron) 325 mg (65 mg iron) tablet Unknown at Unknown time  No   Sig: Take 1 Tab by mouth Daily (before breakfast).    metoprolol tartrate (LOPRESSOR) 50 mg tablet Unknown at Unknown time  No   Sig: Take 50 mg by mouth two (2) times a day. pantoprazole (Protonix) 40 mg tablet Unknown at Unknown time  No   Sig: Take 1 Tab by mouth two (2) times a day. Facility-Administered Medications: None     Please contact the main inpatient pharmacy with any questions or concerns at [phone] and we will direct you to the clinical pharmacist covering this patient's care while in-house.    JAMARI Templeton

## 2021-02-18 NOTE — ED TRIAGE NOTES
Pt from home with c/o bilat lower extremity weakness for past three weeks; pt. States he has been falling daily and knows he can't take care of himself anymore.

## 2021-02-18 NOTE — PROGRESS NOTES
Pharmacist Note - Vancomycin Dosing    Consult provided for this 66 y.o. male for indication of a lower extremity SSTI with possible osteomyelitis. Antibiotic regimen(s): Vancomycin and Cefepime  Patient on vancomycin PTA? NO     Recent Labs     21  0917   WBC 8.0   CREA 1.62*   BUN 20     Frequency of BMP: daily through   Height: 172.7 cm  Weight: 74.8 kg  Est CrCl: ~40 ml/min  Temp (24hrs), Av.6 °F (37 °C), Min:98.4 °F (36.9 °C), Max:98.9 °F (37.2 °C)    Cultures:   Blood:  pending    Goal trough = 15 - 20 mcg/mL (until osteo ruled out)    Therapy will be initiated with a loading dose of 1500 mg IV x 1 to be followed by a maintenance dose of 1000 mg IV every 18 hours. Pharmacy to follow patient daily and order levels / make dose adjustments as appropriate.

## 2021-02-18 NOTE — H&P
History & Physical    Primary Care Provider: Anurag, MD Ryan  Source of Information: Patient     History of Presenting Illness:   Jey Mobley is a 66 y.o. male who presents with weakness. History was primarily obtained from the patient. Patient is a poor historian as he is extremely hard of hearing. Patient reports that he has been having some difficulty walking for the last 4 weeks. Patient reports usually he is able to walk with his walker. Patient reports he feels unsteady on his feet. Patient denies any other complaints or problems, reports that he is afraid that he is going to fall but has not had a fall in the past 1 month. Patient came to the ER and was found to have a hemoglobin of 5, patient is on DAPT, was found to be FOBT positive and was requested to be admitted to the hospitalist service. Patient denies any other complaints or problems. Again patient is a poor historian. The patient denies any Headache, blurry vision, sore throat, trouble swallowing, trouble with speech, chest pain, SOB, cough, fever, chills, N/V/D, abd pain, urinary symptoms, constipation, recent travels, sick contacts, ,  falls, injuries, rashes, contact with COVID-19 diagnosed patients, hematemesis, melena, hemoptysis, hematuria, rashes, denies starting any new medications and denies any other concerns or problems besides as mentioned above. Review of Systems:  A comprehensive review of systems was negative except for that written in the History of Present Illness. Past Medical History:   Diagnosis Date    Skin cancer 12/19/2019    bcc-left forehead, left neck, left lateral cheek     Stroke (cerebrum) ([facility] [address]       No past surgical history on file. Prior to Admission medications    Medication Sig Start Date End Date Taking? Authorizing Provider   atorvastatin (LIPITOR) 40 mg tablet Take 1 Tab by mouth nightly.  9/17/20   Hao Finn MD   aspirin 81 mg chewable tablet Take 1 Tab by mouth daily. 9/18/20   Rosalia De Leon MD   clopidogreL (PLAVIX) 75 mg tab Take 1 Tab by mouth daily. 9/18/20   Rosalia De Leon MD   metoprolol tartrate (LOPRESSOR) 25 mg tablet Take 1 Tab by mouth every twelve (12) hours. 9/17/20   Rosalia De Leon MD   pantoprazole (Protonix) 40 mg tablet Take 1 Tab by mouth two (2) times a day. 9/17/20   Rosalia De Leon MD   ferrous sulfate (Iron) 325 mg (65 mg iron) tablet Take 1 Tab by mouth Daily (before breakfast). 9/17/20   Rosalia De Leon MD   pramipexole (MIRAPEX) 0.125 mg tablet  11/23/19   Provider, Historical     No Known Allergies   No family history on file. SOCIAL HISTORY:  Patient resides:  Independently x   Assisted Living    SNF    With family care       Smoking history:   None    Former x   Chronic      Alcohol history:   None    Social x   Chronic      Ambulates:   Independently    w/cane    w/walker x   w/wc    CODE STATUS:  DNR    Full x   Other      Objective:     Physical Exam:     Visit Vitals  BP (!) 155/63   Pulse 87   Temp 98.6 °F (37 °C)   Resp 20   Ht 5' 8\" (1.727 m)   Wt 74.8 kg (165 lb)   SpO2 96%   BMI 25.09 kg/m²      O2 Device: Room air    General : alert x 3, awake, very hard of hearing, pleasant male  HEENT: PEERL, EOMI, moist mucus membrane, TM clear  Neck: supple, no JVD,   Chest: Decreased basal breath sounds  CVS: S1 S2 heard,   Abd: soft/ Non tender, non distended, BS physiological,   Ext: no clubbing, no cyanosis, no edema, patient with left heel wound with purulent discharge  Neuro/Psych: Limited exam as patient is not very cooperative, DTR 1+ to 4, sensory, strength and cranial nerves could not be tested although patient moves all 4.   Skin: Multiple areas of excoriation and superficial wounds in the gluteal and groin region      EKG: Normal sinus rhythm with nonspecific ST changes      Data Review:     Recent Days:  Recent Labs     02/18/21  0917   WBC 8.0   HGB 5.0*   HCT 17.1*   *     Recent Labs 02/18/21  0917      K 3.8      CO2 22      BUN 20   CREA 1.62*   CA 8.1*   ALB 2.6*   ALT 11*     No results for input(s): PH, PCO2, PO2, HCO3, FIO2 in the last 72 hours. 24 Hour Results:  Recent Results (from the past 24 hour(s))   CBC WITH AUTOMATED DIFF    Collection Time: 02/18/21  9:17 AM   Result Value Ref Range    WBC 8.0 4.1 - 11.1 K/uL    RBC 2.07 (L) 4.10 - 5.70 M/uL    HGB 5.0 (LL) 12.1 - 17.0 g/dL    HCT 17.1 (LL) 36.6 - 50.3 %    MCV 82.6 80.0 - 99.0 FL    MCH 24.2 (L) 26.0 - 34.0 PG    MCHC 29.2 (L) 30.0 - 36.5 g/dL    RDW 16.1 (H) 11.5 - 14.5 %    PLATELET 171 (H) 486 - 400 K/uL    MPV 11.1 8.9 - 12.9 FL    NRBC 0.0 0  WBC    ABSOLUTE NRBC 0.00 0.00 - 0.01 K/uL    NEUTROPHILS 78 (H) 32 - 75 %    LYMPHOCYTES 10 (L) 12 - 49 %    MONOCYTES 10 5 - 13 %    EOSINOPHILS 1 0 - 7 %    BASOPHILS 1 0 - 1 %    IMMATURE GRANULOCYTES 0 0.0 - 0.5 %    ABS. NEUTROPHILS 6.2 1.8 - 8.0 K/UL    ABS. LYMPHOCYTES 0.8 0.8 - 3.5 K/UL    ABS. MONOCYTES 0.8 0.0 - 1.0 K/UL    ABS. EOSINOPHILS 0.1 0.0 - 0.4 K/UL    ABS. BASOPHILS 0.1 0.0 - 0.1 K/UL    ABS. IMM.  GRANS. 0.0 0.00 - 0.04 K/UL    DF SMEAR SCANNED      RBC COMMENTS ANISOCYTOSIS  1+        RBC COMMENTS HYPOCHROMIA  1+        RBC COMMENTS OVALOCYTES  PRESENT        RBC COMMENTS TARGET CELLS  PRESENT        RBC COMMENTS POLYCHROMASIA  1+        RBC COMMENTS Pathology Review Requested     METABOLIC PANEL, COMPREHENSIVE    Collection Time: 02/18/21  9:17 AM   Result Value Ref Range    Sodium 139 136 - 145 mmol/L    Potassium 3.8 3.5 - 5.1 mmol/L    Chloride 107 97 - 108 mmol/L    CO2 22 21 - 32 mmol/L    Anion gap 10 5 - 15 mmol/L    Glucose 100 65 - 100 mg/dL    BUN 20 6 - 20 MG/DL    Creatinine 1.62 (H) 0.70 - 1.30 MG/DL    BUN/Creatinine ratio 12 12 - 20      GFR est AA 51 (L) >60 ml/min/1.73m2    GFR est non-AA 42 (L) >60 ml/min/1.73m2    Calcium 8.1 (L) 8.5 - 10.1 MG/DL    Bilirubin, total 0.3 0.2 - 1.0 MG/DL    ALT (SGPT) 11 (L) 12 - 78 U/L    AST (SGOT) 9 (L) 15 - 37 U/L    Alk. phosphatase 99 45 - 117 U/L    Protein, total 6.8 6.4 - 8.2 g/dL    Albumin 2.6 (L) 3.5 - 5.0 g/dL    Globulin 4.2 (H) 2.0 - 4.0 g/dL    A-G Ratio 0.6 (L) 1.1 - 2.2     SAMPLES BEING HELD    Collection Time: 02/18/21  9:17 AM   Result Value Ref Range    SAMPLES BEING HELD 1BLU,1RED     COMMENT        Add-on orders for these samples will be processed based on acceptable specimen integrity and analyte stability, which may vary by analyte. CK    Collection Time: 02/18/21  9:17 AM   Result Value Ref Range    CK 83 39 - 308 U/L   TYPE & SCREEN    Collection Time: 02/18/21  9:17 AM   Result Value Ref Range    Crossmatch Expiration 02/21/2021,2359     ABO/Rh(D) A POSITIVE     Antibody screen NEG     Unit number [id number]     [facility]     [facility]     Status of unit ISSUED     Crossmatch result Compatible     Unit number [id number]     [facility]     Unit division 00     Status of unit ALLOCATED     Crossmatch result Compatible    OCCULT BLOOD, STOOL    Collection Time: 02/18/21 10:25 AM   Result Value Ref Range    Occult blood, stool Positive (A) NEG     RBC, ALLOCATE    Collection Time: 02/18/21 11:45 AM   Result Value Ref Range    HISTORY CHECKED? Historical check performed    EKG, 12 LEAD, INITIAL    Collection Time: 02/18/21  1:44 PM   Result Value Ref Range    Ventricular Rate 87 BPM    Atrial Rate 87 BPM    P-R Interval 118 ms    QRS Duration 88 ms    Q-T Interval 394 ms    QTC Calculation (Bezet) 474 ms    Calculated P Axis 36 degrees    Calculated R Axis 21 degrees    Calculated T Axis 27 degrees    Diagnosis       Normal sinus rhythm  ST abnormality, possible digitalis effect  When compared with ECG of 08-SEP-2020 07:03,  Nonspecific T wave abnormality, improved in Inferior leads           Imaging:   No results found.   Assessment:     GI bleed: Patient will be admitted on a telemetry bed, Protonix twice daily, GI consult, n.p.o., monitor H&H, transfuse 2 unit PRBC, hold anticoagulation, supportive care and close monitoring, further intervention per hospital course, reassess as needed    Acute blood loss anemia: Likely secondary above, H&H every 8 hours, transfuse as needed, supportive care, close monitoring, further intervention per hospitalist, reassess as needed    Weakness: Unclear etiology, likely secondary to above, will get PT consult, CT of the head, neurovascular checks, supportive care, IV hydration, symptoms persist may consider further intervention and diagnostics, reassess as needed    TUSHAR: Likely prerenal, gentle IV hydration, avoid nephrotoxic medication, renally dose all medication, supportive care, close monitoring, reassess as needed, trend creatinine and if elevation persist may consider nephrology consult and additional imaging    History of CVA: Patient with history of carotid artery disease, on dual antiplatelet agents, hold for now, monitor with neurochecks, supportive care and reassess as needed    Infected wound left lower extremity: Patient with infected left heel wound, with purulent discharge, start patient on broad-spectrum IV antibiotics, blood cultures, wound care consult, supportive care and close monitoring, x-ray of the foot and podiatry consult supportive care and close monitoring    Groin intertrigo: Topical antifungal    Coronary artery disease: Monitor, cycle troponins, further intervention per hospital course      GI DVT prophylaxis: Patient will be on SCDs             Signed By: Ignacio Hicks MD     February 18, 2021

## 2021-02-18 NOTE — ED NOTES
Will await PT consult prior to getting patient out of bed and orthostatic pressures due to increased falls and weakness.

## 2021-02-18 NOTE — CONSULTS
[facility].   [address]  [address] [id number]        GASTROENTEROLOGY CONSULTATION NOTE  Will Silvino Castro  [phone] office  [phone] NP/PA in-hospital cell phone M-F until 4:30PM  After 5PM or on weekends, please call  for physician on call        NAME:  Mariposa Auguste   :   1950   MRN:   [id number]       Referring Physician: Dr. Vick Enriquez Date: 2021 2:57 PM    Chief Complaint: weakness and difficulty walking     History of Present Illness:  Patient is a 66 y.o. who is seen in consultation at the request of Dr. Hemal Whipple for GI bleed. Patient has a past medical history significant for CVA (on aspirin and Plavix). He presented to the ED for worsening generalized weakness and difficulty walking over the last several weeks. In the ED, Hgb was found to be 5.0. Patient was admitted to the hospital on 21 for GI bleed, acute blood loss anemia, weakness, and acute kidney injury. No nausea, reflux, or vomiting. No dysphagia or odynophagia. No abdominal bloating, early satiety, or abdominal pain. No change in bowel habits. Patient reports black stools for the last 3-4 months, although he has attributed to oral iron supplementation. No hematochezia. No fevers, chills, or unexplained weight loss. Patient was seen during previous admission in 2020 for anemia. EGD (9/10/20) by Dr. Alessandro Valladares showed an esophageal stricture with overlying clean-based ulcer, (biopsied), dilation deferred in setting of ulceration; unable to pass scope. Pathology of esophageal ulcer showed benign junctional mucosa with focal acute esophagitis and antral-type gastric mucosa showing foveolar hyperplasia, no definite evidence of viral cytopathic effect or fungal organisms. Possible NSAID use. Patient is on aspirin and Plavix (last dose 217 AM). Denies history of abdominal surgeries. No known history of colonoscopy.   Patient reports having had a Cologuard test in the last few months. A detailed history is difficult to obtain. I have reviewed the emergency room note, hospital admission note, notes by all other clinicians who have seen the patient during this hospitalization to date. I have reviewed the problem list and the reason for this hospitalization. I have reviewed the allergies and the medications the patient was taking at home prior to this hospitalization. PMH:  Past Medical History:   Diagnosis Date    Skin cancer 12/19/2019    bcc-left forehead, left neck, left lateral cheek     Stroke (cerebrum) (HCC)        PSH:  No past surgical history on file. Allergies:  No Known Allergies    Home Medications:  Prior to Admission Medications   Prescriptions Last Dose Informant Patient Reported? Taking?   aspirin 81 mg chewable tablet   No No   Sig: Take 1 Tab by mouth daily. atorvastatin (LIPITOR) 40 mg tablet   No No   Sig: Take 1 Tab by mouth nightly. clopidogreL (PLAVIX) 75 mg tab   No No   Sig: Take 1 Tab by mouth daily. ferrous sulfate (Iron) 325 mg (65 mg iron) tablet   No No   Sig: Take 1 Tab by mouth Daily (before breakfast). metoprolol tartrate (LOPRESSOR) 25 mg tablet   No No   Sig: Take 1 Tab by mouth every twelve (12) hours. pantoprazole (Protonix) 40 mg tablet   No No   Sig: Take 1 Tab by mouth two (2) times a day.    pramipexole (MIRAPEX) 0.125 mg tablet   Yes No      Facility-Administered Medications: None       Hospital Medications:  Current Facility-Administered Medications   Medication Dose Route Frequency    sodium chloride (NS) flush 5-40 mL  5-40 mL IntraVENous Q8H    sodium chloride (NS) flush 5-40 mL  5-40 mL IntraVENous PRN    metoprolol tartrate (LOPRESSOR) tablet 25 mg  25 mg Oral Q12H    sodium chloride (NS) flush 5-40 mL  5-40 mL IntraVENous Q8H    sodium chloride (NS) flush 5-40 mL  5-40 mL IntraVENous PRN    0.9% sodium chloride infusion  75 mL/hr IntraVENous CONTINUOUS    ondansetron (ZOFRAN) injection 4 mg 4 mg IntraVENous Q4H PRN    pantoprazole (PROTONIX) 40 mg in 0.9% sodium chloride 10 mL injection  40 mg IntraVENous BID     Current Outpatient Medications   Medication Sig    atorvastatin (LIPITOR) 40 mg tablet Take 1 Tab by mouth nightly.  aspirin 81 mg chewable tablet Take 1 Tab by mouth daily.  clopidogreL (PLAVIX) 75 mg tab Take 1 Tab by mouth daily.  metoprolol tartrate (LOPRESSOR) 25 mg tablet Take 1 Tab by mouth every twelve (12) hours.  pantoprazole (Protonix) 40 mg tablet Take 1 Tab by mouth two (2) times a day.  ferrous sulfate (Iron) 325 mg (65 mg iron) tablet Take 1 Tab by mouth Daily (before breakfast).  pramipexole (MIRAPEX) 0.125 mg tablet        Social History:  Social History     Tobacco Use    Smoking status: Current Every Day Smoker    Smokeless tobacco: Never Used   Substance Use Topics    Alcohol use: Yes       Family History:  No family history on file.     Review of Systems:  Constitutional: negative fever, negative chills, negative weight loss  Eyes:   negative visual changes  ENT:   negative sore throat, tongue or lip swelling  Respiratory:  negative cough, negative dyspnea  Cards:  negative for chest pain, palpitations, lower extremity edema  GI:   See HPI  :  negative for frequency, dysuria  Integument:  negative for rash and pruritus  Heme:  negative for easy bruising and gum/nose bleeding  Musculoskeletal:negative for myalgias, negative back pain, + lower extremity weakness  Neuro:  negative for headaches, dizziness  Psych: negative for feelings of anxiety, depression       Objective:     Patient Vitals for the past 8 hrs:   BP Temp Pulse Resp SpO2 Height Weight   02/18/21 1430 (!) 145/62 98.6 °F (37 °C) 78 16 99 %     02/18/21 1415 (!) 155/63 98.4 °F (36.9 °C) 87 20 96 %     02/18/21 1400 138/67 98.5 °F (36.9 °C) 79 12 98 %     02/18/21 1345 (!) 140/62 98.6 °F (37 °C) 90 15 100 %     02/18/21 1330 (!) 152/69 98.6 °F (37 °C) 91 18 100 %     02/18/21 1030 (!) 150/63 — 83 15 100 % — —   02/18/21 1000 139/61 — 77 22 — — —   02/18/21 0930 (!) 146/68 — 85 16 100 % — —   02/18/21 0915 (!) 149/75 98.9 °F (37.2 °C) 90 20 100 % 5' 8\" (1.727 m) 74.8 kg (165 lb)     No intake/output data recorded.  No intake/output data recorded.    EXAM:     CONST:  Pleasant male lying in bed, no acute distress, chronically ill-appearing   NEURO:  Alert and oriented x 3   HEENT: No scleral icterus   LUNGS: No acute respiratory distress   CARD:  S1 S2   ABD:  Soft, non distended, no tenderness, no rebound, no guarding. + Bowel sounds.    EXT:  Warm   PSYCH: Not anxious or agitated       Data Review     Recent Labs     02/18/21 0917   WBC 8.0   HGB 5.0*   HCT 17.1*   *     Recent Labs     02/18/21 0917      K 3.8      CO2 22   BUN 20   CREA 1.62*      CA 8.1*     Recent Labs     02/18/21 0917   AP 99   TP 6.8   ALB 2.6*   GLOB 4.2*     No results for input(s): INR, PTP, APTT, INREXT in the last 72 hours.    EGD (9/10/20) by Dr. Burton showed an esophageal stricture with overlying clean-based ulcer, (biopsied), dilation deferred in setting of ulceration; unable to pass scope.  Pathology of esophageal ulcer showed benign junctional mucosa with focal acute esophagitis and antral-type gastric mucosa showing foveolar hyperplasia, no definite evidence of viral cytopathic effect or fungal organisms       Assessment:   · Anemia with hemoccult positive stool. Hgb 5.0 (8.2 on 10/29/20), platelets 418. Receiving 1 unit PRBCs now. No iron studies. On oral iron supplementation, black stools. EGD in 9/2020 with above findings. No known history of colonoscopy. Differential includes peptic ulcer disease, AVMs, and malignancy.  · Weakness and difficulty walking  · Acute kidney injury  · History of CVA: on aspirin and Plavix (last dose 2/17 AM)  · Coronary artery disease     Patient Active Problem List   Diagnosis Code   • NSTEMI (non-ST elevated myocardial infarction) (HCC)  I21.4    Carotid artery stenosis, symptomatic, left I65.22    GI bleed K92.2     Plan:   · NPO after midnight  · PPI BID  · Receiving 1 of 2 units PRBCs. Trend CBC and transfuse as necessary. · Plavix on hold (last dose 2/17 AM)  · Plan for EGD tomorrow with Dr. Vero Snider. Details and risks of the procedure to include (but not limited to) anesthesia, bleeding, infection, and perforation were discussed. Patient understands and is in agreement with the plan.   · Please obtain rapid COVID test  · Patient was discussed with and will be seen by Dr. Vero Snider  · Thank you for allowing me to participate in care of Mariposa Auguste     Signed By: GUERLINE Bowman     2/18/2021  2:57 PM     Agree with above    EGD in am

## 2021-02-18 NOTE — CONSULTS
Podiatry Consult    Subjective:         Date of Consultation: February 18, 2021     Referring Physician: Cem Wong MD    Patient is a 66 y.o. male who is being seen for left heel wound. Patient is aaox3 but very poor historian. Patient is currently being admitted for LGIB 2/2 acutely low Hgb with +FOBT after transfer by EMS. Regarding his heel wound, Mr. Emma El states that about two or three weeks ago he felt his legs \"goes numb\" and he \"couldn't walk\". He points to both of his legs below his knees in terms of level of numbness he felt. He says he has fallen many time since then and that he \"can't walk\". He is unable to explain why this happened. He has not done much for it except call the ambulance. He lives alone and has no family or nursing that helps him on a regular basis. He denies head pain, neck pain, back pain or hip pain. He is able to recall all of his falls to his knowledge. His left heel woundhas been there for a duration that he is unable to recall. However, he does state that his hurts him when there is pressure applied. He has not seen anyone for it. Denies nausea, vomiting, diarrhea, sob, cp, cough, fevers. 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 No family history on file.    Social History     Tobacco Use    Smoking status: Current Every Day Smoker    Smokeless tobacco: Never Used   Substance Use Topics    Alcohol use: Yes     Current Facility-Administered Medications   Medication Dose Route Frequency Provider Last Rate Last Admin    sodium chloride (NS) flush 5-40 mL  5-40 mL IntraVENous Q8H Keyon Hernandez MD   Stopped at 02/18/21 1400    sodium chloride (NS) flush 5-40 mL  5-40 mL IntraVENous PRN Oscar Gibson MD   10 mL at 02/18/21 1327    metoprolol tartrate (LOPRESSOR) tablet 25 mg  25 mg Oral Q12H Kishore Spicer MD   Stopped at 02/18/21 1434    sodium chloride (NS) flush 5-40 mL  5-40 mL IntraVENous Q8H Kishore Spicer MD   10 mL at 02/18/21 1554    sodium chloride (NS) flush 5-40 mL  5-40 mL IntraVENous PRN Kishore Spicer MD        0.9% sodium chloride infusion  75 mL/hr IntraVENous CONTINUOUS Kishore Spicer MD 75 mL/hr at 02/18/21 1554 75 mL/hr at 02/18/21 1554    ondansetron (ZOFRAN) injection 4 mg  4 mg IntraVENous Q4H PRN Kishore Spicer MD        pantoprazole (PROTONIX) 40 mg in 0.9% sodium chloride 10 mL injection  40 mg IntraVENous BID Kihsore Spicer MD        vancomycin (VANCOCIN) 1500 mg in  ml infusion  1,500 mg IntraVENous NOW Kishore Spicer MD        cefepime (MAXIPIME) 2 g in 0.9% sodium chloride (MBP/ADV) 100 mL MBP  2 g IntraVENous Q12H MD Javi Mason ON 2/19/2021] collagenase (SANTYL) 250 unit/gram ointment   Topical DAILY MD Javi Mason ON 2/19/2021] metroNIDAZOLE (METROGEL) 0.75 % gel   Topical DAILY Kishore Spicer MD        miconazole (MICOTIN) 2 % powder   Topical BID Kishore Spicer MD         Current Outpatient Medications   Medication Sig Dispense Refill    atorvastatin (LIPITOR) 40 mg tablet Take 1 Tab by mouth nightly. 90 Tab 0    aspirin 81 mg chewable tablet Take 1 Tab by mouth daily. 90 Tab 0    clopidogreL (PLAVIX) 75 mg tab Take 1 Tab by mouth daily. 90 Tab 0    metoprolol tartrate (LOPRESSOR) 25 mg tablet Take 1 Tab by mouth every twelve (12) hours. 90 Tab 0    pantoprazole (Protonix) 40 mg tablet Take 1 Tab by mouth two (2) times a day. 90 Tab 0    ferrous sulfate (Iron) 325 mg (65 mg iron) tablet Take 1 Tab by mouth Daily (before breakfast).  90 Tab 0    pramipexole (MIRAPEX) 0.125 mg tablet         No Known Allergies     Review of Systems:  Pertinent items are noted in the History of Present Illness.     Objective:     Patient Vitals for the past 8 hrs:   BP Temp Pulse Resp SpO2 Height Weight   21 1627 (!) 147/65 98.4 °F (36.9 °C) 85 12 98 %     21 1530 (!) 152/63 98.5 °F (36.9 °C) 75 15 98 %     21 1430 (!) 145/62 98.6 °F (37 °C) 78 16 99 %     21 1415 (!) 155/63 98.4 °F (36.9 °C) 87 20 96 %     21 1400 138/67 98.5 °F (36.9 °C) 79 12 98 %     21 1345 (!) 140/62 98.6 °F (37 °C) 90 15 100 %     21 1330 (!) 152/69 98.6 °F (37 °C) 91 18 100 %     21 1030 (!) 150/63  83 15 100 %     21 1000 139/61  77 22      21 0930 (!) 146/68  85 16 100 %     21 0915 (!) 149/75 98.9 °F (37.2 °C) 90 20 100 % 5' 8\" (1.727 m) 74.8 kg (165 lb)     Temp (24hrs), Av.6 °F (37 °C), Min:98.4 °F (36.9 °C), Max:98.9 °F (37.2 °C)      Physical Exam:    Gen: no acute distress  Cardiac: rrr, no murmurs appriecated, no bruits or thrills, no JVD  GI: abdominal sounds present, abdomen is nontender with appropriate epigastric tympany, no rebound tenderness  Psych: aaox2, at baseline    Vascular: RLE DP is 0 and PT is 0, LLE DP is 0 and PT is 0  CFT is > 3 seconds, There is no peripheral edema bilaterally  Skin temperature is cool to warm from distal to proximal    Orthopaedic:   Amputations: None    RLE:  No ankle joint range of motion  Fires EHL/EDL/FHL/FDL    LLE:   No ankle joint range of motion  Fires EHL/EDL/FHL/FDL    Palpation: 9/10 pain on VAS to left heel wound      Neuro: Alden test reveals absent proprioception up to level of mid tibia, Sensation absent to light touch up to level of mid tibia, both bilateral    Derm:   Wound Number: 1  Wound Location: Left heel  Wound Type: Pressure, ischemic  Wound Size: 2x2x0  Wound Base: Eschar, boggy, no purulence expressed and no PTB  Periwound: Intact, viable, erythema noted without overt cellulitis or ascending lymphangitis  Surrounding Skin: Intact, viable   Drainage: None  Odor: Mild    Wound was cleansed with saline. Wound was unable to be debrided due to patient tolerance. Wound was dressed with betadine + DSD without ACE given concerns for vasculopathy      Lab Review:   Recent Results (from the past 24 hour(s))   CBC WITH AUTOMATED DIFF    Collection Time: 02/18/21  9:17 AM   Result Value Ref Range    WBC 8.0 4.1 - 11.1 K/uL    RBC 2.07 (L) 4.10 - 5.70 M/uL    HGB 5.0 (LL) 12.1 - 17.0 g/dL    HCT 17.1 (LL) 36.6 - 50.3 %    MCV 82.6 80.0 - 99.0 FL    MCH 24.2 (L) 26.0 - 34.0 PG    MCHC 29.2 (L) 30.0 - 36.5 g/dL    RDW 16.1 (H) 11.5 - 14.5 %    PLATELET 956 (H) 633 - 400 K/uL    MPV 11.1 8.9 - 12.9 FL    NRBC 0.0 0  WBC    ABSOLUTE NRBC 0.00 0.00 - 0.01 K/uL    NEUTROPHILS 78 (H) 32 - 75 %    LYMPHOCYTES 10 (L) 12 - 49 %    MONOCYTES 10 5 - 13 %    EOSINOPHILS 1 0 - 7 %    BASOPHILS 1 0 - 1 %    IMMATURE GRANULOCYTES 0 0.0 - 0.5 %    ABS. NEUTROPHILS 6.2 1.8 - 8.0 K/UL    ABS. LYMPHOCYTES 0.8 0.8 - 3.5 K/UL    ABS. MONOCYTES 0.8 0.0 - 1.0 K/UL    ABS. EOSINOPHILS 0.1 0.0 - 0.4 K/UL    ABS. BASOPHILS 0.1 0.0 - 0.1 K/UL    ABS. IMM. GRANS. 0.0 0.00 - 0.04 K/UL    DF SMEAR SCANNED      RBC COMMENTS ANISOCYTOSIS  1+        RBC COMMENTS HYPOCHROMIA  1+        RBC COMMENTS OVALOCYTES  PRESENT        RBC COMMENTS TARGET CELLS  PRESENT        RBC COMMENTS POLYCHROMASIA  1+        RBC COMMENTS Pathology Review Requested      Pathologist review        Pathologic examination results can be viewed in [facility] Chart Review under the Pathology tab.    METABOLIC PANEL, COMPREHENSIVE    Collection Time: 02/18/21  9:17 AM   Result Value Ref Range    Sodium 139 136 - 145 mmol/L    Potassium 3.8 3.5 - 5.1 mmol/L    Chloride 107 97 - 108 mmol/L    CO2 22 21 - 32 mmol/L    Anion gap 10 5 - 15 mmol/L    Glucose 100 65 - 100 mg/dL    BUN 20 6 - 20 MG/DL    Creatinine 1.62 (H) 0.70 - 1.30 MG/DL    BUN/Creatinine ratio 12 12 - 20 GFR est AA 51 (L) >60 ml/min/1.73m2    GFR est non-AA 42 (L) >60 ml/min/1.73m2    Calcium 8.1 (L) 8.5 - 10.1 MG/DL    Bilirubin, total 0.3 0.2 - 1.0 MG/DL    ALT (SGPT) 11 (L) 12 - 78 U/L    AST (SGOT) 9 (L) 15 - 37 U/L    Alk. phosphatase 99 45 - 117 U/L    Protein, total 6.8 6.4 - 8.2 g/dL    Albumin 2.6 (L) 3.5 - 5.0 g/dL    Globulin 4.2 (H) 2.0 - 4.0 g/dL    A-G Ratio 0.6 (L) 1.1 - 2.2     SAMPLES BEING HELD    Collection Time: 02/18/21  9:17 AM   Result Value Ref Range    SAMPLES BEING HELD 1BLU,1RED     COMMENT        Add-on orders for these samples will be processed based on acceptable specimen integrity and analyte stability, which may vary by analyte. CK    Collection Time: 02/18/21  9:17 AM   Result Value Ref Range    CK 83 39 - 308 U/L   TYPE & SCREEN    Collection Time: 02/18/21  9:17 AM   Result Value Ref Range    Crossmatch Expiration 02/21/2021,2359     ABO/Rh(D) A POSITIVE     Antibody screen NEG     Unit number [id number]     Blood component type [facility]     Unit division 00     Status of unit ISSUED     Crossmatch result Compatible     Unit number [id number]     Blood component type [facility]     Unit division 00     Status of unit ALLOCATED     Crossmatch result Compatible    OCCULT BLOOD, STOOL    Collection Time: 02/18/21 10:25 AM   Result Value Ref Range    Occult blood, stool Positive (A) NEG     RBC, ALLOCATE    Collection Time: 02/18/21 11:45 AM   Result Value Ref Range    HISTORY CHECKED?  Historical check performed    EKG, 12 LEAD, INITIAL    Collection Time: 02/18/21  1:44 PM   Result Value Ref Range    Ventricular Rate 87 BPM    Atrial Rate 87 BPM    P-R Interval 118 ms    QRS Duration 88 ms    Q-T Interval 394 ms    QTC Calculation (Bezet) 474 ms    Calculated P Axis 36 degrees    Calculated R Axis 21 degrees    Calculated T Axis 27 degrees    Diagnosis       Normal sinus rhythm  ST abnormality, possible digitalis effect  When compared with ECG of 08-SEP-2020 07:03,  Nonspecific T wave abnormality, improved in Inferior leads         Impression:     ulcer of heel, left  Peripheral neuropathy, unspecified        Recommendation:     71M with PMHx significant for Hx CVA, MI (on DAPT), HTN admitted for LIGB who also presents with a left heel wound.      - Patient seen and examined with all questions and concerns addressed at this time  - Patient is a poor historian, noted during history intake  - No acute surgical intervention required by podiatric surgery at this time  - Patients left heel is concerning for underlying infection, however does not appear emergent at this time (no clinical findings for NSTI, no leukocytosis) -- will f/u XR and consider MRI (does not need to be ordered at this time)    - Before any surgical intervention, patient would need to be optimized from a medical standpoint   -- particularly because the anatomic concerns generally result in intraoperative blood loss  -- Patient would also need cardiac preoperative clearance given that he would need to be prone  -- Determine etiology of numbness and falls; neuro clearance  -- Patient examines concerning for vasculopathy and would need vascular workup  -- Social history concerns for postoperative care    - Will continue to monitor patient status  - Recommend DELROY/PVRs/TBIs while inhouse, bilateral lower extremities  - Offload bilateral feet  - Agree with empiric Abx  - Local wound care  - This is subject to change pending change in patients overall status    Signed By: Akilah Richardson MD     February 18, 2021

## 2021-02-18 NOTE — ED NOTES
Virgilio Carrizales RN at bedside attempting additional IV access. [id number]: Virgilio Carrizales unable to obtain IV access. US tech to attempt.

## 2021-02-18 NOTE — ED NOTES
Pt is difficult stick. 1 blood culture drawn so far. Unable to obtain line.  Kerri Feliciano RN at bedside attempting new line/labs

## 2021-02-19 ENCOUNTER — ANESTHESIA EVENT (OUTPATIENT)
Dept: ENDOSCOPY | Age: 71
DRG: 377 | End: 2021-02-19
Payer: MEDICARE

## 2021-02-19 ENCOUNTER — APPOINTMENT (OUTPATIENT)
Dept: MRI IMAGING | Age: 71
DRG: 377 | End: 2021-02-19
Attending: HOSPITALIST
Payer: MEDICARE

## 2021-02-19 ENCOUNTER — APPOINTMENT (OUTPATIENT)
Dept: MRI IMAGING | Age: 71
DRG: 377 | End: 2021-02-19
Attending: STUDENT IN AN ORGANIZED HEALTH CARE EDUCATION/TRAINING PROGRAM
Payer: MEDICARE

## 2021-02-19 ENCOUNTER — ANESTHESIA (OUTPATIENT)
Dept: ENDOSCOPY | Age: 71
DRG: 377 | End: 2021-02-19
Payer: MEDICARE

## 2021-02-19 LAB
ABO + RH BLD: NORMAL
ALBUMIN SERPL-MCNC: 2.3 G/DL (ref 3.5–5)
ALBUMIN/GLOB SERPL: 0.6 {RATIO} (ref 1.1–2.2)
ALP SERPL-CCNC: 87 U/L (ref 45–117)
ALT SERPL-CCNC: 8 U/L (ref 12–78)
ANION GAP SERPL CALC-SCNC: 8 MMOL/L (ref 5–15)
AST SERPL-CCNC: 5 U/L (ref 15–37)
BASOPHILS # BLD: 0.1 K/UL (ref 0–0.1)
BASOPHILS NFR BLD: 1 % (ref 0–1)
BILIRUB SERPL-MCNC: 1.2 MG/DL (ref 0.2–1)
BLD PROD TYP BPU: NORMAL
BLD PROD TYP BPU: NORMAL
BLOOD GROUP ANTIBODIES SERPL: NORMAL
BPU ID: NORMAL
BPU ID: NORMAL
BUN SERPL-MCNC: 18 MG/DL (ref 6–20)
BUN/CREAT SERPL: 13 (ref 12–20)
CALCIUM SERPL-MCNC: 8 MG/DL (ref 8.5–10.1)
CHLORIDE SERPL-SCNC: 110 MMOL/L (ref 97–108)
CO2 SERPL-SCNC: 22 MMOL/L (ref 21–32)
CREAT SERPL-MCNC: 1.36 MG/DL (ref 0.7–1.3)
CROSSMATCH RESULT,%XM: NORMAL
CROSSMATCH RESULT,%XM: NORMAL
DIFFERENTIAL METHOD BLD: ABNORMAL
EOSINOPHIL # BLD: 0.2 K/UL (ref 0–0.4)
EOSINOPHIL NFR BLD: 2 % (ref 0–7)
ERYTHROCYTE [DISTWIDTH] IN BLOOD BY AUTOMATED COUNT: 15.4 % (ref 11.5–14.5)
GLOBULIN SER CALC-MCNC: 3.9 G/DL (ref 2–4)
GLUCOSE SERPL-MCNC: 82 MG/DL (ref 65–100)
HCT VFR BLD AUTO: 23.4 % (ref 36.6–50.3)
HGB BLD-MCNC: 7.2 G/DL (ref 12.1–17)
HGB BLD-MCNC: 7.3 G/DL (ref 12.1–17)
HGB BLD-MCNC: 7.5 G/DL (ref 12.1–17)
IMM GRANULOCYTES # BLD AUTO: 0 K/UL (ref 0–0.04)
IMM GRANULOCYTES NFR BLD AUTO: 0 % (ref 0–0.5)
LYMPHOCYTES # BLD: 0.8 K/UL (ref 0.8–3.5)
LYMPHOCYTES NFR BLD: 8 % (ref 12–49)
MCH RBC QN AUTO: 26.5 PG (ref 26–34)
MCHC RBC AUTO-ENTMCNC: 31.2 G/DL (ref 30–36.5)
MCV RBC AUTO: 85.1 FL (ref 80–99)
MONOCYTES # BLD: 0.7 K/UL (ref 0–1)
MONOCYTES NFR BLD: 7 % (ref 5–13)
NEUTS SEG # BLD: 7.7 K/UL (ref 1.8–8)
NEUTS SEG NFR BLD: 82 % (ref 32–75)
NRBC # BLD: 0 K/UL (ref 0–0.01)
NRBC BLD-RTO: 0 PER 100 WBC
PLATELET # BLD AUTO: 391 K/UL (ref 150–400)
PMV BLD AUTO: 10.9 FL (ref 8.9–12.9)
POTASSIUM SERPL-SCNC: 3.6 MMOL/L (ref 3.5–5.1)
PROT SERPL-MCNC: 6.2 G/DL (ref 6.4–8.2)
RBC # BLD AUTO: 2.75 M/UL (ref 4.1–5.7)
SODIUM SERPL-SCNC: 140 MMOL/L (ref 136–145)
SPECIMEN EXP DATE BLD: NORMAL
STATUS OF UNIT,%ST: NORMAL
STATUS OF UNIT,%ST: NORMAL
TROPONIN I SERPL-MCNC: <0.05 NG/ML
UNIT DIVISION, %UDIV: 0
UNIT DIVISION, %UDIV: 0
WBC # BLD AUTO: 9.6 K/UL (ref 4.1–11.1)

## 2021-02-19 PROCEDURE — 2709999900 HC NON-CHARGEABLE SUPPLY: Performed by: INTERNAL MEDICINE

## 2021-02-19 PROCEDURE — 74011000258 HC RX REV CODE- 258: Performed by: NURSE ANESTHETIST, CERTIFIED REGISTERED

## 2021-02-19 PROCEDURE — 80053 COMPREHEN METABOLIC PANEL: CPT

## 2021-02-19 PROCEDURE — 74011000250 HC RX REV CODE- 250: Performed by: NURSE ANESTHETIST, CERTIFIED REGISTERED

## 2021-02-19 PROCEDURE — 84484 ASSAY OF TROPONIN QUANT: CPT

## 2021-02-19 PROCEDURE — 74011250636 HC RX REV CODE- 250/636: Performed by: HOSPITALIST

## 2021-02-19 PROCEDURE — 0DJ08ZZ INSPECTION OF UPPER INTESTINAL TRACT, VIA NATURAL OR ARTIFICIAL OPENING ENDOSCOPIC: ICD-10-PCS | Performed by: INTERNAL MEDICINE

## 2021-02-19 PROCEDURE — 74011000258 HC RX REV CODE- 258: Performed by: FAMILY MEDICINE

## 2021-02-19 PROCEDURE — 76060000031 HC ANESTHESIA FIRST 0.5 HR: Performed by: INTERNAL MEDICINE

## 2021-02-19 PROCEDURE — 97161 PT EVAL LOW COMPLEX 20 MIN: CPT

## 2021-02-19 PROCEDURE — 65660000000 HC RM CCU STEPDOWN

## 2021-02-19 PROCEDURE — 72148 MRI LUMBAR SPINE W/O DYE: CPT

## 2021-02-19 PROCEDURE — 30233N1 TRANSFUSION OF NONAUTOLOGOUS RED BLOOD CELLS INTO PERIPHERAL VEIN, PERCUTANEOUS APPROACH: ICD-10-PCS | Performed by: INTERNAL MEDICINE

## 2021-02-19 PROCEDURE — 85018 HEMOGLOBIN: CPT

## 2021-02-19 PROCEDURE — 74011250637 HC RX REV CODE- 250/637: Performed by: FAMILY MEDICINE

## 2021-02-19 PROCEDURE — 36415 COLL VENOUS BLD VENIPUNCTURE: CPT

## 2021-02-19 PROCEDURE — 74011250636 HC RX REV CODE- 250/636: Performed by: INTERNAL MEDICINE

## 2021-02-19 PROCEDURE — 36600 WITHDRAWAL OF ARTERIAL BLOOD: CPT

## 2021-02-19 PROCEDURE — 74011000250 HC RX REV CODE- 250: Performed by: FAMILY MEDICINE

## 2021-02-19 PROCEDURE — 76040000019: Performed by: INTERNAL MEDICINE

## 2021-02-19 PROCEDURE — 74011250636 HC RX REV CODE- 250/636: Performed by: FAMILY MEDICINE

## 2021-02-19 PROCEDURE — 97530 THERAPEUTIC ACTIVITIES: CPT

## 2021-02-19 PROCEDURE — 85025 COMPLETE CBC W/AUTO DIFF WBC: CPT

## 2021-02-19 PROCEDURE — 73718 MRI LOWER EXTREMITY W/O DYE: CPT

## 2021-02-19 PROCEDURE — 74011250636 HC RX REV CODE- 250/636: Performed by: NURSE ANESTHETIST, CERTIFIED REGISTERED

## 2021-02-19 PROCEDURE — C9113 INJ PANTOPRAZOLE SODIUM, VIA: HCPCS | Performed by: FAMILY MEDICINE

## 2021-02-19 RX ORDER — SODIUM CHLORIDE 9 MG/ML
INJECTION, SOLUTION INTRAVENOUS
Status: DISCONTINUED | OUTPATIENT
Start: 2021-02-19 | End: 2021-02-19 | Stop reason: HOSPADM

## 2021-02-19 RX ORDER — EPINEPHRINE 0.1 MG/ML
1 INJECTION INTRACARDIAC; INTRAVENOUS
Status: DISCONTINUED | OUTPATIENT
Start: 2021-02-19 | End: 2021-02-19 | Stop reason: HOSPADM

## 2021-02-19 RX ORDER — SODIUM CHLORIDE 0.9 % (FLUSH) 0.9 %
5-40 SYRINGE (ML) INJECTION EVERY 8 HOURS
Status: DISCONTINUED | OUTPATIENT
Start: 2021-02-19 | End: 2021-02-26 | Stop reason: HOSPADM

## 2021-02-19 RX ORDER — ATROPINE SULFATE 0.1 MG/ML
0.5 INJECTION INTRAVENOUS
Status: DISCONTINUED | OUTPATIENT
Start: 2021-02-19 | End: 2021-02-19 | Stop reason: HOSPADM

## 2021-02-19 RX ORDER — PROPOFOL 10 MG/ML
INJECTION, EMULSION INTRAVENOUS AS NEEDED
Status: DISCONTINUED | OUTPATIENT
Start: 2021-02-19 | End: 2021-02-19 | Stop reason: HOSPADM

## 2021-02-19 RX ORDER — FENTANYL CITRATE 50 UG/ML
25-200 INJECTION, SOLUTION INTRAMUSCULAR; INTRAVENOUS
Status: DISCONTINUED | OUTPATIENT
Start: 2021-02-19 | End: 2021-02-19 | Stop reason: HOSPADM

## 2021-02-19 RX ORDER — LIDOCAINE HYDROCHLORIDE 20 MG/ML
INJECTION, SOLUTION EPIDURAL; INFILTRATION; INTRACAUDAL; PERINEURAL AS NEEDED
Status: DISCONTINUED | OUTPATIENT
Start: 2021-02-19 | End: 2021-02-19 | Stop reason: HOSPADM

## 2021-02-19 RX ORDER — FLUMAZENIL 0.1 MG/ML
0.2 INJECTION INTRAVENOUS
Status: DISCONTINUED | OUTPATIENT
Start: 2021-02-19 | End: 2021-02-19 | Stop reason: HOSPADM

## 2021-02-19 RX ORDER — SODIUM CHLORIDE 0.9 % (FLUSH) 0.9 %
5-40 SYRINGE (ML) INJECTION AS NEEDED
Status: DISCONTINUED | OUTPATIENT
Start: 2021-02-19 | End: 2021-02-26 | Stop reason: HOSPADM

## 2021-02-19 RX ORDER — NALOXONE HYDROCHLORIDE 0.4 MG/ML
0.4 INJECTION, SOLUTION INTRAMUSCULAR; INTRAVENOUS; SUBCUTANEOUS
Status: DISCONTINUED | OUTPATIENT
Start: 2021-02-19 | End: 2021-02-19 | Stop reason: HOSPADM

## 2021-02-19 RX ORDER — DEXTROMETHORPHAN/PSEUDOEPHED 2.5-7.5/.8
1.2 DROPS ORAL
Status: DISCONTINUED | OUTPATIENT
Start: 2021-02-19 | End: 2021-02-19 | Stop reason: HOSPADM

## 2021-02-19 RX ORDER — MIDAZOLAM HYDROCHLORIDE 1 MG/ML
.25-5 INJECTION, SOLUTION INTRAMUSCULAR; INTRAVENOUS
Status: DISCONTINUED | OUTPATIENT
Start: 2021-02-19 | End: 2021-02-19 | Stop reason: HOSPADM

## 2021-02-19 RX ORDER — SODIUM CHLORIDE 9 MG/ML
50 INJECTION, SOLUTION INTRAVENOUS CONTINUOUS
Status: DISPENSED | OUTPATIENT
Start: 2021-02-19 | End: 2021-02-19

## 2021-02-19 RX ADMIN — VANCOMYCIN HYDROCHLORIDE 1000 MG: 1 INJECTION, POWDER, LYOPHILIZED, FOR SOLUTION INTRAVENOUS at 17:24

## 2021-02-19 RX ADMIN — Medication 10 ML: at 14:00

## 2021-02-19 RX ADMIN — PROPOFOL 25 MG: 10 INJECTION, EMULSION INTRAVENOUS at 14:08

## 2021-02-19 RX ADMIN — Medication 10 ML: at 06:00

## 2021-02-19 RX ADMIN — MICONAZOLE NITRATE 2 % TOPICAL POWDER: at 10:44

## 2021-02-19 RX ADMIN — METOPROLOL TARTRATE 25 MG: 25 TABLET, FILM COATED ORAL at 23:19

## 2021-02-19 RX ADMIN — SODIUM CHLORIDE: 9 INJECTION, SOLUTION INTRAVENOUS at 14:01

## 2021-02-19 RX ADMIN — COLLAGENASE SANTYL: 250 OINTMENT TOPICAL at 10:44

## 2021-02-19 RX ADMIN — METRONIDAZOLE: 7.5 GEL TOPICAL at 10:44

## 2021-02-19 RX ADMIN — PANTOPRAZOLE SODIUM 40 MG: 40 INJECTION, POWDER, FOR SOLUTION INTRAVENOUS at 01:52

## 2021-02-19 RX ADMIN — PROPOFOL 25 MG: 10 INJECTION, EMULSION INTRAVENOUS at 14:05

## 2021-02-19 RX ADMIN — METOPROLOL TARTRATE 25 MG: 25 TABLET, FILM COATED ORAL at 10:44

## 2021-02-19 RX ADMIN — PANTOPRAZOLE SODIUM 40 MG: 40 INJECTION, POWDER, FOR SOLUTION INTRAVENOUS at 15:40

## 2021-02-19 RX ADMIN — MICONAZOLE NITRATE 2 % TOPICAL POWDER: at 17:25

## 2021-02-19 RX ADMIN — Medication 10 ML: at 23:22

## 2021-02-19 RX ADMIN — LIDOCAINE HYDROCHLORIDE 80 MG: 20 INJECTION, SOLUTION EPIDURAL; INFILTRATION; INTRACAUDAL; PERINEURAL at 14:05

## 2021-02-19 RX ADMIN — Medication 10 ML: at 23:21

## 2021-02-19 RX ADMIN — CEFEPIME HYDROCHLORIDE 2 G: 2 INJECTION, POWDER, FOR SOLUTION INTRAVENOUS at 04:02

## 2021-02-19 NOTE — PROGRESS NOTES
[address]  [facility], [address]       GI PROGRESS NOTE  Rian Burkitt, [address] office  [phone] NP in-hospital cell phone M-F until 4:30  After 5pm or on weekends, please call  for physician on call      NAME: Elisa Stahl   :  1950   MRN:  [id number]       Subjective:   He denies complaint, no nausea or abdominal pain. No BM or melena. Objective:     VITALS:   Last 24hrs VS reviewed since prior progress note. Most recent are:  Visit Vitals  BP (!) 176/78 (BP Patient Position: At rest)   Pulse 95   Temp 98.4 °F (36.9 °C)   Resp 20   Ht 5' 8\" (1.727 m)   Wt 66.7 kg (147 lb)   SpO2 97%   BMI 22.35 kg/m²       PHYSICAL EXAM:  General: Cooperative, no acute distress    Neurologic:  Alert and oriented X 3. HEENT: EOMI, no scleral icterus   Lungs:  No increased WOB  Heart:  S1 S2   Abdomen: Soft, non-distended, no tenderness. Extremities: No edema  Psych:   Fair insight. Not anxious or agitated. Lab Data Reviewed:     Recent Results (from the past 24 hour(s))   OCCULT BLOOD, STOOL    Collection Time: 21 10:25 AM   Result Value Ref Range    Occult blood, stool Positive (A) NEG     RBC, ALLOCATE    Collection Time: 21 11:45 AM   Result Value Ref Range    HISTORY CHECKED?  Historical check performed    EKG, 12 LEAD, INITIAL    Collection Time: 21  1:44 PM   Result Value Ref Range    Ventricular Rate 87 BPM    Atrial Rate 87 BPM    P-R Interval 118 ms    QRS Duration 88 ms    Q-T Interval 394 ms    QTC Calculation (Bezet) 474 ms    Calculated P Axis 36 degrees    Calculated R Axis 21 degrees    Calculated T Axis 27 degrees    Diagnosis       Normal sinus rhythm  Nonspecific ST and T wave abnormality  QT prolongation  When compared with ECG of 08-SEP-2020 07:03,  Nonspecific T wave abnormality, improved in Inferior leads  Confirmed by [facility], MD, Alonso Heck ([id number]) on 2021 5:34:37 PM     TROPONIN I    Collection Time: 21 4:06 PM   Result Value Ref Range    Troponin-I, Qt. <0.05 <0.05 ng/mL   CULTURE, BLOOD, PAIRED    Collection Time: 02/18/21  4:06 PM    Specimen: Blood   Result Value Ref Range    Special Requests: NO SPECIAL REQUESTS      Culture result: NO GROWTH AFTER 11 HOURS     SAMPLES BEING HELD    Collection Time: 02/18/21  4:06 PM   Result Value Ref Range    SAMPLES BEING HELD 1 LAV     COMMENT        Add-on orders for these samples will be processed based on acceptable specimen integrity and analyte stability, which may vary by analyte. HGB & HCT    Collection Time: 02/18/21  4:06 PM   Result Value Ref Range    HGB 7.0 (L) 12.1 - 17.0 g/dL    HCT 22.9 (L) 36.6 - 50.3 %   SARS-COV-2    Collection Time: 02/18/21  5:27 PM   Result Value Ref Range    SARS-CoV-2 SEESO    COVID-19 RAPID TEST    Collection Time: 02/18/21  5:27 PM   Result Value Ref Range    Specimen source Nasopharyngeal      COVID-19 rapid test Not detected NOTD     HEMOGLOBIN    Collection Time: 02/19/21 12:31 AM   Result Value Ref Range    HGB 7.2 (L) 12.1 - 17.0 g/dL   TROPONIN I    Collection Time: 02/19/21 12:31 AM   Result Value Ref Range    Troponin-I, Qt. <0.05 <5.31 ng/mL   METABOLIC PANEL, COMPREHENSIVE    Collection Time: 02/19/21 12:31 AM   Result Value Ref Range    Sodium 140 136 - 145 mmol/L    Potassium 3.6 3.5 - 5.1 mmol/L    Chloride 110 (H) 97 - 108 mmol/L    CO2 22 21 - 32 mmol/L    Anion gap 8 5 - 15 mmol/L    Glucose 82 65 - 100 mg/dL    BUN 18 6 - 20 MG/DL    Creatinine 1.36 (H) 0.70 - 1.30 MG/DL    BUN/Creatinine ratio 13 12 - 20      GFR est AA >60 >60 ml/min/1.73m2    GFR est non-AA 52 (L) >60 ml/min/1.73m2    Calcium 8.0 (L) 8.5 - 10.1 MG/DL    Bilirubin, total 1.2 (H) 0.2 - 1.0 MG/DL    ALT (SGPT) 8 (L) 12 - 78 U/L    AST (SGOT) 5 (L) 15 - 37 U/L    Alk.  phosphatase 87 45 - 117 U/L    Protein, total 6.2 (L) 6.4 - 8.2 g/dL    Albumin 2.3 (L) 3.5 - 5.0 g/dL    Globulin 3.9 2.0 - 4.0 g/dL    A-G Ratio 0.6 (L) 1.1 - 2.2     CBC WITH AUTOMATED DIFF    Collection Time: 02/19/21 12:31 AM   Result Value Ref Range    WBC 9.6 4.1 - 11.1 K/uL    RBC 2.75 (L) 4.10 - 5.70 M/uL    HGB 7.3 (L) 12.1 - 17.0 g/dL    HCT 23.4 (L) 36.6 - 50.3 %    MCV 85.1 80.0 - 99.0 FL    MCH 26.5 26.0 - 34.0 PG    MCHC 31.2 30.0 - 36.5 g/dL    RDW 15.4 (H) 11.5 - 14.5 %    PLATELET 764 636 - 749 K/uL    MPV 10.9 8.9 - 12.9 FL    NRBC 0.0 0  WBC    ABSOLUTE NRBC 0.00 0.00 - 0.01 K/uL    NEUTROPHILS 82 (H) 32 - 75 %    LYMPHOCYTES 8 (L) 12 - 49 %    MONOCYTES 7 5 - 13 %    EOSINOPHILS 2 0 - 7 %    BASOPHILS 1 0 - 1 %    IMMATURE GRANULOCYTES 0 0.0 - 0.5 %    ABS. NEUTROPHILS 7.7 1.8 - 8.0 K/UL    ABS. LYMPHOCYTES 0.8 0.8 - 3.5 K/UL    ABS. MONOCYTES 0.7 0.0 - 1.0 K/UL    ABS. EOSINOPHILS 0.2 0.0 - 0.4 K/UL    ABS. BASOPHILS 0.1 0.0 - 0.1 K/UL    ABS. IMM. GRANS. 0.0 0.00 - 0.04 K/UL    DF AUTOMATED              Assessment:   · Anemia with hemoccult positive stool. Hgb 7.3. Receiving 1 unit PRBCs now. No iron studies. On oral iron supplementation, black stools. EGD in 9/2020 esophageal stricture with overlying ulcer, unable to pass scope. No known history of colonoscopy.     · Weakness and difficulty walking  · Acute kidney injury  · History of CVA: on aspirin and Plavix (last dose 2/17 AM)  · Coronary artery disease  · Rapid COVID negative     Patient Active Problem List   Diagnosis Code    NSTEMI (non-ST elevated myocardial infarction) ([facility] [address] I21.4    Carotid artery stenosis, symptomatic, left I65.22    GI bleed K92.2     Plan:   · NPO  · BID PPI  · Monitor CBC  · Plavix on hold  · Plan for EGD today     Signed By: Penny Mohan NP     2/19/2021  10:05 AM         Agree with above  EGD today

## 2021-02-19 NOTE — PROGRESS NOTES
Day #2 of Vancomycin  Indication:  Left heel wound with purulent discharge  Current regimen:  1 gram IV Q 18 hours  Abx regimen:  Vanc + Cefepime  ID Following ?: NO  Concomitant nephrotoxic drugs (requires more frequent monitoring): None  Frequency of BMP?: Daily through     Recent Labs     21  0031 21  0917   WBC 9.6 8.0   CREA 1.36* 1.62*   BUN 18 20     Est CrCl: ~45-50 ml/min  Temp (24hrs), Av.6 °F (37 °C), Min:98.2 °F (36.8 °C), Max:99.3 °F (37.4 °C)    Cultures:    Blood- NGTD    Goal trough = 10 - 15 mcg/mL    Recent trough history (date/time/level/dose/action taken):  Na    Plan: Change to 1 gram IV Q 24 hours.     Mynor Ponce, PharmD, BCPS

## 2021-02-19 NOTE — PROGRESS NOTES
TRANSFER - OUT REPORT:    Verbal report given to KENAI RN(name) on Elisa Stahl  being transferred to [facility](unit) for routine post - op       Report consisted of patients Situation, Background, Assessment and   Recommendations(SBAR). Information from the following report(s) Procedure Summary was reviewed with the receiving nurse. Lines:   Peripheral IV 02/18/21 Right Hand (Active)   Site Assessment Clean, dry, & intact 02/19/21 0800   Phlebitis Assessment 0 02/19/21 0800   Infiltration Assessment 0 02/19/21 0800   Dressing Status Clean, dry, & intact 02/19/21 0800   Dressing Type Transparent 02/19/21 0800   Hub Color/Line Status Blue; Infusing;Flushed;Patent 02/18/21 1822   Action Taken Open ports on tubing capped 02/18/21 1822   Alcohol Cap Used Yes 02/19/21 0800       Peripheral IV 02/18/21 Left Antecubital (Active)   Site Assessment Clean, dry, & intact 02/19/21 0800   Phlebitis Assessment 0 02/19/21 0800   Infiltration Assessment 0 02/19/21 0800   Dressing Status Clean, dry, & intact 02/19/21 0800   Dressing Type Transparent 02/19/21 0800   Hub Color/Line Status Pink; Infusing;Flushed;Patent 02/18/21 1822   Action Taken Open ports on tubing capped 02/18/21 1822   Alcohol Cap Used Yes 02/19/21 0800       Peripheral IV 02/19/21 Anterior;Distal;Left Forearm (Active)   Site Assessment Clean, dry, & intact 02/19/21 0800   Phlebitis Assessment 0 02/19/21 0800   Infiltration Assessment 0 02/19/21 0800   Dressing Status Clean, dry, & intact 02/19/21 0800   Dressing Type Transparent 02/19/21 0800   Alcohol Cap Used Yes 02/19/21 0800        Opportunity for questions and clarification was provided.       Patient transported with:   [employer]

## 2021-02-19 NOTE — PROGRESS NOTES
Progress Note    Patient: Shadia Jackson MRN: [id number]  SSN: [id number]    YOB: 1950  Age: 66 y.o. Sex: male      Admit Date: 2021  Day of Surgery     Procedure:   Procedure(s):  ESOPHAGOGASTRODUODENOSCOPY (EGD)    Subjective:     Patient seen resting quiet and comfortably and no apparent distress. Pain up to 9/10 to left heel if someone presses on it. No new complaints. NPO for EGD at this time. Objective:     Visit Vitals  BP (!) 176/78 (BP Patient Position: At rest)   Pulse 95   Temp 98.4 °F (36.9 °C)   Resp 20   Ht 5' 8\" (1.727 m)   Wt 66.7 kg (147 lb)   SpO2 97%   BMI 22.35 kg/m²        Physical Exam:    DP/PT nonpalpable bilaterally. CFT > 3 sec bilaterally  Limbs cool to warm bilaterally. Prevalon to LLE+    TTP around left heel wound and with calcaneal squeeze    Wound Number: 1  Wound Location: Left heel  Wound Type: Pressure, ischemic  Wound Size: 2x2x0  Wound Base: Eschar, boggy, minimal purulence expressed  Periwound: Intact, viable, erythema noted without overt cellulitis or ascending lymphangitis  Surrounding Skin: Intact, viable   Drainage: None  Odor: Mild     Wound was cleansed with saline. Wound was unable to be debrided due to patient tolerance.  Wound was dressed with betadine + DSD without ACE given concerns for vasculopathy    Labs/Radiology Review: images and reports reviewed    Assessment:     Hospital Problems  Date Reviewed: 2020          Codes Class Noted POA    GI bleed ICD-10-CM: K92.2  ICD-9-CM: 578.9  2021 Unknown              Plan/Recommendations/Medical Decision MakinM with heel wound    - evaluated with attending  - wound more boggy than yesterday, still without cellulitis  - minimal purulence expressed  - along with pain along calcaneal squeeze, will obtain MRI w/o contrast of LLE to evaluate for abscess vs. Calc OM    - Before any surgical intervention, patient would need to be optimized from a medical standpoint   -- particularly because the anatomic concerns generally result in intraoperative blood loss  -- Patient would also need cardiac preoperative clearance given that he would need to be prone  -- Determine etiology of numbness and falls; neuro clearance  -- Patient examines concerning for vasculopathy and would need vascular workup  -- Social history concerns for postoperative care    Will continue to monitor while inpatient.

## 2021-02-19 NOTE — ANESTHESIA POSTPROCEDURE EVALUATION
Post-Anesthesia Evaluation and Assessment    Patient: Anmol Barnett MRN: [id number]  SSN: [id number]    YOB: 1950  Age: 66 y.o. Sex: male      I have evaluated the patient and they are stable and ready for discharge from the PACU. Cardiovascular Function/Vital Signs  Visit Vitals  BP (!) 156/78   Pulse 72   Temp 37 °C (98.6 °F)   Resp 18   Ht 5' 8\" (1.727 m)   Wt 66.7 kg (147 lb)   SpO2 96%   BMI 22.35 kg/m²       Patient is status post MAC anesthesia for Procedure(s):  ESOPHAGOGASTRODUODENOSCOPY (EGD). Nausea/Vomiting: None    Postoperative hydration reviewed and adequate. Pain:  Pain Scale 1: Numeric (0 - 10) (02/19/21 1430)  Pain Intensity 1: 0 (02/19/21 1430)   Managed    Neurological Status: At baseline    Mental Status, Level of Consciousness: Alert and  oriented to person, place, and time    Pulmonary Status:   O2 Device: Room air (02/19/21 1430)   Adequate oxygenation and airway patent    Complications related to anesthesia: None    Post-anesthesia assessment completed. No concerns    Signed By: Hailey Azul MD     February 19, 2021              Procedure(s):  ESOPHAGOGASTRODUODENOSCOPY (EGD). MAC    <BSHSIANPOST>    INITIAL Post-op Vital signs:   Vitals Value Taken Time   /78 02/19/21 1430   Temp     Pulse 74 02/19/21 1432   Resp 18 02/19/21 1432   SpO2 99 % 02/19/21 1432   Vitals shown include unvalidated device data.

## 2021-02-19 NOTE — PROGRESS NOTES
I went to the room for the art stick but patient was not there. Also, tried calling zone phone [phone] twice. Please call back if you need art stick for labs. Sil Newsome 484 by room again for art stick, asked another nurse to have new oncoming nurse call if this is still needed.

## 2021-02-19 NOTE — ROUTINE PROCESS
[facility] 1950 
[id number] Situation: 
Verbal report received from: Sho 
Procedure: Procedure(s): ESOPHAGOGASTRODUODENOSCOPY (EGD) Background: 
 
Preoperative diagnosis: Anemia Postoperative diagnosis: hiatal hernia :  Dr. Girma Cotton Assistant(s): Endoscopy RN-1: Pardeep Wynn RN Endoscopy RN-2: Josh Santacruz RN Specimens:no H. Pylori  no Assessment: 
Intra-procedure medications Anesthesia gave intra-procedure sedation and medications, see anesthesia flow sheet Intravenous fluids: NS@ Orren Many Vital signs stable Abdominal assessment: round and soft Recommendation: 
Return to floor room 401

## 2021-02-19 NOTE — PROGRESS NOTES
Bedside and Verbal shift change report given to Victor Manuel Borja (oncoming nurse) by Alean Goodpasture (offgoing nurse). Report included the following information SBAR, Kardex, ED Summary, MAR, Accordion, Recent Results, Med Rec Status, Cardiac Rhythm NSR, Alarm Parameters  and Quality Measures.    Last 3 Recorded Weights in this Encounter    02/18/21 0915 02/18/21 1822 02/19/21 0406   Weight: 74.8 kg (165 lb) 65.9 kg (145 lb 4.8 oz) 66.7 kg (147 lb)

## 2021-02-19 NOTE — ANESTHESIA PREPROCEDURE EVALUATION
Relevant Problems   CARDIOVASCULAR   (+) NSTEMI (non-ST elevated myocardial infarction) [facility])       Anesthetic History   No history of anesthetic complications            Review of Systems / Medical History  Patient summary reviewed, nursing notes reviewed and pertinent labs reviewed    Pulmonary          Smoker         Neuro/Psych   Within defined limits    CVA       Cardiovascular              Past MI and CAD    Exercise tolerance: <4 METS  Comments: NSTEMI 9/2020, card consulted:  Needs cath/poss stent, but finding source of anemia precludes cath at this point.      GI/Hepatic/Renal  Within defined limits       Renal disease: ARF       Endo/Other  Within defined limits      Anemia     Other Findings   Comments: GI bleed     Acute blood loss anemia     Weakness     TUSHAR     History of CVA     Infected wound left lower extremity  BCCa of cheek         Physical Exam    Airway  Mallampati: II  TM Distance: > 6 cm  Neck ROM: normal range of motion   Mouth opening: Normal     Cardiovascular  Regular rate and rhythm,  S1 and S2 normal,  no murmur, click, rub, or gallop             Dental    Dentition: Edentulous, Full lower dentures and Full upper dentures     Pulmonary  Breath sounds clear to auscultation               Abdominal  GI exam deferred       Other Findings            Anesthetic Plan    ASA: 4  Anesthesia type: MAC          Induction: Intravenous  Anesthetic plan and risks discussed with: Patient

## 2021-02-19 NOTE — PROGRESS NOTES
Problem: Mobility Impaired (Adult and Pediatric)  Goal: *Acute Goals and Plan of Care (Insert Text)  Description: FUNCTIONAL STATUS PRIOR TO ADMISSION: Patient was modified independent using a rolling walker for functional mobility. HOME SUPPORT PRIOR TO ADMISSION: The patient lived alone with [facility] staff to provide meal assistance. Physical Therapy Goals  Initiated 2/19/2021  1. Patient will move from supine to sit and sit to supine  and roll side to side in bed with modified independence within 7 day(s). 2.  Patient will transfer from bed to chair and chair to bed with modified independence using the least restrictive device within 7 day(s). 3.  Patient will perform sit to stand with modified independence within 7 day(s). 4.  Patient will ambulate with modified independence for 150 feet with the least restrictive device within 7 day(s). Outcome: Progressing Towards Goal   PHYSICAL THERAPY EVALUATION  Patient: Branden Mcneil (8 y.o. male)  Date: 2/19/2021  Primary Diagnosis: GI bleed [K92.2]  Procedure(s) (LRB):  ESOPHAGOGASTRODUODENOSCOPY (EGD) (N/A)     Precautions:          ASSESSMENT  Based on the objective data described below, the patient presents with decreased mobility compared to baseline with multiple falls at home. He was found to be anemic with Hgb of 5 on admission with GI bleed. In addition, he has a L heel wound and a small abrasion on his L knee. He had been admitted to [facility] several months ago and was discharged to a rehab where he felt he got better. However, once he returned to his assisted living apartment, he feels he started to get progressively weaker. When he falls, his legs just buckle, with no associated lightheadedness or dizziness. At this time, he demonstrates global weakness needing mod assist overall for bed mobility and standing with the walker. His VS remained stable and he denies any pain with limited activity.   Deferred OOB to the chair at this time since he has EGD pending. Applied off-loading boot to his L foot and elevated R lower leg on pillow. He should be OOB to the [facility] and chair with nursing assist and RW/gait belt and expect that he would benefit from rehab once he is medically ready. Current Level of Function Impacting Discharge (mobility/balance): mod assist for basic mobility with RW/gait belt    Functional Outcome Measure: The patient scored Total: 40/100 on the Barthel Index which is indicative of significantly impaired ability to care for basic self needs/dependency on others. Other factors to consider for discharge: multiple falls at home     Patient will benefit from skilled therapy intervention to address the above noted impairments. PLAN :  Recommendations and Planned Interventions: bed mobility training, transfer training, gait training, therapeutic exercises, patient and family training/education, and therapeutic activities      Frequency/Duration: Patient will be followed by physical therapy:  5 times a week to address goals. Recommendation for discharge: (in order for the patient to meet his/her long term goals)  Therapy up to 5 days/week in SNF setting    This discharge recommendation:  Has not yet been discussed the attending provider and/or case management    IF patient discharges home will need the following DME: patient owns DME required for discharge and however he would like a scooter         SUBJECTIVE:   Patient stated I just keep falling.     OBJECTIVE DATA SUMMARY:   HISTORY:    Past Medical History:   Diagnosis Date    Skin cancer 12/19/2019    bcc-left forehead, left neck, left lateral cheek     Stroke (cerebrum) ([facility] [address]    No past surgical history on file.     Personal factors and/or comorbidities impacting plan of care: as noted above    Home Situation  # Steps to Enter: (0)  One/Two Story Residence: (assisted living facility)  Living Alone: Yes(with meal assistance only)  Support Systems: Assisted living  Current DME Used/Available at Home: Walker, rolling    EXAMINATION/PRESENTATION/DECISION MAKING:   Critical Behavior:              Hearing:     Skin:  per nursing  Edema: none  Range Of Motion:  AROM: Generally decreased, functional(decr ankle DF/PF)                       Strength:    Strength: Generally decreased, functional(slight weakness L side compared to R, grossly 3+/5)                    Tone & Sensation:   Tone: Normal              Sensation: Impaired(decreased bilateral lower extremities)               Coordination:  Coordination: Generally decreased, functional  Vision:      Functional Mobility:  Bed Mobility:  Rolling: Minimum assistance  Supine to Sit: Moderate assistance; Additional time  Sit to Supine: Moderate assistance; Additional time(for LEs)     Transfers:  Sit to Stand: Minimum assistance; Adaptive equipment; Additional time; Moderate assistance  Stand to Sit: Minimum assistance; Adaptive equipment; Additional time                       Balance:   Sitting: Intact; Without support  Standing: Impaired; With support(hands on the walker)  Standing - Static: Constant support; Fair  Standing - Dynamic : Constant support;Poor(difficulty taking a step, needs to hold walker)  Ambulation/Gait Training:  Distance (ft): 2 Feet (ft)  Assistive Device: Gait belt;Walker, rolling  Ambulation - Level of Assistance: Moderate assistance; Additional time; Adaptive equipment        Gait Abnormalities: Decreased step clearance;Shuffling gait; Step to gait(difficult to even take 1 side step)        Base of Support: Narrowed     Speed/Belen: Shuffled; Slow  Step Length: Right shortened;Left shortened        Interventions: Safety awareness training; Tactile cues; Verbal cues; Visual/Demos            Stairs:               Therapeutic Exercises:       Functional Measure:  Barthel Index:    Bathin  Bladder: 10  Bowels: 5  Groomin  Dressin  Feeding: 10  Mobility: 0  Stairs: 0  Toilet Use: 5  Transfer (Bed to Chair and Back): 5(inferred based on standing)  Total: 40/100       The Barthel ADL Index: Guidelines  1. The index should be used as a record of what a patient does, not as a record of what a patient could do. 2. The main aim is to establish degree of independence from any help, physical or verbal, however minor and for whatever reason. 3. The need for supervision renders the patient not independent. 4. A patient's performance should be established using the best available evidence. Asking the patient, friends/relatives and nurses are the usual sources, but direct observation and common sense are also important. However direct testing is not needed. 5. Usually the patient's performance over the preceding 24-48 hours is important, but occasionally longer periods will be relevant. 6. Middle categories imply that the patient supplies over 50 per cent of the effort. 7. Use of aids to be independent is allowed. Jumnaa Fournier., Barthel, D.W. (8831). Functional evaluation: the Barthel Index. [address] [facility] (14)2. Masha Lucas cintia ALEE Gu, Fidencio Greco., Thomas Francois., [place], [address] [place] [address] (1999). Measuring the change indisability after inpatient rehabilitation; comparison of the responsiveness of the Barthel Index and Functional [place] Measure. Journal of Neurology, Neurosurgery, and Psychiatry, 66(4), 791-086. Keiko Sebastian, N.JODY.A, MARCO Rosales, & Lurdes Joshi M.A. (2004.) Assessment of post-stroke quality of life in cost-effectiveness studies: The usefulness of the Barthel Index and the EuroQoL-5D.  Quality of Life Research, 15, 575-37        Physical Therapy Evaluation Charge Determination   History Examination Presentation Decision-Making   HIGH Complexity :3+ comorbidities / personal factors will impact the outcome/ POC  MEDIUM Complexity : 3 Standardized tests and measures addressing body structure, function, activity limitation and / or participation in recreation  LOW Complexity : Stable, uncomplicated  LOW Complexity : FOTO score of       Based on the above components, the patient evaluation is determined to be of the following complexity level: LOW     Pain Rating:  No complaints of pain    Activity Tolerance:   Fair    After treatment patient left in no apparent distress:   Supine in bed, Heels elevated for pressure relief, Call bell within reach, Bed / chair alarm activated, and Side rails x 3    COMMUNICATION/EDUCATION:   The patients plan of care was discussed with: Registered nurse. Fall prevention education was provided and the patient/caregiver indicated understanding., Patient/family have participated as able in goal setting and plan of care. , and Patient/family agree to work toward stated goals and plan of care.     Thank you for this referral.  Gia Larson, PT   Time Calculation: 27 mins

## 2021-02-19 NOTE — PROGRESS NOTES
Bedside and Verbal shift change report given to Janet Garcia RN(oncoming nurse) by Caroline Martinez RN (offgoing nurse). Report included the following information SBAR, MAR, Recent Results and Cardiac Rhythm NSR.

## 2021-02-19 NOTE — PROCEDURES
[facility], [address] [place] [address]        Esophago- Gastroduodenoscopy (EGD) Procedure Note    Rosa Maria Mayfield  1950  [id number]      Procedure: Endoscopic Gastroduodenoscopy --diagnostic    Indication:  Iron deficiency anemia , took plavix last time on 2/17/21    Pre-operative Diagnosis: see indication above    Post-operative Diagnosis: see findings below    : Isaak Cormier MD    Surgical Assistant: Endoscopy RN-1: Santos More RN  Endoscopy RN-2: Bernice Webb RN    Implants:  None    Referring Provider:  Other, MD Ryan      Anesthesia/Sedation:  MAC anesthesia Propofol        Procedure Details     After infomed consent was obtained for the procedure, with all risks and benefits of procedure explained the patient was taken to the endoscopy suite and placed in the left lateral decubitus position. Following sequential administration of sedation as per above, the endoscope was inserted into the mouth and advanced under direct vision to third portion of the duodenum. A careful inspection was made as the gastroscope was withdrawn, including a retroflexed view of the proximal stomach; findings and interventions are described below. Findings:   Esophagus:hiatal hernia 3 cm in size   Stomach: normal   Duodenum/jejunum: normal      Therapies:  none    Specimens: none         EBL: None      Complications:   None; patient tolerated the procedure well. Impression:    -See post-procedure diagnoses.     Recommendations:  -resume PO diet  -will see again on monday  -will follow as needed this weekend    Signed By: Isaak Cormier MD     2/19/2021  2:29 PM

## 2021-02-19 NOTE — PROGRESS NOTES
Renal Dosing/Monitoring  Medication: Cefepime for SSTI   Current regimen:  2 grams IV every 12 hr  Recent Labs     02/19/21  0031 02/18/21  0917   CREA 1.36* 1.62*   BUN 18 20     Estimated CrCl:  46.3 ml/min  Plan: Change to 2 grams IV Q 24 hours  per [facility] P&T Committee Protocol with respect to renal function. Pharmacy will continue to monitor patient daily and will make dosage adjustments based upon changing renal function.     Luis Daniel Rodriguez, PharmD, BCPS

## 2021-02-20 ENCOUNTER — APPOINTMENT (OUTPATIENT)
Dept: VASCULAR SURGERY | Age: 71
DRG: 377 | End: 2021-02-20
Attending: HOSPITALIST
Payer: MEDICARE

## 2021-02-20 LAB
ANION GAP SERPL CALC-SCNC: 8 MMOL/L (ref 5–15)
ASPIRIN TEST, ASPIRN: 651 ARU
BASOPHILS # BLD: 0.1 K/UL (ref 0–0.1)
BASOPHILS NFR BLD: 1 % (ref 0–1)
BUN SERPL-MCNC: 22 MG/DL (ref 6–20)
BUN/CREAT SERPL: 15 (ref 12–20)
CALCIUM SERPL-MCNC: 7.9 MG/DL (ref 8.5–10.1)
CHLORIDE SERPL-SCNC: 110 MMOL/L (ref 97–108)
CO2 SERPL-SCNC: 21 MMOL/L (ref 21–32)
CREAT SERPL-MCNC: 1.43 MG/DL (ref 0.7–1.3)
DIFFERENTIAL METHOD BLD: ABNORMAL
EOSINOPHIL # BLD: 0.3 K/UL (ref 0–0.4)
EOSINOPHIL NFR BLD: 3 % (ref 0–7)
ERYTHROCYTE [DISTWIDTH] IN BLOOD BY AUTOMATED COUNT: 15.2 % (ref 11.5–14.5)
EST. AVERAGE GLUCOSE BLD GHB EST-MCNC: 111 MG/DL
GLUCOSE SERPL-MCNC: 80 MG/DL (ref 65–100)
HBA1C MFR BLD: 5.5 % (ref 4–5.6)
HCT VFR BLD AUTO: 26.5 % (ref 36.6–50.3)
HGB BLD-MCNC: 8.2 G/DL (ref 12.1–17)
HGB BLD-MCNC: 8.3 G/DL (ref 12.1–17)
IMM GRANULOCYTES # BLD AUTO: 0.1 K/UL (ref 0–0.04)
IMM GRANULOCYTES NFR BLD AUTO: 1 % (ref 0–0.5)
LEFT ABI: 0.46
LEFT ANTERIOR TIBIAL: 73 MMHG
LEFT ARM BP: 157 MMHG
LEFT POSTERIOR TIBIAL: 68 MMHG
LYMPHOCYTES # BLD: 1.3 K/UL (ref 0.8–3.5)
LYMPHOCYTES NFR BLD: 13 % (ref 12–49)
MCH RBC QN AUTO: 25.4 PG (ref 26–34)
MCHC RBC AUTO-ENTMCNC: 30.9 G/DL (ref 30–36.5)
MCV RBC AUTO: 82 FL (ref 80–99)
MONOCYTES # BLD: 0.8 K/UL (ref 0–1)
MONOCYTES NFR BLD: 9 % (ref 5–13)
NEUTS SEG # BLD: 6.8 K/UL (ref 1.8–8)
NEUTS SEG NFR BLD: 73 % (ref 32–75)
NRBC # BLD: 0 K/UL (ref 0–0.01)
NRBC BLD-RTO: 0 PER 100 WBC
P2Y12 PLT RESPONSE,PPPR: 139 PRU (ref 194–418)
PLATELET # BLD AUTO: 416 K/UL (ref 150–400)
PMV BLD AUTO: 10.8 FL (ref 8.9–12.9)
POTASSIUM SERPL-SCNC: 3.7 MMOL/L (ref 3.5–5.1)
RBC # BLD AUTO: 3.23 M/UL (ref 4.1–5.7)
RIGHT ABI: 0.5
RIGHT ANTERIOR TIBIAL: 79 MMHG
RIGHT ARM BP: 152 MMHG
RIGHT POSTERIOR TIBIAL: 73 MMHG
SODIUM SERPL-SCNC: 139 MMOL/L (ref 136–145)
WBC # BLD AUTO: 9.3 K/UL (ref 4.1–11.1)

## 2021-02-20 PROCEDURE — 93925 LOWER EXTREMITY STUDY: CPT

## 2021-02-20 PROCEDURE — 65660000000 HC RM CCU STEPDOWN

## 2021-02-20 PROCEDURE — 74011250636 HC RX REV CODE- 250/636: Performed by: HOSPITALIST

## 2021-02-20 PROCEDURE — 85576 BLOOD PLATELET AGGREGATION: CPT

## 2021-02-20 PROCEDURE — 87077 CULTURE AEROBIC IDENTIFY: CPT

## 2021-02-20 PROCEDURE — 74011250636 HC RX REV CODE- 250/636: Performed by: FAMILY MEDICINE

## 2021-02-20 PROCEDURE — 2709999900 HC NON-CHARGEABLE SUPPLY

## 2021-02-20 PROCEDURE — 74011000250 HC RX REV CODE- 250: Performed by: FAMILY MEDICINE

## 2021-02-20 PROCEDURE — 93922 UPR/L XTREMITY ART 2 LEVELS: CPT

## 2021-02-20 PROCEDURE — 74011250637 HC RX REV CODE- 250/637: Performed by: FAMILY MEDICINE

## 2021-02-20 PROCEDURE — C9113 INJ PANTOPRAZOLE SODIUM, VIA: HCPCS | Performed by: FAMILY MEDICINE

## 2021-02-20 PROCEDURE — 87205 SMEAR GRAM STAIN: CPT

## 2021-02-20 PROCEDURE — 36415 COLL VENOUS BLD VENIPUNCTURE: CPT

## 2021-02-20 PROCEDURE — 85018 HEMOGLOBIN: CPT

## 2021-02-20 PROCEDURE — 74011250637 HC RX REV CODE- 250/637: Performed by: HOSPITALIST

## 2021-02-20 PROCEDURE — 74011000258 HC RX REV CODE- 258: Performed by: HOSPITALIST

## 2021-02-20 PROCEDURE — 87186 SC STD MICRODIL/AGAR DIL: CPT

## 2021-02-20 PROCEDURE — 83036 HEMOGLOBIN GLYCOSYLATED A1C: CPT

## 2021-02-20 PROCEDURE — 80048 BASIC METABOLIC PNL TOTAL CA: CPT

## 2021-02-20 PROCEDURE — 85025 COMPLETE CBC W/AUTO DIFF WBC: CPT

## 2021-02-20 RX ORDER — HEPARIN SODIUM 5000 [USP'U]/ML
5000 INJECTION, SOLUTION INTRAVENOUS; SUBCUTANEOUS EVERY 8 HOURS
Status: DISCONTINUED | OUTPATIENT
Start: 2021-02-20 | End: 2021-02-26 | Stop reason: HOSPADM

## 2021-02-20 RX ORDER — GUAIFENESIN 100 MG/5ML
81 LIQUID (ML) ORAL DAILY
Status: DISCONTINUED | OUTPATIENT
Start: 2021-02-20 | End: 2021-02-26 | Stop reason: HOSPADM

## 2021-02-20 RX ADMIN — Medication 10 ML: at 21:37

## 2021-02-20 RX ADMIN — CEFEPIME 2 G: 2 INJECTION, POWDER, FOR SOLUTION INTRAVENOUS at 04:01

## 2021-02-20 RX ADMIN — MICONAZOLE NITRATE 2 % TOPICAL POWDER: at 10:23

## 2021-02-20 RX ADMIN — SODIUM CHLORIDE 75 ML/HR: 9 INJECTION, SOLUTION INTRAVENOUS at 10:20

## 2021-02-20 RX ADMIN — PANTOPRAZOLE SODIUM 40 MG: 40 INJECTION, POWDER, FOR SOLUTION INTRAVENOUS at 00:47

## 2021-02-20 RX ADMIN — HEPARIN SODIUM 5000 UNITS: 5000 INJECTION INTRAVENOUS; SUBCUTANEOUS at 17:57

## 2021-02-20 RX ADMIN — COLLAGENASE SANTYL: 250 OINTMENT TOPICAL at 10:21

## 2021-02-20 RX ADMIN — METOPROLOL TARTRATE 25 MG: 25 TABLET, FILM COATED ORAL at 10:20

## 2021-02-20 RX ADMIN — METRONIDAZOLE: 7.5 GEL TOPICAL at 10:21

## 2021-02-20 RX ADMIN — VANCOMYCIN HYDROCHLORIDE 1000 MG: 1 INJECTION, POWDER, LYOPHILIZED, FOR SOLUTION INTRAVENOUS at 17:57

## 2021-02-20 RX ADMIN — PANTOPRAZOLE SODIUM 40 MG: 40 INJECTION, POWDER, FOR SOLUTION INTRAVENOUS at 16:16

## 2021-02-20 RX ADMIN — HEPARIN SODIUM 5000 UNITS: 5000 INJECTION INTRAVENOUS; SUBCUTANEOUS at 21:35

## 2021-02-20 RX ADMIN — ASPIRIN 81 MG: 81 TABLET, CHEWABLE ORAL at 16:16

## 2021-02-20 RX ADMIN — MICONAZOLE NITRATE 2 % TOPICAL POWDER: at 17:58

## 2021-02-20 RX ADMIN — METOPROLOL TARTRATE 25 MG: 25 TABLET, FILM COATED ORAL at 21:35

## 2021-02-20 NOTE — PROGRESS NOTES
[facility]  Hospitalist Group                                                                                          Hospitalist Progress Note  Barbara Acharya MD  Answering service: [phone] OR [phone] from in house phone        Date of Service:  2021  NAME:  Mariposa Auguste  :  1950  MRN:  [id number]      Admission Summary:   66 y.o M with PMH of HTN,stroke,s/p carotid artery surgery came to the hospital due to lower extremity weakness. Interval history / Subjective:   Patient seen and examined    States that he is doing OK. He has left foot pain. Assessment & Plan:     # Acute blood loss anemia:  -hb at admission was 5, s/p 2 U PRBCs  -hb 8.3 today  -EGD did not show any obvious evidence of bleeding  -monitor Hb   -aspirin and plavix were held. Will resume aspirin today. ?need colonoscopy    #Left foot wound infection:  -on vanc,cefepime and flagul  -podiatrist following  -MRI -No evidence of osteomyelitis. No drainable fluid collection  -DELROY showed -DELROY's suggest borderline severe arterial disease in the lower extremities bilaterally. PVR waveform appearance may be due to good collateral flow  -ordered bilateral lower extremity arterial duplex for further evaluation.       #Lower extremity weakness:  ?due to peripheral arterial disease  -MRI L spine -Multilevel mild spondylosis , will also get MRI brain as he had previous stroke       #History of stroke  # Left carotid artery stenosis s/p Left carotid endarterectomy with Dacron patch angioplasty  -resumed aspirin, on statin  -will hold plavix until we are definitively certain he does not need procedures    Skin cancer:  -follows [facility] dermatologist and was initiated local treatment per patient      Code status: full  DVT prophylaxis: heparin    Care Plan discussed with: patient,nurse  Anticipated Disposition:SNF vs rehab  Anticipated [address] Problems  Date Reviewed: 2021          Codes Class Noted POA    GI bleed ICD-10-CM: K92.2  ICD-9-CM: 578.9  2/18/2021 Unknown                Review of Systems:   As per HPI      Vital Signs:    Last 24hrs VS reviewed since prior progress note. Most recent are:  Visit Vitals  BP (!) 148/66 (BP 1 Location: Right arm, BP Patient Position: At rest)   Pulse 61   Temp 98.1 °F (36.7 °C)   Resp 15   Ht 5' 8\" (1.727 m)   Wt 66.7 kg (147 lb)   SpO2 100%   BMI 22.35 kg/m²       No intake or output data in the 24 hours ending 02/20/21 1418     Physical Examination:     I had a face to face encounter with this patient and independently examined them on 2/20/2021 as outlined below:          Constitutional:  No acute distress, cooperative, pleasant    ENT:  Oral mucosa moist, oropharynx benign,EOMI    Resp:  CTA bilaterally. No wheezing/rhonchi/rales. No accessory muscle use   CV:  Regular rhythm, normal rate, S1,S 2 wnl    GI:  Soft, non distended, non tender, normoactive bowel sounds    Musculoskeletal:  No LE edema, warm, 2+ pulses throughout    Neurologic:  he is alert and oriented X 3, lower extremity strength 4/5, UE strength 5/5, sensation intact     Psych: not anxious nor agitated. Skin: right cheek -skin cancer       Data Review:    Review and/or order of clinical lab test  Review and/or order of tests in the radiology section of CPT  Review and/or order of tests in the medicine section of CPT      Labs:     Recent Labs     02/20/21  1032 02/20/21  0101 02/19/21  0031 02/19/21  0031   WBC  --  9.3  --  9.6   HGB 8.3* 8.2*   < > 7.3*  7.2*   HCT  --  26.5*  --  23.4*   PLT  --  416*  --  391    < > = values in this interval not displayed.      Recent Labs     02/20/21  0101 02/19/21  0031 02/18/21  0917    140 139   K 3.7 3.6 3.8   * 110* 107   CO2 21 22 22   BUN 22* 18 20   CREA 1.43* 1.36* 1.62*   GLU 80 82 100   CA 7.9* 8.0* 8.1*     Recent Labs     02/19/21  0031 02/18/21  0917   ALT 8* 11*   AP 87 99   TBILI 1.2* 0.3   TP 6.2* 6.8   ALB 2.3* 2.6*   GLOB 3.9 4.2*     No results for input(s): INR, PTP, APTT, INREXT, INREXT in the last 72 hours. No results for input(s): FE, TIBC, PSAT, FERR in the last 72 hours. No results found for: FOL, RBCF   No results for input(s): PH, PCO2, PO2 in the last 72 hours.   Recent Labs     02/19/21  0031 02/18/21  1606 02/18/21  0917   CPK  --   --  83   TROIQ <0.05 <0.05  --      Lab Results   Component Value Date/Time    Cholesterol, total 108 09/12/2020 12:35 PM    HDL Cholesterol 38 09/12/2020 12:35 PM    LDL, calculated 43.6 09/12/2020 12:35 PM    Triglyceride 132 09/12/2020 12:35 PM    CHOL/HDL Ratio 2.8 09/12/2020 12:35 PM     Lab Results   Component Value Date/Time    Glucose (POC) 197 (H) 09/11/2020 08:04 PM    Glucose (POC) 146 (H) 09/11/2020 11:30 AM    Glucose (POC) 123 (H) 09/11/2020 06:49 AM    Glucose (POC) 111 (H) 09/10/2020 11:07 PM    Glucose (POC) 111 (H) 09/10/2020 05:14 PM     No results found for: COLOR, APPRN, SPGRU, REFSG, YESY, PROTU, GLUCU, KETU, BILU, UROU, PRADEEP, LEUKU, GLUKE, EPSU, BACTU, WBCU, RBCU, CASTS, UCRY      Medications Reviewed:     Current Facility-Administered Medications   Medication Dose Route Frequency    sodium chloride (NS) flush 5-40 mL  5-40 mL IntraVENous Q8H    sodium chloride (NS) flush 5-40 mL  5-40 mL IntraVENous PRN    vancomycin (VANCOCIN) 1,000 mg in 0.9% sodium chloride 250 mL (VIAL-MATE)  1,000 mg IntraVENous Q24H    cefepime (MAXIPIME) 2 g in 0.9% sodium chloride (MBP/ADV) 100 mL MBP  2 g IntraVENous Q24H    sodium chloride (NS) flush 5-40 mL  5-40 mL IntraVENous Q8H    sodium chloride (NS) flush 5-40 mL  5-40 mL IntraVENous PRN    metoprolol tartrate (LOPRESSOR) tablet 25 mg  25 mg Oral Q12H    sodium chloride (NS) flush 5-40 mL  5-40 mL IntraVENous Q8H    sodium chloride (NS) flush 5-40 mL  5-40 mL IntraVENous PRN    0.9% sodium chloride infusion  75 mL/hr IntraVENous CONTINUOUS    ondansetron (ZOFRAN) injection 4 mg  4 mg IntraVENous Q4H PRN    pantoprazole (PROTONIX) 40 mg in 0.9% sodium chloride 10 mL injection  40 mg IntraVENous BID    collagenase (SANTYL) 250 unit/gram ointment   Topical DAILY    metroNIDAZOLE (METROGEL) 0.75 % gel   Topical DAILY    miconazole (MICOTIN) 2 % powder   Topical BID    Vancomycin dosing per pharmacy   Other Rx Dosing/Monitoring     ______________________________________________________________________  EXPECTED LENGTH OF STAY: 3d 0h  ACTUAL LENGTH OF STAY:          2                 Wesley Suarez MD

## 2021-02-20 NOTE — PROGRESS NOTES
[facility] Adult  Hospitalist Group                                                                                          Hospitalist Progress Note  Philipp Lamar MD  Answering service: [phone] OR [phone] from in house phone        Date of Service:  2021  NAME:  Colby Loredo  :  1950  MRN:  [id number]      Admission Summary:   66 y.o M with PMH of HTN,stroke,s/p carotid artery surgery came to the hospital due to lower extremity weakness. Interval history / Subjective:   Patient seen and examined    He states that in the last 4 weeks he had bilateral lower extremity weakness and had falls     Assessment & Plan:     # Acute blood loss anemia:  -hb at admission was 5, s/p 2 U PRBCs  -hb 7.3  -EGD did not show any obvious evidence of bleeding  -monitor Hb     #Left foot wound infection:  -on vanc,cefepime and flagul  -podiatrist following  -follow up MRI and DELROY results    #Lower extremity weakness:  -MRI L spine ordered, will also get MRI brain as he had previous stroke     #History of stroke  # Left carotid artery stenosis s/p Left carotid endarterectomy with Dacron patch angioplasty  -aspirin and plavix held today due to anemia  -continue statin                  Code status: full  DVT prophylaxis: scd    Care Plan discussed with: patient,nurse  Anticipated Disposition:SNF vs rehab  Anticipated  [facility] Problems  Date Reviewed: 2021          Codes Class Noted POA    GI bleed ICD-10-CM: K92.2  ICD-9-CM: 578.9  2021 Unknown                Review of Systems:   As per HPI      Vital Signs:    Last 24hrs VS reviewed since prior progress note.  Most recent are:  Visit Vitals  BP (!) 164/73   Pulse (P) 73   Temp (P) 98.8 °F (37.1 °C)   Resp 12   Ht 5' 8\" (1.727 m)   Wt 66.7 kg (147 lb)   SpO2 98%   BMI 22.35 kg/m²         Intake/Output Summary (Last 24 hours) at 20216  Last data filed at 2021 1411  Gross per 24 hour   Intake 50 ml   Output  Net 50 ml        Physical Examination:     I had a face to face encounter with this patient and independently examined them on 2/19/2021 as outlined below:          Constitutional:  No acute distress, cooperative, pleasant    ENT:  Oral mucosa moist, oropharynx benign,EOMI    Resp:  CTA bilaterally. No wheezing/rhonchi/rales. No accessory muscle use   CV:  Regular rhythm, normal rate, S1,S 2 wnl    GI:  Soft, non distended, non tender, normoactive bowel sounds, no hepatosplenomegaly     Musculoskeletal:  No LE edema, warm, 2+ pulses throughout    Neurologic:  he is alert and oriented X 3, lower extremity strength 4/5, UE strength 5/5, sensation intact     Psych: not anxious nor agitated. Skin: right cheek -skin cancer       Data Review:    Review and/or order of clinical lab test  Review and/or order of tests in the radiology section of CPT  Review and/or order of tests in the medicine section of CPT      Labs:     Recent Labs     02/19/21  1737 02/19/21 0031 02/18/21  1606 02/18/21  0917   WBC  --  9.6  --  8.0   HGB 7.5* 7.3*  7.2* 7.0* 5.0*   HCT  --  23.4* 22.9* 17.1*   PLT  --  391  --  418*     Recent Labs     02/19/21 0031 02/18/21  0917    139   K 3.6 3.8   * 107   CO2 22 22   BUN 18 20   CREA 1.36* 1.62*   GLU 82 100   CA 8.0* 8.1*     Recent Labs     02/19/21 0031 02/18/21  0917   ALT 8* 11*   AP 87 99   TBILI 1.2* 0.3   TP 6.2* 6.8   ALB 2.3* 2.6*   GLOB 3.9 4.2*     No results for input(s): INR, PTP, APTT, INREXT in the last 72 hours. No results for input(s): FE, TIBC, PSAT, FERR in the last 72 hours. No results found for: FOL, RBCF   No results for input(s): PH, PCO2, PO2 in the last 72 hours.   Recent Labs     02/19/21 0031 02/18/21  1606 02/18/21  0917   CPK  --   --  80   TROIQ <0.05 <0.05  --      Lab Results   Component Value Date/Time    Cholesterol, total 108 09/12/2020 12:35 PM    HDL Cholesterol 38 09/12/2020 12:35 PM    LDL, calculated 43.6 09/12/2020 12:35 PM Triglyceride 132 09/12/2020 12:35 PM    CHOL/HDL Ratio 2.8 09/12/2020 12:35 PM     Lab Results   Component Value Date/Time    Glucose (POC) 197 (H) 09/11/2020 08:04 PM    Glucose (POC) 146 (H) 09/11/2020 11:30 AM    Glucose (POC) 123 (H) 09/11/2020 06:49 AM    Glucose (POC) 111 (H) 09/10/2020 11:07 PM    Glucose (POC) 111 (H) 09/10/2020 05:14 PM     No results found for: COLOR, APPRN, SPGRU, REFSG, YESY, PROTU, GLUCU, KETU, BILU, UROU, PRADEEP, LEUKU, GLUKE, EPSU, BACTU, WBCU, RBCU, CASTS, UCRY      Medications Reviewed:     Current Facility-Administered Medications   Medication Dose Route Frequency    sodium chloride (NS) flush 5-40 mL  5-40 mL IntraVENous Q8H    sodium chloride (NS) flush 5-40 mL  5-40 mL IntraVENous PRN    vancomycin (VANCOCIN) 1,000 mg in 0.9% sodium chloride 250 mL (VIAL-MATE)  1,000 mg IntraVENous Q24H    [START ON 2/20/2021] cefepime (MAXIPIME) 2 g in 0.9% sodium chloride (MBP/ADV) 100 mL MBP  2 g IntraVENous Q24H    sodium chloride (NS) flush 5-40 mL  5-40 mL IntraVENous Q8H    sodium chloride (NS) flush 5-40 mL  5-40 mL IntraVENous PRN    metoprolol tartrate (LOPRESSOR) tablet 25 mg  25 mg Oral Q12H    sodium chloride (NS) flush 5-40 mL  5-40 mL IntraVENous Q8H    sodium chloride (NS) flush 5-40 mL  5-40 mL IntraVENous PRN    0.9% sodium chloride infusion  75 mL/hr IntraVENous CONTINUOUS    ondansetron (ZOFRAN) injection 4 mg  4 mg IntraVENous Q4H PRN    pantoprazole (PROTONIX) 40 mg in 0.9% sodium chloride 10 mL injection  40 mg IntraVENous BID    collagenase (SANTYL) 250 unit/gram ointment   Topical DAILY    metroNIDAZOLE (METROGEL) 0.75 % gel   Topical DAILY    miconazole (MICOTIN) 2 % powder   Topical BID    Vancomycin dosing per pharmacy   Other Rx Dosing/Monitoring     ______________________________________________________________________  EXPECTED LENGTH OF STAY: 3d 0h  ACTUAL LENGTH OF STAY:          1                 Masha Moody MD

## 2021-02-20 NOTE — PROGRESS NOTES
Physician Progress Note      PATIENT:               Radha Cervantes  CSN #:                  [id number]  :                       1950  ADMIT DATE:       2021 9:13 AM  DISCH DATE:  RESPONDING  PROVIDER #:        Nikhil Mendiola MD          QUERY TEXT:    Patient admitted with GIB. Noted documentation of Acute Kidney Injury in H&P. In order to support the diagnosis of TUSHAR, please include additional clinical indicators in your documentation. Or please document if the diagnosis of TUSHAR has been ruled out after further study. The medical record reflects the following:  Risk Factors: blood loss;    Clinical Indicators:    Cr 1.62-> 1.36 (1.32 in Oct, 1.19 - 1.61 in Sept)  GFR 42 -> 52    Treatment: gentle 0.9% NS 75ml/hr; avoid nephrotoxic meds, monitor BMP      Defined by Kidney Disease Improving Global Outcomes (KDIGO) clinical practice guideline for acute kidney injury:  -Increase in SCr by greater than or equal to 0.3 mg/dl within 48 hours; or  -Increase in SCr to greater than or equal to 1.5 times baseline, which is known or presumed to have occurred within the prior 7 days; or  -Urine volume < 0.5ml/kg/h for 6 hours    Thank you,  Kartik Gudino RN, BSN, [facility]  Clinical   [phone]  Options provided:  -- Acute kidney injury evidenced by, Please document evidence as well as baseline creatinine, if known. -- Currently resolved acute kidney injury was evidenced by, Please document evidence as well as baseline creatinine, if known. -- Acute kidney injury ruled out after study  -- Other - I will add my own diagnosis  -- Disagree - Not applicable / Not valid  -- Disagree - Clinically unable to determine / Unknown  -- Refer to Clinical Documentation Reviewer    PROVIDER RESPONSE TEXT:    Acute kidney injury was ruled out after study.     Query created by: Keary Claude on 2021 12:11 PM      Electronically signed by:  Nikhil Mendiola MD 2021 7:14 AM

## 2021-02-20 NOTE — PROGRESS NOTES
Day #3 of Vancomycin  Indication:  Left heel wound with purulent discharge  -no evidence of osteo on MRI  Current regimen:  1 gram IV Q 24 hours  Abx regimen:  Vanc + Cefepime  ID Following ?: NO  Concomitant nephrotoxic drugs (requires more frequent monitoring): None  Frequency of BMP?: Daily through     Recent Labs     21  0101 21  0031 21  0917   WBC 9.3 9.6 8.0   CREA 1.43* 1.36* 1.62*   BUN 22* 18 20     Est CrCl: ~45-50 ml/min  Temp (24hrs), Av.4 °F (36.9 °C), Min:98 °F (36.7 °C), Max:98.8 °F (37.1 °C)    Cultures:    Blood- NG x2 days; prelim    Goal trough = 10 - 15 mcg/mL    Recent trough history (date/time/level/dose/action taken):  Na    Plan: Continue current regimen. Will assess dosing with a trough level once at steady state.     Jason Quiñonez, [facility]

## 2021-02-20 NOTE — PROGRESS NOTES
Progress Note    Patient: Angela Michelle MRN: [id number]  SSN: [id number]    YOB: 1950  Age: 66 y.o. Sex: male      Admit Date: 2021  Day of Surgery     Procedure:   Procedure(s):  ESOPHAGOGASTRODUODENOSCOPY (EGD)    Subjective:     Patient seen resting quiet and comfortably and no apparent distress. Pain up to 9/10 to left heel if someone presses on it. No new complaints. He states he still cannot walk properly, andhe is not sure why. Objective:     Visit Vitals  BP (!) 152/72 (BP 1 Location: Right arm, BP Patient Position: At rest)   Pulse 70   Temp 98.6 °F (37 °C)   Resp 19   Ht 5' 8\" (1.727 m)   Wt 66.7 kg (147 lb)   SpO2 99%   BMI 22.35 kg/m²        Physical Exam:    DP/PT nonpalpable bilaterally. CFT > 3 sec bilaterally  Limbs cool to warm bilaterally. Prevalon to LLE+    TTP around left heel wound and with calcaneal squeeze    Wound Number: 1  Wound Location: Left heel  Wound Type: Pressure, ischemic  Wound Size: 2x2x0  Wound Base: Eschar, boggy, minimal purulence expressed  Periwound: Intact, viable, erythema noted without overt cellulitis or ascending lymphangitis  Surrounding Skin: Intact, viable   Drainage: None  Odor: Mild     Wound was cleansed with saline. Wound was unable to be debrided due to patient tolerance. A culture swab was taken of the minimal purulence noted at the proximal end of the eschar.  Wound was dressed with betadine + DSD without ACE given concerns for vasculopathy    Labs/Radiology Review: images and reports reviewed, MRI unremarkable for deeper infection, no signs of OM    Assessment:     Hospital Problems  Date Reviewed: 2021          Codes Class Noted POA    GI bleed ICD-10-CM: K92.2  ICD-9-CM: 578.9  2021 Unknown              Plan/Recommendations/Medical Decision MakinM with heel wound    - wound more boggy than yesterday, still without cellulitis  - minimal purulence expressed, culture taken and ordered  - MRI reviewed; findings unremarkable    - Before any surgical intervention, patient would need to be optimized from a medical standpoint   -- particularly because the anatomic concerns generally result in intraoperative blood loss  -- Patient would also need cardiac preoperative clearance given that he would need to be prone  -- Determine etiology of numbness and falls; neuro clearance  -- Patient examines concerning for vasculopathy and would need vascular workup  -- Social history concerns for postoperative care    Will continue to monitor while inpatient. Overall concerned for patient's inability to ambulate appropriately.

## 2021-02-20 NOTE — PROGRESS NOTES
1853 TRANSFER - IN REPORT:    Verbal report received from RN(name) on Joesph Muller  being received from [facility]) for routine progression of care      Report consisted of patients Situation, Background, Assessment and   Recommendations(SBAR). Information from the following report(s) SBAR, Kardex, ED Summary, Procedure Summary, Intake/Output, MAR and Cardiac Rhythm NSR was reviewed with the receiving nurse. Opportunity for questions and clarification was provided. Assessment completed upon patients arrival to unit and care assumed.   _______________________________________________________      4856 Patient has yet to arrive to [facility]. Verbal shift change report given to Gonsalo Macias (oncoming nurse) by Felizardo Essex RN (offgoing nurse). Report included the following information SBAR, Kardex, ED Summary, Procedure Summary, Intake/Output, MAR and Cardiac Rhythm NSR.

## 2021-02-21 ENCOUNTER — APPOINTMENT (OUTPATIENT)
Dept: MRI IMAGING | Age: 71
DRG: 377 | End: 2021-02-21
Attending: HOSPITALIST
Payer: MEDICARE

## 2021-02-21 LAB
ANION GAP SERPL CALC-SCNC: 6 MMOL/L (ref 5–15)
BUN SERPL-MCNC: 21 MG/DL (ref 6–20)
BUN/CREAT SERPL: 14 (ref 12–20)
CALCIUM SERPL-MCNC: 7.3 MG/DL (ref 8.5–10.1)
CHLORIDE SERPL-SCNC: 109 MMOL/L (ref 97–108)
CO2 SERPL-SCNC: 23 MMOL/L (ref 21–32)
CREAT SERPL-MCNC: 1.45 MG/DL (ref 0.7–1.3)
GLUCOSE SERPL-MCNC: 96 MG/DL (ref 65–100)
HGB BLD-MCNC: 7.6 G/DL (ref 12.1–17)
POTASSIUM SERPL-SCNC: 3.7 MMOL/L (ref 3.5–5.1)
SODIUM SERPL-SCNC: 138 MMOL/L (ref 136–145)

## 2021-02-21 PROCEDURE — 70551 MRI BRAIN STEM W/O DYE: CPT

## 2021-02-21 PROCEDURE — 74011250636 HC RX REV CODE- 250/636: Performed by: HOSPITALIST

## 2021-02-21 PROCEDURE — 74011000258 HC RX REV CODE- 258: Performed by: HOSPITALIST

## 2021-02-21 PROCEDURE — 74011250637 HC RX REV CODE- 250/637: Performed by: FAMILY MEDICINE

## 2021-02-21 PROCEDURE — 65660000000 HC RM CCU STEPDOWN

## 2021-02-21 PROCEDURE — 74011000250 HC RX REV CODE- 250: Performed by: FAMILY MEDICINE

## 2021-02-21 PROCEDURE — 36415 COLL VENOUS BLD VENIPUNCTURE: CPT

## 2021-02-21 PROCEDURE — C9113 INJ PANTOPRAZOLE SODIUM, VIA: HCPCS | Performed by: FAMILY MEDICINE

## 2021-02-21 PROCEDURE — 74011250636 HC RX REV CODE- 250/636: Performed by: FAMILY MEDICINE

## 2021-02-21 PROCEDURE — 80048 BASIC METABOLIC PNL TOTAL CA: CPT

## 2021-02-21 PROCEDURE — 85018 HEMOGLOBIN: CPT

## 2021-02-21 PROCEDURE — 74011250637 HC RX REV CODE- 250/637: Performed by: HOSPITALIST

## 2021-02-21 RX ADMIN — Medication 10 ML: at 12:57

## 2021-02-21 RX ADMIN — MICONAZOLE NITRATE 2 % TOPICAL POWDER: at 18:00

## 2021-02-21 RX ADMIN — METOPROLOL TARTRATE 25 MG: 25 TABLET, FILM COATED ORAL at 08:58

## 2021-02-21 RX ADMIN — ASPIRIN 81 MG: 81 TABLET, CHEWABLE ORAL at 08:57

## 2021-02-21 RX ADMIN — SODIUM CHLORIDE 75 ML/HR: 9 INJECTION, SOLUTION INTRAVENOUS at 12:58

## 2021-02-21 RX ADMIN — Medication 10 ML: at 12:58

## 2021-02-21 RX ADMIN — Medication 10 ML: at 06:00

## 2021-02-21 RX ADMIN — METOPROLOL TARTRATE 25 MG: 25 TABLET, FILM COATED ORAL at 22:15

## 2021-02-21 RX ADMIN — PANTOPRAZOLE SODIUM 40 MG: 40 INJECTION, POWDER, FOR SOLUTION INTRAVENOUS at 12:57

## 2021-02-21 RX ADMIN — MICONAZOLE NITRATE 2 % TOPICAL POWDER: at 09:00

## 2021-02-21 RX ADMIN — METRONIDAZOLE: 7.5 GEL TOPICAL at 09:00

## 2021-02-21 RX ADMIN — PANTOPRAZOLE SODIUM 40 MG: 40 INJECTION, POWDER, FOR SOLUTION INTRAVENOUS at 00:51

## 2021-02-21 RX ADMIN — COLLAGENASE SANTYL: 250 OINTMENT TOPICAL at 08:59

## 2021-02-21 RX ADMIN — CEFEPIME 2 G: 2 INJECTION, POWDER, FOR SOLUTION INTRAVENOUS at 05:32

## 2021-02-21 RX ADMIN — VANCOMYCIN HYDROCHLORIDE 1000 MG: 1 INJECTION, POWDER, LYOPHILIZED, FOR SOLUTION INTRAVENOUS at 18:00

## 2021-02-21 NOTE — PROGRESS NOTES
Dr Gao Mail with radiology called and gave MRI results to RN- acute on chronic L MCA infarct. Attending MD informed.

## 2021-02-21 NOTE — PROGRESS NOTES
Wound care to left heel noted to have eschar. Cleaned site with wound klenz and applied santyl ointment covered with 4x4 gauze and kerlix dressing. Tolerated procedure well.

## 2021-02-21 NOTE — PROGRESS NOTES
[facility]                                                                                          Hospitalist Progress Note  Quiana Chavez MD  Answering service: [phone] OR [phone] from in house phone        Date of Service:  2021  NAME:  Mikaela Mendoza  :  1950  MRN:  [id number]      Admission Summary:   66 y.o M with PMH of HTN,stroke,s/p carotid artery surgery came to the hospital due to lower extremity weakness. Interval history / Subjective:   Patient seen and examined    Denied any chest pain, SOB,nasuea/vomiting. States that she has LE weakness     Assessment & Plan:     # Acute blood loss anemia:  -hb at admission was 5, s/p 2 U PRBCs  -hb stable so far -7.3,   -EGD did not show any obvious evidence of bleeding  -monitor Hb   -hold plavix. ?need colonoscopy    #Left foot wound infection:  -on vanc,cefepime and flagul  -podiatrist following  -MRI -No evidence of osteomyelitis.  No drainable fluid collection    #Peripheral arterial disease:  -arterial duplex results noted -vascular surgery consulted      #Lower extremity weakness:  ?due to peripheral arterial disease  -MRI L spine -Multilevel mild spondylosis ,  MRI brain pending as he had previous stroke       #History of stroke  # Left carotid artery stenosis s/p Left carotid endarterectomy with Dacron patch angioplasty  -on aspirin, on statin  -will hold plavix until we are definitively certain he does not need procedures    Skin cancer:  -follows [facility] dermatologist and was initiated local treatment per patient      Code status: full  DVT prophylaxis: heparin    Care Plan discussed with: patient,nurse  Anticipated Disposition:SNF vs rehab  Anticipated [id number]  Date Reviewed: 2021          Codes Class Noted POA    GI bleed ICD-10-CM: K92.2  ICD-9-CM: 578.9  2021 Unknown                Review of Systems:   As per HPI      Vital Signs:    Last 24hrs VS reviewed since prior progress note. Most recent are:  Visit Vitals  BP (!) 160/73   Pulse 69   Temp 97.5 °F (36.4 °C)   Resp 13   Ht 5' 8\" (1.727 m)   Wt 63.1 kg (139 lb 1.8 oz)   SpO2 100%   BMI 21.15 kg/m²       No intake or output data in the 24 hours ending 02/21/21 0900     Physical Examination:     I had a face to face encounter with this patient and independently examined them on 2/21/2021 as outlined below:          Constitutional:  No acute distress, cooperative, pleasant    ENT:  Oral mucosa moist, oropharynx benign,EOMI    Resp:  CTA bilaterally. No wheezing/rhonchi/rales. No accessory muscle use   CV:  Regular rhythm, normal rate, S1,S 2 wnl    GI:  Soft, non distended, non tender, normoactive bowel sounds    Musculoskeletal:  No LE edema, warm    Neurologic:  he is alert and oriented X 3, lower extremity strength 4/5, UE strength 5/5, sensation intact     Psych: not anxious nor agitated. Skin: right cheek -skin cancer       Data Review:    Review and/or order of clinical lab test  Review and/or order of tests in the radiology section of CPT  Review and/or order of tests in the medicine section of CPT      Labs:     Recent Labs     02/21/21  0203 02/20/21  1032 02/20/21  0101 02/19/21  0031 02/19/21  0031   WBC  --   --  9.3  --  9.6   HGB 7.6* 8.3* 8.2*   < > 7.3*  7.2*   HCT  --   --  26.5*  --  23.4*   PLT  --   --  416*  --  391    < > = values in this interval not displayed. Recent Labs     02/21/21  0203 02/20/21  0101 02/19/21  0031    139 140   K 3.7 3.7 3.6   * 110* 110*   CO2 23 21 22   BUN 21* 22* 18   CREA 1.45* 1.43* 1.36*   GLU 96 80 82   CA 7.3* 7.9* 8.0*     Recent Labs     02/19/21  0031 02/18/21  0917   ALT 8* 11*   AP 87 99   TBILI 1.2* 0.3   TP 6.2* 6.8   ALB 2.3* 2.6*   GLOB 3.9 4.2*     No results for input(s): INR, PTP, APTT, INREXT, INREXT in the last 72 hours. No results for input(s): FE, TIBC, PSAT, FERR in the last 72 hours.    No results found for: FOL, RBCF   No results for input(s): PH, PCO2, PO2 in the last 72 hours.   Recent Labs     02/19/21  0031 02/18/21  1606 02/18/21  0917   CPK  --   --  83   TROIQ <0.05 <0.05  --      Lab Results   Component Value Date/Time    Cholesterol, total 108 09/12/2020 12:35 PM    HDL Cholesterol 38 09/12/2020 12:35 PM    LDL, calculated 43.6 09/12/2020 12:35 PM    Triglyceride 132 09/12/2020 12:35 PM    CHOL/HDL Ratio 2.8 09/12/2020 12:35 PM     Lab Results   Component Value Date/Time    Glucose (POC) 197 (H) 09/11/2020 08:04 PM    Glucose (POC) 146 (H) 09/11/2020 11:30 AM    Glucose (POC) 123 (H) 09/11/2020 06:49 AM    Glucose (POC) 111 (H) 09/10/2020 11:07 PM    Glucose (POC) 111 (H) 09/10/2020 05:14 PM     No results found for: COLOR, APPRN, SPGRU, REFSG, YESY, PROTU, GLUCU, KETU, BILU, UROU, PRADEEP, LEUKU, GLUKE, EPSU, BACTU, WBCU, RBCU, CASTS, UCRY      Medications Reviewed:     Current Facility-Administered Medications   Medication Dose Route Frequency    aspirin chewable tablet 81 mg  81 mg Oral DAILY    heparin (porcine) injection 5,000 Units  5,000 Units SubCUTAneous Q8H    sodium chloride (NS) flush 5-40 mL  5-40 mL IntraVENous Q8H    sodium chloride (NS) flush 5-40 mL  5-40 mL IntraVENous PRN    vancomycin (VANCOCIN) 1,000 mg in 0.9% sodium chloride 250 mL (VIAL-MATE)  1,000 mg IntraVENous Q24H    cefepime (MAXIPIME) 2 g in 0.9% sodium chloride (MBP/ADV) 100 mL MBP  2 g IntraVENous Q24H    sodium chloride (NS) flush 5-40 mL  5-40 mL IntraVENous Q8H    sodium chloride (NS) flush 5-40 mL  5-40 mL IntraVENous PRN    metoprolol tartrate (LOPRESSOR) tablet 25 mg  25 mg Oral Q12H    sodium chloride (NS) flush 5-40 mL  5-40 mL IntraVENous Q8H    sodium chloride (NS) flush 5-40 mL  5-40 mL IntraVENous PRN    0.9% sodium chloride infusion  75 mL/hr IntraVENous CONTINUOUS    ondansetron (ZOFRAN) injection 4 mg  4 mg IntraVENous Q4H PRN    pantoprazole (PROTONIX) 40 mg in 0.9% sodium chloride 10 mL injection  40 mg IntraVENous BID    collagenase (SANTYL) 250 unit/gram ointment   Topical DAILY    metroNIDAZOLE (METROGEL) 0.75 % gel   Topical DAILY    miconazole (MICOTIN) 2 % powder   Topical BID    Vancomycin dosing per pharmacy   Other Rx Dosing/Monitoring     ______________________________________________________________________  EXPECTED LENGTH OF STAY: 3d 0h  ACTUAL LENGTH OF STAY:          3                 Gabriella Giles MD

## 2021-02-21 NOTE — ROUTINE PROCESS
Bedside shift change report given to [address] (oncoming nurse) by Vikash Real RN (offgoing nurse). Report included the following information SBAR, Kardex, ED Summary, Intake/Output, MAR, Cardiac Rhythm NSR and Dual Neuro Assessment.

## 2021-02-21 NOTE — PROGRESS NOTES
Day #4 of Vancomycin  Indication:  Left heel wound with purulent discharge  -no evidence of osteo on MRI  Current regimen:  1 gram IV Q 24 hours  Abx regimen:  Vanc + Cefepime  ID Following ?: NO  Concomitant nephrotoxic drugs (requires more frequent monitoring): None  Frequency of BMP?: Daily through     Recent Labs     21  0203 21  0101 21  0031   WBC  --  9.3 9.6   CREA 1.45* 1.43* 1.36*   BUN * * 18     Est CrCl: ~45-50 ml/min  Temp (24hrs), Av.2 °F (36.8 °C), Min:97.5 °F (36.4 °C), Max:99 °F (37.2 °C)    Cultures:    Blood- NG x3 days; prelim   heel wound: light prob Staph aureus; prelim    Goal trough = 10 - 15 mcg/mL    Recent trough history (date/time/level/dose/action taken):  Na    Plan: Continue current regimen. Will assess dosing with a trough level once at steady state.  Await speciation and sensitivities from wound cx.    Yana Puls, [facility]

## 2021-02-21 NOTE — CONSULTS
Vascular Surgery Consult Note  2/21/2021    Subjective:     Scott Broussard is a 66 y.o.  michael who presents complaining of generalized and specifically lower extremity weakness. Has been present for some time and involved multiple falls. Denies lower extremity pain per se. No claudication at his level of activity. No rest pain. Has multiple wounds at various stages of healing. Past Medical History:   Diagnosis Date    Skin cancer 12/19/2019    bcc-left forehead, left neck, left lateral cheek     Stroke (cerebrum) ([facility] [address]       History reviewed. No pertinent surgical history. History reviewed. No pertinent family history. Social History     Tobacco Use    Smoking status: Current Every Day Smoker    Smokeless tobacco: Never Used   Substance Use Topics    Alcohol use: Yes       Prior to Admission medications    Medication Sig Start Date End Date Taking? Authorizing Provider   acetaminophen (TYLENOL) 500 mg tablet Take 500 mg by mouth every six (6) hours as needed for Pain. Yes Provider, Historical   aspirin 81 mg chewable tablet Take 1 Tab by mouth daily. 9/18/20  Yes Anat Tamez MD   clopidogreL (PLAVIX) 75 mg tab Take 1 Tab by mouth daily. 9/18/20  Yes Anat Tamez MD   metoprolol tartrate (LOPRESSOR) 50 mg tablet Take 50 mg by mouth two (2) times a day. Provider, Historical   atorvastatin (LIPITOR) 40 mg tablet Take 1 Tab by mouth nightly. 9/17/20   Anat Tamez MD   pantoprazole (Protonix) 40 mg tablet Take 1 Tab by mouth two (2) times a day. 9/17/20   Anat Tamez MD   ferrous sulfate (Iron) 325 mg (65 mg iron) tablet Take 1 Tab by mouth Daily (before breakfast). 9/17/20   Anat Tamez MD     No Known Allergies     Review of Systems:  Review of Systems   Constitutional: Negative. HENT: Negative. Respiratory: Negative. Cardiovascular: Negative. Genitourinary: Negative. Musculoskeletal: Positive for arthralgias, back pain and gait problem.    Neurological: Positive for weakness. Objective:       Patient Vitals for the past 24 hrs:   BP Temp Pulse Resp SpO2 Weight   02/21/21 1006 (!) 150/66 98.2 °F (36.8 °C) 69 20 99 %    02/21/21 0857 (!) 160/73  69      02/21/21 0708      63.1 kg (139 lb 1.8 oz)   02/21/21 0600 (!) 165/67  60 13 100 %    02/21/21 0534 (!) 170/79 97.5 °F (36.4 °C) 75 15 97 %    02/21/21 0205 (!) 157/87 98.2 °F (36.8 °C) 66 19 97 %    02/21/21 0100     100 %    02/20/21 2138 (!) 145/71 98.2 °F (36.8 °C) 73 20 97 %    02/20/21 2000     98 %    02/20/21 1948 139/62 99 °F (37.2 °C) 72 20 98 % 66 kg (145 lb 8.1 oz)   02/20/21 1619 129/80 98 °F (36.7 °C) 71 18 99 %      ---------------------------------------------------------------------------------------------------------    Physical Exam:   Physical Exam  HENT:      Head: Normocephalic. Comments: Large basal cell cancer right cheek     Nose: Nose normal.   Cardiovascular:      Rate and Rhythm: Normal rate and regular rhythm. Pulmonary:      Effort: Pulmonary effort is normal.   Abdominal:      General: Abdomen is flat. Palpations: Abdomen is soft. Skin:     General: Skin is warm and dry. Neurological:      Mental Status: He is alert. Motor: Weakness present.          Pertinent Test Results:   Recent Results (from the past 24 hour(s))   DUPLEX LOWER EXT ARTERY BILAT    Collection Time: 02/20/21  4:02 PM   Result Value Ref Range    Right CFA prox sys PSV 78.3 cm/s    Right Prox PFA A .6 cm/s    Right super femoral prox sys PSV 0.0 cm/s    Right Dist PTA PSV 28.1 cm/s    Right Dist DONTAE Velocity 35.1 cm/s    Left CFA prox sys .0 cm/s    Left Prox PFA A .4 cm/s    Left super femoral dist sys PSV 6.7 cm/s    Left super femoral prox sys .7 cm/s    Left Dist PTA PSV 22.3 cm/s    Left Dist DONTAE Velocity 54.6 cm/s    Left popliteal mid sys PSV 50.7 cm/s    Right SFA Prox Mani Ratio 0.0     Left SFA Prox Mani Ratio 9.77    METABOLIC PANEL, BASIC    Collection Time: 02/21/21  2:03 AM   Result Value Ref Range    Sodium 138 136 - 145 mmol/L    Potassium 3.7 3.5 - 5.1 mmol/L    Chloride 109 (H) 97 - 108 mmol/L    CO2 23 21 - 32 mmol/L    Anion gap 6 5 - 15 mmol/L    Glucose 96 65 - 100 mg/dL    BUN 21 (H) 6 - 20 MG/DL    Creatinine 1.45 (H) 0.70 - 1.30 MG/DL    BUN/Creatinine ratio 14 12 - 20      GFR est AA 58 (L) >60 ml/min/1.73m2    GFR est non-AA 48 (L) >60 ml/min/1.73m2    Calcium 7.3 (L) 8.5 - 10.1 MG/DL   HEMOGLOBIN    Collection Time: 02/21/21  2:03 AM   Result Value Ref Range    HGB 7.6 (L) 12.1 - 17.0 g/dL       Assessmen/Plan:   Patient with generalized weakness. Has moderate pvd which I would expect to cause claudication if he was more active. Not likely a cause of his weakness or falls. Once this and his anemia is resolved we can consider further workup. Thank you for consult . Will follow with you.      Signed By: Sissy Hughes MD     February 21, 2021

## 2021-02-22 ENCOUNTER — APPOINTMENT (OUTPATIENT)
Dept: NON INVASIVE DIAGNOSTICS | Age: 71
DRG: 377 | End: 2021-02-22
Attending: HOSPITALIST
Payer: MEDICARE

## 2021-02-22 ENCOUNTER — APPOINTMENT (OUTPATIENT)
Dept: VASCULAR SURGERY | Age: 71
DRG: 377 | End: 2021-02-22
Attending: HOSPITALIST
Payer: MEDICARE

## 2021-02-22 LAB
ANION GAP SERPL CALC-SCNC: 7 MMOL/L (ref 5–15)
BACTERIA SPEC CULT: ABNORMAL
BASOPHILS # BLD: 0.1 K/UL (ref 0–0.1)
BASOPHILS NFR BLD: 2 % (ref 0–1)
BUN SERPL-MCNC: 20 MG/DL (ref 6–20)
BUN/CREAT SERPL: 12 (ref 12–20)
CALCIUM SERPL-MCNC: 7.7 MG/DL (ref 8.5–10.1)
CHLORIDE SERPL-SCNC: 110 MMOL/L (ref 97–108)
CHOLEST SERPL-MCNC: 96 MG/DL
CO2 SERPL-SCNC: 22 MMOL/L (ref 21–32)
CREAT SERPL-MCNC: 1.65 MG/DL (ref 0.7–1.3)
DIFFERENTIAL METHOD BLD: ABNORMAL
ECHO AO ROOT DIAM: 2.93 CM
ECHO AV PEAK GRADIENT: 5.94 MMHG
ECHO AV PEAK VELOCITY: 121.87 CM/S
ECHO LA MAJOR AXIS: 3.03 CM
ECHO LA MINOR AXIS: 1.72 CM
ECHO LV INTERNAL DIMENSION DIASTOLIC: 4.96 CM (ref 4.2–5.9)
ECHO LV INTERNAL DIMENSION SYSTOLIC: 3.53 CM
ECHO LV IVSD: 1.15 CM (ref 0.6–1)
ECHO LV MASS 2D: 203.2 G (ref 88–224)
ECHO LV MASS INDEX 2D: 115.3 G/M2 (ref 49–115)
ECHO LV POSTERIOR WALL DIASTOLIC: 1.04 CM (ref 0.6–1)
ECHO LVOT PEAK GRADIENT: 2.57 MMHG
ECHO LVOT PEAK VELOCITY: 80.13 CM/S
ECHO MV A VELOCITY: 67.28 CM/S
ECHO MV AREA PHT: 3.93 CM2
ECHO MV E DECELERATION TIME (DT): 193.1 MS
ECHO MV E VELOCITY: 96.5 CM/S
ECHO MV E/A RATIO: 1.43
ECHO MV PRESSURE HALF TIME (PHT): 56 MS
ECHO PV PEAK INSTANTANEOUS GRADIENT SYSTOLIC: 3.72 MMHG
ECHO RV TAPSE: 1.58 CM (ref 1.5–2)
ECHO TV REGURGITANT MAX VELOCITY: 239.55 CM/S
ECHO TV REGURGITANT PEAK GRADIENT: 22.95 MMHG
EOSINOPHIL # BLD: 0.5 K/UL (ref 0–0.4)
EOSINOPHIL NFR BLD: 7 % (ref 0–7)
ERYTHROCYTE [DISTWIDTH] IN BLOOD BY AUTOMATED COUNT: 16.3 % (ref 11.5–14.5)
GLUCOSE SERPL-MCNC: 98 MG/DL (ref 65–100)
GRAM STN SPEC: ABNORMAL
HCT VFR BLD AUTO: 27 % (ref 36.6–50.3)
HDLC SERPL-MCNC: 37 MG/DL
HDLC SERPL: 2.6 {RATIO} (ref 0–5)
HGB BLD-MCNC: 8.2 G/DL (ref 12.1–17)
HGB BLD-MCNC: 8.2 G/DL (ref 12.1–17)
IMM GRANULOCYTES # BLD AUTO: 0 K/UL (ref 0–0.04)
IMM GRANULOCYTES NFR BLD AUTO: 1 % (ref 0–0.5)
LDLC SERPL CALC-MCNC: 39 MG/DL (ref 0–100)
LEFT CCA DIST DIAS: 19 CM/S
LEFT CCA DIST SYS: 97 CM/S
LEFT CCA PROX DIAS: 17.9 CM/S
LEFT CCA PROX SYS: 92.6 CM/S
LEFT CFA PROX SYS PSV: 113 CM/S
LEFT DIST ATA VELOCITY: 54.6 CM/S
LEFT DIST PTA PSV: 22.3 CM/S
LEFT ECA DIAS: 8.48 CM/S
LEFT ECA SYS: 118.9 CM/S
LEFT ICA DIST DIAS: 9.5 CM/S
LEFT ICA DIST SYS: 85.8 CM/S
LEFT ICA MID DIAS: 21.1 CM/S
LEFT ICA MID SYS: 85.8 CM/S
LEFT ICA PROX DIAS: 10.7 CM/S
LEFT ICA PROX SYS: 91 CM/S
LEFT ICA/CCA SYS: 0.94
LEFT POPLITEAL MID SYS PSV: 50.7 CM/S
LEFT PROX PFA A PSV: 260.4 CM/S
LEFT SFA PROX VEL RATIO: 2.24
LEFT SUPER FEMORAL DIST SYS PSV: 6.7 CM/S
LEFT SUPER FEMORAL PROX SYS PSV: 252.7 CM/S
LEFT VERTEBRAL DIAS: 22.39 CM/S
LEFT VERTEBRAL SYS: 127.2 CM/S
LIPID PROFILE,FLP: NORMAL
LYMPHOCYTES # BLD: 1.1 K/UL (ref 0.8–3.5)
LYMPHOCYTES NFR BLD: 16 % (ref 12–49)
MCH RBC QN AUTO: 25.7 PG (ref 26–34)
MCHC RBC AUTO-ENTMCNC: 30.4 G/DL (ref 30–36.5)
MCV RBC AUTO: 84.6 FL (ref 80–99)
MONOCYTES # BLD: 0.5 K/UL (ref 0–1)
MONOCYTES NFR BLD: 7 % (ref 5–13)
NEUTS SEG # BLD: 4.7 K/UL (ref 1.8–8)
NEUTS SEG NFR BLD: 67 % (ref 32–75)
NRBC # BLD: 0 K/UL (ref 0–0.01)
NRBC BLD-RTO: 0 PER 100 WBC
PLATELET # BLD AUTO: 428 K/UL (ref 150–400)
PMV BLD AUTO: 11.3 FL (ref 8.9–12.9)
POTASSIUM SERPL-SCNC: 4 MMOL/L (ref 3.5–5.1)
RBC # BLD AUTO: 3.19 M/UL (ref 4.1–5.7)
RIGHT CCA DIST DIAS: 15 CM/S
RIGHT CCA DIST SYS: 74.5 CM/S
RIGHT CCA PROX DIAS: 11.6 CM/S
RIGHT CCA PROX SYS: 53.5 CM/S
RIGHT CFA PROX SYS PSV: 78.3 CM/S
RIGHT DIST ATA VELOCITY: 35.1 CM/S
RIGHT DIST PTA PSV: 28.1 CM/S
RIGHT ECA DIAS: 12.42 CM/S
RIGHT ECA SYS: 164.2 CM/S
RIGHT ICA DIST DIAS: 17.4 CM/S
RIGHT ICA DIST SYS: 81.2 CM/S
RIGHT ICA MID DIAS: 24.7 CM/S
RIGHT ICA MID SYS: 97.4 CM/S
RIGHT ICA PROX DIAS: 24.7 CM/S
RIGHT ICA PROX SYS: 103.9 CM/S
RIGHT ICA/CCA SYS: 1.4
RIGHT PROX PFA A PSV: 363.6 CM/S
RIGHT SFA PROX VEL RATIO: 0
RIGHT SUPER FEMORAL PROX SYS PSV: 0 CM/S
RIGHT VERTEBRAL DIAS: 7.33 CM/S
RIGHT VERTEBRAL SYS: 34.4 CM/S
SERVICE CMNT-IMP: ABNORMAL
SODIUM SERPL-SCNC: 139 MMOL/L (ref 136–145)
TRIGL SERPL-MCNC: 100 MG/DL (ref ?–150)
VLDLC SERPL CALC-MCNC: 20 MG/DL
WBC # BLD AUTO: 6.9 K/UL (ref 4.1–11.1)

## 2021-02-22 PROCEDURE — 85018 HEMOGLOBIN: CPT

## 2021-02-22 PROCEDURE — 74011000250 HC RX REV CODE- 250: Performed by: HOSPITALIST

## 2021-02-22 PROCEDURE — 97530 THERAPEUTIC ACTIVITIES: CPT

## 2021-02-22 PROCEDURE — 80061 LIPID PANEL: CPT

## 2021-02-22 PROCEDURE — 74011000258 HC RX REV CODE- 258: Performed by: HOSPITALIST

## 2021-02-22 PROCEDURE — 65660000000 HC RM CCU STEPDOWN

## 2021-02-22 PROCEDURE — 36415 COLL VENOUS BLD VENIPUNCTURE: CPT

## 2021-02-22 PROCEDURE — 74011250636 HC RX REV CODE- 250/636: Performed by: FAMILY MEDICINE

## 2021-02-22 PROCEDURE — 74011250637 HC RX REV CODE- 250/637: Performed by: FAMILY MEDICINE

## 2021-02-22 PROCEDURE — 74011250636 HC RX REV CODE- 250/636: Performed by: HOSPITALIST

## 2021-02-22 PROCEDURE — C8929 TTE W OR WO FOL WCON,DOPPLER: HCPCS

## 2021-02-22 PROCEDURE — 74011250637 HC RX REV CODE- 250/637: Performed by: HOSPITALIST

## 2021-02-22 PROCEDURE — 99223 1ST HOSP IP/OBS HIGH 75: CPT | Performed by: NURSE PRACTITIONER

## 2021-02-22 PROCEDURE — 93306 TTE W/DOPPLER COMPLETE: CPT | Performed by: INTERNAL MEDICINE

## 2021-02-22 PROCEDURE — 97110 THERAPEUTIC EXERCISES: CPT

## 2021-02-22 PROCEDURE — 74011000250 HC RX REV CODE- 250: Performed by: FAMILY MEDICINE

## 2021-02-22 PROCEDURE — C9113 INJ PANTOPRAZOLE SODIUM, VIA: HCPCS | Performed by: FAMILY MEDICINE

## 2021-02-22 PROCEDURE — 93880 EXTRACRANIAL BILAT STUDY: CPT

## 2021-02-22 PROCEDURE — 80048 BASIC METABOLIC PNL TOTAL CA: CPT

## 2021-02-22 PROCEDURE — 85025 COMPLETE CBC W/AUTO DIFF WBC: CPT

## 2021-02-22 RX ORDER — POLYETHYLENE GLYCOL 3350, SODIUM SULFATE, SODIUM CHLORIDE, POTASSIUM CHLORIDE, SODIUM ASCORBATE, AND ASCORBIC ACID 7.5-2.691G
2 KIT ORAL ONCE
Status: COMPLETED | OUTPATIENT
Start: 2021-02-23 | End: 2021-02-23

## 2021-02-22 RX ADMIN — PANTOPRAZOLE SODIUM 40 MG: 40 INJECTION, POWDER, FOR SOLUTION INTRAVENOUS at 02:46

## 2021-02-22 RX ADMIN — PERFLUTREN 1.5 ML: 6.52 INJECTION, SUSPENSION INTRAVENOUS at 11:00

## 2021-02-22 RX ADMIN — MICONAZOLE NITRATE 2 % TOPICAL POWDER: at 08:51

## 2021-02-22 RX ADMIN — METOPROLOL TARTRATE 25 MG: 25 TABLET, FILM COATED ORAL at 08:50

## 2021-02-22 RX ADMIN — Medication 10 ML: at 13:26

## 2021-02-22 RX ADMIN — Medication 10 ML: at 21:10

## 2021-02-22 RX ADMIN — ASPIRIN 81 MG: 81 TABLET, CHEWABLE ORAL at 08:50

## 2021-02-22 RX ADMIN — Medication 10 ML: at 21:13

## 2021-02-22 RX ADMIN — Medication 10 ML: at 21:09

## 2021-02-22 RX ADMIN — COLLAGENASE SANTYL: 250 OINTMENT TOPICAL at 08:50

## 2021-02-22 RX ADMIN — PANTOPRAZOLE SODIUM 40 MG: 40 INJECTION, POWDER, FOR SOLUTION INTRAVENOUS at 13:26

## 2021-02-22 RX ADMIN — HEPARIN SODIUM 5000 UNITS: 5000 INJECTION INTRAVENOUS; SUBCUTANEOUS at 21:06

## 2021-02-22 RX ADMIN — METOPROLOL TARTRATE 25 MG: 25 TABLET, FILM COATED ORAL at 21:07

## 2021-02-22 RX ADMIN — CEFEPIME 2 G: 2 INJECTION, POWDER, FOR SOLUTION INTRAVENOUS at 05:31

## 2021-02-22 RX ADMIN — Medication 10 ML: at 13:27

## 2021-02-22 RX ADMIN — MICONAZOLE NITRATE 2 % TOPICAL POWDER: at 18:00

## 2021-02-22 RX ADMIN — METRONIDAZOLE: 7.5 GEL TOPICAL at 08:51

## 2021-02-22 NOTE — CONSULTS
Neurology Consult Note  Aide Steinberg NP      Date of admission: 2/18/2021    Patient: Lee Loving MRN: [id number]  SSN: [id number]    YOB: 1950  Age: 66 y.o. Sex: male        Darinel low ext leg weakness and possible punctate infarcts in old stroke bed vs artifact    Subjective:     HPI: Lee Loving is a 66 y.o. male w/ PMH of HTN, L MCA stroke, s/p  left CEA on 10/28/20 came to the hospital due to lower extremity weakness. On admission found to have Hgb of 5.0. Pt has left heel would and multiple pressure injuries, unstageable left ischial tuberosity and stage 3 PIs to left/right buttocks. MRI lumbar spine unremarkable. MRI w/o 12/21/21 showed chronic white matter disease and areas of chronic infarction particularly in  the left MCA territory. Few punctate foci of mild diffusion signal abnormality in the periphery of the infarct may represent acute/subacute infarctions versus artifact. ASA 81 mg/day and Plavix 75 mg/day held. Recent admission 9/7/20 with fatigue & dehydration, had troponin 8.3 and severe anemia with hemoglobin of 5.2. He is reported to have chronic left-sided weakness and dysarthria after a prior stroke. Neurology consulted for concern for new new right-sided weakness. MRI showed acute left hemisphere cortical infarction primarily involving frontal and parietal lobes but with small acute cortical infarct in the posterior inferior occipital lobe and evidence of previous old small vessel ischemic injury left greater than right. He was not on aspirin prior to admission. CTA H&N showed severe L ICA stenosis. Asprin non-responder. Started on Plavix with good therapeutic response. Unclear etiology of anemia. Dr Mary Pozo of vascular consulted performed left CEA on 10/28/20. Interval 02/22/21:     MRI w/o 12/21/21 showed chronic white matter disease and areas of chronic infarction particularly in  the left MCA territory.  Few punctate foci of mild diffusion signal abnormality in the periphery of the infarct may represent acute/subacute infarctions versus artifact. ASA 81 mg/day restarted - (ASA non-responder). Plavix 75 mg/day held. Pt feels his strength in the lower extremities has improved        Review of systems  Review of systems negative except as detailed in the HPI, interval, PMH and A&P    Past Medical History:   Diagnosis Date    Skin cancer 12/19/2019    bcc-left forehead, left neck, left lateral cheek     Stroke (cerebrum) ([facility] [address]      History reviewed. No pertinent surgical history. History reviewed. No pertinent family history. Social History     Tobacco Use    Smoking status: Current Every Day Smoker    Smokeless tobacco: Never Used   Substance Use Topics    Alcohol use: Yes      Prior to Admission medications    Medication Sig Start Date End Date Taking? Authorizing Provider   acetaminophen (TYLENOL) 500 mg tablet Take 500 mg by mouth every six (6) hours as needed for Pain. Yes Provider, Historical   aspirin 81 mg chewable tablet Take 1 Tab by mouth daily. 9/18/20  Yes Frank Garsai MD   clopidogreL (PLAVIX) 75 mg tab Take 1 Tab by mouth daily. 9/18/20  Yes Frank Garsia MD   metoprolol tartrate (LOPRESSOR) 50 mg tablet Take 50 mg by mouth two (2) times a day. Provider, Historical   atorvastatin (LIPITOR) 40 mg tablet Take 1 Tab by mouth nightly. 9/17/20   Frank Garsia MD   pantoprazole (Protonix) 40 mg tablet Take 1 Tab by mouth two (2) times a day. 9/17/20   Frank Garsia MD   ferrous sulfate (Iron) 325 mg (65 mg iron) tablet Take 1 Tab by mouth Daily (before breakfast).  9/17/20   Frank Garsia MD     Current Facility-Administered Medications   Medication Dose Route Frequency Provider Last Rate Last Admin    [COMPLETED] perflutren lipid microspheres (DEFINITY) in NS bolus IV  1.5 mL IntraVENous RAD Abi Campos MD   1.5 mL at 02/22/21 1100    [START ON 2/23/2021] peg 3350-electrolytes (COLYTE) 4000 mL  4,000 mL Oral Albaro Whitfield Reg [employer] [facility]        aspirin chewable tablet 81 mg  81 mg Oral DAILY Jonh Casillas MD   81 mg at 02/22/21 0850    heparin (porcine) injection 5,000 Units  5,000 Units SubCUTAneous Q8H Jonh Casillas MD   Stopped at 02/21/21 0600    sodium chloride (NS) flush 5-40 mL  5-40 mL IntraVENous Q8H Jennifer Mills MD   Stopped at 02/21/21 2200    sodium chloride (NS) flush 5-40 mL  5-40 mL IntraVENous PRN Jennifer Mills MD        vancomycin (VANCOCIN) 1,000 mg in 0.9% sodium chloride 250 mL (VIAL-MATE)  1,000 mg IntraVENous Q24H Jonh Casillas MD   1,000 mg at 02/21/21 1800    cefepime (MAXIPIME) 2 g in 0.9% sodium chloride (MBP/ADV) 100 mL MBP  2 g IntraVENous Q24H Jonh Casillas  mL/hr at 02/22/21 0531 2 g at 02/22/21 0531    sodium chloride (NS) flush 5-40 mL  5-40 mL IntraVENous Q8H Oscar Barclay MD   Stopped at 02/21/21 2200    sodium chloride (NS) flush 5-40 mL  5-40 mL IntraVENous PRN Oscar Barclay MD   10 mL at 02/18/21 1327    metoprolol tartrate (LOPRESSOR) tablet 25 mg  25 mg Oral Q12H Destiny Gamboa MD   25 mg at 02/22/21 0850    sodium chloride (NS) flush 5-40 mL  5-40 mL IntraVENous Darya Casiano MD   Stopped at 02/21/21 2200    sodium chloride (NS) flush 5-40 mL  5-40 mL IntraVENous PRN Destiny Gamboa MD        0.9% sodium chloride infusion  75 mL/hr IntraVENous CONTINUOUS Destiny Gamboa MD 75 mL/hr at 02/21/21 1258 75 mL/hr at 02/21/21 1258    ondansetron (ZOFRAN) injection 4 mg  4 mg IntraVENous Q4H PRN Destiny Gamboa MD        pantoprazole (PROTONIX) 40 mg in 0.9% sodium chloride 10 mL injection  40 mg IntraVENous BID Destiny Gamboa MD   40 mg at 02/22/21 0246    collagenase (SANTYL) 250 unit/gram ointment   Topical DAILY Destiny Gamboa MD   Given at 02/22/21 0850    metroNIDAZOLE (METROGEL) 0.75 % gel   Topical DAILY Destiny Gamboa MD   Given at 02/22/21 0851    miconazole (MICOTIN) 2 % powder   Topical BID Destiny Gamboa MD   Given at 21 0851    Vancomycin dosing per pharmacy   Other Rx Dosing/Monitoring Krystian Jack MD            No Known Allergies    Objective:     Vitals:    21 0614 21 0850 21 1021 21 1025   BP: (!) 152/64 (!) 160/81 (!) 160/81 (!) 151/67   Pulse: 61 74  68   Resp: 10   19   Temp: 98.6 °F (37 °C)   97.3 °F (36.3 °C)   SpO2: 99%   100%        Temp (24hrs), Av °F (36.7 °C), Min:97.3 °F (36.3 °C), Max:98.6 °F (37 °C)        O2 Device: Room air         Intake/Output Summary (Last 24 hours) at 2021 1051  Last data filed at 2021 0745  Gross per 24 hour   Intake    Output 2000 ml   Net -2000 ml       General: In NAD. Cardiac: RRR  Lungs: Unlabored breathing. Abdomen: Soft/NT/non-distended. Extremities. No edema. Neurologic Exam:  Mental Status:  Alert and oriented    Language:    Grossly intact fluency and comprehension. He can repeat and name. Cranial Nerves:   Pupils equal, round and reactive to light. Extraocular movements intact w/o nystagmus      Facial sensation intact to LT     Facial activation symmetric. Hearing grossly intact. No dysarthria. Shoulder shrug 5/5 bilaterally. Motor:    Bulk and tone normal.      RUE 4/5      LUE 5/5     BLE 4+/5     No pronator drift. No involuntary movements. Sensation:    Sensation intact throughout to light touch. Coordination & Gait: No ataxia with finger to nose. Gait deferred    LABS:  Recent Labs     21  0251 21  0203 21  1032 21  0101   WBC 6.9  --   --  9.3   HGB 8.2*  8.2* 7.6* 8.3* 8.2*   HCT 27.0*  --   --  26.5*   *  --   --  416*     Recent Labs     21  0251 21  0203 21  0101    138 139   K 4.0 3.7 3.7   * 109* 110*   CO2 22 23 21   BUN 20 21* 22*   CREA 1.65* 1.45* 1.43*   GLU 98 96 80   CA 7.7* 7.3* 7.9*     No results for input(s): ALT, AP, TBIL, TBILI, TP, ALB, GLOB, GGT, AML, LPSE in the last 72 hours.     No lab exists for component: SGOT, GPT, AMYP, HLPSE  No results for input(s): INR, PTP, APTT, INREXT in the last 72 hours. No results for input(s): PHI, PCO2I, PO2I, HCO3I in the last 72 hours. No results for input(s): CPK, CKNDX, TROIQ in the last 72 hours. No lab exists for component: CPKMB  Lab Results   Component Value Date/Time    Cholesterol, total 108 09/12/2020 12:35 PM    HDL Cholesterol 38 09/12/2020 12:35 PM    LDL, calculated 43.6 09/12/2020 12:35 PM    Triglyceride 132 09/12/2020 12:35 PM    CHOL/HDL Ratio 2.8 09/12/2020 12:35 PM     Lab Results   Component Value Date/Time    Glucose (POC) 197 (H) 09/11/2020 08:04 PM    Glucose (POC) 146 (H) 09/11/2020 11:30 AM    Glucose (POC) 123 (H) 09/11/2020 06:49 AM    Glucose (POC) 111 (H) 09/10/2020 11:07 PM    Glucose (POC) 111 (H) 09/10/2020 05:14 PM     No results found for: COLOR, APPRN, SPGRU, REFSG, YESY, PROTU, GLUCU, KETU, BILU, UROU, PRADEEP, LEUKU, GLUKE, EPSU, BACTU, WBCU, RBCU, CASTS, UCRY    No results for input(s): FE, TIBC, PSAT, FERR in the last 72 hours. No results found for: FOL, RBCF   No results for input(s): PH, PCO2, PO2 in the last 72 hours. No results for input(s): CPK, CKNDX, TROIQ in the last 72 hours. No lab exists for component: CPKMB    Imaging:  Mri Brain Wo Cont    Result Date: 2/22/2021  1. Chronic white matter disease and areas of chronic infarction particularly in the left MCA territory as above. Few punctate foci of mild diffusion signal abnormality in the periphery of the infarct may represent acute/subacute infarctions versus artifact. 2. Large soft tissue abnormality in the right cheek extending toward the right mandible, as seen on CT. Likely corresponds to known skin cancer though incompletely evaluated by this examination, correlate clinically. 3. Areas of chronic hemorrhage. No acute hemorrhage. Generalized atrophy.         CT Results:  Results from [place] encounter on 02/18/21   CT HEAD WO CONT    Narrative EXAM: CT HEAD WO CONT    INDICATION: WEAKNESS    COMPARISON: 9/10/2020. CONTRAST: None. TECHNIQUE: Unenhanced CT of the head was performed using 5 mm images. Brain and  bone windows were generated. Coronal and sagittal reformats. CT dose reduction  was achieved through use of a standardized protocol tailored for this  examination and automatic exposure control for dose modulation. FINDINGS:  The ventricles and sulci are normal in size, shape and configuration. .  Encephalomalacia left frontal lobe. Encephalomalacia left basal ganglia. Minimal  encephalomalacia in the right caudate head. These findings are unchanged. . There  is no intracranial hemorrhage, extra-axial collection, or mass effect. The  basilar cisterns are open. No CT evidence of acute infarct. The bone windows demonstrate no abnormalities. The visualized portions of the  paranasal sinuses and mastoid air cells are clear. Impression 1. Chronic bilateral basal ganglia lacunar infarcts and cephalization of left  frontal lobe. No acute intracranial abnormality  2. Question soft tissue thickening in the right cheek. Please correlate with  physical exam       Results from Hospital Encounter encounter on 09/07/20   CTA HEAD    Narrative CLINICAL HISTORY: Left-sided embolic infarcts    EXAMINATION:  CT ANGIOGRAPHY HEAD AND NECK    COMPARISON: MRI brain September 10, 2020. TECHNIQUE:  Following the uneventful administration of iodinated contrast  material, axial CT angiography of the head and neck was performed. Delayed axial  images through the head were also obtained. Coronal and sagittal reconstructions  were obtained. Manual postprocessing of images was performed. 3-D  Sagittal  maximal intensity projection images were obtained. 3-D Coronal maximal  intensity projections were obtained. CT dose reduction was achieved through use  of a standardized protocol tailored for this examination and automatic exposure  control for dose modulation. Adaptive statistical iterative reconstruction  (ASIR) was utilized. FINDINGS:    Delayed contrast-enhanced head CT:    Hypodensities in the left cerebral hemisphere consistent with evolving infarct  seen on recent MRI. No midline shift, hemorrhage, or significant sulcal  effacement. Basal cisterns are clear. Paranasal sinuses are clear. CTA NECK:    Great vessels: Normal arch anatomy with the origins patent. Right subclavian artery: Patent    Left subclavian artery: Patent     Right common carotid artery: Patent     Left common carotid artery: Patent     Cervical right internal carotid artery: Calcified and noncalcified plaque with  60% stenosis by NASCET criteria. Cervical left internal carotid artery: Predominantly noncalcified plaque with  90% stenosis by NASCET criteria. Right vertebral artery: Mild ostial stenosis. Left vertebral artery: Nondominant left vertebral artery is patent. Large bilateral pleural effusions with compressive atelectasis. No thyroid  nodule or cervical lymphadenopathy. CTA HEAD:    Right cavernous internal carotid artery: Patent    Left cavernous internal carotid artery: Patent    Anterior cerebral arteries: Patent    Anterior communicating artery: Patent    Right middle cerebral artery: Patent    Left middle cerebral artery: Patent    Posterior communicating arteries: Patent    Posterior cerebral arteries: Mild irregularity of the posterior cerebral  arteries bilaterally due to intracranial atherosclerosis. Basilar artery: Patent    Distal vertebral arteries: Patent    No evidence for intracranial aneurysm or hemodynamically significant stenosis. Impression IMPRESSION:  1. Bilateral carotid artery stenosis, mild to moderate on the right and severe  on the left. 2.  Mild intracranial atherosclerosis affecting the posterior cerebral arteries. 3.  No intracranial or intracranial large vessel occlusion. 4.  Large bilateral pleural effusions.    CTA NECK Narrative CLINICAL HISTORY: Left-sided embolic infarcts    EXAMINATION:  CT ANGIOGRAPHY HEAD AND NECK    COMPARISON: MRI brain September 10, 2020. TECHNIQUE:  Following the uneventful administration of iodinated contrast  material, axial CT angiography of the head and neck was performed. Delayed axial  images through the head were also obtained. Coronal and sagittal reconstructions  were obtained. Manual postprocessing of images was performed. 3-D  Sagittal  maximal intensity projection images were obtained. 3-D Coronal maximal  intensity projections were obtained. CT dose reduction was achieved through use  of a standardized protocol tailored for this examination and automatic exposure  control for dose modulation. Adaptive statistical iterative reconstruction  (ASIR) was utilized. FINDINGS:    Delayed contrast-enhanced head CT:    Hypodensities in the left cerebral hemisphere consistent with evolving infarct  seen on recent MRI. No midline shift, hemorrhage, or significant sulcal  effacement. Basal cisterns are clear. Paranasal sinuses are clear. CTA NECK:    Great vessels: Normal arch anatomy with the origins patent. Right subclavian artery: Patent    Left subclavian artery: Patent     Right common carotid artery: Patent     Left common carotid artery: Patent     Cervical right internal carotid artery: Calcified and noncalcified plaque with  60% stenosis by NASCET criteria. Cervical left internal carotid artery: Predominantly noncalcified plaque with  90% stenosis by NASCET criteria. Right vertebral artery: Mild ostial stenosis. Left vertebral artery: Nondominant left vertebral artery is patent. Large bilateral pleural effusions with compressive atelectasis. No thyroid  nodule or cervical lymphadenopathy.     CTA HEAD:    Right cavernous internal carotid artery: Patent    Left cavernous internal carotid artery: Patent    Anterior cerebral arteries: Patent    Anterior communicating artery: Patent    Right middle cerebral artery: Patent    Left middle cerebral artery: Patent    Posterior communicating arteries: Patent    Posterior cerebral arteries: Mild irregularity of the posterior cerebral  arteries bilaterally due to intracranial atherosclerosis. Basilar artery: Patent    Distal vertebral arteries: Patent    No evidence for intracranial aneurysm or hemodynamically significant stenosis. Impression IMPRESSION:  1. Bilateral carotid artery stenosis, mild to moderate on the right and severe  on the left. 2.  Mild intracranial atherosclerosis affecting the posterior cerebral arteries. 3.  No intracranial or intracranial large vessel occlusion. 4.  Large bilateral pleural effusions. MRI Results:  Results from [place] encounter on 02/18/21   MRI LUMB SPINE WO CONT    Narrative EXAM: MRI LUMB SPINE WO CONT    INDICATION: evaluate for lumbar stenosis, bilateral lower extremity weakness. COMPARISON: None    TECHNIQUE: MR imaging of the lumbar spine was performed using the following  sequences: sagittal T1, T2, STIR;  axial T1, T2.     CONTRAST:  None. FINDINGS:    There is normal alignment of the lumbar spine. Vertebral body heights are  maintained. Marrow signal is normal.    The conus medullaris terminates at T12-L1. Signal and caliber of the distal  spinal cord are within normal limits. The paraspinal soft tissues are within normal limits. Lower thoracic spine: No herniation or stenosis. L1-L2: Mild disc space narrowing. Mild broad-based disc bulge causing mild  spinal stenosis. No significant neural foraminal narrowing. L2-L3: Mild disc space narrowing. Mild broad-based disc but that is asymmetric  to the left. There is mild impingement left lateral recess. No significant  neural foraminal narrowing. L3-L4: Mild disc space narrowing. No significant spinal stenosis or neural  foraminal narrowing. L4-L5: Mild disc space narrowing.  Mild broad-based disc bulge causing mild  spinal stenosis. No significant neural foraminal narrowing. L5-S1: No herniation or stenosis. Impression Multilevel mild spondylosis as above. MRI BRAIN WO CONT    Narrative *PRELIMINARY REPORT*    Acute on old left MCA area infarct    Preliminary report was provided by Dr. Salvador Winn, the on-call radiologist, at 1536  hours. Alea Maldonado was given this result at 1538 hours by myself. [phone]    Final report to follow. *END PRELIMINARY REPORT*    FINAL REPORT:    INDICATION:   acute stroke    EXAMINATION:  MRI BRAIN WO CONTRAST    COMPARISON:  9/10/2020 MRI, CT 2/18/21    TECHNIQUE:  Multiplanar multisequence acquisition without contrast of the brain. FINDINGS:      Ventricles:  Midline, no hydrocephalus. Brain Parenchyma/Brainstem:  Generalized atrophy. Moderate sized chronic  infarction left middle cerebral artery territory, involving the corona radiata  and frontoparietal cortex. Smaller chronic infarction right caudate head, right  internal capsule, left parietal cortex. Patchy T2 hyperintensity throughout the  supratentorial white matter consistent with chronic microvascular ischemic  disease. Probable chronic lacunar infarction left silvestre. Few subtle areas of mild  diffusion signal abnormality in the periphery of the chronic left MCA territory  infarction. Left Wallerian degeneration. Intracranial Hemorrhage:  No acute hemorrhage. Susceptibility artifact  throughout the chronic left MCA territory infarction. . Chronic microhemorrhages  in the posterior and inferior right cerebellum, as well as in the bilateral  frontal lobe white matter and right parietal cortex. Basal Cisterns:  Normal.   Flow Voids:  Normal.  Additional Comments:  Abnormal soft tissue thickening and defect in the right  cheek, extending toward the right mandible. Impression 1. Chronic white matter disease and areas of chronic infarction particularly in  the left MCA territory as above.  Few punctate foci of mild diffusion signal  abnormality in the periphery of the infarct may represent acute/subacute  infarctions versus artifact. 2. Large soft tissue abnormality in the right cheek extending toward the right  mandible, as seen on CT. Likely corresponds to known skin cancer though  incompletely evaluated by this examination, correlate clinically. 3. Areas of chronic hemorrhage. No acute hemorrhage. Generalized atrophy. MRI FOOT LT WO CONT    Narrative EXAM:  MRI FOOT LT WO CONT    INDICATION:  Heel wound    COMPARISON: Radiographs 2/18/2021    TECHNIQUE: Axial, coronal, and sagittal MRI of the left hindfoot in the T1, T2,  and inversion-recovery pulse sequences with and without fat saturation . CONTRAST: None. FINDINGS: Bone marrow: within normal limits. No fracture, dislocation, or marrow  replacing process. No evidence of stress reaction or periostitis. Joint fluid:  None. Tendons: Intact. Muscles: There is diffuse edema in the musculature likely related to diabetic  neuropathy. Neurovascular bundles: Within normal limits. Articular cartilage: Intact without osteochondral lesion. Soft tissue mass: None. Impression No evidence of osteomyelitis. No drainable fluid collection. XR Results   Results from [place] encounter on 02/18/21   XR FOOT LT MIN 3 V    Impression 1. No fracture or osteomyelitis. 2. No soft tissue gas or radiodense foreign body. 3. Osteopenia. Results from [place] encounter on 09/07/20   XR CHEST PORT    Impression IMPRESSION:  1. No complicating features post line placement. 2. Diffuse airspace disease versus pulmonary edema again noted     XR CHEST PORT    Impression IMPRESSION:  1. Bilateral pleural effusions with associated passive atelectasis and/or  consolidation. 2. Pulmonary vascular congestion. VAS/US Results (maximum last 3): No results found for this or any previous visit.     TTE  09/07/20   ECHO ADULT COMPLETE 09/09/2020 9/9/2020    Narrative · Image quality for this study was technically difficult. · AV: With no significant stenosis. · LA: Mildly dilated left atrium. · LV: Estimated LVEF is 45 - 50%. Normal cavity size. Upper normal wall   thickness. Wall motion: unable to assess due to limited image quality. Inconclusive left ventricular diastolic function. · MV: Mild mitral valve regurgitation is present. · PA: Mild to moderate pulmonary hypertension. Pulmonary arterial systolic   pressure is 50 mmHg. · RV: Borderline low systolic function. Signed by: Isai Melgar MD         EKG  Results for orders placed or performed during the hospital encounter of 02/18/21   EKG, 12 LEAD, INITIAL   Result Value Ref Range    Ventricular Rate 87 BPM    Atrial Rate 87 BPM    P-R Interval 118 ms    QRS Duration 88 ms    Q-T Interval 394 ms    QTC Calculation (Bezet) 474 ms    Calculated P Axis 36 degrees    Calculated R Axis 21 degrees    Calculated T Axis 27 degrees    Diagnosis       Normal sinus rhythm  Nonspecific ST and T wave abnormality  QT prolongation  When compared with ECG of 08-SEP-2020 07:03,  Nonspecific T wave abnormality, improved in Inferior leads  Confirmed by Cara Us MD, Johnny ([id number]) on 2/18/2021 5:34:37 PM         Hospital Problems  Date Reviewed: 2/19/2021          Codes Class Noted POA    GI bleed ICD-10-CM: K92.2  ICD-9-CM: 578.9  2/18/2021 Unknown              Assessment/Plan:     PTA antiplatelet: ASA 81 mg & Plavix 75 mg    PTA AC: No  PTA statin: atorvastatin 40 mg  LDL: 43 (Sept 2020)  ASA assay: non -responder  P2Y12: previously therapeutic  ECHO:  No other significant findings of concern. EKG: NSR   A1C: 5.5        Lorena Gillespie is a 66 y.o. male worsening of lower ext weakness and possible cortical punctate infarct, vs artifact in his old L MCA stroke bed.        Darinel leg weakness chronic, with worsening in the infections, Hgb 5.0, reduced mobility as evidenced by multiple pressure injuries, general ill health and debility. Pt feels strength has improved since hospitalization. MRI lumbar spine benign. In regards to the few punctate foci of mild diffusion signal abnormality in the periphery of the left MCA infarct bed that may represent acute/subacute infarctions versus artifact on MRI. Non-contributory to lower ext weakness. If punctate infarcts did occur, pt may be hypercoagulable from cancer and multiple infections. Management would not change. Continue Plavix, statin. He is an ASA non-responder thus not necessary to continue from neurological standpoint. Recommend restarting Plavix for stroke prevention as aspirin non-responder  Continue atorvastatin  PT/OT for debility    We will sign off  Please contact us with questions concerns. Thank you for this consult.     Signed By: Terrie Baxter NP     February 22, 2021 10:51 AM

## 2021-02-22 NOTE — PROGRESS NOTES
TRANSITIONS OF CARE PLAN:   1. DESTINATION: TBD; The pt currently resides at [facility]   2. TRANSPORT: TBD; pending medical progress  3. ADDITIONAL SUPPORT: The pt lives in a senior living facility  4. DME: Bess Octave, and Wheelchair   5. HOME HEALTH: TBD; pending medical progress   6. CODE STATUS/AMD STATUS: FULL Code   7. FOLLOW UP APPOINTMENTS: PCP  8. STILL NEEDS:   Reason for Admission:  GI Bleed                     RUR Score:          21%  Plan for utilizing home health:      TBD; pending medical progress    PCP: First and Last name:  N/A   Name of Practice:    Are you a current patient: Yes/No:    Approximate date of last visit:    Can you participate in a virtual visit with your PCP:                     Current Advanced Directive/Advance Care Plan: No AMD on file     Healthcare Decision Maker:   Click here to complete [address] including selection of the Healthcare Decision Maker Relationship (ie \"Primary\")                         Transition of Care Plan:            The pt presented as AOx4. The pt was able to verify his demographics. The pt identified that he would require support with transport when medically cleared for DC. The pt's disposition is possibly to return to [facility]. The pt has utilized [facility] in the past for IPR. The pt identified that he is independent with completing ADLs before experiencing difficulty with walking. The pt stated that he does not know if he will be able to return home, \"without the use of his legs\". The cm will continue to follow. The cm attempted to contact the pt's emergency contact [facility] [phone], but the cm was unable to reach her three separate times and unable to leave . Medicare pt has received, reviewed, and signed 2nd IM letter informing them of their right to appeal the discharge. Signed copy has been placed on pt bedside chart.   Care Management Interventions  PCP Verified by CM: No  Palliative Care Criteria Met (RRAT>21 & CHF Dx)?: No  Mode of Transport at Discharge: Other (see comment)(TBD; pending medical progress )  Transition of Care Consult (CM Consult): Discharge Planning  MyChart Signup: No  Discharge Durable Medical Equipment: No(Cane, Walker, wheelchair )  Physical Therapy Consult: Yes  Occupational Therapy Consult: Yes  Speech Therapy Consult: Yes  Confirm Follow Up Transport: Other (see comment)(TBD)  Discharge Location  Discharge Placement: Unable to determine at this time(The pt is a current resident at [facility]. )    CM: FLACA BISHOP JR. [facility] [place],   [phone]

## 2021-02-22 NOTE — PROGRESS NOTES
Bedside shift change report given to Schuyler Taylor RN (oncoming nurse) by Caroline Villalpando RN (offgoing nurse). Report included the following information SBAR, Kardex, Intake/Output, MAR, Med Rec Status, Cardiac Rhythm NSR, Quality Measures and Dual Neuro Assessment.

## 2021-02-22 NOTE — PROGRESS NOTES
Jamee Roberson 272  [address] [facility], [address]       GI PROGRESS NOTE  Will Graig Lefort  [phone] office  [phone] NP/PA in-hospital cell phone M-F until 4:30PM  After 5PM or on weekends, please call  for physician on call      NAME: Victorina Gramajo   :  1950   MRN:  [id number]       Subjective:   Patient complains of lower extremity weakness. No nausea, vomiting, or abdominal pain. He reports having a bowel movement over the weekend (no known hematochezia). He is on a GI lite diet. Objective:     VITALS:   Last 24hrs VS reviewed since prior progress note. Most recent are:  Visit Vitals  BP (!) 160/81   Pulse 74   Temp 98.6 °F (37 °C)   Resp 10   Ht 5' 8\" (1.727 m)   Wt 64 kg (141 lb 1.5 oz)   SpO2 99%   BMI 21.45 kg/m²       PHYSICAL EXAM:  General: Cooperative, no acute distress    Neurologic:  Alert and oriented  HEENT: EOMI, no scleral icterus   Lungs:  No respiratory distress  Abdomen: Soft, non-distended, no tenderness, no guarding, no rebound.   Extremities: Left foot dressing and boot  Psych:   Not anxious or agitated      Lab Data Reviewed:     Recent Results (from the past 24 hour(s))   ECHO ADULT COMPLETE    Collection Time: 21  3:43 PM   Result Value Ref Range    IVSd 1.15 (A) 0.6 - 1.0 cm    LVIDd 4.96 4.2 - 5.9 cm    LVIDs 3.53 cm    LVPWd 1.04 (A) 0.6 - 1.0 cm    LVOT Peak Gradient 2.57 mmHg    LVOT Peak Velocity 80.13 cm/s    Left Atrium Major Axis 3.03 cm    AoV PG 5.94 mmHg    Aortic Valve Systolic Peak Velocity 488.89 cm/s    MV A Mani 67.28 cm/s    Mitral Valve E Wave Deceleration Time 193.10 ms    MV E Mani 96.50 cm/s    E/E' lateral 8.14     E/E' septal 14.14     Mitral Valve Pressure Half-time 56.00 ms    MVA (PHT) 3.93 cm2    Pulmonic Valve Systolic Peak Instantaneous Gradient 3.72 mmHg    Tapse 1.58 1.5 - 2.0 cm    Triscuspid Valve Regurgitation Peak Gradient 22.95 mmHg    TR Max Velocity 239.55 cm/s    Ao Root D 4.15 cm   METABOLIC PANEL, BASIC    Collection Time: 02/22/21  2:51 AM   Result Value Ref Range    Sodium 139 136 - 145 mmol/L    Potassium 4.0 3.5 - 5.1 mmol/L    Chloride 110 (H) 97 - 108 mmol/L    CO2 22 21 - 32 mmol/L    Anion gap 7 5 - 15 mmol/L    Glucose 98 65 - 100 mg/dL    BUN 20 6 - 20 MG/DL    Creatinine 1.65 (H) 0.70 - 1.30 MG/DL    BUN/Creatinine ratio 12 12 - 20      GFR est AA 50 (L) >60 ml/min/1.73m2    GFR est non-AA 41 (L) >60 ml/min/1.73m2    Calcium 7.7 (L) 8.5 - 10.1 MG/DL   HEMOGLOBIN    Collection Time: 02/22/21  2:51 AM   Result Value Ref Range    HGB 8.2 (L) 12.1 - 17.0 g/dL   CBC WITH AUTOMATED DIFF    Collection Time: 02/22/21  2:51 AM   Result Value Ref Range    WBC 6.9 4.1 - 11.1 K/uL    RBC 3.19 (L) 4.10 - 5.70 M/uL    HGB 8.2 (L) 12.1 - 17.0 g/dL    HCT 27.0 (L) 36.6 - 50.3 %    MCV 84.6 80.0 - 99.0 FL    MCH 25.7 (L) 26.0 - 34.0 PG    MCHC 30.4 30.0 - 36.5 g/dL    RDW 16.3 (H) 11.5 - 14.5 %    PLATELET 943 (H) 914 - 400 K/uL    MPV 11.3 8.9 - 12.9 FL    NRBC 0.0 0  WBC    ABSOLUTE NRBC 0.00 0.00 - 0.01 K/uL    NEUTROPHILS 67 32 - 75 %    LYMPHOCYTES 16 12 - 49 %    MONOCYTES 7 5 - 13 %    EOSINOPHILS 7 0 - 7 %    BASOPHILS 2 (H) 0 - 1 %    IMMATURE GRANULOCYTES 1 (H) 0.0 - 0.5 %    ABS. NEUTROPHILS 4.7 1.8 - 8.0 K/UL    ABS. LYMPHOCYTES 1.1 0.8 - 3.5 K/UL    ABS. MONOCYTES 0.5 0.0 - 1.0 K/UL    ABS. EOSINOPHILS 0.5 (H) 0.0 - 0.4 K/UL    ABS. BASOPHILS 0.1 0.0 - 0.1 K/UL    ABS. IMM.  GRANS. 0.0 0.00 - 0.04 K/UL    DF AUTOMATED     DUPLEX CAROTID BILATERAL    Collection Time: 02/22/21  9:43 AM   Result Value Ref Range    Right cca dist sys 74.5 cm/s    Right CCA dist seaman 15.0 cm/s    Right CCA prox sys 53.5 cm/s    Right CCA prox seaman 11.6 cm/s    Right eca sys 164.2 cm/s    RIGHT EXTERNAL CAROTID ARTERY D 12.42 cm/s    Right ICA dist sys 81.2 cm/s    Right ICA dist seaman 17.4 cm/s    Right ICA mid sys 97.4 cm/s    Right ICA mid seaman 24.7 cm/s    Right ICA prox sys 103.9 cm/s    Right ICA prox seaman 24.7 cm/s    Right vertebral sys 34.4 cm/s    RIGHT VERTEBRAL ARTERY D 7.33 cm/s    Right ICA/CCA sys 1.4     Left CCA dist sys 97.0 cm/s    Left CCA dist seaman 19.0 cm/s    Left CCA prox sys 92.6 cm/s    Left CCA prox seaman 17.9 cm/s    Left ECA sys 118.9 cm/s    LEFT EXTERNAL CAROTID ARTERY D 8.48 cm/s    Left ICA dist sys 85.8 cm/s    Left ICA dist seaman 9.5 cm/s    Left ICA mid sys 85.8 cm/s    Left ICA mid seaman 21.1 cm/s    Left ICA prox sys 91.0 cm/s    Left ICA prox seaman 10.7 cm/s    Left vertebral sys 127.2 cm/s    LEFT VERTEBRAL ARTERY D 22.39 cm/s    Left ICA/CCA sys 0.94        Assessment:   · Anemia with hemoccult positive stool. Hgb 8.2 (5.0 on presentation), platelets 472. Received 2 units PRBCs (2/18). No iron studies. On oral iron supplementation prior to admission, black stools. No known history of colonoscopy. EGD (2/19/21) by Dr. Ariane Angel showed a 3 cm hiatal hernia; otherwise, normal. On aspirin and heparin SQ. · Left foot wound infection: podiatry following, on antibiotics. · Peripheral vascular disease: vascular surgery following. · Generalized weakness  · Acute kidney injury  · History of CVA: on aspirin and Plavix (last dose 2/17 AM) prior to admission  · Coronary artery disease     Patient Active Problem List   Diagnosis Code    NSTEMI (non-ST elevated myocardial infarction) [facility] I21.4    Carotid artery stenosis, symptomatic, left I65.22    GI bleed K92.2     Plan:   · Trend CBC and transfuse as necessary  · Plavix on hold (last dose 2/17 AM). On aspirin and heparin SQ.   · COVID test negative on 2/18/21  · Plan for colonoscopy on Wednesday at [address] [place] [address] with Dr. Antunez . Details and risks of the procedure to include (but not limited to) anesthesia, bleeding, infection, and perforation were discussed. Patient understands and is in agreement with the plan. Clear liquids after midnight. Bowel prep tomorrow evening. Will need to hold heparin prior to the procedure.       Signed By: Demi Cuellar Sam Zaldivar, [id number]     2/22/2021  10:17 AM        This patient was seen and examined by me in a face-to-face visit today. I reviewed the medical record including lab work, imaging and other provider notes. I confirmed the interval history as described above. I spoke to the patient, discussing our findings and plans. I discussed this case in detail with . I formulated an updated  assessment of this patient and guided our treatment plan. I agree with the above progress note. I agree with the history, exam and assessment and plan as outlined in the note. I would like to add the following: We will plan for diagnostic colonoscopy as above. I have discussed procedure details and risks with patient and he has agreed to proceed. Abbe Martel MD

## 2021-02-22 NOTE — PROGRESS NOTES
Progress Note    Patient: Darylene Lopes MRN: [id number]  SSN: [id number]    YOB: 1950  Age: 66 y.o. Sex: male      Admit Date: 2021  Day of Surgery     Procedure:   Procedure(s):  ESOPHAGOGASTRODUODENOSCOPY (EGD)    Subjective:     MRI Brain - Acute on chronic L MCA infarct    Patient seen resting quiet and comfortably and no apparent distress. Pain to left heel decreased. No new complaints. He states he still cannot walk properly, and he is not sure why. Objective:     Visit Vitals  BP (!) 152/64   Pulse 61   Temp 98.6 °F (37 °C)   Resp 10   Ht 5' 8\" (1.727 m)   Wt 64 kg (141 lb 1.5 oz)   SpO2 99%   BMI 21.45 kg/m²        Physical Exam:    DP/PT nonpalpable bilaterally. CFT > 3 sec bilaterally  Limbs cool to warm bilaterally. Prevalon to LLE+    TTP around left heel wound and with calcaneal squeeze    Wound Number: 1  Wound Location: Left heel  Wound Type: Pressure, ischemic  Wound Size: 2x2x0  Wound Base: Eschar, boggy, minimal purulence expressed  Periwound: Intact, viable, erythema noted without overt cellulitis or ascending lymphangitis  Surrounding Skin: Intact, viable   Drainage: None  Odor: Mild     Wound was cleansed with saline.  Wound was dressed with betadine + DSD without ACE given concerns for vasculopathy    Labs/Radiology Review: images and reports reviewed, MRI unremarkable for deeper infection, no signs of OM    Culture - LG SA at this time    Assessment:     Hospital Problems  Date Reviewed: 2021          Codes Class Noted POA    GI bleed ICD-10-CM: K92.2  ICD-9-CM: 578.9  2021 Unknown              Plan/Recommendations/Medical Decision MakinM with heel wound    - pain likely due to early cellulitis; resolving, no signs of deeper infection  - Cx taken at bedside on  -- now growing LG SA  - MRI reviewed; findings unremarkable    - Patient does not require surgical intervention from a podiatric perspective at this time  - Continue off loading while supine  - Will eventually need PT/OT  - Continue local wound care    Will continue to monitor while inpatient.

## 2021-02-22 NOTE — PROGRESS NOTES
Problem: Mobility Impaired (Adult and Pediatric)  Goal: *Acute Goals and Plan of Care (Insert Text)  Description: FUNCTIONAL STATUS PRIOR TO ADMISSION: Patient was modified independent using a rolling walker for functional mobility. HOME SUPPORT PRIOR TO ADMISSION: The patient lived alone with [facility] staff to provide meal assistance. Physical Therapy Goals  Initiated 2/19/2021  1. Patient will move from supine to sit and sit to supine  and roll side to side in bed with modified independence within 7 day(s). 2.  Patient will transfer from bed to chair and chair to bed with modified independence using the least restrictive device within 7 day(s). 3.  Patient will perform sit to stand with modified independence within 7 day(s). 4.  Patient will ambulate with modified independence for 150 feet with the least restrictive device within 7 day(s). Outcome: Progressing Towards Goal    PHYSICAL THERAPY TREATMENT  Patient: Car Bond (44 y.o. male)  Date: 2/22/2021  Diagnosis: GI bleed [K92.2] <principal problem not specified>  Procedure(s) (LRB):  ESOPHAGOGASTRODUODENOSCOPY (EGD) (N/A) 3 Days Post-Op  Precautions:    Chart, physical therapy assessment, plan of care and goals were reviewed. ASSESSMENT  Patient continues with skilled PT services and is progressing towards goals - remains most limited by L heel ulcer and limited WB status, global weakness, c/o dizziness with positional changes, and impaired balance leading to increased falls risk and dependency from baseline level. Note MRI positive for acute on chronic L MCA CVA. Pt very pleasant and agreeable to participation in session. Required less assist for supine>sit transition however significantly increased assist to attempt standing - very difficult time avoiding weight acceptance on L foot depsite max, multi modal attempts. Completed seated and supine B LE there ex with isometric holds to increase strength needed for transfer progression. May benefit from trial use of slide board for lateral transfers to/from chair to ensure L heel protection. He remains below his baseline level - recommending SNF level rehab once medically cleared. Current Level of Function Impacting Discharge (mobility/balance): mod A to stand - difficulty maintaining L heel NWB    Other factors to consider for discharge: high falls risk; below baseline          PLAN :  Patient continues to benefit from skilled intervention to address the above impairments. Continue treatment per established plan of care. to address goals. Recommendation for discharge: (in order for the patient to meet his/her long term goals)  Therapy up to 5 days/week in SNF setting    This discharge recommendation:  A follow-up discussion with the attending provider and/or case management is planned    IF patient discharges home will need the following DME: to be determined (TBD)       SUBJECTIVE:   Patient stated Not to be too personal but how is a little thing like you going to help me up     OBJECTIVE DATA SUMMARY:   Critical Behavior:     Functional Mobility Training:  Bed Mobility:     Supine to Sit: Contact guard assistance  Sit to Supine: Minimum assistance        Transfers:  Sit to Stand: Moderate assistance  Stand to Sit: Moderate assistance     Balance:  Sitting: Intact  Standing: Impaired  Standing - Static: Constant support; Fair  Standing - Dynamic : Constant support;Poor      Activity Tolerance:   Good and requires rest breaks    After treatment patient left in no apparent distress:   Supine in bed, Call bell within reach, and Side rails x 3    COMMUNICATION/COLLABORATION:   The patients plan of care was discussed with: Registered nurse. Patient and/or family was verbally educated on the BE FAST acronym for signs/symptoms of CVA and TIA. BE FAST was written on patient's communication board  for visual education and reinforcement.   All questions answered with patient indicating fair understanding.      Segundo Knight, PT, DPT   Time Calculation: 31 mins

## 2021-02-22 NOTE — PROGRESS NOTES
[address] [facility] Adult  Hospitalist Group                                                                                          Hospitalist Progress Note  Shashank Costello MD  Answering service: [phone] OR [phone] from in house phone        Date of Service:  2021  NAME:  Branden Mcneil  :  1950  MRN:  [id number]      Admission Summary:   66 y.o M with PMH of HTN,stroke,s/p carotid artery surgery came to the hospital due to lower extremity weakness. Interval history / Subjective:   Patient seen and examined    Denied any chest pain,nasuea/vomiting,abdominal pain. Complained of generalized weakness     Assessment & Plan:     # Acute blood loss anemia:  -hb at admission was 5, s/p 2 U PRBCs  -EGD did not show any obvious evidence of bleeding  -hold plavix. -GI planning for colonoscopy on Wednesday  EGD unremarkable    #Left foot wound infection:  Wound culture grew MSSA. D/c vanc,cefepime and flagyl. - switch to ceftriaxone  -podiatrist following- no surgical intervention  -MRI -No evidence of osteomyelitis. No drainable fluid collection    #Peripheral arterial disease:  -arterial duplex results noted -vascular surgery consulted-recommended work up as OP. #Lower extremity weakness:  Likely multifactorial- anemia, PAD, h/o stroke, physical deconditioning  -MRI L spine -Multilevel mild spondylosis ,  MRI brain -Chronic white matter disease and areas of chronic infarction particularly in the left MCA territory as above.  Few punctate foci of mild diffusion signal abnormality in the periphery of the infarct may represent acute/subacute  infarctions versus artifact   -Neurology consulted-appreciate recommendations  -echo -EF -45-50% similar to 2020      #History of stroke  # Left carotid artery stenosis s/p Left carotid endarterectomy with Dacron patch angioplasty  - on statin  -will resume plavix after colonoscopy  -aspirin non responder  · -carotid duplex -Patient is s/p left carotid endarterectomy, from October 2020. Evidence of mild, lesst briceno 50%, stenosis in the right ICA    Skin cancer:  -follows [facility] dermatologist and was initiated local treatment per patient      Code status: full  DVT prophylaxis: heparin    Care Plan discussed with: patient,nurse  Anticipated Disposition:SNF vs rehab  Anticipated Discharge:tbd     Hospital Problems  Date Reviewed: 2/19/2021          Codes Class Noted POA    GI bleed ICD-10-CM: K92.2  ICD-9-CM: 578.9  2/18/2021 Unknown                Review of Systems:   As per HPI      Vital Signs:    Last 24hrs VS reviewed since prior progress note. Most recent are:  Visit Vitals  BP (!) 151/59 (BP 1 Location: Right arm, BP Patient Position: At rest)   Pulse (!) 56   Temp 98.5 °F (36.9 °C)   Resp 17   Ht 5' 8\" (1.727 m)   Wt 64 kg (141 lb 1.5 oz)   SpO2 99%   BMI 21.45 kg/m²         Intake/Output Summary (Last 24 hours) at 2/22/2021 1647  Last data filed at 2/22/2021 0745  Gross per 24 hour   Intake    Output 1450 ml   Net -1450 ml        Physical Examination:     I had a face to face encounter with this patient and independently examined them on 2/22/2021 as outlined below:          Constitutional:  No acute distress, cooperative, pleasant    ENT:  Oral mucosa moist, oropharynx benign,EOMI    Resp:  CTA bilaterally. No wheezing/rhonchi/rales. No accessory muscle use   CV:  Regular rhythm, normal rate, S1,S 2 wnl    GI:  Soft, non distended, non tender, normoactive bowel sounds    Musculoskeletal:  No LE edema, warm    Neurologic:  he is alert and oriented X 3, lower extremity strength 4/5, UE strength 5/5, sensation intact     Psych: not anxious nor agitated.   Skin: right cheek -skin cancer       Data Review:    Review and/or order of clinical lab test  Review and/or order of tests in the radiology section of CPT  Review and/or order of tests in the medicine section of CPT      Labs:     Recent Labs     02/22/21  0251 02/21/21  0203 02/20/21  0101 02/20/21  0101 WBC 6.9  --   --  9.3   HGB 8.2*  8.2* 7.6*   < > 8.2*   HCT 27.0*  --   --  26.5*   *  --   --  416*    < > = values in this interval not displayed. Recent Labs     02/22/21  0251 02/21/21  0203 02/20/21  0101    138 139   K 4.0 3.7 3.7   * 109* 110*   CO2 22 23 21   BUN 20 21* 22*   CREA 1.65* 1.45* 1.43*   GLU 98 96 80   CA 7.7* 7.3* 7.9*     No results for input(s): ALT, AP, TBIL, TBILI, TP, ALB, GLOB, GGT, AML, LPSE in the last 72 hours. No lab exists for component: SGOT, GPT, AMYP, HLPSE  No results for input(s): INR, PTP, APTT, INREXT, INREXT in the last 72 hours. No results for input(s): FE, TIBC, PSAT, FERR in the last 72 hours. No results found for: FOL, RBCF   No results for input(s): PH, PCO2, PO2 in the last 72 hours. No results for input(s): CPK, CKNDX, TROIQ in the last 72 hours.     No lab exists for component: CPKMB  Lab Results   Component Value Date/Time    Cholesterol, total 96 02/22/2021 02:51 AM    HDL Cholesterol 37 02/22/2021 02:51 AM    LDL, calculated 39 02/22/2021 02:51 AM    Triglyceride 100 02/22/2021 02:51 AM    CHOL/HDL Ratio 2.6 02/22/2021 02:51 AM     Lab Results   Component Value Date/Time    Glucose (POC) 197 (H) 09/11/2020 08:04 PM    Glucose (POC) 146 (H) 09/11/2020 11:30 AM    Glucose (POC) 123 (H) 09/11/2020 06:49 AM    Glucose (POC) 111 (H) 09/10/2020 11:07 PM    Glucose (POC) 111 (H) 09/10/2020 05:14 PM     No results found for: COLOR, APPRN, SPGRU, REFSG, YESY, PROTU, GLUCU, KETU, BILU, UROU, PRADEEP, LEUKU, GLUKE, EPSU, BACTU, WBCU, RBCU, CASTS, UCRY      Medications Reviewed:     Current Facility-Administered Medications   Medication Dose Route Frequency    [START ON 2/23/2021] peg 3350-Electrolytes-Vit C (MOVIPREP) oral solution 2 L  2 L Oral ONCE    aspirin chewable tablet 81 mg  81 mg Oral DAILY    heparin (porcine) injection 5,000 Units  5,000 Units SubCUTAneous Q8H    sodium chloride (NS) flush 5-40 mL  5-40 mL IntraVENous Q8H    sodium chloride (NS) flush 5-40 mL  5-40 mL IntraVENous PRN    cefepime (MAXIPIME) 2 g in 0.9% sodium chloride (MBP/ADV) 100 mL MBP  2 g IntraVENous Q24H    sodium chloride (NS) flush 5-40 mL  5-40 mL IntraVENous Q8H    sodium chloride (NS) flush 5-40 mL  5-40 mL IntraVENous PRN    metoprolol tartrate (LOPRESSOR) tablet 25 mg  25 mg Oral Q12H    sodium chloride (NS) flush 5-40 mL  5-40 mL IntraVENous Q8H    sodium chloride (NS) flush 5-40 mL  5-40 mL IntraVENous PRN    0.9% sodium chloride infusion  75 mL/hr IntraVENous CONTINUOUS    ondansetron (ZOFRAN) injection 4 mg  4 mg IntraVENous Q4H PRN    pantoprazole (PROTONIX) 40 mg in 0.9% sodium chloride 10 mL injection  40 mg IntraVENous BID    collagenase (SANTYL) 250 unit/gram ointment   Topical DAILY    metroNIDAZOLE (METROGEL) 0.75 % gel   Topical DAILY    miconazole (MICOTIN) 2 % powder   Topical BID     ______________________________________________________________________  EXPECTED LENGTH OF STAY: 3d 0h  ACTUAL LENGTH OF STAY:          4                 Laurence Huston MD

## 2021-02-22 NOTE — PROGRESS NOTES
Physician Progress Note      PATIENT:               Ambrose Posadas  CSN #:                  [id number]  :                       1950  ADMIT DATE:       2021 9:13 AM  DISCH DATE:  RESPONDING  PROVIDER #:        Maicol Mancuso MD          QUERY TEXT:    Patient admitted with GIB. Per wound care note dated , pt with unstageable PI to left ischial tuberosity, and stage 3 PIs to left/right buttocks all POA. If possible, please document in progress notes and discharge summary the stage of the pressure ulcer: The medical record reflects the following:  Risk Factors: weakness; in recliner at home and unable to move    Clinical Indicators:    POA, right cheek erupting cancer = 4.1 x 4.5 x 0.5 cm with yellow exudate and malodor. Cheek otherwise without redness. Does not appear to go through to inside of mouth. Cleaned with spray cleanser. POA, left ischial tuberosity unstageable pressure injury = 4 x 1.5 x 0.2 cm 100% yellow with malodor. Cleaned and applied petrolatum and cover dressing until Santyl is up from pharmacy. POA, left central buttock stage 3 pressure injury = 0.9 x 0.9 x 0.2 cm  Pink with yellow glaze. Cleaned and hydrocolloid applied. POA, right central buttock stage 3 pressure injury = 1 x 1.2 x 0.2 cm  Pink with yellow glaze. Cleaned and hydrocolloid applied. Treatment: Wound care; Buttocks and ischial tuberosity: clean with saline, apply Santyl and cover dressing.   Change daily; Right cheek: metrogel daily for odor management; Turn/reposition approximately every 2 hours; air mattress pending    Stage 1:  Non-blanchable erythema of intact skin  Stage 2:  Abrasion, Blister, Partial-thickness skin loss, with exposed dermis  Stage 3:  Full-thickness skin loss with damage or necrosis of subcutaneous tissue  Stage 4:  Full-thickness skin & soft tissue loss through to underlying muscle, tendon or bone  Unstageable: Obscured full-thickness skin & tissue loss    Thank you,  Ralf Juarez, RN, BSN, [facility]  Clinical   [phone]  Options provided:  -- Stage 3 Pressure Ulcer of left/right buttocks and unstageable PU to left ischial tuberosity all present on admission  -- Stage 3 Pressure Ulcer of left/right buttocks and unstageable PU to left ischial tuberosity all not present on admission  -- Other - I will add my own diagnosis  -- Disagree - Not applicable / Not valid  -- Disagree - Clinically unable to determine / Unknown  -- Refer to Clinical Documentation Reviewer    PROVIDER RESPONSE TEXT:    1. POA, left heel = 2 x 2.1 cm Base is 100% black eschar with some fluctuance and release of yellow purulence. No odor. Foot is without edema or redness. Cleaned with wound cleanser and cover dressing applied. ? 2. POA, right cheek erupting cancer = 4.1 x 4.5 x 0.5 cm with yellow exudate and malodor. Cheek otherwise without redness. Does not appear to go through to inside of mouth. Cleaned with spray cleanser. ? 3. POA, left ischial tuberosity unstageable pressure injury = 4 x 1.5 x 0.2 cm 100% yellow with malodor. Cleaned and applied petrolatum and cover dressing until Santyl is up from pharmacy. ? 4. POA, left central buttock stage 3 pressure injury = 0.9 x 0.9 x 0.2 cm Pink with yellow glaze. Cleaned and hydrocolloid applied. ? POA, right central buttock stage 3 pressure injury = 1 x 1.2 x 0.2 cm Pink with yellow glaze. Cleaned and hydrocolloid applied.  ?    Query created by: Rolan Hanna on 2/19/2021 12:19 PM      Electronically signed by:  Lillian Crabtree MD 2/22/2021 9:13 AM

## 2021-02-23 PROBLEM — N17.9 ARF (ACUTE RENAL FAILURE) (HCC): Status: ACTIVE | Noted: 2021-02-23

## 2021-02-23 PROBLEM — I25.10 CVD (CARDIOVASCULAR DISEASE): Status: ACTIVE | Noted: 2021-02-23

## 2021-02-23 PROBLEM — I73.9 PAD (PERIPHERAL ARTERY DISEASE) (HCC): Status: ACTIVE | Noted: 2021-02-23

## 2021-02-23 PROBLEM — I25.118 CORONARY ARTERY DISEASE OF NATIVE ARTERY OF NATIVE HEART WITH STABLE ANGINA PECTORIS (HCC): Status: ACTIVE | Noted: 2021-02-23

## 2021-02-23 LAB
BACTERIA SPEC CULT: NORMAL
COVID-19 RAPID TEST, COVR: NOT DETECTED
HGB BLD-MCNC: 8 G/DL (ref 12.1–17)
SARS-COV-2, COV2: NORMAL
SERVICE CMNT-IMP: NORMAL
SOURCE, COVRS: NORMAL

## 2021-02-23 PROCEDURE — C9113 INJ PANTOPRAZOLE SODIUM, VIA: HCPCS | Performed by: FAMILY MEDICINE

## 2021-02-23 PROCEDURE — 74011000250 HC RX REV CODE- 250: Performed by: FAMILY MEDICINE

## 2021-02-23 PROCEDURE — 97530 THERAPEUTIC ACTIVITIES: CPT

## 2021-02-23 PROCEDURE — 74011250636 HC RX REV CODE- 250/636: Performed by: FAMILY MEDICINE

## 2021-02-23 PROCEDURE — 74011250637 HC RX REV CODE- 250/637: Performed by: HOSPITALIST

## 2021-02-23 PROCEDURE — 74011250636 HC RX REV CODE- 250/636: Performed by: NURSE PRACTITIONER

## 2021-02-23 PROCEDURE — 74011250636 HC RX REV CODE- 250/636: Performed by: HOSPITALIST

## 2021-02-23 PROCEDURE — 99222 1ST HOSP IP/OBS MODERATE 55: CPT | Performed by: INTERNAL MEDICINE

## 2021-02-23 PROCEDURE — 85018 HEMOGLOBIN: CPT

## 2021-02-23 PROCEDURE — 65660000000 HC RM CCU STEPDOWN

## 2021-02-23 PROCEDURE — 74011000258 HC RX REV CODE- 258: Performed by: HOSPITALIST

## 2021-02-23 PROCEDURE — 74011250637 HC RX REV CODE- 250/637: Performed by: FAMILY MEDICINE

## 2021-02-23 PROCEDURE — 97165 OT EVAL LOW COMPLEX 30 MIN: CPT

## 2021-02-23 PROCEDURE — 74011000250 HC RX REV CODE- 250: Performed by: PHYSICIAN ASSISTANT

## 2021-02-23 PROCEDURE — 87635 SARS-COV-2 COVID-19 AMP PRB: CPT

## 2021-02-23 PROCEDURE — 77030040831 HC BAG URINE DRNG MDII -A

## 2021-02-23 PROCEDURE — 36415 COLL VENOUS BLD VENIPUNCTURE: CPT

## 2021-02-23 RX ORDER — HYDRALAZINE HYDROCHLORIDE 20 MG/ML
20 INJECTION INTRAMUSCULAR; INTRAVENOUS
Status: DISCONTINUED | OUTPATIENT
Start: 2021-02-23 | End: 2021-02-26 | Stop reason: HOSPADM

## 2021-02-23 RX ORDER — SODIUM CHLORIDE 9 MG/ML
75 INJECTION, SOLUTION INTRAVENOUS CONTINUOUS
Status: DISCONTINUED | OUTPATIENT
Start: 2021-02-23 | End: 2021-02-24

## 2021-02-23 RX ADMIN — POLYETHYLENE GLYCOL 3350, SODIUM SULFATE, SODIUM CHLORIDE, POTASSIUM CHLORIDE, ASCORBIC ACID, SODIUM ASCORBATE 2 L: KIT at 19:14

## 2021-02-23 RX ADMIN — Medication 10 ML: at 14:42

## 2021-02-23 RX ADMIN — METOPROLOL TARTRATE 25 MG: 25 TABLET, FILM COATED ORAL at 21:05

## 2021-02-23 RX ADMIN — SODIUM CHLORIDE 75 ML/HR: 9 INJECTION, SOLUTION INTRAVENOUS at 21:04

## 2021-02-23 RX ADMIN — PANTOPRAZOLE SODIUM 40 MG: 40 INJECTION, POWDER, FOR SOLUTION INTRAVENOUS at 01:44

## 2021-02-23 RX ADMIN — ASPIRIN 81 MG: 81 TABLET, CHEWABLE ORAL at 09:12

## 2021-02-23 RX ADMIN — PANTOPRAZOLE SODIUM 40 MG: 40 INJECTION, POWDER, FOR SOLUTION INTRAVENOUS at 14:41

## 2021-02-23 RX ADMIN — HEPARIN SODIUM 5000 UNITS: 5000 INJECTION INTRAVENOUS; SUBCUTANEOUS at 21:05

## 2021-02-23 RX ADMIN — METOPROLOL TARTRATE 25 MG: 25 TABLET, FILM COATED ORAL at 09:12

## 2021-02-23 RX ADMIN — Medication 10 ML: at 06:53

## 2021-02-23 RX ADMIN — HEPARIN SODIUM 5000 UNITS: 5000 INJECTION INTRAVENOUS; SUBCUTANEOUS at 06:54

## 2021-02-23 RX ADMIN — Medication 10 ML: at 21:10

## 2021-02-23 RX ADMIN — Medication 10 ML: at 14:41

## 2021-02-23 RX ADMIN — CEFTRIAXONE SODIUM 1 G: 1 INJECTION, POWDER, FOR SOLUTION INTRAMUSCULAR; INTRAVENOUS at 05:07

## 2021-02-23 RX ADMIN — HYDRALAZINE HYDROCHLORIDE 20 MG: 20 INJECTION INTRAMUSCULAR; INTRAVENOUS at 23:22

## 2021-02-23 RX ADMIN — COLLAGENASE SANTYL: 250 OINTMENT TOPICAL at 09:12

## 2021-02-23 RX ADMIN — MICONAZOLE NITRATE 2 % TOPICAL POWDER: at 19:15

## 2021-02-23 RX ADMIN — MICONAZOLE NITRATE 2 % TOPICAL POWDER: at 09:12

## 2021-02-23 NOTE — PROGRESS NOTES
Bedside and Verbal shift change report given to Jaylyn Garrett (oncoming nurse) by Mirlande Cervantes (offgoing nurse). Report included the following information SBAR, Kardex, Intake/Output, MAR, Cardiac Rhythm Normal Sinus Rhythm , Quality Measures and Dual Neuro Assessment.

## 2021-02-23 NOTE — PROGRESS NOTES
[facility]  [facility], [address]       GI PROGRESS NOTE  Benton Lawrence, [facility] office  [phone] NP/PA in-hospital cell phone M-F until 4:30PM  After 5PM or on weekends, please call  for physician on call      NAME: Car Bond   :  1950   MRN:  [id number]       Subjective:    He denies complaint, no signs of GI blood loss. Objective:     VITALS:   Last 24hrs VS reviewed since prior progress note. Most recent are:  Visit Vitals  BP (!) 165/58 (BP 1 Location: Right upper arm, BP Patient Position: At rest)   Pulse 60   Temp 98.2 °F (36.8 °C)   Resp 13   Ht 5' 8\" (1.727 m)   Wt 64 kg (141 lb 1.5 oz)   SpO2 98%   BMI 21.45 kg/m²       PHYSICAL EXAM:  General: Cooperative, no acute distress    Neurologic:  Alert and oriented  HEENT: EOMI, no scleral icterus   Lungs:  No respiratory distress  Abdomen: Soft, non-distended, no tenderness, no guarding, no rebound. Extremities: Left foot dressing and boot  Psych:   Not anxious or agitated      Lab Data Reviewed:     Recent Results (from the past 24 hour(s))   HEMOGLOBIN    Collection Time: 21  4:39 AM   Result Value Ref Range    HGB 8.0 (L) 12.1 - 17.0 g/dL       Assessment:   · Anemia with hemoccult positive stool. Hgb 8.0 (5.0 on presentation). Received 2 units PRBCs (). No iron studies. On oral iron supplementation prior to admission, black stools. No known history of colonoscopy. EGD (21) by Dr. Lara Donahue showed a 3 cm hiatal hernia; otherwise, normal. On aspirin and heparin SQ. · Left foot wound infection: podiatry following, on antibiotics. · Peripheral vascular disease: vascular surgery following.    · Generalized weakness  · Acute kidney injury  · History of CVA: on aspirin and Plavix (last dose  AM) prior to admission  · Coronary artery disease     Patient Active Problem List   Diagnosis Code    [facility] I21.4    Carotid artery stenosis, symptomatic, left I65.22    GI bleed K92.2     Plan:   · Trend CBC    · Plavix on hold (last dose 2/17 AM). On aspirin and heparin SQ.   · COVID test negative on 2/18/21, needs repeat COVID testing  · Clear liquid diet, NPO midnight  · Start prep this evening  · Plan for colonoscopy tomorrow with Dr. Von Arroyo. · Will need to hold heparin prior to the procedure. Signed By: Murray Peralta NP     2/23/2021  10:17 AM           GI Attending: Agree with above plan. Plan for colonoscopy tomorrow. Please keep NPO after midnight. Abbe Arroyo MD

## 2021-02-23 NOTE — PROGRESS NOTES
Problem: Self Care Deficits Care Plan (Adult)  Goal: *Acute Goals and Plan of Care (Insert Text)  Description:   FUNCTIONAL STATUS PRIOR TO ADMISSION: Patient was modified independent using a rolling walker for functional mobility. HOME SUPPORT: The patient lived with staff at [facility] [place]. Occupational Therapy Goals  Initiated 2/23/2021  1. Patient will perform standing ADLs with RW with supervision/set-up within 7 day(s). 2.  Patient will perform lower body dressing with minimal assistance/contact guard assist within 7 day(s). 3.  Patient will perform bathing with minimal assistance/contact guard assist within 7 day(s). 4.  Patient will perform toilet transfers to [facility] with minimal assistance/contact guard assist within 7 day(s). 5.  Patient will perform all aspects of toileting with minimal assistance/contact guard assist within 7 day(s). Outcome: Progressing Towards Goal    OCCUPATIONAL THERAPY EVALUATION  Patient: Stevie Merino (58 y.o. male)  Date: 2/23/2021  Primary Diagnosis: GI bleed [K92.2]  Procedure(s) (LRB):  ESOPHAGOGASTRODUODENOSCOPY (EGD) (N/A) 4 Days Post-Op   Precautions:   Fall    ASSESSMENT  Based on the objective data described below, the patient presents with impaired functional mobility, risk for falls, decreased LB access, decreased safety awareness, and decline in functional status. Pt also with non healing L heel wound and adamant therapists apply L Prevalon boot for OOB activity. Will need further clarification from vascular/podiatry regarding WB status and would likely benefit from off loading boot for further OOB. Pt able to maintain NWB on L LE statically standing with RW x 20 seconds before fatigue, and pt was unable to maintain NWB during transfer to bedside chair.  He is below his baseline and would benefit from SNF rehab at discharge    Current Level of Function Impacting Discharge (ADLs/self-care): min A x 2 to stand, decreased standing tolerance, inability to maintain NWB L LE    Functional Outcome Measure: The patient scored Total: 40/100 on the Barthel Index outcome measure which is indicative of 60% impaired ability to care for basic self needs/dependency on others; inferred 100% dependency on others for instrumental ADLs. Other factors to consider for discharge: from [facility]     Patient will benefit from skilled therapy intervention to address the above noted impairments. PLAN :  Recommendations and Planned Interventions: self care training, functional mobility training, therapeutic exercise, balance training, therapeutic activities, and endurance activities    Frequency/Duration: Patient will be followed by occupational therapy 5 times a week to address goals. Recommendation for discharge: (in order for the patient to meet his/her long term goals)  Therapy up to 5 days/week in SNF setting    This discharge recommendation:  Has not yet been discussed the attending provider and/or case management    IF patient discharges home will need the following DME: TBD       SUBJECTIVE:   Patient stated I have a shower chair at home. 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 No pertinent surgical history. Expanded or extensive additional review of patient history:     Home Situation  # Steps to Enter: (0)  One/Two Story Residence: (assisted living facility)  Living Alone: Yes(with meal assistance only)  Support Systems: Assisted living  Current DME Used/Available at Home: Jose Antonioon Sherwin, rolling, Shower chair  Tub or Shower Type: Shower    Hand dominance: Right    EXAMINATION OF PERFORMANCE DEFICITS:  Cognitive/Behavioral Status:  Neurologic State: Alert                   Skin: L heel wound with breakthrough serosanguineous drainage    Edema: none  Hearing:   Auditory  Auditory Impairment: None    Vision/Perceptual:                           Acuity: Within Defined Limits    Corrective Lenses: Glasses    Range of Motion:    AROM: Generally decreased, functional                         Strength:    Strength: Generally decreased, functional                Coordination:  Coordination: Generally decreased, functional  Fine Motor Skills-Upper: Left Intact; Right Intact    Gross Motor Skills-Upper: Left Intact; Right Intact    Tone & Sensation:    Tone: Normal  Sensation: Impaired                      Balance:  Sitting: Intact; Without support  Standing: Impaired; With support  Standing - Static: Constant support; Fair  Standing - Dynamic : Fair    Functional Mobility and Transfers for ADLs:  Bed Mobility:  Supine to Sit: Minimum assistance    Transfers:  Sit to Stand: Minimum assistance;Assist x2;Adaptive equipment; Additional time  Stand to Sit: Minimum assistance; Additional time; Adaptive equipment;Assist x2  Bed to Chair: Minimum assistance;Assist x2; Additional time(pt insistent on donning L prevalon boot)  Toilet Transfer : Minimum assistance;Assist x2(inferred by transfer to chair, need BSC)    ADL Assessment:  Feeding: Independent    Oral Facial Hygiene/Grooming: Setup    Bathing: Moderate assistance    Upper Body Dressing: Minimum assistance    Lower Body Dressing: Maximum assistance    Toileting: Maximum assistance                ADL Intervention and task modifications:          Pt educated on role of OT and required verbal cues for safety and proper hand placement during ADLs/functional transfers. Pt adamant to have therapists donned L Prevalon for boot for OOB activity. Educated pt on safety concerns regarding skid bottom and boot not being meant for WB.  PT to clarify WB status and need for offloading boot          Functional Measure:  Barthel Index:    Bathin  Bladder: 5  Bowels: 5  Groomin  Dressin  Feeding: 10  Mobility: 0  Stairs: 0  Toilet Use: 5  Transfer (Bed to Chair and Back): 5  Total: 40/100        The Barthel ADL Index: Guidelines  1. The index should be used as a record of what a patient does, not as a record of what a patient could do. 2. The main aim is to establish degree of independence from any help, physical or verbal, however minor and for whatever reason. 3. The need for supervision renders the patient not independent. 4. A patient's performance should be established using the best available evidence. Asking the patient, friends/relatives and nurses are the usual sources, but direct observation and common sense are also important. However direct testing is not needed. 5. Usually the patient's performance over the preceding 24-48 hours is important, but occasionally longer periods will be relevant. 6. Middle categories imply that the patient supplies over 50 per cent of the effort. 7. Use of aids to be independent is allowed. Eulalia Mazariegos, Barthel, DSaraW. (0719). Functional evaluation: the Barthel Index. [address] [facility] (14)2. ALEE Interiano, Jad Moser., Renetta Olivera., [place], [address] [place] [address] Ave (1999). Measuring the change indisability after inpatient rehabilitation; comparison of the responsiveness of the Barthel Index and Functional Levy Measure. Journal of Neurology, Neurosurgery, and Psychiatry, 66(4), 656-889. Henry Fagan, N.J.A, MARCO Rosales, & Jasmeet Briggs M.A. (2004.) Assessment of post-stroke quality of life in cost-effectiveness studies: The usefulness of the Barthel Index and the EuroQoL-5D. Quality of Life Research, 15, 968-46         Occupational Therapy Evaluation Charge Determination   History Examination Decision-Making   LOW Complexity : Brief history review  LOW Complexity : 1-3 performance deficits relating to physical, cognitive , or psychosocial skils that result in activity limitations and / or participation restrictions  MEDIUM Complexity : Patient may present with comorbidities that affect occupational performnce.  Miniml to moderate modification of tasks or assistance (eg, physical or verbal ) with assesment(s) is necessary to enable patient to complete evaluation       Based on the above components, the patient evaluation is determined to be of the following complexity level: LOW   Pain Rating:  none    Activity Tolerance: WNL    After treatment patient left in no apparent distress:    Sitting in chair, Call bell within reach, and Bed / chair alarm activated    COMMUNICATION/EDUCATION:   The patients plan of care was discussed with: Physical therapist and Registered nurse. Patient/family have participated as able in goal setting and plan of care. This patients plan of care is appropriate for delegation to [facility].     Thank you for this referral.  Arnold Love, OT  Time Calculation: 27 mins

## 2021-02-23 NOTE — PROGRESS NOTES
Bedside and Verbal shift change report given to [id number] Peter Howard (oncoming nurse) by Gamaliel Aguirre (offgoing nurse). Report included the following information SBAR, Kardex, Intake/Output, MAR, Recent Results, Cardiac Rhythm NSR and Dual Neuro Assessment.

## 2021-02-23 NOTE — PROGRESS NOTES
Problem: Mobility Impaired (Adult and Pediatric)  Goal: *Acute Goals and Plan of Care (Insert Text)  Description: FUNCTIONAL STATUS PRIOR TO ADMISSION: Patient was modified independent using a rolling walker for functional mobility. HOME SUPPORT PRIOR TO ADMISSION: The patient lived alone with [facility] staff to provide meal assistance. Physical Therapy Goals  Initiated 2/19/2021  1. Patient will move from supine to sit and sit to supine  and roll side to side in bed with modified independence within 7 day(s). 2.  Patient will transfer from bed to chair and chair to bed with modified independence using the least restrictive device within 7 day(s). 3.  Patient will perform sit to stand with modified independence within 7 day(s). 4.  Patient will ambulate with modified independence for 150 feet with the least restrictive device within 7 day(s). Outcome: Progressing Towards Goal     PHYSICAL THERAPY TREATMENT  Patient: Fely Prado (48 y.o. male)  Date: 2/23/2021  Diagnosis: GI bleed [K92.2] <principal problem not specified>  Procedure(s) (LRB):  ESOPHAGOGASTRODUODENOSCOPY (EGD) (N/A) 4 Days Post-Op  Precautions: Fall  Chart, physical therapy assessment, plan of care and goals were reviewed. ASSESSMENT  Patient continues with skilled PT services and is progressing towards goals - remains most limited by generalized weakness, L heel ulcer and assumed NWB status, impaired balance, and decr tolerance to sustained activity leading to increased falls risk and dependency from baseline level. Required less physical assist with bed mobility - overall min A with occasional cues. Demos good sitting balance once up. Provided extensive education on safe transfer techniques and remaining NWB on L LE - however despite assist x2, demos extreme difficulty fully maintaining during pivot transfer to recliner with RW. Remained up in recliner with LEs elevated and heels off loaded, NAD.  Pt would benefit from a wedge DARCO shoe for heel offloading during transfers if ok with podiatry - will reach out to discuss and obtain further recommendations/orders. Continue to recommend discharge to SNF once medically cleared. Will follow. Current Level of Function Impacting Discharge (mobility/balance): min A x2 for transfer to chair with RW     Other factors to consider for discharge: high falls risk; needs assist with all mobility          PLAN :  Patient continues to benefit from skilled intervention to address the above impairments. Continue treatment per established plan of care. to address goals. Recommendation for discharge: (in order for the patient to meet his/her long term goals)  Therapy up to 5 days/week in SNF setting    This discharge recommendation:  A follow-up discussion with the attending provider and/or case management is planned    IF patient discharges home will need the following DME: to be determined (TBD)       SUBJECTIVE:   Patient stated Curiosity killed the cat.     OBJECTIVE DATA SUMMARY:   Critical Behavior:  Neurologic State: Alert           Functional Mobility Training:  Bed Mobility:  Supine to Sit: Minimum assistance     Transfers:  Sit to Stand: Assist x2;Minimum assistance  Stand to Sit: Assist x2;Minimum assistance  Bed to Chair: Assist x2;Minimum assistance     Balance:  Sitting: Intact; With support  Standing: Impaired; With support;Pull to stand  Standing - Static: Constant support; Fair  Standing - Dynamic : Fair;Constant support      Activity Tolerance:   Fair and requires frequent rest breaks    After treatment patient left in no apparent distress:   Sitting in chair, Heels elevated for pressure relief, Call bell within reach, and Bed / chair alarm activated    COMMUNICATION/COLLABORATION:   The patients plan of care was discussed with: Occupational therapist and Registered nurse. Patient and/or family was verbally educated on the BE FAST acronym for signs/symptoms of CVA and TIA.  BE FAST was written on patient's communication board  for visual education and reinforcement. All questions answered with patient indicating good understanding.      Ifeoma Mcmahon, PT, DPT   Time Calculation: 24 mins

## 2021-02-23 NOTE — CONSULTS
Cardiovascular Consult    Patient: Darylene Lopes MRN: [id number]  SSN: [id number]    YOB: 1950  Age: 66 y.o. Sex: male       Subjective:      Date of  Admission: 2/18/2021   Primary Care Provider: Anurag, MD Ryan   Cardiology: Asiya Brennan MD    Admission type:   Chief Complaint   Patient presents with    Extremity Weakness         Darylene Lopes is a 66 y.o.  male admitted for GI bleed [K92.2]. Patient complains of claudication. This consultation was requested by Dr. Barb Martin. Mr. Chaitanya Miles presents with progressive weakness of both legs. He has known peripheral arterial disease with ulceration on the left heel that is being treated conservatively. He is also recently suffered a stroke requiring left carotid endarterectomy by Dr. Gregg Murguia on 10/28/2020. At that time he presented with acute anemia due to GI bleed. He suffered a non-ST elevation myocardial infarction. Due to the absence of symptoms and his significant comorbidities medical therapy was pursued at that time. The patient also has a history of mild dysarthria and left-sided weakness from prior stroke. The patient presents now with progressive weakness. He was found again to have significant anemia requiring transfusions. Prior EGD on 2/19/2021 showed a 3 cm hiatal hernia but no source of bleeding. Colonoscopy is planned for 2/24/2021. We are asked to assist in his cardiovascular care. Patient has some underlying dementia and is hard of hearing which limits communication. However he denies any recent chest pain at rest or with exertion. No shortness of breath. Denies nausea or vomiting. He has not noted any bright red blood per rectum or melena. No fevers or chills. No syncope or presyncope. History is however suspect.         Past Medical History:   Diagnosis Date    Skin cancer 12/19/2019    bcc-left forehead, left neck, left lateral cheek     Stroke (cerebrum) ([facility] [address]       History reviewed. No pertinent surgical history.  History reviewed. No pertinent family history.   Social History     Tobacco Use   • Smoking status: Current Every Day Smoker   • Smokeless tobacco: Never Used   Substance Use Topics   • Alcohol use: Yes      Current Facility-Administered Medications   Medication Dose Route Frequency   • peg 3350-Electrolytes-Vit C (MOVIPREP) oral solution 2 L  2 L Oral ONCE   • cefTRIAXone (ROCEPHIN) 1 g in 0.9% sodium chloride (MBP/ADV) 50 mL MBP  1 g IntraVENous Q24H   • aspirin chewable tablet 81 mg  81 mg Oral DAILY   • heparin (porcine) injection 5,000 Units  5,000 Units SubCUTAneous Q8H   • sodium chloride (NS) flush 5-40 mL  5-40 mL IntraVENous Q8H   • sodium chloride (NS) flush 5-40 mL  5-40 mL IntraVENous PRN   • sodium chloride (NS) flush 5-40 mL  5-40 mL IntraVENous Q8H   • sodium chloride (NS) flush 5-40 mL  5-40 mL IntraVENous PRN   • metoprolol tartrate (LOPRESSOR) tablet 25 mg  25 mg Oral Q12H   • sodium chloride (NS) flush 5-40 mL  5-40 mL IntraVENous Q8H   • sodium chloride (NS) flush 5-40 mL  5-40 mL IntraVENous PRN   • ondansetron (ZOFRAN) injection 4 mg  4 mg IntraVENous Q4H PRN   • pantoprazole (PROTONIX) 40 mg in 0.9% sodium chloride 10 mL injection  40 mg IntraVENous BID   • collagenase (SANTYL) 250 unit/gram ointment   Topical DAILY   • miconazole (MICOTIN) 2 % powder   Topical BID      Prior to Admission Medications   Prescriptions Last Dose Informant Patient Reported? Taking?   acetaminophen (TYLENOL) 500 mg tablet   Yes Yes   Sig: Take 500 mg by mouth every six (6) hours as needed for Pain.   aspirin 81 mg chewable tablet 2/17/2021 at Unknown time  No Yes   Sig: Take 1 Tab by mouth daily.   atorvastatin (LIPITOR) 40 mg tablet Unknown at Unknown time  No No   Sig: Take 1 Tab by mouth nightly.   clopidogreL (PLAVIX) 75 mg tab 2/17/2021 at Unknown time  No Yes   Sig: Take 1 Tab by mouth daily.   ferrous sulfate (Iron) 325 mg (65 mg iron) tablet Unknown at Unknown time  No No   Sig: Take 1 Tab by mouth Daily (before breakfast). metoprolol tartrate (LOPRESSOR) 50 mg tablet Unknown at Unknown time  Yes No   Sig: Take 50 mg by mouth two (2) times a day. pantoprazole (Protonix) 40 mg tablet Unknown at Unknown time  No No   Sig: Take 1 Tab by mouth two (2) times a day. Facility-Administered Medications: None       No Known Allergies     Review of Symptoms:  Constitutional: No fevers or chills. HEET: No trauma. No visual changes. No hearing loss. No sore throat. Neck: No adenopathy or swelling. Heart: No chest pain or palpitations. Lungs: No shortness of breath. No cough. Abdomen: No pain. No nausea of vomiting. No BRBPR or melena. No diarrhea. Urology: No dysuria or hematuria. Ext: No edema. Weakness  Skin: No acute rashes or ulcers. Endocrine: No heat or cold intolerance. Neuro: No transient neurologic deficits. Subjective:     Visit Vitals  BP (!) 148/59 (BP 1 Location: Right upper arm, BP Patient Position: At rest)   Pulse (!) 48   Temp 97.7 °F (36.5 °C)   Resp 12   Ht 5' 8\" (1.727 m)   Wt 64 kg (141 lb 1.5 oz)   SpO2 99%   BMI 21.45 kg/m²     Physical Exam:  Head: Normocephalic, right facial lesion due to basal cell cancer  Eyes: Pupils equal, round, reactive to light and accomodation. , Extra ocular muscles intact. Sclera anicteric. Ears: Grossly responsive to sound. Neck: No adenopathy. No bruits. Throat: No sores or erythema. Heart: Regular rate and rhythm. Normal S1 and S2. No murmurs, gallops, or rub. Lungs: Clear to auscultation bilaterally. No wheezing, rales, or rhonchi. Abdomen: Soft, non-tender. No guarding or rebound. No hepatosplenomegaly. Bowel sounds active. Ext: 1+ edema. Ulceration left foot- bandaged  Skin: Normal coloration. Warm and dry. No rash. Neuro: Cranial nerves II through XII intact. Motor and sensory grossly intact. Affect: Alert and interactive.     Cardiographics:  ECG:  EKG Results     Procedure 720 Value Units Date/Time EKG, 12 LEAD, INITIAL [[id number]] Collected: 02/18/21 1344    Order Status: Completed Updated: 02/18/21 1734     Ventricular Rate 87 BPM      Atrial Rate 87 BPM      P-R Interval 118 ms      QRS Duration 88 ms      Q-T Interval 394 ms      QTC Calculation (Bezet) 474 ms      Calculated P Axis 36 degrees      Calculated R Axis 21 degrees      Calculated T Axis 27 degrees      Diagnosis --     Normal sinus rhythm  Nonspecific ST and T wave abnormality  QT prolongation  When compared with ECG of 08-SEP-2020 07:03,  Nonspecific T wave abnormality, improved in Inferior leads  Confirmed by Brandy Brenner MD, Ryan Felder ([id number]) on 2/18/2021 5:34:37 PM              Echocardiogram:  02/18/21   ECHO ADULT COMPLETE 02/22/2021 2/22/2021    Narrative · LV: Mildly reduced systolic function. Estimated LVEF is 50%. · MV: Mild mitral valve regurgitation is present. Signed by: Jamie Chung MD       Data Reviewed:   CMP: No results found for: NA, K, CL, CO2, AGAP, GLU, BUN, CREA, GFRAA, GFRNA, CA, MG, PHOS, ALB, TBIL, TP, ALB, GLOB, AGRAT, ALT  CBC:   Lab Results   Component Value Date/Time    HGB 8.0 (L) 02/23/2021 04:39 AM     Lab Results   Component Value Date/Time    Sodium 139 02/22/2021 02:51 AM    Potassium 4.0 02/22/2021 02:51 AM    Chloride 110 (H) 02/22/2021 02:51 AM    CO2 22 02/22/2021 02:51 AM    Anion gap 7 02/22/2021 02:51 AM    Glucose 98 02/22/2021 02:51 AM    BUN 20 02/22/2021 02:51 AM    Creatinine 1.65 (H) 02/22/2021 02:51 AM    BUN/Creatinine ratio 12 02/22/2021 02:51 AM    GFR est AA 50 (L) 02/22/2021 02:51 AM    GFR est non-AA 41 (L) 02/22/2021 02:51 AM    Calcium 7.7 (L) 02/22/2021 02:51 AM    Bilirubin, total 1.2 (H) 02/19/2021 12:31 AM    Alk.  phosphatase 87 02/19/2021 12:31 AM    Protein, total 6.2 (L) 02/19/2021 12:31 AM    Albumin 2.3 (L) 02/19/2021 12:31 AM    Globulin 3.9 02/19/2021 12:31 AM    A-G Ratio 0.6 (L) 02/19/2021 12:31 AM    ALT (SGPT) 8 (L) 02/19/2021 12:31 AM    AST (SGOT) 5 (L) 02/19/2021 12:31 AM     Lab Results   Component Value Date/Time    WBC 6.9 02/22/2021 02:51 AM    HGB 8.0 (L) 02/23/2021 04:39 AM    HCT 27.0 (L) 02/22/2021 02:51 AM    PLATELET 217 (H) 63/25/4194 02:51 AM    MCV 84.6 02/22/2021 02:51 AM       No results found for: BNP, BNPP, BNPPPOC, XBNPT, BNPNT     Lab Results   Component Value Date/Time    CK 83 02/18/2021 09:17 AM    CK - MB 13.0 (H) 09/07/2020 12:58 PM    CK-MB Index 1.7 09/07/2020 12:58 PM    Troponin-I, Qt. <0.05 02/19/2021 12:31 AM        Last Lipid:    Lab Results   Component Value Date/Time    Cholesterol, total 96 02/22/2021 02:51 AM    HDL Cholesterol 37 02/22/2021 02:51 AM    LDL, calculated 39 02/22/2021 02:51 AM    Triglyceride 100 02/22/2021 02:51 AM    CHOL/HDL Ratio 2.6 02/22/2021 02:51 AM       ABG: No results found for: PH, PHI, PCO2, PCO2I, PO2, PO2I, HCO3, HCO3I, FIO2, FIO2I  COAGS: No results found for: APTT, PTP, INR, INREXT, INREXT, INREXT    CT Results (most recent):  Results from Hospital Encounter encounter on 02/18/21   CT HEAD WO CONT    Narrative EXAM: CT HEAD WO CONT    INDICATION: WEAKNESS    COMPARISON: 9/10/2020. CONTRAST: None. TECHNIQUE: Unenhanced CT of the head was performed using 5 mm images. Brain and  bone windows were generated. Coronal and sagittal reformats. CT dose reduction  was achieved through use of a standardized protocol tailored for this  examination and automatic exposure control for dose modulation. FINDINGS:  The ventricles and sulci are normal in size, shape and configuration. .  Encephalomalacia left frontal lobe. Encephalomalacia left basal ganglia. Minimal  encephalomalacia in the right caudate head. These findings are unchanged. . There  is no intracranial hemorrhage, extra-axial collection, or mass effect. The  basilar cisterns are open. No CT evidence of acute infarct. The bone windows demonstrate no abnormalities. The visualized portions of the  paranasal sinuses and mastoid air cells are clear. Impression 1. Chronic bilateral basal ganglia lacunar infarcts and cephalization of left  frontal lobe. No acute intracranial abnormality  2. Question soft tissue thickening in the right cheek. Please correlate with  physical exam         MRI Results (most recent):  Results from Hospital Encounter encounter on 02/18/21   MRI LUMB SPINE WO CONT    Narrative EXAM: MRI LUMB SPINE WO CONT    INDICATION: evaluate for lumbar stenosis, bilateral lower extremity weakness. COMPARISON: None    TECHNIQUE: MR imaging of the lumbar spine was performed using the following  sequences: sagittal T1, T2, STIR;  axial T1, T2.     CONTRAST:  None. FINDINGS:    There is normal alignment of the lumbar spine. Vertebral body heights are  maintained. Marrow signal is normal.    The conus medullaris terminates at T12-L1. Signal and caliber of the distal  spinal cord are within normal limits. The paraspinal soft tissues are within normal limits. Lower thoracic spine: No herniation or stenosis. L1-L2: Mild disc space narrowing. Mild broad-based disc bulge causing mild  spinal stenosis. No significant neural foraminal narrowing. L2-L3: Mild disc space narrowing. Mild broad-based disc but that is asymmetric  to the left. There is mild impingement left lateral recess. No significant  neural foraminal narrowing. L3-L4: Mild disc space narrowing. No significant spinal stenosis or neural  foraminal narrowing. L4-L5: Mild disc space narrowing. Mild broad-based disc bulge causing mild  spinal stenosis. No significant neural foraminal narrowing. L5-S1: No herniation or stenosis. Impression Multilevel mild spondylosis as above.     2/21/2021 MRI Brain:  IMPRESSION  1. Chronic white matter disease and areas of chronic infarction particularly in  the left MCA territory as above.  Few punctate foci of mild diffusion signal  abnormality in the periphery of the infarct may represent acute/subacute  infarctions versus artifact. 2. Large soft tissue abnormality in the right cheek extending toward the right  mandible, as seen on CT. Likely corresponds to known skin cancer though  incompletely evaluated by this examination, correlate clinically. 3. Areas of chronic hemorrhage. No acute hemorrhage. Generalized atrophy. 2/19/2021 MRI Left Foot:  IMPRESSION  No evidence of osteomyelitis. No drainable fluid collection. 2/22/2021 Carotid U/S:  Interpretation Summary  · Patient is s/p left carotid endarterectomy, from October 2020. · Evidence of mild, lesst briceno 50%, stenosis in the right ICA. · No evidence of stenosis in the left ICA. · Vertebrals are patent with antegrade flow. 2/20/2021 Lower Extremity Duplex:  Interpretation Summary  · There is evidence of right femoral artery occlusion in the proximal and mid segments with minimal reversed flow distally that is fed by a collateral vessel. The popliteal is occluded. Low monohasic flow in the distal posterior tibial that is fed by a collateral vessel in the mid calf. The anterior tibial also contain low monophasic flow. · Significantly elevated velocities seen in the proximal right profunda. · There is evidence of left femoral artery occlusion just beyond the most proximal segment and extends to the mid and distal segments. Flow resumes in the popliteal that is low and monophasic as well as the posterior tibial and anterior tibial vessels. · Significantly elevated velocities also seen in the proximal left profunda. 2/20/2021 DELROY:  Interpretation Summary  · Although PVR ankle waveforms appear moderately abnormal, DELROY's suggest borderline severe arterial disease in the lower extremities bilaterally. PVR waveform appearance may be due to good collateral flow. · Probable moderate to severe small digit and/or foot obstruction in right digits 2-5. Probable severe small digit and/or foot obstruction in left digits 2-5.         Assessment:         Hospital Problems Date Reviewed: 2/19/2021          Codes Class Noted POA    Coronary artery disease of native artery of native heart with stable angina pectoris ([facility] ICD-10-CM: I25.118  ICD-9-CM: 414.01, 413.9  2/23/2021 Yes        PAD (peripheral artery disease) ([facility] ICD-10-CM: I73.9  ICD-9-CM: 443.9  2/23/2021 Yes        CVD (cardiovascular disease) ICD-10-CM: I25.10  ICD-9-CM: 429.2  2/23/2021 Yes        ARF (acute renal failure) ([facility] ICD-10-CM: N17.9  ICD-9-CM: 584.9  2/23/2021 Yes        GI bleed ICD-10-CM: K92.2  ICD-9-CM: 578.9  2/18/2021 Yes        NSTEMI (non-ST elevated myocardial infarction) [facility]) ICD-10-CM: I21.4  ICD-9-CM: 410.70  9/7/2020 Yes               Plan:     Mr. Harsh Motley is a 21-year-old gentleman who presents with progressive weakness and anemia. Patient has recurrent GI bleed that is being investigated. Anemia is being supported with transfusions. He has a history of cerebrovascular disease recurrent strokes. He has been an aspirin nonresponder been placed on Plavix. In addition he has advanced peripheral arterial disease with left heel ulcer. PAD is likely exacerbating his lower extremity weakness. Finally he has coronary atherosclerotic disease with prior non-ST elevation myocardial infarction. Troponins this admission have been negative and he denies any symptoms. Consideration should be given towards low-dose rivaroxaban for his advanced cardiovascular disease. Agree with control of hypertension. Continue statin for cardiovascular disease and plaque stabilization. Await colonoscopy and further GI recommendations regarding risk of bleeding to readdress anticoagulation. Patient has acute renal dysfunction. This is likely due to prerenal azotemia. He has been gently hydrated. However, he also has evidence of volume overload with lower extremity edema. Patient has evidence of prior strokes. Continue statin, anticoagulation, and control of blood pressure.   He is status post left carotid endarterectomy. Mr. Clare Gabriel has advanced peripheral arterial disease with left heel ulceration. MRI does not suggest osteomyelitis. Continue antibiotics. Consider lower extremity angiogram with an eye towards percutaneous revascularization once his acute issues are resolved. We will follow his coronary atherosclerotic disease expectantly at this time.       aJk De Paz MD  2/23/2021, 4:42 PM    [facility] Office:  [address] [place] [address]  [address] [address],[address] [place] [address]  P: [phone]  F: [facility] Office:  [address] [facility]  [address] [place] [address],[address]  [facility] [address] [facility]  P: [phone]  F: [phone]

## 2021-02-23 NOTE — PROGRESS NOTES
Vascular Surgery  --examined yesterday and images reviewed  --bilateral leg weakness is multifactorial-- Infarct + anemia?  --carotid duplex shows CEA site/extracranial carotids look good  --PVD is likely chronic and is best managed as outpatient; palpable bilateral femoral pulses--no evidence of acute ischemia (no pain or skin changes)  --best to have him follow up as Op either with me or Dr. oKry Glover (did CEA this past October)

## 2021-02-23 NOTE — PROGRESS NOTES
Spiritual Care Assessment/Progress Note  [facility]      NAME: Bruce Copeland      MRN: [id number]  AGE: 66 y.o. SEX: male  Jehovah's witness Affiliation:    Language: English     2/23/2021     Total Time (in minutes): 12     Spiritual Assessment begun in [facility] [place] [address] [facility] through conversation with:         [x]Patient        [] Family    [] Friend(s)              Spiritual beliefs: (Please include comment if needed)     [] Identifies with a maria del carmen tradition:         [] Supported by a maria del carmen community:            [] Claims no spiritual orientation:           [] Seeking spiritual identity:                [] Adheres to an individual form of spirituality:           [x] Not able to assess:                           Identified resources for coping:      [] Prayer                               [] Music                  [] Guided Imagery     [x] Family/friends                 [] Pet visits     [] Devotional reading                         [] Unknown     [] Other:                                               Interventions offered during this visit: (See comments for more details)    Patient Interventions: Affirmation of emotions/emotional suffering, Catharsis/review of pertinent events in supportive environment, Coping skills reviewed/reinforced, Normalization of emotional/spiritual concerns           Plan of Care:     [] Support spiritual and/or cultural needs    [] Support AMD and/or advance care planning process      [] Support grieving process   [] Coordinate Rites and/or Rituals    [] Coordination with community clergy   [] No spiritual needs identified at this time   [] Detailed Plan of Care below (See Comments)  [] Make referral to Music Therapy  [] Make referral to Pet Therapy     [] Make referral to Addiction services  [] Make referral to [facility]  [] Make referral to Spiritual Care Partner  [] No future visits requested        [x] Follow up upon further referrals     Comments:  for initial visit. Pt shared that he still is not able to walk and that has been troubling.  provided pastoral listening and support. Let him know of  availability. Please contact [phone] [facility] for further support.      [address] Jolynn Andrade, MACE   [phone]

## 2021-02-23 NOTE — PROGRESS NOTES
[address] [facility] Adult  Hospitalist Group                                                                                          Hospitalist Progress Note  Marco Abad MD  Answering service: [phone] OR [phone] from in house phone        Date of Service:  2021  NAME:  Chano Vieira  :  1950  MRN:  [id number]      Admission Summary:   66 y.o M with PMH of HTN,stroke,s/p carotid artery surgery came to the hospital due to lower extremity weakness. Interval history / Subjective:   Patient seen and examined    Denied any new complaints today, plan for colonoscopy tomorrow. Assessment & Plan:     # Acute blood loss anemia:  -hb at admission was 5, s/p 2 U PRBCs  -EGD did not show any obvious evidence of bleeding  -hold plavix. -GI planning for colonoscopy on Wednesday  EGD unremarkable    #Left foot wound infection:  Wound culture grew MSSA. D/c vanc,cefepime and flagyl. - switched to ceftriaxone  -podiatrist following- no surgical intervention  -MRI -No evidence of osteomyelitis. No drainable fluid collection    #Peripheral arterial disease:  -arterial duplex results noted -vascular surgery consulted-recommended work up as OP. #Lower extremity weakness:  Likely multifactorial- anemia, PAD, h/o stroke, physical deconditioning  -MRI L spine -Multilevel mild spondylosis ,  MRI brain -Chronic white matter disease and areas of chronic infarction particularly in the left MCA territory as above.  Few punctate foci of mild diffusion signal abnormality in the periphery of the infarct may represent acute/subacute  infarctions versus artifact   -Neurology consulted-appreciate recommendations      # Coronary atherosclerotic disease:  -continue statin,will resume aspirin and plavix after colonoscopy      #History of stroke  # Left carotid artery stenosis s/p Left carotid endarterectomy with Dacron patch angioplasty  - on statin  -will resume plavix after colonoscopy  -aspirin non responder  · -carotid duplex -Patient is s/p left carotid endarterectomy, from October 2020. Evidence of mild, lesst briceno 50%, stenosis in the right ICA    Skin cancer:  -follows [facility] dermatologist and was initiated local treatment per patient      Code status: full  DVT prophylaxis: heparin    Care Plan discussed with: patient,nurse  Anticipated Disposition:SNF vs rehab  Anticipated Discharge:tbd     Hospital Problems  Date Reviewed: 2/19/2021          Codes Class Noted POA    GI bleed ICD-10-CM: K92.2  ICD-9-CM: 578.9  2/18/2021 Unknown                Review of Systems:   As per HPI      Vital Signs:    Last 24hrs VS reviewed since prior progress note. Most recent are:  Visit Vitals  BP (!) 148/59 (BP 1 Location: Right upper arm, BP Patient Position: At rest)   Pulse (!) 48   Temp 97.7 °F (36.5 °C)   Resp 12   Ht 5' 8\" (1.727 m)   Wt 64 kg (141 lb 1.5 oz)   SpO2 99%   BMI 21.45 kg/m²       No intake or output data in the 24 hours ending 02/23/21 1637     Physical Examination:     I had a face to face encounter with this patient and independently examined them on 2/23/2021 as outlined below:          Constitutional:  No acute distress, cooperative, pleasant    ENT:  Oral mucosa moist, oropharynx benign,EOMI    Resp:  CTA bilaterally. No wheezing/rhonchi/rales. No accessory muscle use   CV:  Regular rhythm, normal rate, S1,S 2 wnl    GI:  Soft, non distended, non tender, normoactive bowel sounds    Musculoskeletal:  No LE edema, warm    Neurologic:  he is alert and oriented X 3, moves all extremities, sensation intact     Psych: not anxious nor agitated.   Skin: right cheek -skin cancer       Data Review:    Review and/or order of clinical lab test  Review and/or order of tests in the medicine section of CPT      Labs:     Recent Labs     02/23/21  0439 02/22/21  0251   WBC  --  6.9   HGB 8.0* 8.2*  8.2*   HCT  --  27.0*   PLT  --  428*     Recent Labs     02/22/21  0251 02/21/21  0203    138   K 4.0 3.7   CL 110* 109*   CO2 22 23   BUN 20 21*   CREA 1.65* 1.45*   GLU 98 96   CA 7.7* 7.3*     No results for input(s): ALT, AP, TBIL, TBILI, TP, ALB, GLOB, GGT, AML, LPSE in the last 72 hours. No lab exists for component: SGOT, GPT, AMYP, HLPSE  No results for input(s): INR, PTP, APTT, INREXT, INREXT in the last 72 hours. No results for input(s): FE, TIBC, PSAT, FERR in the last 72 hours. No results found for: FOL, RBCF   No results for input(s): PH, PCO2, PO2 in the last 72 hours. No results for input(s): CPK, CKNDX, TROIQ in the last 72 hours.     No lab exists for component: CPKMB  Lab Results   Component Value Date/Time    Cholesterol, total 96 02/22/2021 02:51 AM    HDL Cholesterol 37 02/22/2021 02:51 AM    LDL, calculated 39 02/22/2021 02:51 AM    Triglyceride 100 02/22/2021 02:51 AM    CHOL/HDL Ratio 2.6 02/22/2021 02:51 AM     Lab Results   Component Value Date/Time    Glucose (POC) 197 (H) 09/11/2020 08:04 PM    Glucose (POC) 146 (H) 09/11/2020 11:30 AM    Glucose (POC) 123 (H) 09/11/2020 06:49 AM    Glucose (POC) 111 (H) 09/10/2020 11:07 PM    Glucose (POC) 111 (H) 09/10/2020 05:14 PM     No results found for: COLOR, APPRN, SPGRU, REFSG, YESY, PROTU, GLUCU, KETU, BILU, UROU, PRADEEP, LEUKU, GLUKE, EPSU, BACTU, WBCU, RBCU, CASTS, UCRY      Medications Reviewed:     Current Facility-Administered Medications   Medication Dose Route Frequency    peg 3350-Electrolytes-Vit C (MOVIPREP) oral solution 2 L  2 L Oral ONCE    cefTRIAXone (ROCEPHIN) 1 g in 0.9% sodium chloride (MBP/ADV) 50 mL MBP  1 g IntraVENous Q24H    aspirin chewable tablet 81 mg  81 mg Oral DAILY    heparin (porcine) injection 5,000 Units  5,000 Units SubCUTAneous Q8H    sodium chloride (NS) flush 5-40 mL  5-40 mL IntraVENous Q8H    sodium chloride (NS) flush 5-40 mL  5-40 mL IntraVENous PRN    sodium chloride (NS) flush 5-40 mL  5-40 mL IntraVENous Q8H    sodium chloride (NS) flush 5-40 mL  5-40 mL IntraVENous PRN    metoprolol tartrate (LOPRESSOR) tablet 25 mg  25 mg Oral Q12H    sodium chloride (NS) flush 5-40 mL  5-40 mL IntraVENous Q8H    sodium chloride (NS) flush 5-40 mL  5-40 mL IntraVENous PRN    ondansetron (ZOFRAN) injection 4 mg  4 mg IntraVENous Q4H PRN    pantoprazole (PROTONIX) 40 mg in 0.9% sodium chloride 10 mL injection  40 mg IntraVENous BID    collagenase (SANTYL) 250 unit/gram ointment   Topical DAILY    miconazole (MICOTIN) 2 % powder   Topical BID     ______________________________________________________________________  EXPECTED LENGTH OF STAY: 3d 0h  ACTUAL LENGTH OF STAY:          5                 Wesley Suarez MD

## 2021-02-24 ENCOUNTER — ANESTHESIA EVENT (OUTPATIENT)
Dept: ENDOSCOPY | Age: 71
DRG: 377 | End: 2021-02-24
Payer: MEDICARE

## 2021-02-24 ENCOUNTER — ANESTHESIA (OUTPATIENT)
Dept: ENDOSCOPY | Age: 71
DRG: 377 | End: 2021-02-24
Payer: MEDICARE

## 2021-02-24 LAB
ANION GAP SERPL CALC-SCNC: 14 MMOL/L (ref 5–15)
ANION GAP SERPL CALC-SCNC: 6 MMOL/L (ref 5–15)
BUN SERPL-MCNC: 14 MG/DL (ref 6–20)
BUN SERPL-MCNC: 16 MG/DL (ref 6–20)
BUN/CREAT SERPL: 11 (ref 12–20)
BUN/CREAT SERPL: 13 (ref 12–20)
CALCIUM SERPL-MCNC: 8.3 MG/DL (ref 8.5–10.1)
CALCIUM SERPL-MCNC: 8.8 MG/DL (ref 8.5–10.1)
CHLORIDE SERPL-SCNC: 110 MMOL/L (ref 97–108)
CHLORIDE SERPL-SCNC: 110 MMOL/L (ref 97–108)
CO2 SERPL-SCNC: 13 MMOL/L (ref 21–32)
CO2 SERPL-SCNC: 24 MMOL/L (ref 21–32)
CREAT SERPL-MCNC: 1.27 MG/DL (ref 0.7–1.3)
CREAT SERPL-MCNC: 1.28 MG/DL (ref 0.7–1.3)
GLUCOSE SERPL-MCNC: 95 MG/DL (ref 65–100)
GLUCOSE SERPL-MCNC: 96 MG/DL (ref 65–100)
HGB BLD-MCNC: 9.3 G/DL (ref 12.1–17)
POTASSIUM SERPL-SCNC: 4.3 MMOL/L (ref 3.5–5.1)
POTASSIUM SERPL-SCNC: 5.1 MMOL/L (ref 3.5–5.1)
SODIUM SERPL-SCNC: 137 MMOL/L (ref 136–145)
SODIUM SERPL-SCNC: 140 MMOL/L (ref 136–145)

## 2021-02-24 PROCEDURE — 97110 THERAPEUTIC EXERCISES: CPT

## 2021-02-24 PROCEDURE — 94760 N-INVAS EAR/PLS OXIMETRY 1: CPT

## 2021-02-24 PROCEDURE — 74011250637 HC RX REV CODE- 250/637: Performed by: FAMILY MEDICINE

## 2021-02-24 PROCEDURE — 74011250637 HC RX REV CODE- 250/637: Performed by: INTERNAL MEDICINE

## 2021-02-24 PROCEDURE — 74011250636 HC RX REV CODE- 250/636: Performed by: NURSE PRACTITIONER

## 2021-02-24 PROCEDURE — 65660000000 HC RM CCU STEPDOWN

## 2021-02-24 PROCEDURE — 76060000032 HC ANESTHESIA 0.5 TO 1 HR: Performed by: INTERNAL MEDICINE

## 2021-02-24 PROCEDURE — 74011250637 HC RX REV CODE- 250/637: Performed by: HOSPITALIST

## 2021-02-24 PROCEDURE — 36415 COLL VENOUS BLD VENIPUNCTURE: CPT

## 2021-02-24 PROCEDURE — 74011250636 HC RX REV CODE- 250/636: Performed by: NURSE ANESTHETIST, CERTIFIED REGISTERED

## 2021-02-24 PROCEDURE — 74011000258 HC RX REV CODE- 258: Performed by: NURSE PRACTITIONER

## 2021-02-24 PROCEDURE — 74011250636 HC RX REV CODE- 250/636: Performed by: FAMILY MEDICINE

## 2021-02-24 PROCEDURE — 80048 BASIC METABOLIC PNL TOTAL CA: CPT

## 2021-02-24 PROCEDURE — 85018 HEMOGLOBIN: CPT

## 2021-02-24 PROCEDURE — 74011000250 HC RX REV CODE- 250: Performed by: NURSE PRACTITIONER

## 2021-02-24 PROCEDURE — 99232 SBSQ HOSP IP/OBS MODERATE 35: CPT | Performed by: SPECIALIST

## 2021-02-24 PROCEDURE — 76040000007: Performed by: INTERNAL MEDICINE

## 2021-02-24 PROCEDURE — 74011000250 HC RX REV CODE- 250: Performed by: NURSE ANESTHETIST, CERTIFIED REGISTERED

## 2021-02-24 PROCEDURE — 74011000258 HC RX REV CODE- 258: Performed by: HOSPITALIST

## 2021-02-24 PROCEDURE — C9113 INJ PANTOPRAZOLE SODIUM, VIA: HCPCS | Performed by: FAMILY MEDICINE

## 2021-02-24 PROCEDURE — 74011250636 HC RX REV CODE- 250/636: Performed by: HOSPITALIST

## 2021-02-24 PROCEDURE — 74011000250 HC RX REV CODE- 250: Performed by: FAMILY MEDICINE

## 2021-02-24 PROCEDURE — 0DJD8ZZ INSPECTION OF LOWER INTESTINAL TRACT, VIA NATURAL OR ARTIFICIAL OPENING ENDOSCOPIC: ICD-10-PCS | Performed by: INTERNAL MEDICINE

## 2021-02-24 RX ORDER — ATROPINE SULFATE 0.1 MG/ML
0.5 INJECTION INTRAVENOUS
Status: DISCONTINUED | OUTPATIENT
Start: 2021-02-24 | End: 2021-02-24 | Stop reason: HOSPADM

## 2021-02-24 RX ORDER — FLUMAZENIL 0.1 MG/ML
0.2 INJECTION INTRAVENOUS
Status: DISCONTINUED | OUTPATIENT
Start: 2021-02-24 | End: 2021-02-24 | Stop reason: HOSPADM

## 2021-02-24 RX ORDER — EPINEPHRINE 0.1 MG/ML
1 INJECTION INTRACARDIAC; INTRAVENOUS
Status: DISCONTINUED | OUTPATIENT
Start: 2021-02-24 | End: 2021-02-24 | Stop reason: HOSPADM

## 2021-02-24 RX ORDER — LIDOCAINE HYDROCHLORIDE 20 MG/ML
INJECTION, SOLUTION EPIDURAL; INFILTRATION; INTRACAUDAL; PERINEURAL AS NEEDED
Status: DISCONTINUED | OUTPATIENT
Start: 2021-02-24 | End: 2021-02-24 | Stop reason: HOSPADM

## 2021-02-24 RX ORDER — SODIUM CHLORIDE 9 MG/ML
INJECTION, SOLUTION INTRAVENOUS
Status: DISCONTINUED | OUTPATIENT
Start: 2021-02-24 | End: 2021-02-24 | Stop reason: HOSPADM

## 2021-02-24 RX ORDER — CLOPIDOGREL BISULFATE 75 MG/1
75 TABLET ORAL DAILY
Status: DISCONTINUED | OUTPATIENT
Start: 2021-02-24 | End: 2021-02-26 | Stop reason: HOSPADM

## 2021-02-24 RX ORDER — FENTANYL CITRATE 50 UG/ML
25-200 INJECTION, SOLUTION INTRAMUSCULAR; INTRAVENOUS
Status: DISCONTINUED | OUTPATIENT
Start: 2021-02-24 | End: 2021-02-24 | Stop reason: HOSPADM

## 2021-02-24 RX ORDER — PROPOFOL 10 MG/ML
INJECTION, EMULSION INTRAVENOUS AS NEEDED
Status: DISCONTINUED | OUTPATIENT
Start: 2021-02-24 | End: 2021-02-24 | Stop reason: HOSPADM

## 2021-02-24 RX ORDER — MIDAZOLAM HYDROCHLORIDE 1 MG/ML
.25-5 INJECTION, SOLUTION INTRAMUSCULAR; INTRAVENOUS
Status: DISCONTINUED | OUTPATIENT
Start: 2021-02-24 | End: 2021-02-24 | Stop reason: HOSPADM

## 2021-02-24 RX ORDER — CLOPIDOGREL BISULFATE 75 MG/1
75 TABLET ORAL DAILY
Status: DISCONTINUED | OUTPATIENT
Start: 2021-02-25 | End: 2021-02-24

## 2021-02-24 RX ORDER — SODIUM CHLORIDE 0.9 % (FLUSH) 0.9 %
5-40 SYRINGE (ML) INJECTION AS NEEDED
Status: DISCONTINUED | OUTPATIENT
Start: 2021-02-24 | End: 2021-02-26 | Stop reason: HOSPADM

## 2021-02-24 RX ORDER — SODIUM CHLORIDE 9 MG/ML
50 INJECTION, SOLUTION INTRAVENOUS CONTINUOUS
Status: DISPENSED | OUTPATIENT
Start: 2021-02-24 | End: 2021-02-24

## 2021-02-24 RX ORDER — DEXTROMETHORPHAN/PSEUDOEPHED 2.5-7.5/.8
1.2 DROPS ORAL
Status: DISCONTINUED | OUTPATIENT
Start: 2021-02-24 | End: 2021-02-24 | Stop reason: HOSPADM

## 2021-02-24 RX ORDER — NALOXONE HYDROCHLORIDE 0.4 MG/ML
0.4 INJECTION, SOLUTION INTRAMUSCULAR; INTRAVENOUS; SUBCUTANEOUS
Status: DISCONTINUED | OUTPATIENT
Start: 2021-02-24 | End: 2021-02-24 | Stop reason: HOSPADM

## 2021-02-24 RX ORDER — SODIUM CHLORIDE 0.9 % (FLUSH) 0.9 %
5-40 SYRINGE (ML) INJECTION EVERY 8 HOURS
Status: DISCONTINUED | OUTPATIENT
Start: 2021-02-24 | End: 2021-02-26 | Stop reason: HOSPADM

## 2021-02-24 RX ADMIN — METOPROLOL TARTRATE 25 MG: 25 TABLET, FILM COATED ORAL at 11:15

## 2021-02-24 RX ADMIN — PROPOFOL 30 MG: 10 INJECTION, EMULSION INTRAVENOUS at 15:52

## 2021-02-24 RX ADMIN — PROPOFOL 30 MG: 10 INJECTION, EMULSION INTRAVENOUS at 16:05

## 2021-02-24 RX ADMIN — SODIUM BICARBONATE: 84 INJECTION, SOLUTION INTRAVENOUS at 07:08

## 2021-02-24 RX ADMIN — PROPOFOL 30 MG: 10 INJECTION, EMULSION INTRAVENOUS at 16:08

## 2021-02-24 RX ADMIN — CLOPIDOGREL BISULFATE 75 MG: 75 TABLET ORAL at 17:15

## 2021-02-24 RX ADMIN — PROPOFOL 30 MG: 10 INJECTION, EMULSION INTRAVENOUS at 15:55

## 2021-02-24 RX ADMIN — Medication 10 ML: at 14:00

## 2021-02-24 RX ADMIN — HEPARIN SODIUM 5000 UNITS: 5000 INJECTION INTRAVENOUS; SUBCUTANEOUS at 21:10

## 2021-02-24 RX ADMIN — Medication 10 ML: at 21:10

## 2021-02-24 RX ADMIN — COLLAGENASE SANTYL: 250 OINTMENT TOPICAL at 10:00

## 2021-02-24 RX ADMIN — PROPOFOL 50 MG: 10 INJECTION, EMULSION INTRAVENOUS at 15:50

## 2021-02-24 RX ADMIN — LIDOCAINE HYDROCHLORIDE 50 MG: 20 INJECTION, SOLUTION EPIDURAL; INFILTRATION; INTRACAUDAL; PERINEURAL at 15:50

## 2021-02-24 RX ADMIN — Medication 10 ML: at 21:11

## 2021-02-24 RX ADMIN — SODIUM CHLORIDE: 900 INJECTION, SOLUTION INTRAVENOUS at 15:41

## 2021-02-24 RX ADMIN — METOPROLOL TARTRATE 25 MG: 25 TABLET, FILM COATED ORAL at 21:09

## 2021-02-24 RX ADMIN — PROPOFOL 30 MG: 10 INJECTION, EMULSION INTRAVENOUS at 15:58

## 2021-02-24 RX ADMIN — CEFTRIAXONE SODIUM 1 G: 1 INJECTION, POWDER, FOR SOLUTION INTRAMUSCULAR; INTRAVENOUS at 04:21

## 2021-02-24 RX ADMIN — PANTOPRAZOLE SODIUM 40 MG: 40 INJECTION, POWDER, FOR SOLUTION INTRAVENOUS at 01:17

## 2021-02-24 RX ADMIN — PROPOFOL 50 MG: 10 INJECTION, EMULSION INTRAVENOUS at 15:51

## 2021-02-24 RX ADMIN — PANTOPRAZOLE SODIUM 40 MG: 40 INJECTION, POWDER, FOR SOLUTION INTRAVENOUS at 13:56

## 2021-02-24 RX ADMIN — PROPOFOL 30 MG: 10 INJECTION, EMULSION INTRAVENOUS at 16:02

## 2021-02-24 RX ADMIN — MICONAZOLE NITRATE 2 % TOPICAL POWDER: at 21:13

## 2021-02-24 RX ADMIN — HYDRALAZINE HYDROCHLORIDE 20 MG: 20 INJECTION INTRAMUSCULAR; INTRAVENOUS at 17:22

## 2021-02-24 NOTE — PROGRESS NOTES
Unknown Gums  1950  [id number]    Situation:  Verbal report received from: Rei Ayala  Procedure: Procedure(s):  COLONOSCOPY   :-    Background:    Preoperative diagnosis: Anemia  Postoperative diagnosis: Diverticulosis  Internal Hemorrhoids    :  Dr. Derek Jeter  Assistant(s): Endoscopy Technician-1: [facility]  Endoscopy RN-1: Lizeth Parks RN    Specimens: * No specimens in log *  H. Pylori  no    Assessment:    Anesthesia gave intra-procedure sedation and medications, see anesthesia flow sheet yes    Intravenous fluids: NS@ KVO     Vital signs stable     Abdominal assessment: round and soft     Recommendation:  Discharge patient per MD order.   Return to floor    Permission to share finding with family or friend n/a

## 2021-02-24 NOTE — ANESTHESIA PREPROCEDURE EVALUATION
Relevant Problems   CARDIOVASCULAR   (+) NSTEMI (non-ST elevated myocardial infarction) [facility])       Anesthetic History   No history of anesthetic complications            Review of Systems / Medical History  Patient summary reviewed, nursing notes reviewed and pertinent labs reviewed    Pulmonary          Smoker         Neuro/Psych   Within defined limits    CVA       Cardiovascular              Past MI and CAD    Exercise tolerance: <4 METS  Comments: NSTEMI 9/2020, card consulted:  Needs cath/poss stent, but finding source of anemia precludes cath at this point.      GI/Hepatic/Renal  Within defined limits              Endo/Other        Anemia     Other Findings   Comments: GI bleed     Acute blood loss anemia     Weakness     TUSHAR     History of CVA     Infected wound left lower extremity  BCCa of cheek         Physical Exam    Airway  Mallampati: II  TM Distance: > 6 cm  Neck ROM: normal range of motion   Mouth opening: Normal     Cardiovascular  Regular rate and rhythm,  S1 and S2 normal,  no murmur, click, rub, or gallop             Dental    Dentition: Edentulous, Full lower dentures and Full upper dentures     Pulmonary  Breath sounds clear to auscultation               Abdominal  GI exam deferred       Other Findings            Anesthetic Plan    ASA: 3  Anesthesia type: MAC          Induction: Intravenous  Anesthetic plan and risks discussed with: Patient

## 2021-02-24 NOTE — PROGRESS NOTES
TRANSFER - OUT REPORT:    Verbal report given to Idania(name) on Bruce Copeland  being transferred to [address] [place] [address] (unit) for routine progression of care       Report consisted of patients Situation, Background, Assessment and   Recommendations(SBAR). Information from the following report(s) SBAR, Kardex, Intake/Output, MAR, Cardiac Rhythm Sinus Rhythm, Quality Measures and Dual Neuro Assessment was reviewed with the receiving nurse. Lines:   Peripheral IV 02/23/21 Left Arm (Active)   Site Assessment Clean, dry, & intact 02/24/21 0159   Phlebitis Assessment 0 02/24/21 0159   Infiltration Assessment 0 02/24/21 0159   Dressing Status Clean, dry, & intact 02/24/21 0159   Dressing Type Transparent;Tape 02/24/21 0159   Hub Color/Line Status Infusing 02/24/21 0159   Action Taken Open ports on tubing capped 02/24/21 0159   Alcohol Cap Used Yes 02/24/21 0159       Arterial Line 02/23/21 (Active)        Opportunity for questions and clarification was provided.       Patient transported with:   Patient's medications from home  Registered Nurse

## 2021-02-24 NOTE — PERIOP NOTES

## 2021-02-24 NOTE — PROGRESS NOTES
Bedside and Verbal shift change report given to [id number] Peter Howard (oncoming nurse) by [address] Bud Mejia (offgoing nurse). Report included the following information SBAR, Kardex, Intake/Output, MAR, Recent Results, Cardiac Rhythm NSR and Dual Neuro Assessment.

## 2021-02-24 NOTE — PERIOP NOTES
TRANSFER - OUT REPORT:    Verbal report given to [facility] RN(name) on Chano Factor  being transferred to 602(unit) for routine post - op       Report consisted of patients Situation, Background, Assessment and   Recommendations(SBAR). Information from the following report(s) SBAR and Procedure Summary was reviewed with the receiving nurse. Lines:   Peripheral IV 02/24/21 Left Wrist (Active)       Arterial Line 02/23/21 (Active)        Opportunity for questions and clarification was provided.

## 2021-02-24 NOTE — PROGRESS NOTES
[address] [facility] Adult  Hospitalist Group                                                                                          Hospitalist Progress Note  Shashank Guthrie MD  Answering service: [phone] OR [phone] from in house phone        Date of Service:  2021  NAME:  Joesph Muller  :  1950  MRN:  [id number]      Admission Summary:   66 y.o M with PMH of HTN,stroke,s/p carotid artery surgery came to the hospital due to lower extremity weakness. Interval history / Subjective:   Patient seen and examined    States that he had lot of bowel movements with bowel prep. Assessment & Plan:     # Acute blood loss anemia:  -hb at admission was 5, s/p 2 U PRBCs  -EGD did not show any obvious evidence of bleeding  -hold plavix. -GI planning for colonoscopy  today  EGD unremarkable    #Left foot wound infection:  Wound culture grew MSSA. D/c vanc,cefepime and flagyl. - switched to ceftriaxone  -podiatrist following- no surgical intervention  -MRI -No evidence of osteomyelitis. No drainable fluid collection    #Peripheral arterial disease:  -arterial duplex results noted -vascular surgery consulted-recommended work up as OP. #Lower extremity weakness:  Likely multifactorial- anemia, PAD, h/o stroke, physical deconditioning  -MRI L spine -Multilevel mild spondylosis ,  MRI brain -Chronic white matter disease and areas of chronic infarction particularly in the left MCA territory as above. Few punctate foci of mild diffusion signal abnormality in the periphery of the infarct may represent acute/subacute  infarctions versus artifact   -Neurology consulted-appreciate recommendations  Need SNF at discharge      # Coronary atherosclerotic disease:  -continue statin,will resume aspirin and plavix after colonoscopy      #History of stroke  # Left carotid artery stenosis s/p Left carotid endarterectomy with Dacron patch angioplasty  - on statin  -will resume plavix after colonoscopy.    -aspirin non responder  · -carotid duplex -Patient is s/p left carotid endarterectomy, from October 2020. Evidence of mild, lesst briceno 50%, stenosis in the right ICA    Skin cancer- rt cheek:  -follows [facility] dermatologist and was initiated local treatment per patient      Code status: full  DVT prophylaxis: heparin    Care Plan discussed with: patient,nurse  Anticipated Disposition:SNF vs rehab  Anticipated Discharge:tbd     Hospital Problems  Date Reviewed: 2/24/2021          Codes Class Noted POA    Coronary artery disease of native artery of native heart with stable angina pectoris ([facility] ICD-10-CM: I25.118  ICD-9-CM: 414.01, 413.9  2/23/2021 Yes        PAD (peripheral artery disease) ([facility] ICD-10-CM: I73.9  ICD-9-CM: 443.9  2/23/2021 Yes        CVD (cardiovascular disease) ICD-10-CM: I25.10  ICD-9-CM: 429.2  2/23/2021 Yes        ARF (acute renal failure) ([facility] ICD-10-CM: N17.9  ICD-9-CM: 584.9  2/23/2021 Yes        GI bleed ICD-10-CM: K92.2  ICD-9-CM: 578.9  2/18/2021 Yes        NSTEMI (non-ST elevated myocardial infarction) [facility]) ICD-10-CM: I21.4  ICD-9-CM: 410.70  9/7/2020 Yes                Review of Systems:   As per HPI      Vital Signs:    Last 24hrs VS reviewed since prior progress note. Most recent are:  Visit Vitals  BP (!) 178/65   Pulse 71   Temp 98.9 °F (37.2 °C)   Resp 14   Ht 5' 8\" (1.727 m)   Wt 64 kg (141 lb 1.5 oz)   SpO2 100%   BMI 21.45 kg/m²         Intake/Output Summary (Last 24 hours) at 2/24/2021 1553  Last data filed at 2/24/2021 0159  Gross per 24 hour   Intake    Output 750 ml   Net -750 ml        Physical Examination:     I had a face to face encounter with this patient and independently examined them on 2/24/2021 as outlined below:          Constitutional:  No acute distress, cooperative, pleasant    ENT:  Oral mucosa moist, oropharynx benign,EOMI    Resp:  CTA bilaterally. No wheezing/rhonchi/rales.  No accessory muscle use   CV:  Regular rhythm, normal rate, S1,S 2 wnl    GI:  Soft, non distended, non tender, normoactive bowel sounds    Musculoskeletal:  No LE edema, warm    Neurologic:  he is alert and oriented X 3, moves all extremities, sensation intact     Psych: not anxious nor agitated. Skin: right cheek ulcerated lesion -skin cancer       Data Review:    Review and/or order of clinical lab test  Review and/or order of tests in the medicine section of [facility]      Labs:     Recent Labs     02/24/21  0223 02/23/21  0439 02/22/21  0251   WBC  --   --  6.9   HGB 9.3* 8.0* 8.2*  8.2*   HCT  --   --  27.0*   PLT  --   --  428*     Recent Labs     02/24/21  1305 02/24/21  0223 02/22/21  0251    137 139   K 4.3 5.1 4.0   * 110* 110*   CO2 24 13* 22   BUN 14 16 20   CREA 1.28 1.27 1.65*   GLU 96 95 98   CA 8.3* 8.8 7.7*     No results for input(s): ALT, AP, TBIL, TBILI, TP, ALB, GLOB, GGT, AML, LPSE in the last 72 hours. No lab exists for component: SGOT, GPT, AMYP, HLPSE  No results for input(s): INR, PTP, APTT, INREXT, INREXT in the last 72 hours. No results for input(s): FE, TIBC, PSAT, FERR in the last 72 hours. No results found for: FOL, RBCF   No results for input(s): PH, PCO2, PO2 in the last 72 hours. No results for input(s): CPK, CKNDX, TROIQ in the last 72 hours.     No lab exists for component: CPKMB  Lab Results   Component Value Date/Time    Cholesterol, total 96 02/22/2021 02:51 AM    HDL Cholesterol 37 02/22/2021 02:51 AM    LDL, calculated 39 02/22/2021 02:51 AM    Triglyceride 100 02/22/2021 02:51 AM    CHOL/HDL Ratio 2.6 02/22/2021 02:51 AM     Lab Results   Component Value Date/Time    Glucose (POC) 197 (H) 09/11/2020 08:04 PM    Glucose (POC) 146 (H) 09/11/2020 11:30 AM    Glucose (POC) 123 (H) 09/11/2020 06:49 AM    Glucose (POC) 111 (H) 09/10/2020 11:07 PM    Glucose (POC) 111 (H) 09/10/2020 05:14 PM     No results found for: COLOR, APPRN, SPGRU, REFSG, YESY, PROTU, GLUCU, KETU, BILU, UROU, PRADEEP, LEUKU, GLUKE, EPSU, BACTU, WBCU, RBCU, CASTS, UCRY      Medications Reviewed: Current Facility-Administered Medications   Medication Dose Route Frequency    sodium bicarbonate (8.4%) 100 mEq in dextrose 5 % - 0.45% NaCl 1,000 mL infusion   IntraVENous CONTINUOUS    hydrALAZINE (APRESOLINE) 20 mg/mL injection 20 mg  20 mg IntraVENous Q6H PRN    cefTRIAXone (ROCEPHIN) 1 g in 0.9% sodium chloride (MBP/ADV) 50 mL MBP  1 g IntraVENous Q24H    aspirin chewable tablet 81 mg  81 mg Oral DAILY    heparin (porcine) injection 5,000 Units  5,000 Units SubCUTAneous Q8H    sodium chloride (NS) flush 5-40 mL  5-40 mL IntraVENous Q8H    sodium chloride (NS) flush 5-40 mL  5-40 mL IntraVENous PRN    sodium chloride (NS) flush 5-40 mL  5-40 mL IntraVENous Q8H    sodium chloride (NS) flush 5-40 mL  5-40 mL IntraVENous PRN    metoprolol tartrate (LOPRESSOR) tablet 25 mg  25 mg Oral Q12H    sodium chloride (NS) flush 5-40 mL  5-40 mL IntraVENous Q8H    sodium chloride (NS) flush 5-40 mL  5-40 mL IntraVENous PRN    ondansetron (ZOFRAN) injection 4 mg  4 mg IntraVENous Q4H PRN    pantoprazole (PROTONIX) 40 mg in 0.9% sodium chloride 10 mL injection  40 mg IntraVENous BID    collagenase (SANTYL) 250 unit/gram ointment   Topical DAILY    miconazole (MICOTIN) 2 % powder   Topical BID     ______________________________________________________________________  EXPECTED LENGTH OF STAY: 3d 0h  ACTUAL LENGTH OF STAY:          6                 Barbara Acharya MD

## 2021-02-24 NOTE — PROGRESS NOTES
Problem: Mobility Impaired (Adult and Pediatric)  Goal: *Acute Goals and Plan of Care (Insert Text)  Description: FUNCTIONAL STATUS PRIOR TO ADMISSION: Patient was modified independent using a rolling walker for functional mobility. HOME SUPPORT PRIOR TO ADMISSION: The patient lived alone with [facility] staff to provide meal assistance. Physical Therapy Goals  Initiated 2/19/2021  1. Patient will move from supine to sit and sit to supine  and roll side to side in bed with modified independence within 7 day(s). 2.  Patient will transfer from bed to chair and chair to bed with modified independence using the least restrictive device within 7 day(s). 3.  Patient will perform sit to stand with modified independence within 7 day(s). 4.  Patient will ambulate with modified independence for 150 feet with the least restrictive device within 7 day(s). Outcome: Progressing Towards Goal   PHYSICAL THERAPY TREATMENT  Patient: Joesph Muller (13 y.o. male)  Date: 2/24/2021  Diagnosis: GI bleed [K92.2] <principal problem not specified>  Procedure(s) (LRB):  ESOPHAGOGASTRODUODENOSCOPY (EGD) (N/A) 5 Days Post-Op  Precautions: Fall  Chart, physical therapy assessment, plan of care and goals were reviewed. ASSESSMENT  Patient continues with skilled PT services and is progressing towards goals. Patient limited today due to bowel prep and frequent stools per nursing therefore limited session to bed mobility and therapeutic exercise. Casa Hernandez Also still awaiting weightbearing status/recommendations for shoe for standing from podiatrist (perfect served this am). Patient very motivated to working with therapy and wanting to work on gait. Likely will need rehab at discharge.      Current Level of Function Impacting Discharge (mobility/balance): min assist bed mobility; gait -not assessed     Other factors to consider for discharge: need for clearance on weightbearing; could benefit from off loading shoe         PLAN :  Patient continues to benefit from skilled intervention to address the above impairments. Continue treatment per established plan of care. to address goals. Recommendation for discharge: (in order for the patient to meet his/her long term goals)  Therapy up to 5 days/week in SNF setting    This discharge recommendation:  Has not yet been discussed the attending provider and/or case management    IF patient discharges home will need the following DME: to be determined (TBD)       SUBJECTIVE:   Patient stated got to get walking and running.     OBJECTIVE DATA SUMMARY:   Critical Behavior:  Neurologic State: Alert           Functional Mobility Training:  Bed Mobility:     Supine to Sit: Supervision; Adaptive equipment(bed rail)  Sit to Supine: Minimum assistance                           Balance:  Sitting: Impaired; Without support  Sitting - Static: Good (unsupported)  Sitting - Dynamic: Good (unsupported)                        Therapeutic Exercises:   SLR, seated hip flexion, knee extension; BUE exercises    Activity Tolerance:   Good    After treatment patient left in no apparent distress:   Supine in bed, Heels elevated for pressure relief, Call bell within reach, and Side rails x 3    COMMUNICATION/COLLABORATION:   The patients plan of care was discussed with: Registered nurse.      Deirdre Clemons, PT   Time Calculation: 19 mins

## 2021-02-24 NOTE — PROGRESS NOTES
Occupational Therapy: Will is currently off of the floor in [facility]. Will follow.   YUMIKO Sandra/RICHELLE

## 2021-02-24 NOTE — PROGRESS NOTES
[address] [place] [address]               Division of Cardiology  [phone]                       Progress note              Angel Nick M.D., F.A.CSaraC. NAME:  Lorena Gillespie   :   1950   MRN:   [id number]         ICD-10-CM ICD-9-CM    1. Gastrointestinal hemorrhage, unspecified gastrointestinal hemorrhage type  K92.2 578.9    2. Generalized weakness  R53.1 780.79    3. Symptomatic anemia  D64.9 285.9    4. Weakness of both lower extremities  R29.898 729.89                  HPI  66years old gentleman with a past medical history remarkable for peripheral arterial disease with ulceration of the left heel, CVA requiring left carotid enterectomy in 2020, CAD status post and STEMI in 2020 with a troponin peaking at 8 in the setting of profound anemia with an hemoglobin approximately 5. At that time medical therapy was chosen. He presents today with progressive weakness and found to have again significant anemia requiring transfusions. He does have a history of hiatal hernia. He seems comfortable during his examination today. He denies any chest pain or shortness of breath legs discomfort. Assessment/Plan:  1. CAD: History of NSTEMI on 2020 in the setting of profound anemia. Likely underlying coronary artery disease but the patient remains asymptomatic at this time. His last EKG showed normal sinus rhythm with no acute ischemic changes. Continue medical therapy only at this time. Continue metoprolol and Lipitor. Continue aspirin if okay from GI standpoint. Low-dose of Xarelto would be a consideration given also his peripheral arterial disease but recurrent severe anemia is of course of concern and at this time I would hold off. Patient previously on Plavix. He has had no history of recent PCI's.   May hold off Plavix from cardiology standpoint at least given recurrent anemia. I will defer to primary team if Plavix was used from the neurological standpoint. Hypertension noted consider adding small dose of Norvasc but I will of course defer to primary team for now. No additional cardiac interventions for now. Echocardiogram reveals preserved ejection fraction although low normal.    Eventually when the anemia settles we may consider an outpatient nuclear stress test to determine the burden of ischemia. I will sign off for now but remain available as needed. CARDIAC STUDIES      02/18/21   ECHO ADULT COMPLETE 02/22/2021 2/22/2021    Narrative · LV: Mildly reduced systolic function. Estimated LVEF is 50%. · MV: Mild mitral valve regurgitation is present. Signed by: Jamie Chung MD                  @[facility]      IMAGING      CT Results (most recent):  Results from Hospital Encounter encounter on 02/18/21   CT HEAD WO CONT    Narrative EXAM: CT HEAD WO CONT    INDICATION: WEAKNESS    COMPARISON: 9/10/2020. CONTRAST: None. TECHNIQUE: Unenhanced CT of the head was performed using 5 mm images. Brain and  bone windows were generated. Coronal and sagittal reformats. CT dose reduction  was achieved through use of a standardized protocol tailored for this  examination and automatic exposure control for dose modulation. FINDINGS:  The ventricles and sulci are normal in size, shape and configuration. .  Encephalomalacia left frontal lobe. Encephalomalacia left basal ganglia. Minimal  encephalomalacia in the right caudate head. These findings are unchanged. . There  is no intracranial hemorrhage, extra-axial collection, or mass effect. The  basilar cisterns are open. No CT evidence of acute infarct. The bone windows demonstrate no abnormalities. The visualized portions of the  paranasal sinuses and mastoid air cells are clear. Impression 1. Chronic bilateral basal ganglia lacunar infarcts and cephalization of left  frontal lobe.  No acute intracranial abnormality  2. Question soft tissue thickening in the right cheek. Please correlate with  physical exam           XR Results (most recent):  Results from Hospital Encounter encounter on 02/18/21   XR FOOT LT MIN 3 V    Narrative EXAM: XR FOOT LT MIN 3 V    INDICATION: Left foot heel wound. COMPARISON: None. FINDINGS: Three views of the left foot demonstrate no fracture or other acute  osseous or articular abnormality. The soft tissues are within normal limits. Bones are osteopenic. No evidence of soft tissue gas or radiodense foreign body. Minimal first MTP and MTS joint osteoarthritis. No erosion. Impression 1. No fracture or osteomyelitis. 2. No soft tissue gas or radiodense foreign body. 3. Osteopenia. MRI Results (most recent):  Results from [place] encounter on 02/18/21   MRI LUMB SPINE WO CONT    Narrative EXAM: MRI LUMB SPINE WO CONT    INDICATION: evaluate for lumbar stenosis, bilateral lower extremity weakness. COMPARISON: None    TECHNIQUE: MR imaging of the lumbar spine was performed using the following  sequences: sagittal T1, T2, STIR;  axial T1, T2.     CONTRAST:  None. FINDINGS:    There is normal alignment of the lumbar spine. Vertebral body heights are  maintained. Marrow signal is normal.    The conus medullaris terminates at T12-L1. Signal and caliber of the distal  spinal cord are within normal limits. The paraspinal soft tissues are within normal limits. Lower thoracic spine: No herniation or stenosis. L1-L2: Mild disc space narrowing. Mild broad-based disc bulge causing mild  spinal stenosis. No significant neural foraminal narrowing. L2-L3: Mild disc space narrowing. Mild broad-based disc but that is asymmetric  to the left. There is mild impingement left lateral recess. No significant  neural foraminal narrowing. L3-L4: Mild disc space narrowing. No significant spinal stenosis or neural  foraminal narrowing.     L4-L5: Mild disc space narrowing. Mild broad-based disc bulge causing mild  spinal stenosis. No significant neural foraminal narrowing. L5-S1: No herniation or stenosis. Impression Multilevel mild spondylosis as above. Past Medical History:   Diagnosis Date    Skin cancer 12/19/2019    bcc-left forehead, left neck, left lateral cheek     Stroke (cerebrum) ([address]            History reviewed. No pertinent surgical history. Visit Vitals  BP (!) 177/77 (BP 1 Location: Right upper arm, BP Patient Position: At rest)   Pulse 77   Temp 98.2 °F (36.8 °C)   Resp 18   Ht 5' 8\" (1.727 m)   Wt 141 lb 1.5 oz (64 kg)   SpO2 98%   BMI 21.45 kg/m²         Wt Readings from Last 3 Encounters:   02/22/21 141 lb 1.5 oz (64 kg)   02/19/21 147 lb 0.8 oz (66.7 kg)   10/29/20 160 lb (72.6 kg)         Review of Systems:   Pertinent items are noted in the History of Present Illness. No intake/output data recorded.     02/22 1901 - 02/24 0700  In: -   Out: 2000 [Urine:2000]      Telemetry: normal sinus rhythm    Physical Exam:    Heart: regular rate and rhythm  Lungs: clear to auscultation bilaterally  Extremities: no edema    Current Facility-Administered Medications   Medication Dose Route Frequency    sodium bicarbonate (8.4%) 100 mEq in dextrose 5 % - 0.45% NaCl 1,000 mL infusion   IntraVENous CONTINUOUS    hydrALAZINE (APRESOLINE) 20 mg/mL injection 20 mg  20 mg IntraVENous Q6H PRN    cefTRIAXone (ROCEPHIN) 1 g in 0.9% sodium chloride (MBP/ADV) 50 mL MBP  1 g IntraVENous Q24H    aspirin chewable tablet 81 mg  81 mg Oral DAILY    heparin (porcine) injection 5,000 Units  5,000 Units SubCUTAneous Q8H    sodium chloride (NS) flush 5-40 mL  5-40 mL IntraVENous Q8H    sodium chloride (NS) flush 5-40 mL  5-40 mL IntraVENous PRN    sodium chloride (NS) flush 5-40 mL  5-40 mL IntraVENous Q8H    sodium chloride (NS) flush 5-40 mL  5-40 mL IntraVENous PRN    metoprolol tartrate (LOPRESSOR) tablet 25 mg  25 mg Oral Q12H    sodium chloride (NS) flush 5-40 mL  5-40 mL IntraVENous Q8H    sodium chloride (NS) flush 5-40 mL  5-40 mL IntraVENous PRN    ondansetron (ZOFRAN) injection 4 mg  4 mg IntraVENous Q4H PRN    pantoprazole (PROTONIX) 40 mg in 0.9% sodium chloride 10 mL injection  40 mg IntraVENous BID    collagenase (SANTYL) 250 unit/gram ointment   Topical DAILY    miconazole (MICOTIN) 2 % powder   Topical BID         All Cardiac Markers in the last 24 hours:  No results found for: CPK, CK, CKMMB, CKMB, RCK3, CKMBT, CKMBPOC, CKNDX, CKND1, ADARSH, TROPT, TROIQ, REVA, TROPT, TNIPOC, BNP, BNPP, BNPNT     Lab Results   Component Value Date/Time    Creatinine 1.27 02/24/2021 02:23 AM          Lab Results   Component Value Date/Time    CK 83 02/18/2021 09:17 AM    CK - MB 13.0 (H) 09/07/2020 12:58 PM    CK-MB Index 1.7 09/07/2020 12:58 PM    Troponin-I, Qt. <0.05 02/19/2021 12:31 AM         Lab Results   Component Value Date/Time    WBC 6.9 02/22/2021 02:51 AM    HGB 9.3 (L) 02/24/2021 02:23 AM    HCT 27.0 (L) 02/22/2021 02:51 AM    PLATELET 634 (H) 12/07/7994 02:51 AM    MCV 84.6 02/22/2021 02:51 AM         Lab Results   Component Value Date/Time    Sodium 137 02/24/2021 02:23 AM    Potassium 5.1 02/24/2021 02:23 AM    Chloride 110 (H) 02/24/2021 02:23 AM    CO2 13 (LL) 02/24/2021 02:23 AM    Anion gap 14 02/24/2021 02:23 AM    Glucose 95 02/24/2021 02:23 AM    BUN 16 02/24/2021 02:23 AM    Creatinine 1.27 02/24/2021 02:23 AM    BUN/Creatinine ratio 13 02/24/2021 02:23 AM    GFR est AA >60 02/24/2021 02:23 AM    GFR est non-AA 56 (L) 02/24/2021 02:23 AM    Calcium 8.8 02/24/2021 02:23 AM         No results found for: BNP, BNPP, BNPPPOC, XBNPT, BNPNT      Lab Results   Component Value Date/Time    Cholesterol, total 96 02/22/2021 02:51 AM    HDL Cholesterol 37 02/22/2021 02:51 AM    LDL, calculated 39 02/22/2021 02:51 AM    VLDL, calculated 20 02/22/2021 02:51 AM    Triglyceride 100 02/22/2021 02:51 AM    CHOL/HDL Ratio 2.6 02/22/2021 02:51 AM         Lab Results   Component Value Date/Time    ALT (SGPT) 8 (L) 02/19/2021 12:31 AM    Alk.  phosphatase 87 02/19/2021 12:31 AM    Bilirubin, total 1.2 (H) 02/19/2021 12:31 AM

## 2021-02-24 NOTE — PROCEDURES
[address]  [address] [facility], [address] [place] [address]      Colonoscopy Operative Report    Herman Escalera  [id number]  1950      Procedure Type:   Colonoscopy --diagnostic     Indications:    Iron deficiency anemia, Occult blood in stool         Pre-operative Diagnosis: see indication above    Post-operative Diagnosis:  See findings below    :  Palmer Valdes. Amilcar Perkins MD      Referring Provider: Other, MD Ryan      Sedation:  MAC anesthesia Propofol      Procedure Details:  After informed consent was obtained with all risks and benefits of procedure explained and preoperative exam completed, the patient was taken to the endoscopy suite and placed in the left lateral decubitus position. Upon sequential sedation as per above, a digital rectal exam was performed demonstrating internal hemorrhoids. The Olympus pediatric videocolonoscope  was inserted in the rectum and carefully advanced to the cecum, which was identified by the ileocecal valve and appendiceal orifice. The cecum was identified by the ileocecal valve and appendiceal orifice. The quality of preparation was adequate. The colonoscope was slowly withdrawn with careful evaluation between folds. Retroflexion in the rectum was completed . Findings:   Rectum: normal  Sigmoid: moderate diverticulosis  Descending Colon: normal  Transverse Colon: normal  Ascending Colon: normal  Cecum: normal  Terminal Ileum: could not be intubated due to extensive scope looping      Specimen Removed: none    Complications: None. EBL:  None. Impression:      As above    Recommendations:  1. Cardiac regular diet  2. Monitor CBC  3. Okay to restart anticoagulation from GI perspective  4. Repeat colonoscopy in 10 years based on general health at that time  5. Celiac antibody panel ordered to further work up iron deficiency anemia. We can also consider capsule endoscopy next if anemia recurs. 6.. We will sign off for now.  Please call with any questions    Signed By: Luz Maria Stearns.  Geronimo Loja MD     2/24/2021  4:20 PM

## 2021-02-25 PROBLEM — E44.0 MODERATE PROTEIN-CALORIE MALNUTRITION (HCC): Status: ACTIVE | Noted: 2021-02-25

## 2021-02-25 LAB
ANION GAP SERPL CALC-SCNC: 8 MMOL/L (ref 5–15)
BASOPHILS # BLD: 0.1 K/UL (ref 0–0.1)
BASOPHILS NFR BLD: 2 % (ref 0–1)
BUN SERPL-MCNC: 14 MG/DL (ref 6–20)
BUN/CREAT SERPL: 11 (ref 12–20)
CALCIUM SERPL-MCNC: 8.2 MG/DL (ref 8.5–10.1)
CHLORIDE SERPL-SCNC: 109 MMOL/L (ref 97–108)
CO2 SERPL-SCNC: 21 MMOL/L (ref 21–32)
CREAT SERPL-MCNC: 1.24 MG/DL (ref 0.7–1.3)
DIFFERENTIAL METHOD BLD: ABNORMAL
EOSINOPHIL # BLD: 0.4 K/UL (ref 0–0.4)
EOSINOPHIL NFR BLD: 6 % (ref 0–7)
ERYTHROCYTE [DISTWIDTH] IN BLOOD BY AUTOMATED COUNT: 17.5 % (ref 11.5–14.5)
ERYTHROCYTE [DISTWIDTH] IN BLOOD BY AUTOMATED COUNT: 17.8 % (ref 11.5–14.5)
GLUCOSE SERPL-MCNC: 100 MG/DL (ref 65–100)
HCT VFR BLD AUTO: 23.4 % (ref 36.6–50.3)
HCT VFR BLD AUTO: 27.8 % (ref 36.6–50.3)
HGB BLD-MCNC: 7.4 G/DL (ref 12.1–17)
HGB BLD-MCNC: 8.4 G/DL (ref 12.1–17)
IMM GRANULOCYTES # BLD AUTO: 0 K/UL
IMM GRANULOCYTES NFR BLD AUTO: 0 %
LYMPHOCYTES # BLD: 1.5 K/UL (ref 0.8–3.5)
LYMPHOCYTES NFR BLD: 25 % (ref 12–49)
MCH RBC QN AUTO: 25.5 PG (ref 26–34)
MCH RBC QN AUTO: 26.2 PG (ref 26–34)
MCHC RBC AUTO-ENTMCNC: 30.2 G/DL (ref 30–36.5)
MCHC RBC AUTO-ENTMCNC: 31.6 G/DL (ref 30–36.5)
MCV RBC AUTO: 83 FL (ref 80–99)
MCV RBC AUTO: 84.2 FL (ref 80–99)
MONOCYTES # BLD: 0.1 K/UL (ref 0–1)
MONOCYTES NFR BLD: 1 % (ref 5–13)
NEUTS SEG # BLD: 3.8 K/UL (ref 1.8–8)
NEUTS SEG NFR BLD: 66 % (ref 32–75)
NRBC # BLD: 0 K/UL (ref 0–0.01)
NRBC # BLD: 0 K/UL (ref 0–0.01)
NRBC BLD-RTO: 0 PER 100 WBC
NRBC BLD-RTO: 0 PER 100 WBC
PLATELET # BLD AUTO: 391 K/UL (ref 150–400)
PLATELET # BLD AUTO: 392 K/UL (ref 150–400)
PMV BLD AUTO: 10.9 FL (ref 8.9–12.9)
PMV BLD AUTO: 11 FL (ref 8.9–12.9)
POTASSIUM SERPL-SCNC: 4 MMOL/L (ref 3.5–5.1)
RBC # BLD AUTO: 2.82 M/UL (ref 4.1–5.7)
RBC # BLD AUTO: 3.3 M/UL (ref 4.1–5.7)
RBC MORPH BLD: ABNORMAL
SODIUM SERPL-SCNC: 138 MMOL/L (ref 136–145)
WBC # BLD AUTO: 5.2 K/UL (ref 4.1–11.1)
WBC # BLD AUTO: 5.9 K/UL (ref 4.1–11.1)

## 2021-02-25 PROCEDURE — 74011250636 HC RX REV CODE- 250/636: Performed by: HOSPITALIST

## 2021-02-25 PROCEDURE — 74011250637 HC RX REV CODE- 250/637: Performed by: FAMILY MEDICINE

## 2021-02-25 PROCEDURE — 85025 COMPLETE CBC W/AUTO DIFF WBC: CPT

## 2021-02-25 PROCEDURE — 80048 BASIC METABOLIC PNL TOTAL CA: CPT

## 2021-02-25 PROCEDURE — 97530 THERAPEUTIC ACTIVITIES: CPT

## 2021-02-25 PROCEDURE — 36415 COLL VENOUS BLD VENIPUNCTURE: CPT

## 2021-02-25 PROCEDURE — 74011250636 HC RX REV CODE- 250/636: Performed by: FAMILY MEDICINE

## 2021-02-25 PROCEDURE — 74011250637 HC RX REV CODE- 250/637: Performed by: HOSPITALIST

## 2021-02-25 PROCEDURE — 65660000000 HC RM CCU STEPDOWN

## 2021-02-25 PROCEDURE — 74011250637 HC RX REV CODE- 250/637: Performed by: INTERNAL MEDICINE

## 2021-02-25 PROCEDURE — 85027 COMPLETE CBC AUTOMATED: CPT

## 2021-02-25 PROCEDURE — 2709999900 HC NON-CHARGEABLE SUPPLY

## 2021-02-25 PROCEDURE — C9113 INJ PANTOPRAZOLE SODIUM, VIA: HCPCS | Performed by: FAMILY MEDICINE

## 2021-02-25 PROCEDURE — 74011000258 HC RX REV CODE- 258: Performed by: HOSPITALIST

## 2021-02-25 PROCEDURE — 74011000250 HC RX REV CODE- 250: Performed by: FAMILY MEDICINE

## 2021-02-25 PROCEDURE — 83516 IMMUNOASSAY NONANTIBODY: CPT

## 2021-02-25 PROCEDURE — 74011250636 HC RX REV CODE- 250/636: Performed by: NURSE PRACTITIONER

## 2021-02-25 RX ORDER — METRONIDAZOLE 7.5 MG/G
GEL TOPICAL DAILY
Status: DISCONTINUED | OUTPATIENT
Start: 2021-02-26 | End: 2021-02-26 | Stop reason: HOSPADM

## 2021-02-25 RX ORDER — PANTOPRAZOLE SODIUM 40 MG/1
40 TABLET, DELAYED RELEASE ORAL
Status: DISCONTINUED | OUTPATIENT
Start: 2021-02-25 | End: 2021-02-26 | Stop reason: HOSPADM

## 2021-02-25 RX ADMIN — METOPROLOL TARTRATE 25 MG: 25 TABLET, FILM COATED ORAL at 21:46

## 2021-02-25 RX ADMIN — CLOPIDOGREL BISULFATE 75 MG: 75 TABLET ORAL at 08:48

## 2021-02-25 RX ADMIN — CEFTRIAXONE SODIUM 1 G: 1 INJECTION, POWDER, FOR SOLUTION INTRAMUSCULAR; INTRAVENOUS at 03:34

## 2021-02-25 RX ADMIN — Medication 10 ML: at 21:54

## 2021-02-25 RX ADMIN — Medication 10 ML: at 16:33

## 2021-02-25 RX ADMIN — COLLAGENASE SANTYL: 250 OINTMENT TOPICAL at 08:52

## 2021-02-25 RX ADMIN — HYDRALAZINE HYDROCHLORIDE 20 MG: 20 INJECTION INTRAMUSCULAR; INTRAVENOUS at 23:43

## 2021-02-25 RX ADMIN — ASPIRIN 81 MG: 81 TABLET, CHEWABLE ORAL at 08:47

## 2021-02-25 RX ADMIN — MICONAZOLE NITRATE 2 % TOPICAL POWDER: at 18:55

## 2021-02-25 RX ADMIN — PANTOPRAZOLE SODIUM 40 MG: 40 TABLET, DELAYED RELEASE ORAL at 16:32

## 2021-02-25 RX ADMIN — Medication 10 ML: at 05:37

## 2021-02-25 RX ADMIN — HEPARIN SODIUM 5000 UNITS: 5000 INJECTION INTRAVENOUS; SUBCUTANEOUS at 21:46

## 2021-02-25 RX ADMIN — METOPROLOL TARTRATE 25 MG: 25 TABLET, FILM COATED ORAL at 08:47

## 2021-02-25 RX ADMIN — PANTOPRAZOLE SODIUM 40 MG: 40 INJECTION, POWDER, FOR SOLUTION INTRAVENOUS at 01:16

## 2021-02-25 RX ADMIN — HEPARIN SODIUM 5000 UNITS: 5000 INJECTION INTRAVENOUS; SUBCUTANEOUS at 05:37

## 2021-02-25 RX ADMIN — HYDRALAZINE HYDROCHLORIDE 20 MG: 20 INJECTION INTRAMUSCULAR; INTRAVENOUS at 03:34

## 2021-02-25 RX ADMIN — HEPARIN SODIUM 5000 UNITS: 5000 INJECTION INTRAVENOUS; SUBCUTANEOUS at 16:32

## 2021-02-25 NOTE — ANESTHESIA POSTPROCEDURE EVALUATION
Post-Anesthesia Evaluation and Assessment    Patient: Lauren Garrison MRN: [id number]  SSN: [id number]    YOB: 1950  Age: 66 y.o. Sex: male      I have evaluated the patient and they are stable and ready for discharge from the PACU. Cardiovascular Function/Vital Signs  Visit Vitals  BP (!) 145/60   Pulse 76   Temp 36.4 °C (97.6 °F)   Resp 18   Ht 5' 8\" (1.727 m)   Wt 64 kg (141 lb 1.5 oz)   SpO2 98%   BMI 21.45 kg/m²       Patient is status post MAC anesthesia for Procedure(s):  COLONOSCOPY   :-.    Nausea/Vomiting: None    Postoperative hydration reviewed and adequate. Pain:  Pain Scale 1: Numeric (0 - 10) (02/24/21 2103)  Pain Intensity 1: 0 (02/24/21 2103)   Managed    Neurological Status:   Neuro  Neurologic State: Alert (02/24/21 0159)  LLE Motor Response: Weak;Purposeful (02/24/21 2103)  RUE Motor Response: Purposeful (02/24/21 2103)  RLE Motor Response: Purposeful;Weak (02/24/21 2103)   At baseline    Mental Status, Level of Consciousness: Alert and  oriented to person, place, and time    Pulmonary Status:   O2 Device: Room air (02/24/21 2103)   Adequate oxygenation and airway patent    Complications related to anesthesia: None    Post-anesthesia assessment completed.  No concerns    Signed By: Colleen Cortez MD     February 25, 2021              Procedure(s):  COLONOSCOPY   :-.    MAC    <BSHSIANPOST>    INITIAL Post-op Vital signs:   Vitals Value Taken Time   /60 02/25/21 0538   Temp 36.4 °C (97.6 °F) 02/25/21 0236   Pulse 76 02/25/21 0236   Resp 18 02/25/21 0236   SpO2 98 % 02/25/21 0236

## 2021-02-25 NOTE — PROGRESS NOTES
Progress Note    Patient: Elisa Stahl MRN: [id number]  SSN: [id number]    YOB: 1950  Age: 66 y.o. Sex: male      Admit Date: 2/18/2021      Subjective:       Patient seen resting quiet and comfortably and no apparent distress. Pain to left heel decreased. No new complaints. He states he still cannot walk properly, and he is not sure why. Discussed need for offloading shoe. Objective:     Visit Vitals  /62 (BP 1 Location: Right upper arm, BP Patient Position: At rest)   Pulse 84   Temp 98.2 °F (36.8 °C)   Resp 18   Ht 5' 8\" (1.727 m)   Wt 64 kg (141 lb 1.5 oz)   SpO2 98%   BMI 21.45 kg/m²        Physical Exam:    DP/PT nonpalpable bilaterally. CFT > 3 sec bilaterally  Limbs cool to warm bilaterally. Prevalon to LLE+    TTP around left heel wound and with calcaneal squeeze    Wound Number: 1  Wound Location: Left heel  Wound Type: Pressure, ischemic  Wound Size: 2x2x0  Wound Base: Eschar, not boggy, no purulence, TTP  Periwound: Intact, viable, erythema noted without overt cellulitis or ascending lymphangitis  Surrounding Skin: Intact, viable   Drainage: None  Odor: Mild     Wound was cleansed with saline.  Wound was dressed with betadine + DSD without ACE given concerns for vasculopathy    Labs/Radiology Review: images and reports reviewed, MRI unremarkable for deeper infection, no signs of OM    Culture - LG SA at this time    Assessment:     Hospital Problems  Date Reviewed: 2/24/2021          Codes Class Noted POA    Coronary artery disease of native artery of native heart with stable angina pectoris ([facility] ICD-10-CM: I25.118  ICD-9-CM: 414.01, 413.9  2/23/2021 Yes        PAD (peripheral artery disease) ([facility] ICD-10-CM: I73.9  ICD-9-CM: 443.9  2/23/2021 Yes        CVD (cardiovascular disease) ICD-10-CM: I25.10  ICD-9-CM: 429.2  2/23/2021 Yes        ARF (acute renal failure) ([facility] ICD-10-CM: N17.9  ICD-9-CM: 584.9  2/23/2021 Yes        GI bleed ICD-10-CM: K92.2  ICD-9-CM: 578.9 2021 Yes        NSTEMI (non-ST elevated myocardial infarction) [facility]) ICD-10-CM: I21.4  ICD-9-CM: 410.70  2020 Yes              Plan/Recommendations/Medical Decision MakinM with heel wound    - pain likely due to early cellulitis; now resolved, no signs of deeper infection  - Cx taken at bedside on  -- now growing LG SA  - MRI reviewed; findings unremarkable    - Patient does not require surgical intervention from a podiatric perspective at this time  - Continue off loading while supine  - Will eventually need PT/OT  - Recommend heel offloading wedge shoe  - Continue local wound care  - Will monitor from distance while in house, wound care orders placed      Plan for follow up with Dr. Parker Collins on discharge [phone].

## 2021-02-25 NOTE — PROGRESS NOTES
Transition of Care Plan   RUR- Medium 21%   DISPOSITION: SNF - referral sent to [facility]   Transport: [facility]      Transition of Care Plan: CM spoke with patient at bedside regarding SNF choices; CM explained difference between IPR and SNF. Patient was open to any SNF; CM [place] with him [place] [facility] [place] [facility] resident [facility]. Referral sent in [address]. Patient informed CM that EC \"Jacque\" is  and do not call other EC, \"Manuela\". The Plan for Transition of Care is related to the following treatment goals: SNF    The Patient  was provided with a choice of provider and agrees  with the discharge plan. Yes [x] No []    A Freedom of choice list was provided with basic dialogue that supports the patient's individualized plan of care/goals and shares the quality data associated with the providers.        Yes [] No []

## 2021-02-25 NOTE — WOUND CARE
WOCN Note: Follow-up visit to reassess wounds to right face, left heel, and right and left ischium Chart shows: 
Patient admitted on 2/18/21 for multiple falls and weakness. Being treated for acute blood loss anemia, left heel wound infection, PAD History of Stroke, skin cancer, hard of hearing Wound Culture obtained on 2/20/21 with results staph aureus WBC = 5.9 Admitted from home, lives alone Patient is being followed by podiatry for left heel wound Patient had vascular consult. DELROY results show right 0.5 and left 0.46 Assessment:  
A&O x 4 Appropriately conversattional 
Reports pain in left heel Needs minimal assist with repositioning but needs reminding to turn to side every 2 hours Incontinent urine, has condom cath in place Last Judson Score   18 Surface: On Prius mattress Diet: cardiac regular, 2 GM NA Right heel and sacral skin intact and without erythema. Right heel offloaded with pillow, heel suspension boot to left foot Red rash to groin improving 1. POA, left heel, arterial wound 2 x 2.2 x 0.1 cm Base is 85% dry eschar, 15% pink  
no exudate No odor Attached edges Periwound intact & without erythema No purulence, edema, or erythema - No gross S&S of infection 2. POA,  Right cheek fungating caner wound 5.5 x 5 x 0.3 cm Sanguinous exudate, small amount No odor Periwound intact & without erythema 3. POA, left ischial tuberosity, stage 3 pressure injury 3.5 x 1.8 x 0.1 cm Base is 90% pink/red, 10% yellow Small serosang exudate No odor Attached edges Periwound intact & without erythema Improved since last assessment on 2/18/21 No purulence, edema, or erythema - No gross S&S of infection 3. POA, left central buttock pressure injury healed 4. POA, right central buttock healing stage 3 pressure injury 1 x 1.1 cm Base is 85% dry eschar, 15% pink  
no exudate No odor Attached edges Periwound intact & without erythema Improved since last assessment on 2/18/21 No purulence, edema, or erythema - No gross S&S of infection Wound Recommendations:   
Left heel: Continue with podiatry recomendations Right cheek: Continue metrogel daily for odor management and cover with mepitel one Right buttocks and left ischial tuberosity: Cleanse with normal saline, apply hydrocolloid dressing and change every 7 days and prn if loosens or becomes soiled. Next change due 3/3/21 Continue antifungal powder to groin BID 
 
PI Prevention: 
Turn/reposition approximately every 2 hours Offload heels with pillows at all times in bed. Pad bony prominences Keep HOB 30 degrees or less to decrease shearing and pressure unless medically contraindicated. If HOB is to be over 30 degrees, raise knees first then [facility] to prevent sliding Please call [employer] @  for bed to be picked up at discharge. Discussed with RN, Soo Vera. Teaching completed with:  
[x] Patient          
[] Family member      
[] Caregiver         
[] Nursing 
[] Other Topics Discussed: pressure relief, turning every 2 hours, heel elevation, wound care Patient/Caregiver Teaching: 
Level of patient/caregiver understanding able to:  
[] Indicates understanding       [x] Needs reinforcement 
[] Unsuccessful      [] Verbal Understanding 
[] Demonstrated understanding       [] No evidence of learning 
[] Refused teaching         [] N/A Transition of Care: Plan to follow weekly  as needed while admitted to hospital.   
 
Lorrie Cogan BSN, RN, [address] [place] [address],[address], [facility] Certified Wound and Ostomy Nurse 
office [phone] 
pager [phone] or call  to page

## 2021-02-25 NOTE — PROGRESS NOTES
Problem: Mobility Impaired (Adult and Pediatric)  Goal: *Acute Goals and Plan of Care (Insert Text)  Description: FUNCTIONAL STATUS PRIOR TO ADMISSION: Patient was modified independent using a rolling walker for functional mobility. HOME SUPPORT PRIOR TO ADMISSION: The patient lived alone with [facility] staff to provide meal assistance. Physical Therapy Goals  Initiated 2/19/2021  1. Patient will move from supine to sit and sit to supine  and roll side to side in bed with modified independence within 7 day(s). 2.  Patient will transfer from bed to chair and chair to bed with modified independence using the least restrictive device within 7 day(s). 3.  Patient will perform sit to stand with modified independence within 7 day(s). 4.  Patient will ambulate with modified independence for 150 feet with the least restrictive device within 7 day(s). Outcome: Progressing Towards Goal   PHYSICAL THERAPY TREATMENT  Patient: Chano Vieira (44 y.o. male)  Date: 2/25/2021  Diagnosis: GI bleed [K92.2] <principal problem not specified>  Procedure(s) (LRB):  COLONOSCOPY   :- (N/A) 1 Day Post-Op  Precautions: Fall  Chart, physical therapy assessment, plan of care and goals were reviewed. ASSESSMENT  Patient continues with skilled PT services. Attempted sit<>stand w/ Mod Ax1 however pt unable to clear buttocks off bed while maintaining NWB since offload shoe not yet arrived/found for pt use. Attempted sit<>stand x2. Competed seated push up while sitting EOB, 2x10 for increase in BU and RLE strength. Also encouraged pt to SLR, heel slides and continued ankle pumps, all of which pt reprots he has been doing while in bed. PT to continue to follow for progression of mobility once offload shoe secured. Current Level of Function Impacting Discharge (mobility/balance): Mod Ax1 w/ RW for sit <>stand, unable to clear bed this session    Other factors to consider for discharge: mobility below baseline.           PLAN :  Patient continues to benefit from skilled intervention to address the above impairments. Continue treatment per established plan of care. to address goals. Recommendation for discharge: (in order for the patient to meet his/her long term goals)  Therapy up to 5 days/week in SNF setting    This discharge recommendation:  Has been made in collaboration with the attending provider and/or case management    IF patient discharges home will need the following DME: patient owns DME required for discharge       SUBJECTIVE:   Patient stated This bed is stupid.     OBJECTIVE DATA SUMMARY:   Critical Behavior:  Neurologic State: Alert           Functional Mobility Training:  Bed Mobility:     Supine to Sit: Supervision  Sit to Supine: Minimum assistance;Assist x1(LE's into bed)  Scooting: Minimum assistance;Assist x1(while seated, scoot hips back in bed)        Transfers:  Sit to Stand: Moderate assistance;Assist x1(unable to clear buttocks from bed)  Stand to Sit: Moderate assistance;Assist x1                             Balance:  Sitting: Impaired  Sitting - Static: Good (unsupported)  Sitting - Dynamic: Fair (occasional)  Standing: Impaired; With support  Standing - Static: Good  Standing - Dynamic : Fair      Therapeutic Exercises:   2x10 seated triceps ext (seated push up)  Pain Rating:  No c/o pain this session    Activity Tolerance:   Fair    After treatment patient left in no apparent distress:   Supine in bed, Call bell within reach, Bed / chair alarm activated, and Side rails x 3    COMMUNICATION/COLLABORATION:   The patients plan of care was discussed with: Registered nurse.      Kacie Michaels PTA   Time Calculation: 33 mins

## 2021-02-25 NOTE — PROGRESS NOTES
Comprehensive Nutrition Assessment    Type and Reason for Visit: Initial, LOS    Nutrition Recommendations/Plan:    1. Change diet to regular, NAI  2. Add Ensure Enlive TID at meals  3. Add MVI with minerals  4. Pt meets criteria for moderate, protein-calorie malnutrition (POA)    Malnutrition Assessment:  Malnutrition Status: Moderate malnutrition    Context:  Social/environmental circumstances     Findings of the 6 clinical characteristics of malnutrition:   Energy Intake:  Mild decrease in energy intake (less than he was eating when living with sister, now in [place])  Weight Loss:  7.00 - Greater than 10% over 6 months     Body Fat Loss:  1 - Mild body fat loss, Orbital, Triceps   Muscle Mass Loss:  1 - Mild muscle mass loss, Calf (gastrocnemius), Clavicles (pectoralis &deltoids), Temples (temporalis)  Fluid Accumulation:  1 - Mild, Genitals, Extremities   Strength:  Not performed     Nutrition Assessment:     66 y.o. male who has a PMHx of skin cancer (BCC - L forehead, neck, lateral cheek), CVA, CAD/ NSTEMI in Oct 2020, and PAD. Admitted with weakness and GI bleed. Hgb 5 on admission, s/p 2 units PRBC. EGD on 2/19 unremarkable, colonoscopy on 2/24 with moderate diverticulitis, celiac antibody panel ordered to further work-up iron deficiency, but otherwise unremarkable. Known hx of iron deficiency, takes PO iron at home. No iron panel result available at this time. Noted multiple wounds POA, seen by WOCN, but no BPA triggered for nutrition referral.  Podiatry following for L food wound infection, no evidence of osteo/ no surgical intervention needed. Pt on limited diet most this admission d/t w/u for GI bleed. Noted he is a poor historian and very [place]. Per CM, pt is from [facility] living facility. Uses walker, cane, and wheelchair as needed at baseline. Plan for SNF vs rehab at d/c. UBW ~165 lb. Admitted at 145 lb, down to 141 lb ?if d/t bowel prep vs limited PO. Noted 1-2+ edema. Regardless, on admission, pt is down 20 lb (12% BW) from 1 year ago - although difficult to known accuracy of previous wt as it appears to exact same across first 3 wt encounters below. Could very well have been reported at that time. Pt was seen in 2020 by this service with a bed weight of 161 lb at that time. Compared to current wt, this is a 12.4% BW loss in less than 6 months, which is significant. Previous RD noted mild orbital and buccal SQ fat loss and mild clavicular, calf, and temple muscle wasting. I met with pt in room, sitting up in bed. Ate nearly 100% of lunch except for green beans because they were cold. Good dentition (?dentures), denies any chewing/ swallowing difficulty. [place], but seemed to be a good historian. Reports BW of 165 lb, but when I explained he was weighing between 140-145 lb he agreed that made sense because none of his clothes fit anymore. He continues to endorse a good appetite, HOWEVER, he states he was eating much differently when his sister was alive and cooked for him when they were living together. She  recently and he reports living at [facility] for about 4-5 months now. He indicates all meals are prepared for him and he usually eats them all. Denies ever trying ONS. Would like chocolate. I offered to send with 1 meal right now and he requests them with all meals. Tells me he is hungry and wouldn't mind drinking 3x/day. I encouraged at least 1-2x/day with wound. Emphasized adequate nutrition and wound healing. Limited NFPE, but temples and calves reveal more severe wasting. Calves could be attributed to decreased usage with noted weakness/ difficulty walking (but could be vice versa). Regardless, pt does meet criteria for at least moderate protein-calorie malnutrition with wt loss and even mild muscle wasting. Given he has reportedly still been eating well, hesitant to dx more severe malnutrition at this time.     If celiac panel did come back +, could be considered more severe even with good PO intake as malabsorption could be playing a role. Will monitor for results as long as pt remains in hospital.    For now, plan to change diet to regular. Add ONS Ensure Enlive (chocolate) TID with meals. Will f/u with acceptance to ensure they are not being delivered unnecessarily. Nutritionally Significant Medications:   Rocephin, Plavix, Protonix      Estimated Daily Nutrient Needs:  Energy (kcal): 3696-9237(30-35 kcal/kg)  Protein (g): (1.5-2 gm/kg)  Fluid (ml/day): 1 mL/kcal    Nutrition Related Findings:   Edema: 2+ genital; 1+ bilat LE  Last BM: 2/24 - loose, bowel prep  Abdomen: WDL    Wounds:    Multiple, Pressure injury, Open wounds      per WOCN :  1. POA, left heel arterial wound  2. POA, right cheek erupting cancer   3. POA, left ischial tuberosity unstageable pressure injury   4. POA, left central buttock stage 3 pressure injury   5. POA, right central buttock stage 3 pressure injury    Current Nutrition Therapies:  Diet: Cardiac, 2 gm Na+  Supplements: none at this time    Meal intake: No data found.       Anthropometric Measures:  · Height:  5' 8\" (172.7 cm)  · Current Body Wt:  64 kg (141 lb 1.5 oz)   · Admission Body Wt:  145 lb 4.5 oz(65.9 kg - bed)    · Usual Body Wt:  74.8 kg (165 lb)     · Ideal Body Wt:  154 lbs:  91.6 %   · BMI Category:  Underweight (BMI less than 22) age over 72       Wt Readings from Last 20 Encounters:   02/22/21 64 kg (141 lb 1.5 oz)   02/19/21 66.7 kg (147 lb 0.8 oz)   02/18/21 65.9 kg (145 lb 4.8 oz) - bed, admission   10/29/20 72.6 kg (160 lb)   09/17/20 74.2 kg (163 lb 9.3 oz)   09/14/20 73.3 kg (161 lb 9.6 oz)   02/27/20 74.8 kg (165 lb)   01/29/20 74.8 kg (165 lb)   12/19/19 74.8 kg (165 lb)       Nutrition Diagnosis:   · Increased nutrient needs related to increased demand for energy/nutrients, catabolic illness as evidenced by wounds, BMI, weight loss    · Inadequate oral intake related to altered GI function, acute injury/trauma as evidenced by (limited intake this admission, wt loss PTA)      Nutrition Interventions:   Food and/or Nutrient Delivery: Modify current diet, Start oral nutrition supplement, Mineral supplement, Vitamin supplement  Nutrition Education and Counseling: No recommendations at this time  Coordination of Nutrition Care: Continue to monitor while inpatient, Interdisciplinary rounds    Goals:  PO >50% of meals and consumption of at least 1-2 ONS daily within 7 days; stabilize wt loss, promote wt gain       Nutrition Monitoring and Evaluation:   Behavioral-Environmental Outcomes: Knowledge or skill  Food/Nutrient Intake Outcomes: Food and nutrient intake, Supplement intake, Vitamin/mineral intake  Physical Signs/Symptoms Outcomes: Biochemical data, GI status, Fluid status or edema, Meal time behavior, Skin, Nutrition focused physical findings, Weight    Discharge Planning: Too soon to determine     Recent Labs     02/25/21  0123 02/24/21  1305 02/24/21  0223    96 95   BUN 14 14 16   CREA 1.24 1.28 1.27    140 137   K 4.0 4.3 5.1   * 110* 110*   CO2 21 24 13*   CA 8.2* 8.3* 8.8       No results for input(s): ALT, AP, TBIL, TBILI, TP, ALB, GLOB, GGT, AML, LPSE in the last 72 hours.     No lab exists for component: SGOT, GPT, AMYP, HLPSE    Recent Labs     02/25/21 0123 02/24/21  0223   WBC 5.9  --    HGB 7.4* 9.3*   HCT 23.4*  --      --        Lab Results   Component Value Date/Time    Hemoglobin A1c 5.5 02/20/2021 01:01 AM       Iron Profile  No results found for: IRON, TIBC, PSAT        Phyllis Carcamo RD  Available via Virtual Paper  Or Pager# [phone]

## 2021-02-26 VITALS
OXYGEN SATURATION: 93 % | WEIGHT: 141.09 LBS | BODY MASS INDEX: 21.38 KG/M2 | SYSTOLIC BLOOD PRESSURE: 136 MMHG | HEIGHT: 68 IN | DIASTOLIC BLOOD PRESSURE: 63 MMHG | RESPIRATION RATE: 18 BRPM | HEART RATE: 70 BPM | TEMPERATURE: 98.5 F

## 2021-02-26 PROCEDURE — 74011000258 HC RX REV CODE- 258: Performed by: HOSPITALIST

## 2021-02-26 PROCEDURE — 74011250636 HC RX REV CODE- 250/636: Performed by: HOSPITALIST

## 2021-02-26 PROCEDURE — 74011250637 HC RX REV CODE- 250/637: Performed by: INTERNAL MEDICINE

## 2021-02-26 PROCEDURE — 74011250637 HC RX REV CODE- 250/637: Performed by: HOSPITALIST

## 2021-02-26 PROCEDURE — 74011250637 HC RX REV CODE- 250/637: Performed by: FAMILY MEDICINE

## 2021-02-26 PROCEDURE — 97530 THERAPEUTIC ACTIVITIES: CPT

## 2021-02-26 RX ORDER — MICONAZOLE NITRATE 2 %
POWDER (GRAM) TOPICAL 2 TIMES DAILY
Qty: 1 BOTTLE | Refills: 0 | Status: SHIPPED
Start: 2021-02-26

## 2021-02-26 RX ORDER — AMOXICILLIN AND CLAVULANATE POTASSIUM 875; 125 MG/1; MG/1
1 TABLET, FILM COATED ORAL 2 TIMES DAILY
Qty: 20 TAB | Refills: 0 | Status: SHIPPED
Start: 2021-02-26 | End: 2021-03-08

## 2021-02-26 RX ORDER — METRONIDAZOLE 7.5 MG/G
GEL TOPICAL DAILY
Qty: 60 G | Refills: 0 | Status: SHIPPED
Start: 2021-02-26

## 2021-02-26 RX ORDER — METOPROLOL TARTRATE 25 MG/1
25 TABLET, FILM COATED ORAL 2 TIMES DAILY
Qty: 60 TAB | Refills: 0 | Status: SHIPPED | OUTPATIENT
Start: 2021-02-26

## 2021-02-26 RX ADMIN — CLOPIDOGREL BISULFATE 75 MG: 75 TABLET ORAL at 08:56

## 2021-02-26 RX ADMIN — MICONAZOLE NITRATE 2 % TOPICAL POWDER: at 14:25

## 2021-02-26 RX ADMIN — Medication 10 ML: at 07:08

## 2021-02-26 RX ADMIN — METRONIDAZOLE: 7.5 GEL TOPICAL at 14:26

## 2021-02-26 RX ADMIN — CEFTRIAXONE SODIUM 1 G: 1 INJECTION, POWDER, FOR SOLUTION INTRAMUSCULAR; INTRAVENOUS at 03:31

## 2021-02-26 RX ADMIN — MULTIPLE VITAMINS W/ MINERALS TAB 1 TABLET: TAB at 08:56

## 2021-02-26 RX ADMIN — HEPARIN SODIUM 5000 UNITS: 5000 INJECTION INTRAVENOUS; SUBCUTANEOUS at 07:09

## 2021-02-26 RX ADMIN — PANTOPRAZOLE SODIUM 40 MG: 40 TABLET, DELAYED RELEASE ORAL at 07:09

## 2021-02-26 RX ADMIN — METOPROLOL TARTRATE 25 MG: 25 TABLET, FILM COATED ORAL at 08:57

## 2021-02-26 RX ADMIN — ASPIRIN 81 MG: 81 TABLET, CHEWABLE ORAL at 08:57

## 2021-02-26 NOTE — PROGRESS NOTES
Problem: Mobility Impaired (Adult and Pediatric)  Goal: *Acute Goals and Plan of Care (Insert Text)  Description: FUNCTIONAL STATUS PRIOR TO ADMISSION: Patient was modified independent using a rolling walker for functional mobility. HOME SUPPORT PRIOR TO ADMISSION: The patient lived alone with [facility] staff to provide meal assistance. Physical Therapy Goals  Initiated 2/19/2021  1. Patient will move from supine to sit and sit to supine  and roll side to side in bed with modified independence within 7 day(s). 2.  Patient will transfer from bed to chair and chair to bed with modified independence using the least restrictive device within 7 day(s). 3.  Patient will perform sit to stand with modified independence within 7 day(s). 4.  Patient will ambulate with modified independence for 150 feet with the least restrictive device within 7 day(s). Outcome: Progressing Towards Goal   PHYSICAL THERAPY TREATMENT  Patient: Jak Franklin (24 y.o. male)  Date: 2/26/2021  Diagnosis: GI bleed [K92.2] <principal problem not specified>  Procedure(s) (LRB):  COLONOSCOPY   :- (N/A) 2 Days Post-Op  Precautions: Fall  Chart, physical therapy assessment, plan of care and goals were reviewed. ASSESSMENT  Patient continues with skilled PT services and is progressing towards goals. Received his off loading shoe so worked on standing. Continues to require significant assist to get to standing and a little time to get balanced. Was able to take a few steps forward and back but with the unlevel stance with orthotic shoe and no shoe on opposite side its more challenging. Anticipate rehab today and will progress more quickly now with orthotic shoe. .     Current Level of Function Impacting Discharge (mobility/balance): mod assist; minimal gait; decreased balance    Other factors to consider for discharge: transferring to SNF today         PLAN :  Patient continues to benefit from skilled intervention to address the above impairments. Continue treatment per established plan of care. to address goals. Recommendation for discharge: (in order for the patient to meet his/her long term goals)  Therapy up to 5 days/week in SNF setting    This discharge recommendation:  Has been made in collaboration with the attending provider and/or case management    IF patient discharges home will need the following DME: to be determined (TBD)       SUBJECTIVE:   Patient stated this is a stupid bed.     OBJECTIVE DATA SUMMARY:   Critical Behavior:  Neurologic State: Alert           Functional Mobility Training:  Bed Mobility:     Supine to Sit: Stand-by assistance  Sit to Supine: Moderate assistance  Scooting: Moderate assistance        Transfers:  Sit to Stand: Moderate assistance  Stand to Sit: Moderate assistance                             Balance:  Sitting: Impaired  Sitting - Static: Good (unsupported)  Sitting - Dynamic: Fair (occasional)  Standing: Impaired; With support  Standing - Static: Constant support; Fair  Standing - Dynamic : Fair;Constant support  Ambulation/Gait Training:  Distance (ft): 1 Feet (ft)  Assistive Device: Gait belt;Orthotic device; Walker, rolling  Ambulation - Level of Assistance: Moderate assistance        Gait Abnormalities: Antalgic;Early heel rise; Step to gait        Base of Support: Narrowed     Speed/Belen: Pace decreased (<100 feet/min)  Step Length: Right shortened;Left shortened           Activity Tolerance:   Fair    After treatment patient left in no apparent distress:   Supine in bed, Heels elevated for pressure relief, Call bell within reach, and Side rails x 3    COMMUNICATION/COLLABORATION:   The patients plan of care was discussed with: Registered nurse.      Gauri Norman, PT   Time Calculation: 26 mins

## 2021-02-26 NOTE — PROGRESS NOTES
Transition of Care Plan to SNF/Rehab    SNF/Rehab Transition:  Patient has been accepted to [facility] and meets criteria for admission. Patient will transported by [facility] and expected to leave at 3pm.    Communication to Patient/Family:  Met with patient and they are agreeable to the transition plan. Communication to SNF/Rehab:  Bedside RN, APOORVA, has been notified to update the transition plan to the facility and call report (phone number [phone]). Discharge information has been updated on the AVS.     Discharge instructions to be fax'd to facility at [facility] # ). Nursing Please include all hard scripts for controlled substances, med rec and dc summary, and AVS in packet. Reviewed and confirmed with facility, [facility], can manage the patient care needs for the following:     SNF/Rehab Transition:    PCP/Specialist:     Reviewed and confirmed with facility, [facility] they can manage the patient care needs for the following:     [place] Records with (X) only those applicable:    Medication:  [x]  Medications will be available at the facility  []  IV Antibiotics   []  Controlled Substance - hard copy to be sent with patient   [x]  Weekly Labs   Documents:  [x] Hard RX  [x] MAR  [x] Kardex  [x] AVS  [x]Transfer Summary  [x]Discharge   Equipment:  []  CPAP/BiPAP  []  Wound Vacuum  []  Castanead or Urinary Device  [x]  PICC/Central Line  []  Nebulizer  []  Ventilator   Treatment:  []Isolation (for MRSA, VRE, etc.)  []Surgical Drain Management  []Tracheostomy Care  [x]Dressing Changes  []Dialysis with transportation and chair time   []PEG Care  []Oxygen  []Daily Weights for Heart Failure   Dietary:  [x]Any diet limitations  []Tube Feedings   []Total Parenteral Management (TPN)   Eligible for Medicaid Long Term Services and Supports  Yes:  [] Eligible for medical assistance or will become eligible within 180 days and UAI completed.    [] Provider/Patient and/or support system has requested screening. [] UAI copy provided to patient or responsible party,  [] UAI unavailable at discharge will send once processed to SNF provider. [] UAI unavailable at discharged mailed to patient  No:   [x] Private pay and is not financially eligible for Medicaid within the next 180 days. [] Reside out-of-state. [] A residents of a state owned/operated facility that is licensed  by [address] [employer] [facility] or [facility]  [] Enrollment in [facility] services  [] [address] [place] [address]  [] Patient /Family declines to have screening completed or provide financial information for screening     Financial Resources:  Medicaid    [] Initiated and application pending   [] Full coverage     Advanced Care Plan:  []Surrogate Decision Maker of Care  []POA  [x]Communicated Code Status FULL   Other  All ADT Orders (ADT stands for Admission Discharge Transfer)  Collapse  Hide  (From admission, onward)  Start   Ordered   02/23/21 2100  TRANSFER PATIENT ONE TIME     Authorizing Provider: Kaylyn Rodriguez MD    User who entered the order: Kaylyn Rodriguez MD       Question: Type of Bed Answer: Remote Telemetry    02/23/21 2051   02/20/21 1115  TRANSFER PATIENT ONE TIME     Authorizing Provider: Kaylyn Rodriguez MD    User who entered the order: Kaylyn Rodriguez MD       Question: Type of Bed Answer: Remote Telemetry    02/20/21 1103   02/20/21 1115  TRANSFER PATIENT ONE TIME     Authorizing Provider: Kaylyn Rodriguez MD    User who entered the order: Makenna Drew RN       Question Answer Comment   Type of Bed Telemetry    Comments lateral covid NEGATIVE        02/20/21 1104   02/18/21 1215  INITIAL PHYSICIAN ORDER: INPATIENT Telemetry; 9.  Other (further clarification in H&P documentation) (ADMISSION ORDERS) ONE TIME     Authorizing Provider: John De La Paz MD    User who entered the order: John De La Paz MD       References: CLICK HERE-** FAQ-NEW CMS RULES FOR INPATIENT CERTIFICATION **   Question Answer Comment   Status: INPATIENT    Type of Bed Telemetry    Inpatient Hospitalization Certified Necessary for the Following Reasons 9. Other (further clarification in H&P documentation)    Admitting Diagnosis GI bleed    Admitting Physician Zaira Gupta    Attending Physician Zaira Gupta    Estimated Length of Stay 3-4 Midnights    Discharge Plan: Home with Office Follow-up        02/18/21 1208             Medicare pt has received, reviewed, and signed 2nd IM letter informing them of their right to appeal the discharge. Signed copy has been placed on pt bedside chart.       ALISE Gamez, MSW Supervisee

## 2021-02-26 NOTE — PROGRESS NOTES
Case Management student note reviewed by this CM.     Henry Watt, [address], [address] [facility]

## 2021-02-26 NOTE — ROUTINE PROCESS
Received new patient PCP request from CM/MSW supervisee. Unable to schedule new patient appt at this time as I did not received practice/provider preferences or date when patient will be leaving from SNF. Awaiting return email. **addendum: I have been informed patient has no practice or provider preferences except he wishes to see a provider close to his home. New patient appt has been scheduled Mid-March as the patient is d/c to SNF and it is unknown when he will return home. Appt is on March 23 @ 1030AM with Dr. David Pacheco at [facility].

## 2021-02-26 NOTE — PROGRESS NOTES
[facility]                                                                                          Hospitalist Progress Note  Osmani Tran MD  Answering service: [phone] OR [phone] from in house phone        Date of Service:  2021  NAME:  Satnam Hawk  :  1950  MRN:  [id number]      Admission Summary:   71 y.o M with PMH of HTN,stroke,s/p carotid artery surgery came to the hospital due to lower extremity weakness.    Interval history / Subjective:   Patient seen and examined    Feeling well. Pain controlled. No further bleeding.      Assessment & Plan:     # Acute blood loss anemia:  -hb at admission was 5, s/p 2 U PRBCs  -EGD did not show any obvious evidence of bleeding  -Colonoscopy not significant  -OK to resume anticoagulant- plavix resumed    #Left foot wound infection:  -Wound culture grew MSSA. D/c vanc,cefepime and flagyl.   -switched to ceftriaxone, likely plan for 14 days abx  -podiatrist following- no surgical intervention  -Follow up with Dr. Dominguez outpatient. Call office at [phone].  No specific time line for follow up  -MRI -No evidence of osteomyelitis. No drainable fluid collection    #Peripheral arterial disease:  -arterial duplex results noted -vascular surgery consulted-recommended work up as OP.    #Lower extremity weakness:  -Likely multifactorial- anemia, PAD, h/o stroke, physical deconditioning  -MRI L spine -Multilevel mild spondylosis ,  MRI brain -Chronic white matter disease and areas of chronic infarction particularly in the left MCA territory as above. Few punctate foci of mild diffusion signal abnormality in the periphery of the infarct may represent acute/subacute  infarctions versus artifact   -Neurology consulted-appreciate recommendations  -Need SNF at discharge    # Coronary atherosclerotic disease:  -continue statin,will resume aspirin and plavix after colonoscopy      #History of stroke  # Left carotid artery  stenosis s/p Left carotid endarterectomy with Dacron patch angioplasty  - on statin  -will resume plavix after colonoscopy. -aspirin non responder  · -carotid duplex -Patient is s/p left carotid endarterectomy, from October 2020. Evidence of mild, lesst briceno 50%, stenosis in the right ICA    Skin cancer- rt cheek:  -follows [facility] dermatologist and was initiated local treatment per patient      Code status: full  DVT prophylaxis: heparin    Care Plan discussed with: patient,nurse  Anticipated Disposition:SNF vs rehab  Anticipated Discharge:d     Hospital Problems  Date Reviewed: 2/24/2021          Codes Class Noted POA    Moderate protein-calorie malnutrition (RUST 75.) ICD-10-CM: E44.0  ICD-9-CM: 263.0  2/25/2021 Yes        Coronary artery disease of native artery of native heart with stable angina pectoris (RUST 75.) ICD-10-CM: I25.118  ICD-9-CM: 414.01, 413.9  2/23/2021 Yes        PAD (peripheral artery disease) (RUST 75.) ICD-10-CM: I73.9  ICD-9-CM: 443.9  2/23/2021 Yes        CVD (cardiovascular disease) ICD-10-CM: I25.10  ICD-9-CM: 429.2  2/23/2021 Yes        ARF (acute renal failure) (RUST 75.) ICD-10-CM: N17.9  ICD-9-CM: 584.9  2/23/2021 Yes        GI bleed ICD-10-CM: K92.2  ICD-9-CM: 578.9  2/18/2021 Yes        NSTEMI (non-ST elevated myocardial infarction) [facility]) ICD-10-CM: I21.4  ICD-9-CM: 410.70  9/7/2020 Yes                Review of Systems:   As per HPI      Vital Signs:    Last 24hrs VS reviewed since prior progress note.  Most recent are:  Visit Vitals  BP (!) 176/70 (BP 1 Location: Right upper arm, BP Patient Position: At rest)   Pulse 66   Temp 98.2 °F (36.8 °C)   Resp 18   Ht 5' 8\" (1.727 m)   Wt 64 kg (141 lb 1.5 oz)   SpO2 99%   BMI 21.45 kg/m²         Intake/Output Summary (Last 24 hours) at 2/25/2021 2110  Last data filed at 2/25/2021 1854  Gross per 24 hour   Intake    Output 1450 ml   Net -1450 ml        Physical Examination:     I had a face to face encounter with this patient and independently examined them on 2/25/2021 as outlined below:          Constitutional:  No acute distress, cooperative, pleasant    ENT:  Oral mucosa moist, oropharynx benign,EOMI    Resp:  CTAB, no rales, rhochi or wheeze   CV:  Regular rhythm, normal rate, S1,S 2 wnl    GI:  Soft, non distended, non tender, normoactive bowel sounds    Musculoskeletal:  No LE edema, warm    Neurologic:  he is alert and oriented X 3, moves all extremities, sensation intact     Psych: not anxious nor agitated. Skin: right cheek ulcerated lesion -skin cancer. Dressing in [address] and dry       Data Review:    Review and/or order of clinical lab test  Review and/or order of tests in the medicine section of CPT    Xr Foot Lt Min 3 V    Result Date: 2/18/2021  1. No fracture or osteomyelitis. 2. No soft tissue gas or radiodense foreign body. 3. Osteopenia. Mri Brain Wo Cont    Result Date: 2/22/2021  1. Chronic white matter disease and areas of chronic infarction particularly in the left MCA territory as above. Few punctate foci of mild diffusion signal abnormality in the periphery of the infarct may represent acute/subacute infarctions versus artifact. 2. Large soft tissue abnormality in the right cheek extending toward the right mandible, as seen on CT. Likely corresponds to known skin cancer though incompletely evaluated by this examination, correlate clinically. 3. Areas of chronic hemorrhage. No acute hemorrhage. Generalized atrophy. Mri Lumb Spine Wo Cont    Result Date: 2/19/2021  Multilevel mild spondylosis as above. Mri Foot Lt Wo Cont    Result Date: 2/19/2021  No evidence of osteomyelitis. No drainable fluid collection. Ct Head Wo Cont    Result Date: 2/18/2021  1. Chronic bilateral basal ganglia lacunar infarcts and cephalization of left frontal lobe. No acute intracranial abnormality 2. Question soft tissue thickening in the right cheek.  Please correlate with physical exam         Labs:     Recent Labs     02/25/21  1403 02/25/21  0123 WBC 5.2 5.9   HGB 8.4* 7.4*   HCT 27.8* 23.4*    391     Recent Labs     02/25/21  0123 02/24/21  1305 02/24/21  0223    140 137   K 4.0 4.3 5.1   * 110* 110*   CO2 21 24 13*   BUN 14 14 16   CREA 1.24 1.28 1.27    96 95   CA 8.2* 8.3* 8.8     No results for input(s): ALT, AP, TBIL, TBILI, TP, ALB, GLOB, GGT, AML, LPSE in the last 72 hours. No lab exists for component: SGOT, GPT, AMYP, HLPSE  No results for input(s): INR, PTP, APTT, INREXT, INREXT in the last 72 hours. No results for input(s): FE, TIBC, PSAT, FERR in the last 72 hours. No results found for: FOL, RBCF   No results for input(s): PH, PCO2, PO2 in the last 72 hours. No results for input(s): CPK, CKNDX, TROIQ in the last 72 hours.     No lab exists for component: CPKMB  Lab Results   Component Value Date/Time    Cholesterol, total 96 02/22/2021 02:51 AM    HDL Cholesterol 37 02/22/2021 02:51 AM    LDL, calculated 39 02/22/2021 02:51 AM    Triglyceride 100 02/22/2021 02:51 AM    CHOL/HDL Ratio 2.6 02/22/2021 02:51 AM     Lab Results   Component Value Date/Time    Glucose (POC) 197 (H) 09/11/2020 08:04 PM    Glucose (POC) 146 (H) 09/11/2020 11:30 AM    Glucose (POC) 123 (H) 09/11/2020 06:49 AM    Glucose (POC) 111 (H) 09/10/2020 11:07 PM    Glucose (POC) 111 (H) 09/10/2020 05:14 PM     No results found for: COLOR, APPRN, SPGRU, REFSG, YESY, PROTU, GLUCU, KETU, BILU, UROU, PRADEEP, LEUKU, GLUKE, EPSU, BACTU, WBCU, RBCU, CASTS, UCRY      Medications Reviewed:     Current Facility-Administered Medications   Medication Dose Route Frequency    pantoprazole (PROTONIX) tablet 40 mg  40 mg Oral ACB&D    [START ON 2/26/2021] multivitamin, tx-iron-ca-min (THERA-M w/ IRON) tablet 1 Tab  1 Tab Oral DAILY    [START ON 2/26/2021] metroNIDAZOLE (METROGEL) 0.75 % gel   Topical DAILY    sodium chloride (NS) flush 5-40 mL  5-40 mL IntraVENous Q8H    sodium chloride (NS) flush 5-40 mL  5-40 mL IntraVENous PRN    clopidogreL (PLAVIX) tablet 75 mg  75 mg Oral DAILY    hydrALAZINE (APRESOLINE) 20 mg/mL injection 20 mg  20 mg IntraVENous Q6H PRN    cefTRIAXone (ROCEPHIN) 1 g in 0.9% sodium chloride (MBP/ADV) 50 mL MBP  1 g IntraVENous Q24H    aspirin chewable tablet 81 mg  81 mg Oral DAILY    heparin (porcine) injection 5,000 Units  5,000 Units SubCUTAneous Q8H    sodium chloride (NS) flush 5-40 mL  5-40 mL IntraVENous Q8H    sodium chloride (NS) flush 5-40 mL  5-40 mL IntraVENous PRN    sodium chloride (NS) flush 5-40 mL  5-40 mL IntraVENous Q8H    sodium chloride (NS) flush 5-40 mL  5-40 mL IntraVENous PRN    metoprolol tartrate (LOPRESSOR) tablet 25 mg  25 mg Oral Q12H    sodium chloride (NS) flush 5-40 mL  5-40 mL IntraVENous Q8H    sodium chloride (NS) flush 5-40 mL  5-40 mL IntraVENous PRN    ondansetron (ZOFRAN) injection 4 mg  4 mg IntraVENous Q4H PRN    miconazole (MICOTIN) 2 % powder   Topical BID     ______________________________________________________________________  EXPECTED LENGTH OF STAY: 3d 0h  ACTUAL LENGTH OF STAY:          7                 Priya Padgett MD

## 2021-02-26 NOTE — DISCHARGE INSTRUCTIONS
Discharge Instructions       PATIENT ID: Herman Escalera  MRN: [id number]   YOB: 1950    DATE OF ADMISSION: 2/18/2021  9:13 AM    DATE OF DISCHARGE: 2/26/2021    PRIMARY CARE PROVIDER: Negin Bower MD     ATTENDING PHYSICIAN: Radha Abbott MD  DISCHARGING PROVIDER: Mayelin Panda MD    To contact this individual call [phone] and ask the  to page. If unavailable ask to be transferred the Adult Hospitalist Department. DISCHARGE DIAGNOSES   # Acute blood loss anemia, Hb 5 on admission, s/p 2 U PRBCs. Upper and lower endoscopies unremarkable this admission per GI  # Left foot wound infection. Wound culture + for MSSA. 14 days Abx total, to complete with PO Augmentin. Outpatient Podiatry follow up with Dr. Akila Hammond  # Peripheral arterial disease. Outpatient Vascular surgery follow up  # Lower extremity weakness. SNF at discharge for further rehab  # Coronary atherosclerotic disease. Stable. On Plavix  # History of stroke. Aspirin nonresponder. On plavix. # Left carotid artery stenosis s/p Left carotid endarterectomy with Dacron patch angioplasty. On plavix. # R cheek Skin cancer.  Follow up with primary dermatology outpatient    CONSULTATIONS: IP CONSULT TO HOSPITALIST  IP CONSULT TO GASTROENTEROLOGY  IP CONSULT TO GASTROENTEROLOGY  IP CONSULT TO PODIATRY  IP CONSULT TO NEUROLOGY  IP CONSULT TO VASCULAR SURGERY    PROCEDURES/SURGERIES: Procedure(s):  COLONOSCOPY   :-    PENDING TEST RESULTS:   At the time of discharge the following test results are still pending: none    FOLLOW UP APPOINTMENTS:   Follow-up Information     Follow up With Specialties Details Why Contact Info    Rodrick Mariano DPM Foot and Ankle Surgery Schedule an appointment as soon as possible for a visit Podiatry Follow up: Call to schedule the follow up in 1-2 weeks or As needed, If symptoms worsen [address]  [address]  [facility] [phone]      [facility] [address] [facility]   [facility]  [phone]    Other, MD Ryan    Patient can only remember the practice name and not the physician      Samantha Wiseman MD Vascular Surgery, General Surgery Schedule an appointment as soon as possible for a visit in 2 weeks Vascualr surgery: Please call to make appointment with vascular surgery specialist for Peripheral arterial disease [address]  [phone]      Dermatology   Please continue to follow up with your Skin specialist for R cheek cancer treatment as recommended by them            ADDITIONAL CARE RECOMMENDATIONS:   Follow up with PCP, Podiatry, Vascular surgery and Dermatology as appropriate and as noted above. · It is important that you take the medication exactly as they are prescribed. · Keep your medication in the bottles provided by the pharmacist and keep a list of the medication names, dosages, and times to be taken in your wallet. · Do not take other medications without consulting your doctor. · No drinking alcohol or driving car or operating machinery if you are on narcotic pain medications. Donot take sedating mediations if you are sleepy or confused. · Fall Precautions  · Keep Well Hydrated  · Report to your medical provider if you feel you have  developed allergies to medications  · Follow up with your PCP or Consultant for medication adjustments and refills  · Monitor for signs of fevers,chills,bleeding,chest pain and seek medical attention if you do so. DIET: Cardiac Diet    ACTIVITY: Activity as tolerated    WOUND CARE: As per wound care team and Podiatry team recommendations. EQUIPMENT needed: NA      Radiology:  Xr Foot Lt Min 3 V    Result Date: 2/18/2021  1. No fracture or osteomyelitis. 2. No soft tissue gas or radiodense foreign body. 3. Osteopenia. Mri Brain Wo Cont    Result Date: 2/22/2021  1.  Chronic white matter disease and areas of chronic infarction particularly in the left MCA territory as above. Few punctate foci of mild diffusion signal abnormality in the periphery of the infarct may represent acute/subacute infarctions versus artifact. 2. Large soft tissue abnormality in the right cheek extending toward the right mandible, as seen on CT. Likely corresponds to known skin cancer though incompletely evaluated by this examination, correlate clinically. 3. Areas of chronic hemorrhage. No acute hemorrhage. Generalized atrophy. Mri Lumb Spine Wo Cont    Result Date: 2/19/2021  Multilevel mild spondylosis as above. Mri Foot Lt Wo Cont    Result Date: 2/19/2021  No evidence of osteomyelitis. No drainable fluid collection. Ct Head Wo Cont    Result Date: 2/18/2021  1. Chronic bilateral basal ganglia lacunar infarcts and cephalization of left frontal lobe. No acute intracranial abnormality 2. Question soft tissue thickening in the right cheek. Please correlate with physical exam           DISCHARGE MEDICATIONS:   See Medication Reconciliation Form    · It is important that you take the medication exactly as they are prescribed. · Keep your medication in the bottles provided by the pharmacist and keep a list of the medication names, dosages, and times to be taken in your wallet. · Do not take other medications without consulting your doctor. NOTIFY YOUR PHYSICIAN FOR ANY OF THE FOLLOWING:   Fever over 101 degrees for 24 hours. Chest pain, shortness of breath, fever, chills, nausea, vomiting, diarrhea, change in mentation, falling, weakness, bleeding. Severe pain or pain not relieved by medications. Or, any other signs or symptoms that you may have questions about.       DISPOSITION:    Home With:   OT  PT  HH  RN      x SNF/Inpatient Rehab/LTAC    Independent/assisted living    Hospice    Other:     CDMP Checked:   Yes x     PROBLEM LIST Updated:  Yes x        My Medications      START taking these medications      Instructions Each Dose to Equal Morning Noon Evening Bedtime   amoxicillin-clavulanate 875-125 mg per tablet  Commonly known as: Augmentin    Your last dose was: Your next dose is: Take 1 Tab by mouth two (2) times a day for 10 days. 1 Tab                 metroNIDAZOLE 0.75 % topical gel  Commonly known as: METROGEL    Your last dose was: Your next dose is:         Apply  to affected area daily. APPLY TO right cheek wound Nursing, document site in comments                  miconazole 2 % topical powder  Commonly known as: MICOTIN    Your last dose was: Your next dose is:         Apply  to affected area two (2) times a day. APPLY TO groin                  multivitamin, tx-iron-ca-min 9 mg iron-400 mcg Tab tablet  Commonly known as: THERA-M w/ IRON  Start taking on: February 27, 2021    Your last dose was: Your next dose is: Take 1 Tab by mouth daily. 1 Tab                    CHANGE how you take these medications      Instructions Each Dose to Equal Morning Noon Evening Bedtime   metoprolol tartrate 25 mg tablet  Commonly known as: LOPRESSOR  What changed:   · medication strength  · how much to take  · additional instructions    Your last dose was: Your next dose is: Take 1 Tab by mouth two (2) times a day. Dose reduced from 50mg to 25mg BID   25 mg                    CONTINUE taking these medications      Instructions Each Dose to Equal Morning Noon Evening Bedtime   acetaminophen 500 mg tablet  Commonly known as: TYLENOL    Your last dose was: Your next dose is: Take 500 mg by mouth every six (6) hours as needed for Pain. 500 mg                 aspirin 81 mg chewable tablet    Your last dose was: Your next dose is: Take 1 Tab by mouth daily. 81 mg                 atorvastatin 40 mg tablet  Commonly known as: LIPITOR    Your last dose was: Your next dose is: Take 1 Tab by mouth nightly.    40 mg                 clopidogreL 75 mg Tab  Commonly known as: PLAVIX    Your last dose was: Your next dose is: Take 1 Tab by mouth daily. 75 mg                 ferrous sulfate 325 mg (65 mg iron) tablet  Commonly known as: Iron    Your last dose was: Your next dose is: Take 1 Tab by mouth Daily (before breakfast). 325 mg                 pantoprazole 40 mg tablet  Commonly known as: Protonix    Your last dose was: Your next dose is: Take 1 Tab by mouth two (2) times a day. 40 mg                       Where to Get Your Medications      These medications were sent to [address] [place] [address]    Phone: [phone]   · metoprolol tartrate 25 mg tablet     Information on where to get these meds will be given to you by the nurse or doctor.     Ask your nurse or doctor about these medications  · amoxicillin-clavulanate 875-125 mg per tablet  · metroNIDAZOLE 0.75 % topical gel  · miconazole 2 % topical powder  · multivitamin, tx-iron-ca-min 9 mg iron-400 mcg Tab tablet         Signed:   Renata Rosado MD  2/26/2021  9:59 AM

## 2021-02-26 NOTE — PROGRESS NOTES
Physical Therapy  Patient still without offloading shoe. It has been called into [place] [facility] today and to be delivered prior to discharge to SNF. Will follow up later as able  when shoe arrives.   Lamar Castro, PT

## 2021-02-26 NOTE — DISCHARGE SUMMARY
Discharge Summary       PATIENT ID: Rosa Maria Mayfield  MRN: [id number]   YOB: 1950    DATE OF ADMISSION: 2/18/2021  9:13 AM    DATE OF DISCHARGE: 2/26/2021  PRIMARY CARE PROVIDER: Ryan Osborn MD     ATTENDING PHYSICIAN: Temitope Sena MD  DISCHARGING PROVIDER: Temitope Sena MD    To contact this individual call [phone] and ask the  to page. If unavailable ask to be transferred the Adult Hospitalist Department. CONSULTATIONS: IP CONSULT TO HOSPITALIST  IP CONSULT TO GASTROENTEROLOGY  IP CONSULT TO GASTROENTEROLOGY  IP CONSULT TO PODIATRY  IP CONSULT TO NEUROLOGY  IP CONSULT TO VASCULAR SURGERY    PROCEDURES/SURGERIES: Procedure(s):  COLONOSCOPY   :-    [address] COURSE:   # Acute blood loss anemia, Hb 5 on admission, s/p 2 U PRBCs. Upper and lower endoscopies unremarkable this admission per GI  # Left foot wound infection. Wound culture + for MSSA. 14 days Abx total, to complete with PO Augmentin. Outpatient Podiatry follow up with Dr. Marcie Palencia  # Peripheral arterial disease. Outpatient Vascular surgery follow up  # Lower extremity weakness. SNF at discharge for further rehab  # Coronary atherosclerotic disease. Stable. On Plavix  # History of stroke. Aspirin nonresponder. On plavix. # Left carotid artery stenosis s/p Left carotid endarterectomy with Dacron patch angioplasty. On plavix. # R cheek Skin cancer. Follow up with primary dermatology outpatient    Admission Summary:   66 y.o M with PMH of HTN,stroke,s/p carotid artery surgery came to the hospital due to lower extremity weakness.     Interval history / Subjective:   Patient seen and examined. Patient is feeling better. Doing well. Okay from podiatry standpoint for discharge. Outpatient follow-up. No other concerns. Awaiting acceptance at SNF.       DISCHARGE DIAGNOSES / PLAN:      # Acute blood loss anemia:  -Hb 5 on admission, s/p 2 U PRBCs  -EGD and colonoscopy without any significant obvious evidence of bleeding or any concerning findings  -No further GI work-up at present  -Outpatient follow-up to follow-up on celiac antibody panel and consider capsule endoscopy if anemia recurs  -OK to resume anticoagulant- plavix resumed     #Left foot wound infection: MSSA on wound culture  -Wound culture grew MSSA. D/c vanc,cefepime and flagyl.   -switched to ceftriaxone, will treat for 14 days total, to complete with p.o. Augmentin  -podiatrist following- no surgical intervention  -Follow up with Dr. Zainab Jean outpatient. Call office at [phone].  No specific time line for follow up  -MRI -No evidence of osteomyelitis. No drainable fluid collection     #Peripheral arterial disease:  -arterial duplex results noted -vascular surgery consulted-recommended work up as OP.     #Lower extremity weakness:  -Likely multifactorial- anemia, PAD, h/o stroke, physical deconditioning  -MRI L spine -Multilevel mild spondylosis ,  MRI brain -Chronic white matter disease and areas of chronic infarction particularly in the left MCA territory as above. Few punctate foci of mild diffusion signal abnormality in the periphery of the infarct may represent acute/subacute  infarctions versus artifact   -Neurology consulted-appreciate recommendations  -Need SNF at discharge     # Coronary atherosclerotic disease:  -continue statin,  -Okay to resume plavix after colonoscopy per GI. Tolerating well.        #History of stroke  # Left carotid artery stenosis s/p Left carotid endarterectomy with Dacron patch angioplasty  - on statin  -Plavix resumed after colonoscopy  -aspirin non responder per neurology  · -carotid duplex -Patient is s/p left carotid endarterectomy, from October 2020.   Evidence of mild, lesst briceno 50%, stenosis in the right ICA     Skin cancer- rt cheek:  -follows [facility] dermatologist and was initiated local treatment per patient  -Outpatient follow-up with dermatology        Code status: full  DVT prophylaxis: heparin     Care Plan discussed with: patient,nurse  Anticipated Disposition:SNF vs rehab  Anticipated Discharge:     ADDITIONAL CARE RECOMMENDATIONS:   Follow up with PCP, Podiatry, Vascular surgery and Dermatology as appropriate and as noted above. Follow-up with GI as appropriate, to consider capsule endoscopy especially if anemia recurs  · It is important that you take the medication exactly as they are prescribed. · Keep your medication in the bottles provided by the pharmacist and keep a list of the medication names, dosages, and times to be taken in your wallet. · Do not take other medications without consulting your doctor. · No drinking alcohol or driving car or operating machinery if you are on narcotic pain medications. Donot take sedating mediations if you are sleepy or confused.    · Fall Precautions  · Keep Well Hydrated  · Report to your medical provider if you feel you have  developed allergies to medications  · Follow up with your PCP or Consultant for medication adjustments and refills  · Monitor for signs of fevers,chills,bleeding,chest pain and seek medical attention if you do so.          DIET: Cardiac Diet     ACTIVITY: Activity as tolerated     WOUND CARE: As per wound care team and Podiatry team recommendations.     EQUIPMENT needed: NA     PENDING TEST RESULTS:   At the time of discharge the following test results are still pending: none    FOLLOW UP APPOINTMENTS:    Follow-up Information     Follow up With Specialties Details Why Contact Info    Nanette Mckay DPM Foot and Ankle Surgery Schedule an appointment as soon as possible for a visit Podiatry Follow up: Call to schedule the follow up in 1-2 weeks or As needed, If symptoms worsen [address] [facility]  [address]  Critical access hospital [address]  [address] [facility]  [phone]    Ryan Osborn MD    Patient can only remember the practice name and not the physician      Anabela Ortiz MD Vascular Surgery, General Surgery Schedule an appointment as soon as possible for a visit in 2 weeks Vascualr surgery: Please call to make appointment with vascular surgery specialist for Peripheral arterial disease [address]  [phone]      Dermatology   Please continue to follow up with your Skin specialist for R cheek cancer treatment as recommended by them                DISCHARGE MEDICATIONS:  Current Discharge Medication List      START taking these medications    Details   multivitamin, tx-iron-ca-min (THERA-M w/ IRON) 9 mg iron-400 mcg tab tablet Take 1 Tab by mouth daily. Qty: 30 Tab, Refills: 0      miconazole (MICOTIN) 2 % topical powder Apply  to affected area two (2) times a day. APPLY TO groin  Qty: 1 Bottle, Refills: 0      metroNIDAZOLE (METROGEL) 0.75 % topical gel Apply  to affected area daily. APPLY TO right cheek wound Nursing, document site in comments  Qty: 60 g, Refills: 0      amoxicillin-clavulanate (Augmentin) 875-125 mg per tablet Take 1 Tab by mouth two (2) times a day for 10 days. Qty: 20 Tab, Refills: 0         CONTINUE these medications which have CHANGED    Details   metoprolol tartrate (LOPRESSOR) 25 mg tablet Take 1 Tab by mouth two (2) times a day. Dose reduced from 50mg to 25mg BID  Qty: 60 Tab, Refills: 0         CONTINUE these medications which have NOT CHANGED    Details   acetaminophen (TYLENOL) 500 mg tablet Take 500 mg by mouth every six (6) hours as needed for Pain. aspirin 81 mg chewable tablet Take 1 Tab by mouth daily. Qty: 90 Tab, Refills: 0      clopidogreL (PLAVIX) 75 mg tab Take 1 Tab by mouth daily. Qty: 90 Tab, Refills: 0      atorvastatin (LIPITOR) 40 mg tablet Take 1 Tab by mouth nightly. Qty: 90 Tab, Refills: 0      pantoprazole (Protonix) 40 mg tablet Take 1 Tab by mouth two (2) times a day.   Qty: 90 Tab, Refills: 0      ferrous sulfate (Iron) 325 mg (65 mg iron) tablet Take 1 Tab by mouth Daily (before breakfast). Qty: 90 Tab, Refills: 0               NOTIFY YOUR PHYSICIAN FOR ANY OF THE FOLLOWING:   Fever over 101 degrees for 24 hours. Chest pain, shortness of breath, fever, chills, nausea, vomiting, diarrhea, change in mentation, falling, weakness, bleeding. Severe pain or pain not relieved by medications. Or, any other signs or symptoms that you may have questions about. DISPOSITION:    Home With:   OT  PT  HH  RN      x Long term SNF/Inpatient Rehab    Independent/assisted living    Hospice    Other:       PATIENT CONDITION AT DISCHARGE:     Functional status    Poor    x Deconditioned     Independent      Cognition   x  Lucid     Forgetful     Dementia      Catheters/lines (plus indication)    Castaneda     PICC     PEG    x None      Code status   x  Full code     DNR      PHYSICAL EXAMINATION AT DISCHARGE:  Visit Vitals  /63 (BP 1 Location: Right arm, BP Patient Position: At rest)   Pulse 70   Temp 98.5 °F (36.9 °C)   Resp 18   Ht 5' 8\" (1.727 m)   Wt 64 kg (141 lb 1.5 oz)   SpO2 93%   BMI 21.45 kg/m²       I had a face to face encounter with this patient and independently examined them on 2/26/2021 as outlined below:                                                   Constitutional:  No acute distress, cooperative, pleasant    ENT:  Oral mucosa moist, oropharynx benign,EOMI    Resp:  CTAB, no rales, rhochi or wheeze   CV:  Regular rhythm, normal rate, S1,S 2 wnl    GI:  Soft, non distended, non tender, normoactive bowel sounds    Musculoskeletal:  No LE edema, warm. Left lower extremity dressing    Neurologic:  he is alert and oriented X 3, moves all extremities, sensation intact                         Psych: not anxious nor agitated. Skin: R cheek ulcerated lesion -skin cancer.  Dressing in [address] and dry        CHRONIC MEDICAL DIAGNOSES:  Problem List as of 2/26/2021 Date Reviewed: 2/24/2021          Codes Class Noted - Resolved    Moderate protein-calorie malnutrition [facility]) ICD-10-CM: E44.0  ICD-9-CM: 263.0  2/25/2021 - Present        Coronary artery disease of native artery of native heart with stable angina pectoris ([facility] ICD-10-CM: I25.118  ICD-9-CM: 414.01, 413.9  2/23/2021 - Present        PAD (peripheral artery disease) ([facility] ICD-10-CM: I73.9  ICD-9-CM: 443.9  2/23/2021 - Present        CVD (cardiovascular disease) ICD-10-CM: I25.10  ICD-9-CM: 429.2  2/23/2021 - Present        ARF (acute renal failure) (HCC) ICD-10-CM: N17.9  ICD-9-CM: 584.9  2/23/2021 - Present        GI bleed ICD-10-CM: K92.2  ICD-9-CM: 578.9  2/18/2021 - Present        Carotid artery stenosis, symptomatic, left ICD-10-CM: R88.95  ICD-9-CM: 433.10  10/28/2020 - Present        NSTEMI (non-ST elevated myocardial infarction) [facility]) ICD-10-CM: I21.4  ICD-9-CM: 410.70  9/7/2020 - Present            Radiology  Xr Foot Lt Min 3 V    Result Date: 2/18/2021  1. No fracture or osteomyelitis. 2. No soft tissue gas or radiodense foreign body. 3. Osteopenia. Mri Brain Wo Cont    Result Date: 2/22/2021  1. Chronic white matter disease and areas of chronic infarction particularly in the left MCA territory as above. Few punctate foci of mild diffusion signal abnormality in the periphery of the infarct may represent acute/subacute infarctions versus artifact. 2. Large soft tissue abnormality in the right cheek extending toward the right mandible, as seen on CT. Likely corresponds to known skin cancer though incompletely evaluated by this examination, correlate clinically. 3. Areas of chronic hemorrhage. No acute hemorrhage. Generalized atrophy. Mri Lumb Spine Wo Cont    Result Date: 2/19/2021  Multilevel mild spondylosis as above. Mri Foot Lt Wo Cont    Result Date: 2/19/2021  No evidence of osteomyelitis. No drainable fluid collection. Ct Head Wo Cont    Result Date: 2/18/2021  1. Chronic bilateral basal ganglia lacunar infarcts and cephalization of left frontal lobe. No acute intracranial abnormality 2. Question soft tissue thickening in the right cheek. Please correlate with physical exam     No results found for this or any previous visit (from the past 24 hour(s)).     Greater than 40 minutes were spent with the patient on counseling and coordination of care    Signed:   Steffanie Carnes MD  2/26/2021  10:08 AM

## 2021-03-02 ENCOUNTER — EXTERNAL NURSING HOME DOCUMENTATION (OUTPATIENT)
Dept: INTERNAL MEDICINE CLINIC | Age: 71
End: 2021-03-02
Payer: MEDICARE

## 2021-03-02 DIAGNOSIS — C44.219: ICD-10-CM

## 2021-03-02 DIAGNOSIS — I25.118 CORONARY ARTERY DISEASE OF NATIVE ARTERY OF NATIVE HEART WITH STABLE ANGINA PECTORIS (HCC): ICD-10-CM

## 2021-03-02 DIAGNOSIS — K92.2 GASTROINTESTINAL HEMORRHAGE, UNSPECIFIED GASTROINTESTINAL HEMORRHAGE TYPE: Primary | ICD-10-CM

## 2021-03-02 DIAGNOSIS — I73.9 PAD (PERIPHERAL ARTERY DISEASE) (HCC): ICD-10-CM

## 2021-03-02 PROCEDURE — 99306 1ST NF CARE HIGH MDM 50: CPT | Performed by: INTERNAL MEDICINE

## 2021-03-03 LAB
GLIADIN PEPTIDE IGA SER-ACNC: 6 UNITS (ref 0–19)
GLIADIN PEPTIDE IGG SER-ACNC: 2 UNITS (ref 0–19)
IGA SERPL-MCNC: NORMAL MG/DL
TTG IGA SER-ACNC: <2 U/ML (ref 0–3)
TTG IGG SER-ACNC: 4 U/ML (ref 0–5)

## 2021-03-03 NOTE — PROGRESS NOTES
History of Present Illness:  Mr. Fam James is a 21-year-old  male with past medical history significant for recent history of stroke, coronary artery disease and peripheral artery disease who was admitted to the hospital with acute GI bleeding and lot of blood loss.  The patient, on admission, had hemoglobin of 5.  He had received 2 units of packed RBCs, endoscopy and colonoscopy done without any evidence of active bleeding. Julio C Santana will follow up as outpatient to rule out celiac disease.  Since there was no active bleeding, his Plavix was resumed.  During this admission, the patient also had left foot wound infection with known peripheral artery disease.  The patient was seen by vascular surgeon. Lance Villegas culture grew out MSSA.  The patient's antibiotics were changed to ceftriaxone for a total of 14 days and then switched to p.o. Augmentin.  He will follow up with podiatrist, Dr. Lito Hall, as an outpatient. [facility] of the feet did not show any osteomyelitis.  The patient had a recent history of stroke. Julio C Santana underwent left carotid endarterectomy by Dr. Amanda Zamora had lower extremity weakness for which he was continued with physical therapy and occupational therapy. Julio C Santana had recent basal cell skin cancer for which he is seeing dermatologist and local excision done. Amna Rinaldi, he was stabilized with his hemoglobin and the diet and was transferred to [address] [place] [address] am seeing him in the rehab. Julio C Santana is doing well right now, complaining of weakness in the legs. Past Medical History:  Significant for coronary artery disease, peripheral artery disease, cerebellar stroke, and basal cell skin cancer. Past Surgical History:  Left carotid endarterectomy with Dacron patch and local excision of skin cancer, endoscopy and colonoscopy. Family History:  Noncontributory. Social History:  The patient is a smoker.  He drinks alcohol.  He lives alone and independently.  He uses walker to walk.      Medications:  The patient right now is taking Augmentin 875 mg one tablet twice a day for 10 days, aspirin 81 mg once a day, Lipitor 40 mg at night, Plavix 75 mg once a day, iron sulfate 325 mg one tablet a day, metoprolol 25 mg twice a day, metronidazole 0.75% topical gel once a day, miconazole topical powder twice a day on groin, multivitamins one tablet a day, Protonix 40 mg twice a day, Tylenol 500 mg every six hours as needed. Review of Systems:  Complete review of systems done, right now essentially negative. Physical Examination:   GENERAL:  This is a pleasant  male, not apparently in distress. VITALS:  T:  97.8 degrees Fahrenheit.  P:  60 per minute.  BP:  103/74 mmHg.  SaO2:  97% on room air. HEENT:  The patient has approximately 5 cm x 7 cm area of open wound on the left side of the cheek with basal cell carcinoma area, area under bandage.  Otherwise, no abnormalities detected. NECK:  Supple, no JVD, no carotid bruits, no thyromegaly. CHEST:  Chest is clear to auscultation bilaterally.  No rales, no rhonchi. CARDIOVASCULAR:  S1, S2 normal.  Regular rate and rhythm. ABDOMEN:  Soft, nontender, nondistended, bowel sounds present. EXTREMITIES:  No edema noticed.  Dorsalis pedis pulse with poor flow.  Left leg in bandage with area of wound infection almost healing up. NEUROLOGICAL:  Alert and oriented x3.  Cranial nerves II through XII grossly intact.  Motor 4/5 bilaterally.  Sensory within normal limits. Assessment and Plan:   1. Acute gastrointestinal blood loss.  The patient received 2 units of packed RBCs.  Endoscopy and colonoscopy without any obvious active bleeding.  The patient's Plavix was resumed.  The patient was placed on Protonix twice a day. Césarele Coronel will follow up with Dr. Aleks Moreira as an outpatient for further workup.    2. Left foot wound infection, methicillin-susceptible Staphylococcus aureus on wound culture.  The patient is finishing up Augmentin.  He will follow up with podiatrist as an outpatient.  Podiatrist's name is Dr. Oziel Davis. 3. Peripheral artery disease.  Vascular surgeon already is consulted.  The patient is stable for now. [facility] will follow up with him as an outpatient.  The patient is on aspirin, statin and Plavix. 4. Lower extremity weakness.  The patient had a recent stroke.  The patient will continue physical therapy and occupational therapy.  MRI of the lumbar spine showed multilevel spondylosis.  MRI of the brain shows chronic white matter disease with chronic infection, particularly in the left [address] area.  The patient needs long-term physical therapy. 5. Coronary artery disease.  The patient is on aspirin, Plavix and statin.  We will continue it. 6. Recent history of stroke with left carotid endarterectomy.  The patient is on Plavix, aspirin and statin. 7. Basal cell cancer of the skin.  Recent local skin excision done.  The patient is following up with the dermatologist.      THIS IS NOT A COMPLETE MEDICAL RECORD ON THIS PATIENT.    THIS IS A PATIENT AT [address] [place] [address].    PLEASE CONTACT THE FACILITY LISTED BELOW FOR MORE INFORMATION ON THIS PATIENT.    THE COMPLETE RECORD RESIDES WITH THIS LONG TERM CARE FACILITY

## 2021-03-04 ENCOUNTER — PATIENT OUTREACH (OUTPATIENT)
Dept: CASE MANAGEMENT | Age: 71
End: 2021-03-04

## 2021-03-05 ENCOUNTER — EXTERNAL NURSING HOME DOCUMENTATION (OUTPATIENT)
Dept: INTERNAL MEDICINE CLINIC | Age: 71
End: 2021-03-05
Payer: MEDICARE

## 2021-03-05 DIAGNOSIS — R26.9 GAIT ABNORMALITY: ICD-10-CM

## 2021-03-05 DIAGNOSIS — I73.9 PAD (PERIPHERAL ARTERY DISEASE) (HCC): ICD-10-CM

## 2021-03-05 DIAGNOSIS — I65.22 CAROTID ARTERY STENOSIS, SYMPTOMATIC, LEFT: ICD-10-CM

## 2021-03-05 DIAGNOSIS — I25.118 CORONARY ARTERY DISEASE OF NATIVE ARTERY OF NATIVE HEART WITH STABLE ANGINA PECTORIS (HCC): Primary | ICD-10-CM

## 2021-03-05 DIAGNOSIS — C44.219: ICD-10-CM

## 2021-03-05 PROCEDURE — 99309 SBSQ NF CARE MODERATE MDM 30: CPT | Performed by: INTERNAL MEDICINE

## 2021-03-08 ENCOUNTER — EXTERNAL NURSING HOME DOCUMENTATION (OUTPATIENT)
Dept: INTERNAL MEDICINE CLINIC | Age: 71
End: 2021-03-08
Payer: MEDICARE

## 2021-03-08 DIAGNOSIS — N17.9 ACUTE RENAL FAILURE, UNSPECIFIED ACUTE RENAL FAILURE TYPE (HCC): ICD-10-CM

## 2021-03-08 DIAGNOSIS — I73.9 PAD (PERIPHERAL ARTERY DISEASE) (HCC): ICD-10-CM

## 2021-03-08 DIAGNOSIS — E44.0 MODERATE PROTEIN-CALORIE MALNUTRITION (HCC): ICD-10-CM

## 2021-03-08 DIAGNOSIS — I25.118 CORONARY ARTERY DISEASE OF NATIVE ARTERY OF NATIVE HEART WITH STABLE ANGINA PECTORIS (HCC): ICD-10-CM

## 2021-03-08 PROCEDURE — 99309 SBSQ NF CARE MODERATE MDM 30: CPT | Performed by: INTERNAL MEDICINE

## 2021-03-08 NOTE — PROGRESS NOTES
THIS IS NOT A COMPLETED MEDICAL RECORD ON THIS PATIENT. THIS IS A PATIENT OF [facility]   PLEASE CONTACT THE FACILITY BELOW FOR MORE INFORMATION ON THIS PATIENT   THE COMPLETE RECORD RESIDES WITH THIS LONG TERM CARE FACILITY    HISTORY OF PRESENT ILLNESS  Alfonzo Smith is a 66 y.o. male. Patient is under the care of Dr. Marla Robertson at [facility].   Past medical history for stroke, coronary artery disease and peripheral artery disease. Patient who was admitted to the hospital with acute GI bleeding and a hbg of 5. He had received 2 units of packed RBCs, endoscopy and colonoscopy with no bleeding. During this admission, the patient also had left foot wound infection with known peripheral artery disease.  The patient was seen by vascular surgeon. [place] [facility] culture grew out MSSA.  The patient's antibiotics were changed to ceftriaxone for a total of 14 days and then switched to p.o. Augmentin.  He will follow up with podiatrist, Dr. Zainab Jean, as an outpatient. [facility] of the feet did not show any osteomyelitis.  The patient had a recent history of stroke. Carola Mujica underwent left carotid endarterectomy by Dr. Francisco Tuttle. I saw patient for a follow up today. NUrsing reports that he has been working with PT and OT. Patient had no concerns at this time. Vitals have been stable. No note of bleeding. HPI    Review of Systems   Constitutional: Negative. Respiratory: Negative. Cardiovascular: Negative. Gastrointestinal: Negative. Neurological: Negative. Endo/Heme/Allergies: Does not bruise/bleed easily. Physical Exam  Vitals signs and nursing note reviewed. Constitutional:       Comments: Up in chair   Cardiovascular:      Rate and Rhythm: Normal rate and regular rhythm. Pulmonary:      Effort: Pulmonary effort is normal.   Abdominal:      General: Bowel sounds are normal.      Tenderness: There is no abdominal tenderness. Skin:     General: Skin is warm.    Neurological:      Mental Status: He is alert and oriented to person, place, and time.         ASSESSMENT and PLAN  Diagnoses and all orders for this visit:    1. Coronary artery disease of native artery of native heart with stable angina pectoris (HCC)    2. Moderate protein-calorie malnutrition (HCC)    3. PAD (peripheral artery disease) ([facility])    4. Acute renal failure, unspecified acute renal failure type ([facility])        PLAN:  1. Continue to work with PT  2. Monitor for bleeding  3. Wound care to continue     lab results and schedule of future lab studies reviewed with patient  reviewed medications and side effects in detail

## 2021-03-11 ENCOUNTER — PATIENT OUTREACH (OUTPATIENT)
Dept: CASE MANAGEMENT | Age: 71
End: 2021-03-11

## 2021-03-17 NOTE — PROGRESS NOTES
Late chart  Post Acute Facility Update     *The information contained in this note was received during a weekly care coordination call with the post acute facility*    Current Facility: [facility] ([facility])  Anticipated Discharge Date: TBD    Facility Nursing Update:2 wounds appts dirksudha silvestre in 2 weeks, foot and ankle surgery  Facility Social Work Update: discharge plan to return to [address]: Moderate Assistance   Lower Extremity Assistance: Minimal Assistance   Bed Mobility: Stand by Assist - Presence and Cueing  Transfers: Minimal Assist  Ambulation: Contact Guard Assist - hands on patient for balance   How far (in feet) is the patient ambulating?  30  Device Used: Cane  Barriers to Discharge: estimated timeframe - 2 week    STEPHANIE RuckerN

## 2021-03-19 ENCOUNTER — EXTERNAL NURSING HOME DOCUMENTATION (OUTPATIENT)
Dept: INTERNAL MEDICINE CLINIC | Age: 71
End: 2021-03-19
Payer: MEDICARE

## 2021-03-19 DIAGNOSIS — C44.219: ICD-10-CM

## 2021-03-19 DIAGNOSIS — L08.9 FOOT INFECTION: ICD-10-CM

## 2021-03-19 DIAGNOSIS — I73.9 PAD (PERIPHERAL ARTERY DISEASE) (HCC): ICD-10-CM

## 2021-03-19 DIAGNOSIS — I25.118 CORONARY ARTERY DISEASE OF NATIVE ARTERY OF NATIVE HEART WITH STABLE ANGINA PECTORIS (HCC): Primary | ICD-10-CM

## 2021-03-19 DIAGNOSIS — E44.0 MODERATE PROTEIN-CALORIE MALNUTRITION (HCC): ICD-10-CM

## 2021-03-19 PROCEDURE — 99309 SBSQ NF CARE MODERATE MDM 30: CPT | Performed by: INTERNAL MEDICINE

## 2021-03-25 ENCOUNTER — PATIENT OUTREACH (OUTPATIENT)
Dept: CASE MANAGEMENT | Age: 71
End: 2021-03-25

## 2021-03-25 NOTE — PROGRESS NOTES
Post Acute Facility Update     *The information contained in this note was received during a weekly care coordination call with the post acute facility*    Current Facility: [facility] (SNF)  Anticipated Discharge Date: LTD 3/17/2021    Facility Nursing Update: area to right face, heal wounds, skilled fo r NSG wounds  Facility Social Work Update:  [address]  ADL's : Moderate Assistance  Current Level of Assistance: Minimal Assistance   Bed Mobility: Stand by Assist - Presence and Cueing  Transfers: Contact Guard Assist - hands on patient for balance   Ambulation: Stand by Assist - Presence and Cueing  How far (in feet) is the patient ambulating?  125   Device Used: walker  Barriers to Discharge: pt will remain at [facility] for wound [address] BSN, RN

## 2021-03-25 NOTE — PROGRESS NOTES
Post Acute Facility Update     *The information contained in this note was received during a weekly care coordination call with the post acute facility*    Current Facility: [facility] ([facility])  Anticipated Discharge Date: TBD    Patients last skilled therapy date was 3/17. Patient remains at [facility] and is skilled for wounds.

## 2021-04-01 ENCOUNTER — PATIENT OUTREACH (OUTPATIENT)
Dept: CASE MANAGEMENT | Age: 71
End: 2021-04-01

## 2021-04-01 NOTE — PROGRESS NOTES
Post Acute Facility Update     *The information contained in this note was received during a weekly care coordination call with the post acute facility*    Current Facility: [facility] ([facility])  Anticipated Discharge Date: TBD    Facility Nursing Update: No current updates   Facility Social Work Update: No current updates   Barriers to Discharge: TBD       March Du, [facility] Team

## 2021-04-05 ENCOUNTER — EXTERNAL NURSING HOME DOCUMENTATION (OUTPATIENT)
Dept: INTERNAL MEDICINE CLINIC | Age: 71
End: 2021-04-05
Payer: MEDICARE

## 2021-04-05 DIAGNOSIS — C44.219: Primary | ICD-10-CM

## 2021-04-05 DIAGNOSIS — E44.0 MODERATE PROTEIN-CALORIE MALNUTRITION (HCC): ICD-10-CM

## 2021-04-05 DIAGNOSIS — L08.9 FOOT INFECTION: ICD-10-CM

## 2021-04-05 PROCEDURE — 99309 SBSQ NF CARE MODERATE MDM 30: CPT | Performed by: INTERNAL MEDICINE

## 2021-04-05 NOTE — PROGRESS NOTES
THIS IS NOT A COMPLETED MEDICAL RECORD ON THIS PATIENT. THIS IS A PATIENT OF [facility]   PLEASE CONTACT THE FACILITY BELOW FOR MORE INFORMATION ON THIS PATIENT   THE COMPLETE RECORD RESIDES WITH THIS LONG TERM CARE FACILITY    HISTORY OF PRESENT ILLNESS  Tianna Gonzalez is a 66 y.o. male. Patient is under the care of Dr. Hung Allen at [facility].   Past medical history for stroke, coronary artery disease and peripheral artery disease. Patient who was admitted to the hospital with acute GI bleeding and a hbg of 5. He had received 2 units of packed RBCs, endoscopy and colonoscopy with no bleeding. During this admission, the patient also had left foot wound infection with known peripheral artery disease.  The patient was seen by vascular surgeon. Dorys De La Fuente culture grew out MSSA.  The patient's antibiotics were changed to ceftriaxone for a total of 14 days and then switched to p.o. Augmentin.  He will follow up with podiatrist, Dr. Duncan Elena, as an outpatient. [facility] of the feet did not show any osteomyelitis.  The patient had a recent history of stroke. [facility] underwent left carotid endarterectomy by Dr. Nancy Quinones. Saw patient for a follow up. Nursing reports no concerns. Patient states that facial CA, has been bleeding a lot. Nursing reports frequent dressing changes. Continues to work with therapy. HPI    Review of Systems   Constitutional: Negative. Respiratory: Negative. Cardiovascular: Negative. Gastrointestinal: Negative. Skin:        Cheek wound bleeding    Neurological: Negative. Physical Exam  Vitals signs and nursing note reviewed. Cardiovascular:      Rate and Rhythm: Normal rate. Pulmonary:      Effort: Pulmonary effort is normal.      Breath sounds: Normal breath sounds. Abdominal:      General: Bowel sounds are normal.   Skin:     General: Skin is warm. Comments: Right cheek covered with dressing.  Site clean   Neurological:      Mental Status: He is alert and oriented to person, place, and time. ASSESSMENT and PLAN  Diagnoses and all orders for this visit:    1. Basal cell carcinoma (BCC) of skin of left external auditory canal    2. Foot infection    3. Moderate protein-calorie malnutrition ([address]        PLAN;  1. Continue wound care to face and foot  2. Patient to work with PT and OT  3.  Patient to see PCP and dermatology for skin CA.     lab results and schedule of future lab studies reviewed with patient  reviewed medications and side effects in detail

## 2021-04-08 ENCOUNTER — PATIENT OUTREACH (OUTPATIENT)
Dept: CASE MANAGEMENT | Age: 71
End: 2021-04-08

## 2021-04-13 NOTE — PROGRESS NOTES
[address] [place] [address] Dr Discharge Note    *The information contained in this note was received during a weekly care coordination call with the post acute facility*      Patient was discharged from [facility] ([facility]) on 4/7/2021    PCP: MD GIANA London appointment: No future appointments. Home Health/Outpatient orders at discharge: home health care  [address]: [address] ordered at discharge: none  [address] received prior to discharge: [address] [facility] Team will sign off at this time. Medications were not reconciled and general patient assessment was not completed during this skilled nursing facility outreach.      Ga BROWNN, RN

## 2021-04-16 ENCOUNTER — EXTERNAL NURSING HOME DOCUMENTATION (OUTPATIENT)
Dept: INTERNAL MEDICINE CLINIC | Age: 71
End: 2021-04-16
Payer: MEDICARE

## 2021-04-16 DIAGNOSIS — N17.9 ACUTE RENAL FAILURE, UNSPECIFIED ACUTE RENAL FAILURE TYPE (HCC): ICD-10-CM

## 2021-04-16 DIAGNOSIS — I73.9 PAD (PERIPHERAL ARTERY DISEASE) (HCC): ICD-10-CM

## 2021-04-16 DIAGNOSIS — R26.9 GAIT ABNORMALITY: ICD-10-CM

## 2021-04-16 DIAGNOSIS — C44.219: ICD-10-CM

## 2021-04-16 DIAGNOSIS — I25.118 CORONARY ARTERY DISEASE OF NATIVE ARTERY OF NATIVE HEART WITH STABLE ANGINA PECTORIS (HCC): Primary | ICD-10-CM

## 2021-04-16 PROCEDURE — 99309 SBSQ NF CARE MODERATE MDM 30: CPT | Performed by: INTERNAL MEDICINE

## 2021-04-16 NOTE — PROGRESS NOTES
Progress Note    Subjective:  Mr. Darío Dean is doing well. He is in a nursing home right now. Leg pain has improved. He has severe peripheral vascular disease. He is seen by a vascular surgeon and planned to do revascularization of his arteries. He was admitted with GI blood loss. Hemoglobin was okay. No active bleeding noted. He needs lab work again. He has heart disease, taking all the medications. Denies chest pain and palpitation. He is sleeping well, eating okay too. No constipation. Objective:  VITALS:  T:  98.6 degrees Fahrenheit. P:  66 per minute. BP:  91/50 mmHg. SaO2:  97% on room air. Weight is 151 pounds. HEENT:  No abnormality detected. The patient has left cheek basal cell cancer under bandage. NECK:  Supple, no JVD, no carotid bruits, no thyromegaly. CHEST:  Chest is clear to auscultation bilaterally. No rales, no rhonchi. CARDIOVASCULAR:  S1, S2 normal.  Regular rate and rhythm. ABDOMEN:  Soft, nontender, nondistended, bowel sounds present. EXTREMITIES:  No edema noted. Dorsalis pedis pulse normal.  NEUROLOGICAL:  Alert and oriented x3. No neurological deficit. Laboratory Data:  Labs reviewed. Only CBC done several weeks back. Hemoglobin was low, 8.5. Assessment and Plan:  1. Coronary artery disease. The patient is on aspirin, Plavix, and statin. We will repeat CBC and CMP right now. 2. History of gastrointestinal bleeding. Received 2 units of packed RBC. The patient was on Protonix. We will repeat CBC. 3. Peripheral vascular disease. The patient is on aspirin and statin. Revascularization will be done soon by vascular surgeon. 4. Recent history of stroke with left carotid endarterectomy. The patient is on Plavix, aspirin, and statin. 5. Basal cell cancer of the skin. He is following up with the dermatologist.    THIS IS NOT A COMPLETE MEDICAL RECORD ON THIS PATIENT.    THIS IS A PATIENT AT [address] [place] [address].    PLEASE CONTACT THE FACILITY LISTED BELOW FOR MORE INFORMATION ON THIS PATIENT.    THE COMPLETE RECORD RESIDES WITH THIS LONG TERM CARE FACILITY

## 2021-05-11 ENCOUNTER — TELEPHONE (OUTPATIENT)
Dept: INTERNAL MEDICINE CLINIC | Age: 71
End: 2021-05-11

## 2021-05-11 NOTE — TELEPHONE ENCOUNTER
Althea Shay from [address] [place] [address] ENT would like a call back to discuss patient taking plavix and aspirin. Patient is scheduled for facial surgery.  Her number is [phone]

## 2021-05-12 NOTE — TELEPHONE ENCOUNTER
Returned call to Patel Argueta, states patient is scheduled for surgery on 5/21. Would like to stop Asa and plavix 5 days prior to surgery and resume after surgery if ok with provider. Also asking for this to be faxed in a letter to their office for pts chart and include pt is low cardiac risk for surgery. I advised Patel lai would forward her message to provider and call her back. fax [phone] for letter.  Attention Patel Argueta

## 2021-05-14 ENCOUNTER — EXTERNAL NURSING HOME DOCUMENTATION (OUTPATIENT)
Dept: INTERNAL MEDICINE CLINIC | Age: 71
End: 2021-05-14
Payer: MEDICARE

## 2021-05-14 DIAGNOSIS — I73.9 PAD (PERIPHERAL ARTERY DISEASE) (HCC): ICD-10-CM

## 2021-05-14 DIAGNOSIS — I65.22 CAROTID ARTERY STENOSIS, SYMPTOMATIC, LEFT: ICD-10-CM

## 2021-05-14 DIAGNOSIS — Z01.818 PRE-OPERATIVE CLEARANCE: ICD-10-CM

## 2021-05-14 DIAGNOSIS — C44.219: Primary | ICD-10-CM

## 2021-05-14 DIAGNOSIS — I25.118 CORONARY ARTERY DISEASE OF NATIVE ARTERY OF NATIVE HEART WITH STABLE ANGINA PECTORIS (HCC): ICD-10-CM

## 2021-05-14 PROCEDURE — 99309 SBSQ NF CARE MODERATE MDM 30: CPT | Performed by: INTERNAL MEDICINE

## 2021-05-14 NOTE — LETTER
NOTIFICATION RETURN TO WORK / SCHOOL 
 
5/14/2021 12:23 PM 
 
Mr. Francisco Walker [facility] [address] [facility],[address] [address] To Whom It May Concern: 
 
Francisco Walker is currently under the care of [address] He  
 
If there are questions or concerns please have the patient contact our office. Sincerely, Ashia Doyle MD

## 2021-05-14 NOTE — LETTER
NOTIFICATION RETURN TO WORK / SCHOOL 
 
5/14/2021 12:35 PM 
 
Mr. Claudia Cespedes [facility]. [address] [facility],[address] [address] To Whom It May Concern: 
 
Claudia Cespedes is currently under the care of [address]. He  has coronary artery disease, also has history of carotid artery stenosis with an history of an aunt arterectomy. He has been doing well. No chest pain palpitation or shortness of breath. I have seen him and did complete medical examinations he is cleared for surgery for his skin cancer with pending labs and EKG. If there are questions or concerns please have the patient contact our office. Sincerely, Brian Garner MD

## 2021-05-17 NOTE — TELEPHONE ENCOUNTER
returned call to Raoul Avalos and advised ok for surgery and will fax letter to her.  She states pt is now admitted to [facility] he came in over the weekend and meds are being held now and has already had EKG done there

## 2021-06-04 ENCOUNTER — EXTERNAL NURSING HOME DOCUMENTATION (OUTPATIENT)
Dept: INTERNAL MEDICINE CLINIC | Age: 71
End: 2021-06-04
Payer: MEDICARE

## 2021-06-04 DIAGNOSIS — I25.118 CORONARY ARTERY DISEASE OF NATIVE ARTERY OF NATIVE HEART WITH STABLE ANGINA PECTORIS (HCC): ICD-10-CM

## 2021-06-04 DIAGNOSIS — C44.219: Primary | ICD-10-CM

## 2021-06-04 DIAGNOSIS — Z98.890: ICD-10-CM

## 2021-06-04 DIAGNOSIS — I65.22 CAROTID ARTERY STENOSIS, SYMPTOMATIC, LEFT: ICD-10-CM

## 2021-06-04 DIAGNOSIS — R26.9 GAIT ABNORMALITY: ICD-10-CM

## 2021-06-04 DIAGNOSIS — I73.9 PAD (PERIPHERAL ARTERY DISEASE) (HCC): ICD-10-CM

## 2021-06-04 PROCEDURE — 99306 1ST NF CARE HIGH MDM 50: CPT | Performed by: INTERNAL MEDICINE

## 2021-06-04 NOTE — PROGRESS NOTES
History of Present Illness: The patient is a 53-year-old  male with past medical history significant for severe x2 hypertension, peripheral vascular disease, CKD, and basal cell carcinoma of the left cheek status post Mohs surgery done on January 2020 and left forehead wound surgery done in 2020 with sensory neural hearing loss and decreased vision in the right eye. He has a large right cheek squamous cell carcinoma diagnosed greater than two years back and he was admitted to [facility] and underwent radical resection of neoplasm of the right face, cheek, partial maxillectomy sparing orbit, right modified neck dissection, right pectoralis major myocutaneous flap repair, local tissue advancement repair in chest, split-thickness skin graft repair in the chest, and right superficial parotidectomy with facial nerve dissection. The patient had episodes of dyspnea for which he needed oxygen. Postoperatively, he has some pharyngeal dysphagia. Speech therapy is working with him. He is able to swallow slowly. Already has PEG tube placement done. He did have some dark melanotic stool for which Plavix was on hold. He has been under ENT and with close supervision eye care was given. The pain was controlled with oxycodone. Plavix was restarted. Speech therapist was working with him. He was stabilized and was transferred to [facility]. He is doing well under rehab. His face is healed up along with the chest wall. No other discomfort right now. Past Medical History:  CVA, GERD, hyperlipidemia, hypertension, malignant neoplasm of the skin of the right face. Past Surgical History:  Multiple past surgical history including surgical intervention for the basal cell cancer with Mohs surgery. Social History:  The patient used to smoke over 40 years. He is a former smoker right now. He does not drink alcohol right now.   He has been staying in [facility] for the past several months. Family History:  Noncontributory. Allergies:  He is not allergic to medications. Medications:  He is on Tylenol 650 mg every four hours as needed, Lipitor 40 mg at bedtime, Colace 100 mg twice a day, metoprolol 75 mg twice a day, pantoprazole 40 mg once a day, aspirin 81 mg once a day, hydralazine 25 mg every four hours, isosorbide 10 mg p.o. three times a day, metoprolol 25 mg twice a day, Plavix 75 mg once a day, and isosorbide 30 mg once a day via G-tube. Review of Systems:  Complete review of systems done. Right now essentially negative. Physical Examination:  GENERAL:  This is a pleasant  male not apparent distress. VITALS:  T:  98 degrees Fahrenheit. P:  95 per minute. BP:  150/68 mmHg. SaO2:  98% on room air. Weight:  162 pounds. HEENT:  No abnormality detected. The patient has healed up right-sided facial skin flap. No sinus infection. NECK:  Supple, no JVD, no carotid bruits, no thyromegaly. CHEST:  Chest is clear to auscultation bilaterally. No rales, no rhonchi. Chest wall skin graft area healed up also. CARDIOVASCULAR:  S1, S2 normal.  Regular rate and rhythm. ABDOMEN:  Soft, nontender, nondistended, bowel sounds present. PEG tube placed in abdomen. NEUROLOGICAL:  Alert and oriented x3. Cranial nerves II through XII grossly intact. Motor 4/5 bilaterally. Sensory within normal limits. Assessment and Plan:   1. Basal cell carcinoma of the face, status post resection of the cancer with partial maxillectomy with right parotidectomy in a patient with facial lesion and reconstruction with skin flap. The patient is doing well. He is on pain medication for pain control. He is working with Speech Therapy. He is already on PEG tube. He will follow up with ENT in [facility].  2. History of CVA. The patient is on aspirin. Plavix restarted. He is also on statin. 3. Hypertension. Stable blood pressure. He is on metoprolol, doing well.   4. Coronary artery disease. He is on aspirin, Plavix, statin, and isosorbide. He is doing well. 5. History of severe anemia. We will monitor his hemoglobin and hematocrit. 6. Nutrition. The patient is on PEG tube feeding right now. Speech is working with him for swallowing. THIS IS NOT A COMPLETE MEDICAL RECORD ON THIS PATIENT.    THIS IS A PATIENT AT [facility].    PLEASE CONTACT THE FACILITY LISTED BELOW FOR MORE INFORMATION ON THIS PATIENT.    THE COMPLETE RECORD RESIDES WITH THIS LONG TERM CARE FACILITY

## 2021-06-11 ENCOUNTER — EXTERNAL NURSING HOME DOCUMENTATION (OUTPATIENT)
Dept: INTERNAL MEDICINE CLINIC | Age: 71
End: 2021-06-11
Payer: MEDICARE

## 2021-06-11 DIAGNOSIS — I65.22 CAROTID ARTERY STENOSIS, SYMPTOMATIC, LEFT: ICD-10-CM

## 2021-06-11 DIAGNOSIS — G47.00 INSOMNIA, UNSPECIFIED TYPE: Primary | ICD-10-CM

## 2021-06-11 DIAGNOSIS — I25.118 CORONARY ARTERY DISEASE OF NATIVE ARTERY OF NATIVE HEART WITH STABLE ANGINA PECTORIS (HCC): ICD-10-CM

## 2021-06-11 DIAGNOSIS — F32.A DEPRESSION, UNSPECIFIED DEPRESSION TYPE: ICD-10-CM

## 2021-06-11 DIAGNOSIS — Z98.890: ICD-10-CM

## 2021-06-11 DIAGNOSIS — I73.9 PAD (PERIPHERAL ARTERY DISEASE) (HCC): ICD-10-CM

## 2021-06-11 PROCEDURE — 99309 SBSQ NF CARE MODERATE MDM 30: CPT | Performed by: INTERNAL MEDICINE

## 2021-06-11 NOTE — PROGRESS NOTES
Progress Note     Subjective:  Mr. Carlos Espino is very irritable today. He is not able to sleep at all. He is very frustrated. He had a recent history of surgery done for his right cheek squamous cell carcinoma. He had surgery and is healing properly. He has seen a maxillofacial surgeon and surgery is working well. He has a PEG tube. He has tried to eat through mouth also. Otherwise, he is doing okay. Objective:    VITALS:  T:  96.9 degrees Fahrenheit. P:  70 per minute. BP:  152/70 mmHg. SaO2:  98% on room air. Weight is 163 pounds. HEENT:  No abnormality detected. Right cheek, status post surgery, the skin flap looks healed up properly. NECK:  Supple, no JVD, no carotid bruits, no thyromegaly. CHEST:  Chest is clear to auscultation bilaterally. No rales, no rhonchi. CARDIOVASCULAR:  S1, S2 normal.  Regular rate and rhythm. ABDOMEN:  Soft, nontender, nondistended, bowel sounds present. PEG tube present. NEUROLOGICAL:  Alert and oriented x3. No neurological deficits. Assessment and Plan:   1. Insomnia and agitation. Probably, the patient is depressed. We will start him on Remeron 7.5 mg every night and we will increase up 15 mg in two weeks. 2. Right cheek squamous cell carcinoma. The patient is status post parotidectomy, maxillary surgery, and skin grafting done. He is following up with ENT surgeon this year. He is doing well. 3. History of CVA. The patient is on aspirin, statin and Plavix. We will continue it. 4. Coronary artery disease. The patient is on metoprolol, aspirin, statin, and isosorbide. Doing well. 5. Hypertension. Stable blood pressure on metoprolol. THIS IS NOT A COMPLETE MEDICAL RECORD ON THIS PATIENT.    THIS IS A PATIENT AT [facility].    PLEASE CONTACT THE FACILITY LISTED BELOW FOR MORE INFORMATION ON THIS PATIENT.    THE COMPLETE RECORD RESIDES WITH THIS LONG TERM CARE FACILITY

## 2021-06-17 ENCOUNTER — HOSPITAL ENCOUNTER (EMERGENCY)
Age: 71
Discharge: NURSING, REHAB OR LONG-TERM CARE | End: 2021-06-17
Attending: STUDENT IN AN ORGANIZED HEALTH CARE EDUCATION/TRAINING PROGRAM
Payer: MEDICARE

## 2021-06-17 VITALS
RESPIRATION RATE: 15 BRPM | HEIGHT: 68 IN | TEMPERATURE: 99.8 F | HEART RATE: 91 BPM | DIASTOLIC BLOOD PRESSURE: 73 MMHG | BODY MASS INDEX: 21.22 KG/M2 | WEIGHT: 139.99 LBS | OXYGEN SATURATION: 96 % | SYSTOLIC BLOOD PRESSURE: 134 MMHG

## 2021-06-17 DIAGNOSIS — Z01.89 ENCOUNTER FOR LABORATORY TEST: Primary | ICD-10-CM

## 2021-06-17 DIAGNOSIS — D50.9 IRON DEFICIENCY ANEMIA, UNSPECIFIED IRON DEFICIENCY ANEMIA TYPE: ICD-10-CM

## 2021-06-17 LAB
ALBUMIN SERPL-MCNC: 2.6 G/DL (ref 3.5–5)
ALBUMIN/GLOB SERPL: 0.6 {RATIO} (ref 1.1–2.2)
ALP SERPL-CCNC: 119 U/L (ref 45–117)
ALT SERPL-CCNC: 10 U/L (ref 12–78)
ANION GAP SERPL CALC-SCNC: 6 MMOL/L (ref 5–15)
AST SERPL-CCNC: 11 U/L (ref 15–37)
ATRIAL RATE: 98 BPM
BASOPHILS # BLD: 0 K/UL (ref 0–0.1)
BASOPHILS NFR BLD: 1 % (ref 0–1)
BILIRUB SERPL-MCNC: 0.3 MG/DL (ref 0.2–1)
BUN SERPL-MCNC: 33 MG/DL (ref 6–20)
BUN/CREAT SERPL: 22 (ref 12–20)
CALCIUM SERPL-MCNC: 8.7 MG/DL (ref 8.5–10.1)
CALCULATED P AXIS, ECG09: 47 DEGREES
CALCULATED R AXIS, ECG10: 36 DEGREES
CALCULATED T AXIS, ECG11: 52 DEGREES
CHLORIDE SERPL-SCNC: 108 MMOL/L (ref 97–108)
CO2 SERPL-SCNC: 25 MMOL/L (ref 21–32)
COMMENT, HOLDF: NORMAL
CREAT SERPL-MCNC: 1.48 MG/DL (ref 0.7–1.3)
DIAGNOSIS, 93000: NORMAL
DIFFERENTIAL METHOD BLD: ABNORMAL
EOSINOPHIL # BLD: 0.1 K/UL (ref 0–0.4)
EOSINOPHIL NFR BLD: 3 % (ref 0–7)
ERYTHROCYTE [DISTWIDTH] IN BLOOD BY AUTOMATED COUNT: 20.8 % (ref 11.5–14.5)
GLOBULIN SER CALC-MCNC: 4.6 G/DL (ref 2–4)
GLUCOSE SERPL-MCNC: 112 MG/DL (ref 65–100)
HCT VFR BLD AUTO: 26.7 % (ref 36.6–50.3)
HGB BLD-MCNC: 7.9 G/DL (ref 12.1–17)
IMM GRANULOCYTES # BLD AUTO: 0 K/UL
IMM GRANULOCYTES NFR BLD AUTO: 0 %
LYMPHOCYTES # BLD: 0.3 K/UL (ref 0.8–3.5)
LYMPHOCYTES NFR BLD: 6 % (ref 12–49)
MCH RBC QN AUTO: 30 PG (ref 26–34)
MCHC RBC AUTO-ENTMCNC: 29.6 G/DL (ref 30–36.5)
MCV RBC AUTO: 101.5 FL (ref 80–99)
MONOCYTES # BLD: 0.1 K/UL (ref 0–1)
MONOCYTES NFR BLD: 2 % (ref 5–13)
NEUTS SEG # BLD: 3.7 K/UL (ref 1.8–8)
NEUTS SEG NFR BLD: 88 % (ref 32–75)
NRBC # BLD: 0 K/UL (ref 0–0.01)
NRBC BLD-RTO: 0 PER 100 WBC
P-R INTERVAL, ECG05: 108 MS
PLATELET # BLD AUTO: 389 K/UL (ref 150–400)
PMV BLD AUTO: 11 FL (ref 8.9–12.9)
POTASSIUM SERPL-SCNC: 4.1 MMOL/L (ref 3.5–5.1)
PROT SERPL-MCNC: 7.2 G/DL (ref 6.4–8.2)
Q-T INTERVAL, ECG07: 346 MS
QRS DURATION, ECG06: 92 MS
QTC CALCULATION (BEZET), ECG08: 441 MS
RBC # BLD AUTO: 2.63 M/UL (ref 4.1–5.7)
RBC MORPH BLD: ABNORMAL
RBC MORPH BLD: ABNORMAL
SAMPLES BEING HELD,HOLD: NORMAL
SODIUM SERPL-SCNC: 139 MMOL/L (ref 136–145)
VENTRICULAR RATE, ECG03: 98 BPM
WBC # BLD AUTO: 4.2 K/UL (ref 4.1–11.1)

## 2021-06-17 PROCEDURE — 99285 EMERGENCY DEPT VISIT HI MDM: CPT

## 2021-06-17 PROCEDURE — 93005 ELECTROCARDIOGRAM TRACING: CPT

## 2021-06-17 PROCEDURE — 85025 COMPLETE CBC W/AUTO DIFF WBC: CPT

## 2021-06-17 PROCEDURE — 36415 COLL VENOUS BLD VENIPUNCTURE: CPT

## 2021-06-17 PROCEDURE — 74011250637 HC RX REV CODE- 250/637: Performed by: STUDENT IN AN ORGANIZED HEALTH CARE EDUCATION/TRAINING PROGRAM

## 2021-06-17 PROCEDURE — 80053 COMPREHEN METABOLIC PANEL: CPT

## 2021-06-17 RX ORDER — ACETAMINOPHEN 325 MG/1
650 TABLET ORAL
Status: COMPLETED | OUTPATIENT
Start: 2021-06-17 | End: 2021-06-17

## 2021-06-17 RX ADMIN — ACETAMINOPHEN 650 MG: 325 TABLET ORAL at 15:44

## 2021-06-17 NOTE — PROGRESS NOTES
Care Management - Received call to assist with stretcher transportation home for patient. [facility] transport times are hours out. Per chart review. Patient is currently at [facility]. Reason for stretcher is history of stroke. [facility]. ETA is 20:30.    17:36 Called [facility] ([phone]). Spoke with Anjana Vargas. ETA is approximately 45 minutes. CM faxed face sheet and PCS to him at [phone]. Updated patient and RN. Gave forms and packet to RN.      Narendra Casarez MSW

## 2021-06-17 NOTE — ED NOTES
TRANSFER - OUT REPORT:    Verbal report given to Palmer Pate on Tammie Carnes  being transferred to [facility] via [employer]. Report consisted of patients Situation, Background, Assessment and   Recommendations(SBAR). Information from the following report(s) SBAR was reviewed with the receiving nurse. IV was removed prior to discharge with cath tip intact. Opportunity for questions and clarification was provided.

## 2021-06-17 NOTE — ED TRIAGE NOTES
Pt arrives via ems from [facility] c/o abnormal labs, 6.3 low hemoglobin. EMS reports /71, HR 90s, 99%RA. Pt denies any symptoms.

## 2021-06-17 NOTE — ED PROVIDER NOTES
65-year-old presenting with concern for worsening anemia. Outpatient labs demonstrated a hemoglobin less than 7. He denies any symptoms including lightheadedness, syncope, chest pain, dyspnea, GI bleeding, hematemesis. Past Medical History:   Diagnosis Date    Skin cancer 12/19/2019    bcc-left forehead, left neck, left lateral cheek     Stroke (cerebrum) [facility])        Past Surgical History:   Procedure Laterality Date    COLONOSCOPY N/A 2/24/2021    COLONOSCOPY   :- performed by Loyda Huynh MD at [place] [address] UNLISTED      feeding tube         History reviewed. No pertinent family history. Social History     Socioeconomic History    Marital status:      Spouse name: Not on file    Number of children: Not on file    Years of education: Not on file    Highest education level: Not on file   Occupational History    Not on file   Tobacco Use    Smoking status: Current Every Day Smoker    Smokeless tobacco: Never Used   Substance and Sexual Activity    Alcohol use: Yes    Drug use: Not on file    Sexual activity: Not on file   Other Topics Concern    Not on file   Social History Narrative    Not on file     Social Determinants of Health     Financial Resource Strain:     Difficulty of Paying Living Expenses:    Food Insecurity:     Worried About Running Out of Food in the Last Year:     [address] in the Last Year:    Transportation Needs:     Lack of Transportation (Medical):      Lack of Transportation (Non-Medical):    Physical Activity:     Days of Exercise per Week:     Minutes of Exercise per Session:    Stress:     Feeling of Stress :    Social Connections:     Frequency of Communication with Friends and Family:     Frequency of Social Gatherings with Friends and Family:     Attends [employer] Services:     Active Member of Clubs or Organizations:     Attends Club or Organization Meetings:     Marital Status:    Intimate Partner Violence:     Fear of Current or Ex-Partner:     Emotionally Abused:     Physically Abused:     Sexually Abused: ALLERGIES: Patient has no known allergies. Review of Systems   Constitutional: Negative for fever. Respiratory: Negative for cough and shortness of breath. Gastrointestinal: Negative for abdominal pain. Genitourinary: Negative for dysuria. Musculoskeletal: Negative for back pain. Neurological: Negative for light-headedness. Psychiatric/Behavioral: Negative for confusion. All other systems reviewed and are negative. Vitals:    21 1313 21 1450   BP: 135/70    Pulse: 94    Resp: 15    Temp: 100.2 °F (37.9 °C)    SpO2: 99% 95%   Weight: 63.5 kg (139 lb 15.9 oz)    Height: 5' 8\" (1.727 m)             Physical Exam  Constitutional:       General: He is not in acute distress. Appearance: He is not toxic-appearing. HENT:      Head: Normocephalic. Comments: Skin graft over the right face     Mouth/Throat:      Mouth: Mucous membranes are moist.   Eyes:      Extraocular Movements: Extraocular movements intact. Cardiovascular:      Rate and Rhythm: Normal rate and regular rhythm. Heart sounds: Normal heart sounds. Pulmonary:      Effort: Pulmonary effort is normal. No respiratory distress. Breath sounds: Normal breath sounds. Abdominal:      Palpations: Abdomen is soft. Tenderness: There is no abdominal tenderness. Musculoskeletal:      Cervical back: Normal range of motion. Right lower leg: No edema. Left lower leg: No edema. Skin:     Capillary Refill: Capillary refill takes less than 2 seconds. Neurological:      General: No focal deficit present. Mental Status: He is alert and oriented to person, place, and time.    Psychiatric:         Mood and Affect: Mood normal.          MDM  Number of Diagnoses or Management Options         MEDICAL DECISION MAKIN y.o. male presents with Abnormal Lab Results      LABORATORY TESTS:  Labs Reviewed   CBC WITH AUTOMATED DIFF - Abnormal; Notable for the following components:       Result Value    RBC 2.63 (*)     HGB 7.9 (*)     HCT 26.7 (*)     .5 (*)     MCHC 29.6 (*)     RDW 20.8 (*)     NEUTROPHILS 88 (*)     LYMPHOCYTES 6 (*)     MONOCYTES 2 (*)     ABS. LYMPHOCYTES 0.3 (*)     All other components within normal limits   METABOLIC PANEL, COMPREHENSIVE - Abnormal; Notable for the following components:    Glucose 112 (*)     BUN 33 (*)     Creatinine 1.48 (*)     BUN/Creatinine ratio 22 (*)     GFR est AA 57 (*)     GFR est non-AA 47 (*)     ALT (SGPT) 10 (*)     AST (SGOT) 11 (*)     Alk. phosphatase 119 (*)     Albumin 2.6 (*)     Globulin 4.6 (*)     A-G Ratio 0.6 (*)     All other components within normal limits   SAMPLES BEING HELD   SAMPLE TO BLOOD BANK       IMAGING RESULTS:  No orders to display       MEDICATIONS GIVEN:  Medications   acetaminophen (TYLENOL) tablet 650 mg (has no administration in time range)       PROGRESS NOTE:   2:56 PM Patient has remained stable and is ready for discharge         EKG:  none completed    CONSULTS:      IMPRESSION:  1. Encounter for laboratory test    2. Iron deficiency anemia, unspecified iron deficiency anemia type        PLAN:  -   Discharge  Current Discharge Medication List        Follow-up Information     Follow up With Specialties Details Why Contact Info    [address] [facility]  If symptoms worsen [address]  [phone]        Return precautions given    Patient presented with concern for worsening anemia although his laboratories appear to be at baseline here. Will discharge with plan to return in case he has any new or worsening symptoms.   He has no complaints        Sherine Donahue MD          Procedures

## 2021-06-18 ENCOUNTER — EXTERNAL NURSING HOME DOCUMENTATION (OUTPATIENT)
Dept: INTERNAL MEDICINE CLINIC | Age: 71
End: 2021-06-18
Payer: MEDICARE

## 2021-06-18 DIAGNOSIS — I25.118 CORONARY ARTERY DISEASE OF NATIVE ARTERY OF NATIVE HEART WITH STABLE ANGINA PECTORIS (HCC): ICD-10-CM

## 2021-06-18 DIAGNOSIS — I73.9 PAD (PERIPHERAL ARTERY DISEASE) (HCC): ICD-10-CM

## 2021-06-18 DIAGNOSIS — D64.9 ANEMIA, UNSPECIFIED TYPE: ICD-10-CM

## 2021-06-18 DIAGNOSIS — R19.7 DIARRHEA, UNSPECIFIED TYPE: ICD-10-CM

## 2021-06-18 DIAGNOSIS — R11.2 NAUSEA AND VOMITING, INTRACTABILITY OF VOMITING NOT SPECIFIED, UNSPECIFIED VOMITING TYPE: Primary | ICD-10-CM

## 2021-06-18 DIAGNOSIS — C44.219: ICD-10-CM

## 2021-06-18 PROCEDURE — 99309 SBSQ NF CARE MODERATE MDM 30: CPT | Performed by: INTERNAL MEDICINE

## 2021-06-18 NOTE — PROGRESS NOTES
Progress Note    Subjective:  Mr. Shirley mAaro is having vomiting and loose stool for past one day. He had low-grade fever and his hemoglobin was 6.9 yesterday, so he was sent to the emergency room. In the emergency room, hemoglobin repeated came back to 7.9. He has history of chronic anemia. He only received two Tylenol for low fever and was sent back to rehab. He is doing well, but continues to have vomiting and diarrhea. No abdominal pain, no other discomfort. He is having both tube feeding and he is also feeding by mouth. Needs to cut back on tube feeding. No other discomfort. Objective:  VITALS:  T:  98.3 degrees Fahrenheit. P:  94 per minute. BP:  157/74 mmHg. SaO2:  95% on room air. Weight is 163 pounds. HEENT:  No abnormality detected. The patient has graft on the face from resection of the tumor from right side of the face. NECK:  Supple, no JVD, no carotid bruits, no thyromegaly. CHEST:  Chest is clear to auscultation bilaterally. No rales, no rhonchi. CARDIOVASCULAR:  S1, S2 normal.  Regular rate and rhythm. ABDOMEN:  Soft, nontender, nondistended, bowel sounds present. EXTREMITIES:  No edema is noticed. Dorsalis pedis pulse normal.  NEUROLOGICAL:  Alert and oriented x2. No neurological deficits. Assessment and Plan:  1. Nausea, vomiting and loose stool. Probably overfeeding. We will cut back on tube feeding right now. The patient is also eating by mouth, will continue with it. We will give him Questran powder one packet every day for three days to control loose bowel. We will monitor him closely. 2. Anemia. Hemoglobin came up to 7.9. We will follow up iron panel. We will put him on vitamin C one tablet a day. We will monitor him next week. 3. GI blood loss. He is on Protonix. No active bleeding noticed. The patient also on Plavix. 4. Coronary artery disease. Taking aspirin, Plavix, and statin.   5. As a result of skin cancer, the patient had recent surgery done on his face. He is doing well. THIS IS NOT A COMPLETE MEDICAL RECORD ON THIS PATIENT.    THIS IS A PATIENT AT [facility].    PLEASE CONTACT THE FACILITY LISTED BELOW FOR MORE INFORMATION ON THIS PATIENT.    THE COMPLETE RECORD RESIDES WITH THIS LONG TERM CARE FACILITY

## 2021-06-25 ENCOUNTER — EXTERNAL NURSING HOME DOCUMENTATION (OUTPATIENT)
Dept: INTERNAL MEDICINE CLINIC | Age: 71
End: 2021-06-25
Payer: MEDICARE

## 2021-06-25 DIAGNOSIS — R11.2 NON-INTRACTABLE VOMITING WITH NAUSEA, UNSPECIFIED VOMITING TYPE: ICD-10-CM

## 2021-06-25 DIAGNOSIS — B49 FUNGEMIA: Primary | ICD-10-CM

## 2021-06-25 DIAGNOSIS — E44.0 MODERATE PROTEIN-CALORIE MALNUTRITION (HCC): ICD-10-CM

## 2021-06-25 DIAGNOSIS — I25.118 CORONARY ARTERY DISEASE OF NATIVE ARTERY OF NATIVE HEART WITH STABLE ANGINA PECTORIS (HCC): ICD-10-CM

## 2021-06-25 DIAGNOSIS — N43.3 HYDROCELE, UNSPECIFIED HYDROCELE TYPE: ICD-10-CM

## 2021-06-25 DIAGNOSIS — I25.10 CVD (CARDIOVASCULAR DISEASE): ICD-10-CM

## 2021-06-25 PROCEDURE — 99309 SBSQ NF CARE MODERATE MDM 30: CPT | Performed by: INTERNAL MEDICINE

## 2021-06-25 NOTE — PROGRESS NOTES
Progress Note     Subjective:  Mr. Jessica Artis has stopped vomiting and he has been vomiting for the past several days whenever he is receiving Prosup, which is a protein mixture. I am going to discontinue Prosup. He has urinary discomfort and urine culture showed lots of yeast infection, bacterial culture still pending. He has no discomfort down there, but nurse practitioner noticed that the patient has mushy and large testicles. She wants me to evaluate him. No discomfort in the scrotal area. No fever right now. Appetite is not great, but speech and dietitian is working with him. Objective:    VITALS:  T:  98.4 degrees Fahrenheit. P:  68 per minute. BP:  102/66 mmHg. SaO2:  94% on room air. Weight is 163 pounds. HEENT:  No abnormality detected. NECK:  Supple, no JVD, no carotid bruits, no thyromegaly. CHEST:  Chest is clear to auscultation bilaterally. No rales, no rhonchi. CARDIOVASCULAR:  S1, S2 normal.  Regular rate and rhythm. ABDOMEN:  Soft, nontender, nondistended, bowel sounds present. EXTREMITIES:  No edema is noted. Dorsalis pedis pulse normal.    NEUROLOGICAL:  Alert and oriented x3. GENITOURINARY:  The patient has hydrocele on left side of the testicle. No redness, no tenderness on the testicles. Laboratory Data:   Hemoglobin 6.1. Urine culture pending, but it showed lot of fungus. Assessment and Plan:   1. Nausea and vomiting. Probably Prosup induced. We will discontinue Prosup. The patient hs stopped vomiting. Speech and dietary is working with him. He is not happy about his pureed diet. Speech is advancing his diet as a trial basis. He will continue tube feeding since his intake is low, but we will cautiously monitor his intake. 2. Severe anemia. Last hemoglobin came back 6.1. The patient has chronic anemia since his cancer. He is already on iron supplement. We will repeat H and H and iron panel today and we will monitor him closely. 3. Funguria.   The patient already on Diflucan 100 mg once a day for seven days. We will wait for urine culture for any bacteria. 4. Coronary artery disease. He is stable taking aspirin, Plavix and statin. 5. Basal cell cancer of the skin. The patient underwent surgery. Skin graft is healing properly. 6. History of GI blood loss. He is on Protonix. No active bleeding noticed right now. THIS IS NOT A COMPLETE MEDICAL RECORD ON THIS PATIENT.    THIS IS A PATIENT AT [facility].    PLEASE CONTACT THE FACILITY LISTED BELOW FOR MORE INFORMATION ON THIS PATIENT.    THE COMPLETE RECORD RESIDES WITH THIS LONG TERM CARE FACILITY

## 2021-06-29 ENCOUNTER — EXTERNAL NURSING HOME DOCUMENTATION (OUTPATIENT)
Dept: INTERNAL MEDICINE CLINIC | Age: 71
End: 2021-06-29
Payer: MEDICARE

## 2021-06-29 DIAGNOSIS — R13.10 SWALLOWING PROBLEM: ICD-10-CM

## 2021-06-29 DIAGNOSIS — F32.A DEPRESSION, UNSPECIFIED DEPRESSION TYPE: ICD-10-CM

## 2021-06-29 DIAGNOSIS — C44.219: ICD-10-CM

## 2021-06-29 DIAGNOSIS — I25.118 CORONARY ARTERY DISEASE OF NATIVE ARTERY OF NATIVE HEART WITH STABLE ANGINA PECTORIS (HCC): Primary | ICD-10-CM

## 2021-06-29 DIAGNOSIS — E44.0 MODERATE PROTEIN-CALORIE MALNUTRITION (HCC): ICD-10-CM

## 2021-06-29 PROCEDURE — 99309 SBSQ NF CARE MODERATE MDM 30: CPT | Performed by: INTERNAL MEDICINE

## 2021-06-29 NOTE — PROGRESS NOTES
Progress Note    Subjective: The patient is doing well. His urine showed yeast, which has been treated appropriately. No discomfort during urination. He has stopped vomiting since we have stopped his red sauce which he is allergic to. Speech is working with him closely and he is progressing with his diet; he is on mechanical soft. Overall, he has improved significantly. Objective:  VITALS:  T:  96.3 degrees Fahrenheit. P:  55 per minute. BP:  119/75 mmHg. SaO2:  98% on room air. Weight is 163 pounds. HEENT:  No abnormality detected. NECK:  Supple, no JVD, no carotid bruits, no thyromegaly. CHEST:  Chest is clear to auscultation bilaterally. No rales, no rhonchi. CARDIOVASCULAR:  S1, S2 normal.  Regular rate and rhythm. ABDOMEN:  Soft, nontender, nondistended, bowel sounds present. EXTREMITIES:  No edema is noted. Dorsalis pedis pulse normal.  NEUROLOGICAL:  Alert and oriented x3. No neurological deficit. Assessment and Plan:  1. Funguria. The patient is on Diflucan. He is improving. Has good appetite, doing well. 2. Recent surgery for oral cancer. The patient's    looks healthy. He is working with Speech Therapy. She is advancing his diet. 3. Nausea and vomiting, which have subsided. He is allergic to    sauce. 4. Mixed UTI. The patient is drinking lot of water. He is on catheter. Urine culture showed mixed urogenital colony. Did not put him on any antibiotic right now. We will closely monitor him. Anemia. Hemoglobin was low. I have repeated hemoglobin and hematocrit along with iron panel. Results are still pending. We will monitor him. He is on iron sulfate. He is on vitamin C for anemia. 5. Coronary artery disease. He is on aspirin, pravastatin. We will continue it. 6. History of GI bleeding. He is on Protonix. He is doing well. THIS IS NOT A COMPLETE MEDICAL RECORD ON THIS PATIENT.    THIS IS A PATIENT AT [facility].    PLEASE CONTACT THE FACILITY LISTED BELOW FOR MORE INFORMATION ON THIS PATIENT.    THE COMPLETE RECORD RESIDES WITH THIS LONG TERM CARE FACILITY

## 2021-07-13 ENCOUNTER — EXTERNAL NURSING HOME DOCUMENTATION (OUTPATIENT)
Dept: INTERNAL MEDICINE CLINIC | Age: 71
End: 2021-07-13
Payer: MEDICARE

## 2021-07-13 DIAGNOSIS — I65.22 CAROTID ARTERY STENOSIS, SYMPTOMATIC, LEFT: ICD-10-CM

## 2021-07-13 DIAGNOSIS — I25.10 CVD (CARDIOVASCULAR DISEASE): ICD-10-CM

## 2021-07-13 DIAGNOSIS — I25.118 CORONARY ARTERY DISEASE OF NATIVE ARTERY OF NATIVE HEART WITH STABLE ANGINA PECTORIS (HCC): ICD-10-CM

## 2021-07-13 DIAGNOSIS — D64.9 SEVERE ANEMIA: Primary | ICD-10-CM

## 2021-07-13 PROCEDURE — 99306 1ST NF CARE HIGH MDM 50: CPT | Performed by: INTERNAL MEDICINE

## 2021-07-13 NOTE — PROGRESS NOTES
History of Present Illness: This is a 76-year-old male with past medical history significant for dysphagia, prior history of anemia, stroke and coronary artery stenosis on Plavix, has recent PEG tube placement and facial cancer post excision and facial graft, hypertension, was found to have dizziness. Blood work showed hemoglobin 4 and was sent to [facility] emergency room. He had no report of GI blood loss. Troponin was initially elevated. Cardiologist was in probably due to demand ischemia and no further intervention done from cardiologist standpoint. He was seen by heme oncologist.  He has received 2 units of packed RBCs. He was placed on Protonix IV b.i.d. Recent workup showed anemia including endoscopic colonoscopy done at [address] showed hiatal hernia and diverticulosis on colonoscopy. No further GI workup was done. Had an echocardiogram done, showed ejection fraction 40 to 45%. Stool was positive for blood. He is on iron supplement daily and CT abdomen was done, showed no sign of bleeding and large colonic stool burden seen. He was stabilized. His hemoglobin improved. He was stabilized and was transferred to [facility]. He is doing well in the rehab and no active bleeding right now. He is not bleeding right now. 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 Allergies:  He is not allergic to any medications. Past Surgical History:  Significant for left carotid endarterectomy with Dacron patch, right now invasive surgery for right sided facial cancer, endoscopy and colonoscopy. Family History:  Noncontributory. Social History:  The patient is a smoker. He drinks alcohol. He lives alone, but recently he has been staying in [facility]. Medications:   The patient is on oxycodone oral solution 5 mg every four hours as needed, MiraLax 17 g once a day, Remeron 15 mg once a day, Senokot twice a day, sorbitol 30 mg _____ before meals, Tylenol 650 mg every four hours as needed, artificial tear drops, two drops per eye twice a day, ascorbic acid 500 mg daily, Nexium 40 mg twice a day, iron sulfate 300 mg twice a day, Lipitor 40 mg every night, metoprolol 25 mg twice a day. Review of Systems:  Complete review of systems done. Right now essentially negative. Physical Examination:    General:  This is a pleasant  male, not in apparent distress. VITALS:  T:  97.9 degrees Fahrenheit. P:  76 per minute. BP:  141/79 mmHg. SaO2:  97% on room air. Weight is 173 pounds. HEENT:  No abnormality detected. The patient has right-sided facial graft done which has healed. NECK:  Supple, no JVD, no carotid bruits, no thyromegaly. CHEST:  Chest is clear to auscultation bilaterally. No rales, no rhonchi. CARDIOVASCULAR:  S1, S2 normal.  Regular rate and rhythm. ABDOMEN:  Soft, nontender, nondistended, bowel sounds present. PEG tube present. EXTREMITIES:  No edema noticed. Dorsalis pedis pulse normal.  NEUROLOGICAL:  Alert and oriented x3. Cranial nerves II through XII grossly intact. Motor 5/5 bilaterally. Sensory within normal limits. Assessment and Plan:   1. Severe anemia. Stool was positive for blood, but no active bleeding noticed. CT of the abdomen was negative. No recent endoscopy or colonoscopy done, but past endoscopy, colonoscopy was negative for any active bleeding. The patient is on iron supplement. He has received 2 units of packed RBC. Hemoglobin stable for now. We will monitor hemoglobin closely. He had already seen heme oncologist, we will follow up with heme oncologist.  2. Syncope with non-ST elevation MI due to orthostatic hypotension from severe anemia. Cardiologist has seen him. Echocardiogram was stable. Ejection fraction 45%.   No further intervention done. 3. GI bleeding. No foci of active GI bleeding. Protonix twice a day was given. Aspirin and Plavix were on hold. We will monitor him for now. 4. Peripheral vascular disease on statin. Aspirin and Plavix on hold. 5. Hypertension on metoprolol and isosorbide. We will continue it. 6. Oropharyngeal dysphagia. The patient is on PEG tube. Speech is following with him. 7. Moderate protein calorie malnutrition. We will follow up with Speech specialist.    THIS IS NOT A COMPLETE MEDICAL RECORD ON THIS PATIENT.    THIS IS A PATIENT AT [facility].    PLEASE CONTACT THE FACILITY LISTED BELOW FOR MORE INFORMATION ON THIS PATIENT.    THE COMPLETE RECORD RESIDES WITH THIS LONG TERM CARE FACILITY

## 2021-07-23 ENCOUNTER — EXTERNAL NURSING HOME DOCUMENTATION (OUTPATIENT)
Dept: INTERNAL MEDICINE CLINIC | Age: 71
End: 2021-07-23
Payer: MEDICARE

## 2021-07-23 DIAGNOSIS — I25.118 CORONARY ARTERY DISEASE OF NATIVE ARTERY OF NATIVE HEART WITH STABLE ANGINA PECTORIS (HCC): Primary | ICD-10-CM

## 2021-07-23 DIAGNOSIS — F32.A DEPRESSION, UNSPECIFIED DEPRESSION TYPE: ICD-10-CM

## 2021-07-23 DIAGNOSIS — I25.10 CVD (CARDIOVASCULAR DISEASE): ICD-10-CM

## 2021-07-23 DIAGNOSIS — D64.9 SEVERE ANEMIA: ICD-10-CM

## 2021-07-23 DIAGNOSIS — I73.9 PAD (PERIPHERAL ARTERY DISEASE) (HCC): ICD-10-CM

## 2021-07-23 PROCEDURE — 99309 SBSQ NF CARE MODERATE MDM 30: CPT | Performed by: INTERNAL MEDICINE

## 2021-07-23 NOTE — PROGRESS NOTES
Progress Note     Subjective:  Mr. Tiffani Hong is doing well in the rehab. He is able to eat well. He is on iron supplement. No constipation right now. No active bleeding noticed. Had funguria. The patient was treated with Diflucan. He has improved. Efficacy is good. Objective:    VITALS:  T:  98 degrees Fahrenheit. P:  60 per minute. BP:  138/67 mmHg. SaO2:  97% on room air. Weight is 122 pounds. HEENT:  No abnormality detected. NECK:  Supple, no JVD, no carotid bruits, no thyromegaly. CHEST:  Chest is clear to auscultation bilaterally. No rales, no rhonchi. CARDIOVASCULAR:  S1, S2 normal.  Regular rate and rhythm. ABDOMEN:  Soft, nontender, nondistended, bowel sounds present. EXTREMITIES:  No edema is noted. Dorsalis pedis pulse normal.    NEUROLOGICAL:  Alert and oriented x3. No neurological deficit. Laboratory Data:   No recent labs done. Assessment and Plan:   1. Severe anemia with acute GI bleeding. The patient is on Protonix. The patient received packed RBC from hospital.  We will monitor hemoglobin next week. He is on iron supplement. 2. Coronary artery disease, on aspirin and Plavix. We will continue it. 3. Recent surgery for oral cancer. The patient is able to swallow. He is still having PEG tube feeding. 4. History of stroke and carotid endarterectomy. He is doing well on aspirin and Plavix. THIS IS NOT A COMPLETE MEDICAL RECORD ON THIS PATIENT.    THIS IS A PATIENT AT [facility].    PLEASE CONTACT THE FACILITY LISTED BELOW FOR MORE INFORMATION ON THIS PATIENT.    THE COMPLETE RECORD RESIDES WITH THIS LONG TERM CARE FACILITY

## 2021-09-28 ENCOUNTER — EXTERNAL NURSING HOME DOCUMENTATION (OUTPATIENT)
Dept: INTERNAL MEDICINE CLINIC | Age: 71
End: 2021-09-28
Payer: MEDICARE

## 2021-09-28 DIAGNOSIS — I25.118 CORONARY ARTERY DISEASE OF NATIVE ARTERY OF NATIVE HEART WITH STABLE ANGINA PECTORIS (HCC): Primary | ICD-10-CM

## 2021-09-28 DIAGNOSIS — I65.22 CAROTID ARTERY STENOSIS, SYMPTOMATIC, LEFT: ICD-10-CM

## 2021-09-28 DIAGNOSIS — F32.A DEPRESSION, UNSPECIFIED DEPRESSION TYPE: ICD-10-CM

## 2021-09-28 DIAGNOSIS — I73.9 PAD (PERIPHERAL ARTERY DISEASE) (HCC): ICD-10-CM

## 2021-09-28 DIAGNOSIS — C44.92 SCC (SQUAMOUS CELL CARCINOMA): ICD-10-CM

## 2021-09-28 PROCEDURE — 99309 SBSQ NF CARE MODERATE MDM 30: CPT | Performed by: INTERNAL MEDICINE

## 2021-09-28 NOTE — PROGRESS NOTES
Progress Note     Subjective:  Mr. Luzmaria Welch is doing well in rehab. He had surgical resection of his right cheek with maxillectomy sparing orbit and also parotidectomy done. 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 He finished up chemo and radiation therapy. He has been healing properly. He has followed up with ENT recently. He has been doing well. Still having PEG tube feeding sometimes, but he is able to swallow. He will have PEG tube removed. Otherwise, he has largely healed. He is taking medications. He denies chest pain, palpitations, or shortness of breath. He is mostly bedbound, sometimes he sits in a wheelchair. Objective:  VITALS:  T:  98 degrees Fahrenheit. P:  62 per minute. BP:  130/80 mmHg. SaO2:  97% on room air. HEENT:  No abnormality detected. Right cheek surgical area healed up. Small superficial wound present. NECK:  Supple, no JVD, no carotid bruits. CHEST:  Chest is clear to auscultation bilaterally. No rales, no rhonchi. CARDIOVASCULAR:  S1, S2 normal.  Regular rate and rhythm. ABDOMEN:  Soft, nontender, nondistended, bowel sounds present. G-tube present. EXTREMITIES:  No edema is noticed. Dorsalis pedis pulse normal.  NEUROLOGICAL:  Alert and oriented x3. Laboratory Data:  Recent labs done last month, UA was negative. Also, hemoglobin was 10.1. Assessment and Plan:  1. Squamous cell cancer of the right cheek status post resection, maxillectomy and parotid gland resection done. He is doing well. He is following up with ENT. Radiation finished up. He is able to swallow. Still on PEG tube feeding. He was reevaluated by speech therapy, speech is clear. The PEG tube can be out. 2. Coronary artery disease, on aspirin and Plavix, we will continue. 3. Severe anemia. Last hemoglobin over 10. We will monitor him closely. 4. History of stroke and carotid endarterectomy. He is on aspirin and Plavix.   He is doing well.    THIS IS NOT A COMPLETE MEDICAL RECORD ON THIS PATIENT.    THIS IS A PATIENT AT [facility].    PLEASE CONTACT THE FACILITY LISTED BELOW FOR MORE INFORMATION ON THIS PATIENT.    THE COMPLETE RECORD RESIDES WITH THIS LONG TERM CARE FACILITY

## 2021-10-01 ENCOUNTER — OFFICE VISIT (OUTPATIENT)
Dept: INTERNAL MEDICINE CLINIC | Age: 71
End: 2021-10-01
Payer: MEDICARE

## 2021-10-01 DIAGNOSIS — I73.9 PAD (PERIPHERAL ARTERY DISEASE) (HCC): ICD-10-CM

## 2021-10-01 DIAGNOSIS — I25.118 CORONARY ARTERY DISEASE OF NATIVE ARTERY OF NATIVE HEART WITH STABLE ANGINA PECTORIS (HCC): ICD-10-CM

## 2021-10-01 DIAGNOSIS — Z86.73 HISTORY OF CVA (CEREBROVASCULAR ACCIDENT): ICD-10-CM

## 2021-10-01 DIAGNOSIS — C44.92 SCC (SQUAMOUS CELL CARCINOMA): Primary | ICD-10-CM

## 2021-10-01 PROCEDURE — 1101F PT FALLS ASSESS-DOCD LE1/YR: CPT | Performed by: NURSE PRACTITIONER

## 2021-10-01 PROCEDURE — G8536 NO DOC ELDER MAL SCRN: HCPCS | Performed by: NURSE PRACTITIONER

## 2021-10-01 PROCEDURE — G8432 DEP SCR NOT DOC, RNG: HCPCS | Performed by: NURSE PRACTITIONER

## 2021-10-01 PROCEDURE — 99308 SBSQ NF CARE LOW MDM 20: CPT | Performed by: NURSE PRACTITIONER

## 2021-10-01 NOTE — PROGRESS NOTES
THIS IS NOT A COMPLETE MEDICAL RECORD ON THIS PATIENT. THIS IS A PATIENT AT [facility]. PLEASE CONTACT THE FACILITY LISTED BELOW FOR MORE INFORMATION ON THIS PATIENT. THE COMPLETE RECORD RESIDES WITH THIS LONG TERM CARE FACILITY. HISTORY OF PRESENT ILLNESS  Piedad Caban is a 66 y.o. male. This patient is currently under the care of Dr. Christal Magana at [facility].  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 Patient had recent follow-up with ENT on 09/27/21 with plans for ST consult and consideration of PEG tube removal.   HPI    Review of Systems   Constitutional: Negative for chills and fever. HENT: Negative for congestion. Respiratory: Negative for cough and shortness of breath. Cardiovascular: Negative for chest pain. Gastrointestinal: Negative. Genitourinary: Negative. Musculoskeletal: Negative. Neurological: Negative for headaches. Endo/Heme/Allergies: Negative. Psychiatric/Behavioral: Negative. Physical Exam  Constitutional:       General: He is not in acute distress. HENT:      Head: Normocephalic and atraumatic. Nose: No congestion. Eyes:      Conjunctiva/sclera: Conjunctivae normal.   Cardiovascular:      Rate and Rhythm: Normal rate and regular rhythm. Pulmonary:      Effort: Pulmonary effort is normal.      Breath sounds: Normal breath sounds. Abdominal:      General: Bowel sounds are normal.      Palpations: Abdomen is soft. Comments: PEG in place   Musculoskeletal:      Right lower leg: No edema. Left lower leg: No edema. Skin:     General: Skin is warm and dry. Comments: Surgical flap to right cheek   Neurological:      Mental Status: He is alert. Psychiatric:         Mood and Affect: Mood normal.         ASSESSMENT and PLAN  Encounter Diagnoses   Name Primary?     SCC (squamous cell carcinoma) Yes    Coronary artery disease of native artery of native heart with stable angina pectoris ([address]     PAD (peripheral artery disease) [facility] [address]     History of CVA (cerebrovascular accident)        Reviewed medications and side effects in detail. Continue current medications at this time. Nursing to continue to monitor closely and notify MD/NP of any change in condition.

## 2021-11-23 ENCOUNTER — EXTERNAL NURSING HOME DOCUMENTATION (OUTPATIENT)
Dept: INTERNAL MEDICINE CLINIC | Age: 71
End: 2021-11-23
Payer: MEDICARE

## 2021-11-23 DIAGNOSIS — I25.118 CORONARY ARTERY DISEASE OF NATIVE ARTERY OF NATIVE HEART WITH STABLE ANGINA PECTORIS (HCC): Primary | ICD-10-CM

## 2021-11-23 DIAGNOSIS — Z86.73 HISTORY OF CVA (CEREBROVASCULAR ACCIDENT): ICD-10-CM

## 2021-11-23 DIAGNOSIS — C44.92 SCC (SQUAMOUS CELL CARCINOMA): ICD-10-CM

## 2021-11-23 DIAGNOSIS — D64.9 SEVERE ANEMIA: ICD-10-CM

## 2021-11-23 DIAGNOSIS — F32.A DEPRESSION, UNSPECIFIED DEPRESSION TYPE: ICD-10-CM

## 2021-11-23 PROCEDURE — 99309 SBSQ NF CARE MODERATE MDM 30: CPT | Performed by: INTERNAL MEDICINE

## 2021-11-23 NOTE — PROGRESS NOTES
Progress Note     Subjective:  Mr. Ginny Lorenzo is a nursing home resident. He is doing well. He is able to swallow without any difficulty. He had recent surgery done for cancer. He is doing well. Has good appetite. Has coronary artery disease, denies any chest pain, palpitation or shortness of breath. Overall he is stable. Objective:    VITALS:  T:  97.4 degrees Fahrenheit. P:  64 per minute. BP:  124/82 mmHg. SaO2:  99% on room air. Weight is 121 pounds. HEENT:  No abnormality detected. Right cheek surgical area healed up. NECK:  Supple, no JVD, no carotid bruits, no thyromegaly. CHEST:  Chest is clear to auscultation bilaterally. No rales, no rhonchi. CARDIOVASCULAR:  S1, S2 normal.  Regular rate and rhythm. ABDOMEN:  Soft, nontender, nondistended, bowel sounds present. EXTREMITIES:  No edema is noticed. Dorsalis pedis pulse normal.    NEUROLOGICAL:  Alert and oriented x3. Assessment and Plan:   1. Coronary artery disease stable on aspirin and Plavix. We will check CBC, CMP and lipid panel. 2. Squamous cell cancer of the right cheek status post infection. Maxillectomy and parotid gland resection done with removal, PEG tube removed, he is able to eat by mouth okay. 3. Severe anemia. Last hemoglobin was over 10. We will repeat CBC. 4. History of stroke and carotid endarterectomy. The patient is on aspirin and Plavix. He is stable. THIS IS NOT A COMPLETE MEDICAL RECORD ON THIS PATIENT.    THIS IS A PATIENT AT [facility].    PLEASE CONTACT THE FACILITY LISTED BELOW FOR MORE INFORMATION ON THIS PATIENT.    THE COMPLETE RECORD RESIDES WITH THIS LONG TERM CARE FACILITY

## 2022-02-04 ENCOUNTER — EXTERNAL NURSING HOME DOCUMENTATION (OUTPATIENT)
Dept: INTERNAL MEDICINE CLINIC | Age: 72
End: 2022-02-04
Payer: MEDICARE

## 2022-02-04 DIAGNOSIS — C44.92 SCC (SQUAMOUS CELL CARCINOMA): ICD-10-CM

## 2022-02-04 DIAGNOSIS — I73.9 PAD (PERIPHERAL ARTERY DISEASE) (HCC): ICD-10-CM

## 2022-02-04 DIAGNOSIS — I25.118 CORONARY ARTERY DISEASE OF NATIVE ARTERY OF NATIVE HEART WITH STABLE ANGINA PECTORIS (HCC): Primary | ICD-10-CM

## 2022-02-04 DIAGNOSIS — Z86.73 HISTORY OF CVA (CEREBROVASCULAR ACCIDENT): ICD-10-CM

## 2022-02-04 PROCEDURE — 99309 SBSQ NF CARE MODERATE MDM 30: CPT | Performed by: INTERNAL MEDICINE

## 2022-02-04 NOTE — PROGRESS NOTES
Progress Note     Subjective:  Mr. Justus Larios is doing well in the nursing home. He is eating and sleeping good. No difficulty swallowing. Objective:    VITALS:  T:  97.8 degrees Fahrenheit. P:  76 per minute. BP:  113/66 mmHg. SaO2:  97% on room air. Weight is 130 pounds. HEENT:  No abnormality detected. The patient has skin graft, which looks healthy. NECK:  Supple, no JVD, no carotid bruits, no thyromegaly. CHEST:  Chest is clear to auscultation bilaterally. No rales, no rhonchi. CARDIOVASCULAR:  S1, S2 normal.  Regular rate and rhythm. ABDOMEN:  Soft, nontender, nondistended, bowel sounds present. EXTREMITIES:  No edema noticed. Dorsalis pedis pulse normal.  NEUROLOGICAL:  Alert and oriented x3. No neurological deficit. Laboratory Data:   Last labs done in November. CBC and CMP were stable. Vitamin D level normal.  Lipid panel normal.    Assessment and Plan:   1. Squamous cell cancer of the right cheek status post surgery done. The patient is doing well. He is able to eat well. 2. Coronary artery disease on aspirin and Plavix. Lipid panel stable. 3. History of stroke and carotid endarterectomy. The patient is on aspirin and Plavix, doing well. 4. Severe anemia. Hemoglobin seems stable. THIS IS NOT A COMPLETE MEDICAL RECORD ON THIS PATIENT.    THIS IS A PATIENT AT [facility].    PLEASE CONTACT THE FACILITY LISTED BELOW FOR MORE INFORMATION ON THIS PATIENT.    THE COMPLETE RECORD RESIDES WITH THIS LONG TERM CARE FACILITY

## 2022-03-18 PROBLEM — I21.4 NSTEMI (NON-ST ELEVATED MYOCARDIAL INFARCTION) (HCC): Status: ACTIVE | Noted: 2020-09-07

## 2022-03-19 PROBLEM — I65.22 CAROTID ARTERY STENOSIS, SYMPTOMATIC, LEFT: Status: ACTIVE | Noted: 2020-10-28

## 2022-03-19 PROBLEM — N17.9 ARF (ACUTE RENAL FAILURE) (HCC): Status: ACTIVE | Noted: 2021-02-23

## 2022-03-19 PROBLEM — I25.10 CVD (CARDIOVASCULAR DISEASE): Status: ACTIVE | Noted: 2021-02-23

## 2022-03-19 PROBLEM — I73.9 PAD (PERIPHERAL ARTERY DISEASE) (HCC): Status: ACTIVE | Noted: 2021-02-23

## 2022-03-19 PROBLEM — E44.0 MODERATE PROTEIN-CALORIE MALNUTRITION (HCC): Status: ACTIVE | Noted: 2021-02-25

## 2022-03-20 PROBLEM — I25.118 CORONARY ARTERY DISEASE OF NATIVE ARTERY OF NATIVE HEART WITH STABLE ANGINA PECTORIS (HCC): Status: ACTIVE | Noted: 2021-02-23

## 2022-03-20 PROBLEM — K92.2 GI BLEED: Status: ACTIVE | Noted: 2021-02-18

## 2022-04-01 ENCOUNTER — EXTERNAL NURSING HOME DOCUMENTATION (OUTPATIENT)
Dept: INTERNAL MEDICINE CLINIC | Age: 72
End: 2022-04-01
Payer: MEDICARE

## 2022-04-01 DIAGNOSIS — H02.122 MECHANICAL ECTROPION OF RIGHT LOWER EYELID: ICD-10-CM

## 2022-04-01 DIAGNOSIS — F32.A DEPRESSION, UNSPECIFIED DEPRESSION TYPE: ICD-10-CM

## 2022-04-01 DIAGNOSIS — H10.31 ACUTE BACTERIAL CONJUNCTIVITIS OF RIGHT EYE: Primary | ICD-10-CM

## 2022-04-01 DIAGNOSIS — I25.118 CORONARY ARTERY DISEASE OF NATIVE ARTERY OF NATIVE HEART WITH STABLE ANGINA PECTORIS (HCC): ICD-10-CM

## 2022-04-01 DIAGNOSIS — I73.9 PAD (PERIPHERAL ARTERY DISEASE) (HCC): ICD-10-CM

## 2022-04-01 PROCEDURE — 99309 SBSQ NF CARE MODERATE MDM 30: CPT | Performed by: INTERNAL MEDICINE

## 2022-04-01 NOTE — PROGRESS NOTES
Progress Note     Subjective:  Mr. Blaire Bustillo is having redness on right lower lid and mild discharge on the right side of the eye. Otherwise, he is eating and sleeping good. No other discomfort. Objective:    VITALS:  T:  98.1 degrees Fahrenheit. P:  68 per minute. BP:  110/68 mmHg. SaO2:  96% on room air. Weight is 125.4 pounds. HEENT:  Right eye, redness in conjunctiva, some ectropion noticed on the right lower eyelid. Otherwise, doing well. NECK:  Supple, no JVD, no carotid bruits, no thyromegaly. CHEST:  Chest is clear to auscultation bilaterally. No rales, no rhonchi. CARDIOVASCULAR:  S1, S2 normal.  Regular rate and rhythm. ABDOMEN:  Soft, nontender, nondistended, bowel sounds present. EXTREMITIES:  No edema is noted. Dorsalis pedis pulse normal.      Laboratory Data:   Labs were reviewed. Last labs done five months back. CBC and CMP stable. Vitamin D level normal.    Assessment and Plan:   1. Ectropion and right eye conjunctivitis. We will put him on tobramycin two drops three times a day for five days. 2. Squamous cell cancer of the right cheek, status post surgery. The patient is doing well. 3. Severe anemia. Hemoglobin remains stable. We will repeat CBC and CMP. 4. Stroke and carotid endarterectomy. The patient is on aspirin and Plavix, doing well. 5. Coronary artery disease. Taking aspirin and Plavix. Lipid panel stable. THIS IS NOT A COMPLETE MEDICAL RECORD ON THIS PATIENT.    THIS IS A PATIENT AT [facility].    PLEASE CONTACT THE FACILITY LISTED BELOW FOR MORE INFORMATION ON THIS PATIENT.    THE COMPLETE RECORD RESIDES WITH THIS LONG TERM CARE FACILITY

## 2022-05-27 ENCOUNTER — EXTERNAL NURSING HOME DOCUMENTATION (OUTPATIENT)
Dept: INTERNAL MEDICINE CLINIC | Age: 72
End: 2022-05-27
Payer: MEDICARE

## 2022-05-27 DIAGNOSIS — I25.118 CORONARY ARTERY DISEASE OF NATIVE ARTERY OF NATIVE HEART WITH STABLE ANGINA PECTORIS (HCC): ICD-10-CM

## 2022-05-27 DIAGNOSIS — I73.9 PAD (PERIPHERAL ARTERY DISEASE) (HCC): ICD-10-CM

## 2022-05-27 DIAGNOSIS — H10.9 BACTERIAL CONJUNCTIVITIS: Primary | ICD-10-CM

## 2022-05-27 DIAGNOSIS — E44.0 MODERATE PROTEIN-CALORIE MALNUTRITION (HCC): ICD-10-CM

## 2022-05-27 PROCEDURE — 99309 SBSQ NF CARE MODERATE MDM 30: CPT | Performed by: INTERNAL MEDICINE

## 2022-05-27 NOTE — PROGRESS NOTES
Progress Note     Subjective:  Mr. Regino Juares has right eye yellow discharge and dryness around the eye for the last several weeks. I have already called in tobramycin, unfortunately the patient has never received antibiotic drop. Eye inflammation is progressively getting worse. He is not complaining about anything. Other than that, he is eating and sleeping good. No other discomfort. Objective:    VITALS:  T:  98.6 degrees Fahrenheit. P:  85 per minute. BP:  141/69 mmHg. SaO2:  98% on room air. Weight is 129.6 pounds. HEENT:  No abnormality detected. The patient's right eye has yellow discharge from eye. Eyelids are stuck together because of discharge. Surrounding area has dry flaky skin. NECK:  Supple, no JVD, no carotid bruits, no thyromegaly. CHEST:  Chest is clear to auscultation bilaterally. No rales, no rhonchi. CARDIOVASCULAR:  S1, S2 normal.  Regular rate and rhythm. ABDOMEN:  Soft, nontender, nondistended, bowel sounds present. EXTREMITIES:  No edema is noted. Dorsalis pedis pulse normal.      Assessment and Plan:   1. Right eye conjunctivitis with ectropion. We will put warm wash in the eye twice a day for the next one week. We will put him on tobramycin eye drop, two drops three times a day for one week. We will refer him to ophthalmologist.  Also for surrounding area I will put him on hydrocortisone 2.5% lotion twice a day for one week. 2. Stroke with carotid endarterectomy. The patient is on aspirin and Plavix, doing well. 3. Coronary artery disease. Stable condition on Plavix and aspirin. 4. Squamous cell cancer of the right cheek, graft free. He is doing well. 5. Anemia. Hemoglobin has been stable. Recent hemoglobin is 10.1. He is doing well. THIS IS NOT A COMPLETE MEDICAL RECORD ON THIS PATIENT.    THIS IS A PATIENT AT [facility].    PLEASE CONTACT THE FACILITY LISTED BELOW FOR MORE INFORMATION ON THIS PATIENT.    THE COMPLETE RECORD RESIDES WITH THIS LONG TERM CARE FACILITY

## 2022-06-20 ENCOUNTER — OFFICE VISIT (OUTPATIENT)
Dept: INTERNAL MEDICINE CLINIC | Age: 72
End: 2022-06-20
Payer: MEDICARE

## 2022-06-20 DIAGNOSIS — Z22.322 MRSA (METHICILLIN RESISTANT STAPH AUREUS) CULTURE POSITIVE: ICD-10-CM

## 2022-06-20 DIAGNOSIS — L08.9 WOUND INFECTION: Primary | ICD-10-CM

## 2022-06-20 DIAGNOSIS — E44.0 MODERATE PROTEIN-CALORIE MALNUTRITION (HCC): ICD-10-CM

## 2022-06-20 DIAGNOSIS — Z86.73 HISTORY OF CVA (CEREBROVASCULAR ACCIDENT): ICD-10-CM

## 2022-06-20 DIAGNOSIS — D64.9 ANEMIA, UNSPECIFIED TYPE: ICD-10-CM

## 2022-06-20 DIAGNOSIS — I73.9 PAD (PERIPHERAL ARTERY DISEASE) (HCC): ICD-10-CM

## 2022-06-20 DIAGNOSIS — C44.92 SCC (SQUAMOUS CELL CARCINOMA): ICD-10-CM

## 2022-06-20 DIAGNOSIS — I25.118 CORONARY ARTERY DISEASE OF NATIVE ARTERY OF NATIVE HEART WITH STABLE ANGINA PECTORIS (HCC): ICD-10-CM

## 2022-06-20 DIAGNOSIS — T14.8XXA WOUND INFECTION: Primary | ICD-10-CM

## 2022-06-20 DIAGNOSIS — R91.8 LUNG NODULE, MULTIPLE: ICD-10-CM

## 2022-06-20 PROCEDURE — G8432 DEP SCR NOT DOC, RNG: HCPCS | Performed by: NURSE PRACTITIONER

## 2022-06-20 PROCEDURE — 1101F PT FALLS ASSESS-DOCD LE1/YR: CPT | Performed by: NURSE PRACTITIONER

## 2022-06-20 PROCEDURE — G8536 NO DOC ELDER MAL SCRN: HCPCS | Performed by: NURSE PRACTITIONER

## 2022-06-20 PROCEDURE — 99309 SBSQ NF CARE MODERATE MDM 30: CPT | Performed by: NURSE PRACTITIONER

## 2022-06-20 NOTE — PROGRESS NOTES
THIS IS NOT A COMPLETE MEDICAL RECORD ON THIS PATIENT. THIS IS A PATIENT AT [facility]. PLEASE CONTACT THE FACILITY LISTED BELOW FOR MORE INFORMATION ON THIS PATIENT. THE COMPLETE RECORD RESIDES WITH THIS LONG TERM CARE FACILITY. HISTORY OF PRESENT ILLNESS  Obed Arvizu is a 67 y.o. male. This patient is currently under the care of Dr. Vicki Oliva at [facility].  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 Patient is seen today per nursing request for result review. Patient has been followed by facility wound care nurse practitioner. Wound cultures of right chest and right thigh were ordered at time of visit with her 6/8/22. Patient's last visit with ENT, Dr. Avel Zimmerman, was 5/26/22, per note \"  R Cheek/Pec/Thigh Wounds s/p resection/flap/graft; All appear to have healthy granulation, unclear why has not healed over  - No current evidence of purulence, nor concerns for ongoing infection   - Rec topical bacitracin to each area w/ nonadherent dressing daily  - No indication for culture of abx at this time. Can reassess if sxs/exam changes  -CT neck and chest set for 6/8/2022  - Will RTC in 1 month for already scheduled followup after below interval imaging\"  CT chest was completed 6/8/22, with results now available on chart that indicate scattered 2-4 mm nodules throughout lungs. Patient is scheduled for follow-up with Dr. Avel Zimmerman 6/29/22. Wound culture results now available that indicated heavy growth MRSA and group A beta hemolytic strep. Labs collected 06/17/22:  WBC- 5.1, H&H-11.1/ 32.9, BUn-27, creatinine 1.24, Na-138, K-4.4  Patient states he is generally feeling well at time of today's visit and denies fever/ chills.     Blood Pressure: 113 /  56 mmHg  Temperature: 97.4 °F   Pulse: 73 bpm  Respirations: 18 Breaths/min  O2 Saturation: 97.0 %    HPI    Review of Systems Constitutional: Negative for chills and fever. HENT: Negative for congestion. Respiratory: Negative for cough and shortness of breath. Cardiovascular: Negative for chest pain. Gastrointestinal: Negative. Genitourinary: Negative. Musculoskeletal: Negative. Neurological: Negative for headaches. Endo/Heme/Allergies: Negative. Psychiatric/Behavioral: Negative. Physical Exam  Constitutional:       General: He is not in acute distress. HENT:      Head: Normocephalic and atraumatic. Nose: No congestion. Eyes:      Conjunctiva/sclera: Conjunctivae normal.   Cardiovascular:      Rate and Rhythm: Normal rate and regular rhythm. Pulmonary:      Effort: Pulmonary effort is normal.      Breath sounds: Normal breath sounds. Abdominal:      General: Bowel sounds are normal.      Palpations: Abdomen is soft. Comments: PEG in place   Musculoskeletal:      Right lower leg: No edema. Left lower leg: No edema. Skin:     General: Skin is warm and dry. Comments: Surgical flap to right cheek  Clean, dry dressings to chest and right thigh   Neurological:      Mental Status: He is alert. Psychiatric:         Mood and Affect: Mood normal.         ASSESSMENT and PLAN  Encounter Diagnoses   Name Primary?  Wound infection Yes    MRSA (methicillin resistant staph aureus) culture positive     SCC (squamous cell carcinoma)     Lung nodule, multiple     Moderate protein-calorie malnutrition (HCC)     [facility]AD [facility]peripheral artery disease) ([facility])     Coronary artery disease of native artery of native heart with stable angina pectoris (HCC)     History of CVA (cerebrovascular accident)     Anemia, unspecified type      Will order  Clindamycin 300 mg po q8h x 7 days. Contact precautions recommended. Follow-up with ENT as scheduled. Nursing to continue local wound/ skin care. Reviewed medications and side effects in detail.       Nursing to continue to monitor closely and notify MD/NP of any change in condition.

## 2022-07-29 ENCOUNTER — EXTERNAL NURSING HOME DOCUMENTATION (OUTPATIENT)
Dept: INTERNAL MEDICINE CLINIC | Age: 72
End: 2022-07-29
Payer: MEDICARE

## 2022-07-29 DIAGNOSIS — F32.A DEPRESSION, UNSPECIFIED DEPRESSION TYPE: ICD-10-CM

## 2022-07-29 DIAGNOSIS — I25.118 CORONARY ARTERY DISEASE OF NATIVE ARTERY OF NATIVE HEART WITH STABLE ANGINA PECTORIS (HCC): Primary | ICD-10-CM

## 2022-07-29 DIAGNOSIS — R26.9 GAIT ABNORMALITY: ICD-10-CM

## 2022-07-29 DIAGNOSIS — I25.10 CVD (CARDIOVASCULAR DISEASE): ICD-10-CM

## 2022-07-29 DIAGNOSIS — Z86.73 HISTORY OF CVA (CEREBROVASCULAR ACCIDENT): ICD-10-CM

## 2022-07-29 PROCEDURE — 99309 SBSQ NF CARE MODERATE MDM 30: CPT | Performed by: INTERNAL MEDICINE

## 2022-07-29 NOTE — Clinical Note
Progress Note     Subjective:  Mr. Maida Corral is doing well in the rehab. Eyes are clean, no discharge noticed. He is eating and sleeping good. No other discomfort. Objective:    VITALS:  T:  97.3 degrees Fahrenheit. P:  60 per minute. BP:  119/60 mmHg. SaO2:  97% on room air. Weight is 136 pounds. He has gained weight. He was 129 pounds before. HEENT:  No abnormality detected. NECK:  Supple, no JVD, no carotid bruits, no thyromegaly. CHEST:  Chest is clear to auscultation bilaterally. No rales, no rhonchi. CARDIOVASCULAR:  S1, S2 normal.  Regular rate and rhythm. ABDOMEN:  Soft, nontender, nondistended, bowel sounds present. EXTREMITIES:  No edema is noted. Dorsalis pedis pulse normal.    NEUROLOGICAL:  Alert and oriented x3. Laboratory Data:   Labs were reviewed. CBC shows hemoglobin 11.1 gm/dL. CMP showed BUN and creatinine normal.      Assessment and Plan:   1. Coronary artery disease. The patient is on aspirin and Plavix. Stable lipid panel. He is doing well. 2. Squamous cell cancer of the right cheek. He healed up pretty good, doing well. 3. Severe anemia. Hemoglobin right now stable. CBC showed hemoglobin 11 gm/dL. 4. Stroke with carotid artery stenosis artery stenosis. The patient is on aspirin and Plavix, doing well. THIS IS NOT A COMPLETE MEDICAL RECORD ON THIS PATIENT. THIS IS A PATIENT AT [facility]. PLEASE CONTACT THE FACILITY LISTED BELOW FOR MORE INFORMATION ON THIS PATIENT.     THE COMPLETE RECORD RESIDES WITH THIS LONG TERM CARE FACILITY

## 2022-07-30 NOTE — PROGRESS NOTES
Progress Note     Subjective:  Mr. Yogesh Fields is doing well in the rehab. Eyes are clean, no discharge noticed. He is eating and sleeping good. No other discomfort. Objective:    VITALS:  T:  97.3 degrees Fahrenheit. P:  60 per minute. BP:  119/60 mmHg. SaO2:  97% on room air. Weight is 136 pounds. He has gained weight. He was 129 pounds before. HEENT:  No abnormality detected. NECK:  Supple, no JVD, no carotid bruits, no thyromegaly. CHEST:  Chest is clear to auscultation bilaterally. No rales, no rhonchi. CARDIOVASCULAR:  S1, S2 normal.  Regular rate and rhythm. ABDOMEN:  Soft, nontender, nondistended, bowel sounds present. EXTREMITIES:  No edema is noted. Dorsalis pedis pulse normal.    NEUROLOGICAL:  Alert and oriented x3. Laboratory Data:   Labs were reviewed. CBC shows hemoglobin 11.1 gm/dL. CMP showed BUN and creatinine normal.      Assessment and Plan:   1. Coronary artery disease. The patient is on aspirin and Plavix. Stable lipid panel. He is doing well. 2. Squamous cell cancer of the right cheek. He healed up pretty good, doing well. 3. Severe anemia. Hemoglobin right now stable. CBC showed hemoglobin 11 gm/dL. 4. Stroke with carotid artery stenosis artery stenosis. The patient is on aspirin and Plavix, doing well.

## 2022-09-30 ENCOUNTER — EXTERNAL NURSING HOME DOCUMENTATION (OUTPATIENT)
Dept: INTERNAL MEDICINE CLINIC | Age: 72
End: 2022-09-30
Payer: MEDICARE

## 2022-09-30 DIAGNOSIS — C44.92 SCC (SQUAMOUS CELL CARCINOMA): ICD-10-CM

## 2022-09-30 DIAGNOSIS — R91.1 PULMONARY NODULE: ICD-10-CM

## 2022-09-30 DIAGNOSIS — I73.9 PAD (PERIPHERAL ARTERY DISEASE) (HCC): ICD-10-CM

## 2022-09-30 DIAGNOSIS — I25.118 CORONARY ARTERY DISEASE OF NATIVE ARTERY OF NATIVE HEART WITH STABLE ANGINA PECTORIS (HCC): Primary | ICD-10-CM

## 2022-09-30 DIAGNOSIS — F32.A DEPRESSION, UNSPECIFIED DEPRESSION TYPE: ICD-10-CM

## 2022-09-30 PROCEDURE — 99309 SBSQ NF CARE MODERATE MDM 30: CPT | Performed by: INTERNAL MEDICINE

## 2022-09-30 NOTE — Clinical Note
Progress Note     Subjective:  Mr. Chaitanya Miles is doing well in the nursing home. He is eating and sleeping good. Anxiety and depression seems under control. He is gaining weight. Objective:    VITALS:  T:  98.3 degrees Fahrenheit. P:  60 per minute. BP:  127/62 mmHg. SaO2:  98% on room air. Weight is 131 pounds. HEENT:  No abnormality detected. NECK:  Supple, no JVD, no carotid bruits, no thyromegaly. CHEST:  Chest is clear to auscultation bilaterally. No rales, no rhonchi. CARDIOVASCULAR:  S1, S2 normal.  Regular rate and rhythm. ABDOMEN:  Soft, nontender, nondistended, bowel sounds present. EXTREMITIES:  No edema is noted. Dorsalis pedis pulse normal.    NEUROLOGICAL:  Alert and oriented x3. Laboratory Data:   Labs were reviewed. CBC and CMP seem stable. CT chest reviewed showed multiple pulmonary nodules and atelectasis. Assessment and Plan:   1. Squamous cell cancer of the right cheek. Healed up pretty well. 2. Multiple pulmonary nodules. The patient has history of recent squamous cell cancer of the cheek, referred the patient to heme-oncologist.  3. Anemia. Hemoglobin is stable and improved. We will continue to monitor him. 4. Stroke with carotid artery stenosis. The patient is on aspirin and Plavix, doing well. 5. Anxiety and depression. The patient is taking Remeron. He is stable. THIS IS NOT A COMPLETE MEDICAL RECORD ON THIS PATIENT. THIS IS A PATIENT AT [facility]. PLEASE CONTACT THE FACILITY LISTED BELOW FOR MORE INFORMATION ON THIS PATIENT.     THE COMPLETE RECORD RESIDES WITH THIS LONG TERM CARE FACILITY

## 2022-10-01 NOTE — PROGRESS NOTES
Progress Note     Subjective:  Mr. Jamie Montero is doing well in the nursing home. He is eating and sleeping good. Anxiety and depression seems under control. He is gaining weight. Objective:    VITALS:  T:  98.3 degrees Fahrenheit. P:  60 per minute. BP:  127/62 mmHg. SaO2:  98% on room air. Weight is 131 pounds. HEENT:  No abnormality detected. NECK:  Supple, no JVD, no carotid bruits, no thyromegaly. CHEST:  Chest is clear to auscultation bilaterally. No rales, no rhonchi. CARDIOVASCULAR:  S1, S2 normal.  Regular rate and rhythm. ABDOMEN:  Soft, nontender, nondistended, bowel sounds present. EXTREMITIES:  No edema is noted. Dorsalis pedis pulse normal.    NEUROLOGICAL:  Alert and oriented x3. Laboratory Data:   Labs were reviewed. CBC and CMP seem stable. CT chest reviewed showed multiple pulmonary nodules and atelectasis. Assessment and Plan:   1. Squamous cell cancer of the right cheek. Healed up pretty well. 2. Multiple pulmonary nodules. The patient has history of recent squamous cell cancer of the cheek, referred the patient to heme-oncologist.  3. Anemia. Hemoglobin is stable and improved. We will continue to monitor him. 4. Stroke with carotid artery stenosis. The patient is on aspirin and Plavix, doing well. 5. Anxiety and depression. The patient is taking Remeron. He is stable.

## 2022-10-24 ENCOUNTER — OFFICE VISIT (OUTPATIENT)
Dept: INTERNAL MEDICINE CLINIC | Age: 72
End: 2022-10-24
Payer: MEDICARE

## 2022-10-24 DIAGNOSIS — C44.92 SCC (SQUAMOUS CELL CARCINOMA): ICD-10-CM

## 2022-10-24 DIAGNOSIS — T17.320A CHOKING DUE TO FOOD IN LARYNX, INITIAL ENCOUNTER: ICD-10-CM

## 2022-10-24 DIAGNOSIS — R13.10 DYSPHAGIA, UNSPECIFIED TYPE: Primary | ICD-10-CM

## 2022-10-24 DIAGNOSIS — Z98.890: ICD-10-CM

## 2022-10-24 PROBLEM — R13.19 OTHER DYSPHAGIA: Status: ACTIVE | Noted: 2022-10-24

## 2022-10-24 PROCEDURE — G8432 DEP SCR NOT DOC, RNG: HCPCS | Performed by: NURSE PRACTITIONER

## 2022-10-24 PROCEDURE — 1101F PT FALLS ASSESS-DOCD LE1/YR: CPT | Performed by: NURSE PRACTITIONER

## 2022-10-24 PROCEDURE — 99309 SBSQ NF CARE MODERATE MDM 30: CPT | Performed by: NURSE PRACTITIONER

## 2022-10-24 PROCEDURE — G8536 NO DOC ELDER MAL SCRN: HCPCS | Performed by: NURSE PRACTITIONER

## 2022-10-24 NOTE — PROGRESS NOTES
THIS IS NOT A COMPLETE MEDICAL RECORD ON THIS PATIENT. THIS IS A PATIENT AT [facility]. PLEASE CONTACT THE FACILITY LISTED BELOW FOR MORE INFORMATION ON THIS PATIENT. THE COMPLETE RECORD RESIDES WITH THIS LONG TERM CARE FACILITY. HISTORY OF PRESENT ILLNESS  Dewayne Phoenix is a 67 y.o. male. This patient is currently under the care of Dr. Pam Gage at [facility].  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 Patient is seen today per nursing request.  Nurse reports that since dinner yesterday, each time patient has tried to eat, he has been unable to tolerate food, resulting in episodes of coughing. With attempt to eat lunch today, food was manually removed from patient's mouth by staff due to coughing/ inability to swallow. Upon entry to patient room, patient with ongoing coughing/ attempts to clear throat. Assisted to sit up in bed. Yankauer suction tip used for oral suctioning, with reduction in coughing. SpO2 monitored throughout visit, 92-94% range. Mildly tachycardic, 115- 120 upon initial entry, improved to 108 with reduction in coughing. Patient able to speak in complete sentences and without coughing at close of visit. Patient states he is feeling better, but unable to eat anything. BP-153/89  T-97.8   HPI    Review of Systems   Constitutional:  Negative for chills and fever. Respiratory:  Positive for cough. Cardiovascular:  Negative for chest pain. Gastrointestinal:  Negative for abdominal pain. Physical Exam  HENT:      Head: Normocephalic and atraumatic. Eyes:      Conjunctiva/sclera: Conjunctivae normal.   Cardiovascular:      Rate and Rhythm: Regular rhythm. Pulmonary:      Effort: Pulmonary effort is normal.      Breath sounds: Normal breath sounds.       Comments: As described above, patient initially with coughing/ throat clearing, resolved at close of visit with breath sounds clear  Abdominal:      General: Bowel sounds are normal.      Palpations: Abdomen is soft. Comments: PEG in place   Musculoskeletal:      Right lower leg: No edema. Left lower leg: No edema. Skin:     General: Skin is warm and dry. Comments: Surgical flap to right cheek   Neurological:      Mental Status: He is alert. ASSESSMENT and PLAN    ICD-10-CM ICD-9-CM   1. Dysphagia, unspecified type  R13.10 787.20      2. Choking due to food in larynx, initial encounter  T17.320A 933.1     E911      3. SCC (squamous cell carcinoma)  C44.92 173.92      4. S/P skin neoplasm resection  Z98.890 V45.89        Transfer to [facility] ER for further evaluation and treatment of dysphagia, with new onset inability to swallow foods, starting dinner last evening, resulting in choking sensation and regurgitation. Discussed above plan of care with Dr. Rukhsana Pablo.

## 2022-11-04 ENCOUNTER — OFFICE VISIT (OUTPATIENT)
Dept: INTERNAL MEDICINE CLINIC | Age: 72
End: 2022-11-04
Payer: MEDICARE

## 2022-11-04 DIAGNOSIS — I10 HYPERTENSION, UNSPECIFIED TYPE: ICD-10-CM

## 2022-11-04 DIAGNOSIS — K59.00 CONSTIPATION, UNSPECIFIED CONSTIPATION TYPE: ICD-10-CM

## 2022-11-04 DIAGNOSIS — F32.A DEPRESSION, UNSPECIFIED DEPRESSION TYPE: ICD-10-CM

## 2022-11-04 DIAGNOSIS — J18.9 PNEUMONIA OF LEFT LOWER LOBE DUE TO INFECTIOUS ORGANISM: Primary | ICD-10-CM

## 2022-11-04 DIAGNOSIS — R13.10 DYSPHAGIA, UNSPECIFIED TYPE: ICD-10-CM

## 2022-11-04 DIAGNOSIS — E55.9 VITAMIN D DEFICIENCY: ICD-10-CM

## 2022-11-04 DIAGNOSIS — K21.9 GASTROESOPHAGEAL REFLUX DISEASE, UNSPECIFIED WHETHER ESOPHAGITIS PRESENT: ICD-10-CM

## 2022-11-04 DIAGNOSIS — D64.9 ANEMIA, UNSPECIFIED TYPE: ICD-10-CM

## 2022-11-04 DIAGNOSIS — Z86.73 HISTORY OF CVA (CEREBROVASCULAR ACCIDENT): ICD-10-CM

## 2022-11-04 PROCEDURE — G8536 NO DOC ELDER MAL SCRN: HCPCS | Performed by: NURSE PRACTITIONER

## 2022-11-04 PROCEDURE — 99309 SBSQ NF CARE MODERATE MDM 30: CPT | Performed by: NURSE PRACTITIONER

## 2022-11-04 PROCEDURE — G8432 DEP SCR NOT DOC, RNG: HCPCS | Performed by: NURSE PRACTITIONER

## 2022-11-04 PROCEDURE — 1101F PT FALLS ASSESS-DOCD LE1/YR: CPT | Performed by: NURSE PRACTITIONER

## 2022-11-04 NOTE — PROGRESS NOTES
THIS IS NOT A COMPLETE MEDICAL RECORD ON THIS PATIENT. THIS IS A PATIENT AT [facility]. PLEASE CONTACT THE FACILITY LISTED BELOW FOR MORE INFORMATION ON THIS PATIENT. THE COMPLETE RECORD RESIDES WITH THIS LONG TERM CARE FACILITY. HISTORY OF PRESENT ILLNESS  Anson Gordon is a 67 y.o. male. This patient is currently under the care of Dr. Cherrie Rocha at [facility].  The patient was readmitted to this facility 11/1/22 following hospitalization at [address] [place] [address]. The patient was transferred to the ER due to dysphagia. He was found during hospitalization to have retained food bolus, now resolved s/p EGD on 10/28/22. Patient was also treated for LLL pneumonia. He was followed by speech therapy during his admission and after clearance of esophageal food bolus with EGD had significant improvement with functional swallow as well as adequate airway protection/ pharyngeal clearance. He was recommended to continue ST at [facility] and discharged on oral diet. 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 Patient indicates he is feeling well at time of visit. He denies shortness of breath. He has had no difficulty with swallowing since return to facility. Nursing does not report any concerns at this time. Blood Pressure: 111 /  68 mmHg  Temperature: 96.8 °F  Pulse: 91 bpm  Respirations: 16 Breaths/min   O2 Saturation: 96.0 %    HPI    Review of Systems   Constitutional:  Negative for chills and fever. HENT:  Negative for congestion. Respiratory:  Negative for cough and shortness of breath. Cardiovascular:  Negative for chest pain. Gastrointestinal: Negative. Musculoskeletal: Negative. Neurological:  Negative for headaches. Physical Exam  Constitutional:       General: He is not in acute distress.      Comments: Sitting up in wheelchair   HENT:      Head: Normocephalic and atraumatic. Nose: No congestion. Eyes:      Conjunctiva/sclera: Conjunctivae normal.   Cardiovascular:      Rate and Rhythm: Normal rate and regular rhythm. Pulmonary:      Effort: Pulmonary effort is normal.      Breath sounds: Normal breath sounds. Abdominal:      General: Bowel sounds are normal.      Palpations: Abdomen is soft. Comments: PEG in place   Musculoskeletal:      Right lower leg: No edema. Left lower leg: No edema. Skin:     General: Skin is warm and dry. Comments: Surgical flap to right cheek   Neurological:      Mental Status: He is alert. Comments: Responding to simple questions appropriately   Psychiatric:         Mood and Affect: Mood normal.       ASSESSMENT and PLAN    ICD-10-CM ICD-9-CM    1. Pneumonia of left lower lobe due to infectious organism  J18.9 486 CBC, CMP next lab day. 2. Dysphagia, unspecified type  R13.10 787.20 Improved. Continue ST as tolerated. 3. Gastroesophageal reflux disease, unspecified whether esophagitis present  K21.9 530.81 On,  Omeprazole Oral Capsule Delayed Release 20 MG (Omeprazole)   Give 1 capsule by mouth in the morning      4. History of CVA (cerebrovascular accident)  Z86.73 V12.54 On,  Atorvastatin Calcium Oral Tablet 40 MG (Atorvastatin Calcium)   Give 1 tablet by mouth one time a day       5. Constipation, unspecified constipation type  K59.00 564.00 On,  Senna-Time Oral Tablet 8.6 MG (Sennosides)   Give 2 tablet by mouth one time a day for Constipation    GaviLAX Powder (Polyethylene Glycol 3350)   Give 17 gram by mouth every 24 hours as needed for Constipation      6. Depression, unspecified depression type  F32. A 311 Continue,  Mirtazapine Oral Tablet 7.5 MG (Mirtazapine)   Give 1 tablet by mouth at bedtime      7. Hypertension, unspecified type  I10 401.9 On,  Metoprolol Tartrate Oral Tablet 25 MG (Metoprolol Tartrate)   Give 1 tablet by mouth every 12 hours      8.  Anemia, unspecified type  D64.9 285.9 On,  Ferrous Sulfate Oral Tablet 325 (65 Fe) MG (Ferrous Sulfate)   Give 1 tablet by mouth two times a day       9. Vitamin D deficiency  E55.9 268.9 On,  Ergocalciferol Oral Capsule 1.25 MG (80835 UT) (Ergocalciferol)   Give 1 capsule by mouth one time a day every Mon for supplement        Continue PT/OT/ST as tolerated. Reviewed medications and side effects in detail. Continue current medications at this time. Nursing to continue to monitor closely and notify MD/NP of any change in condition.

## 2022-11-08 ENCOUNTER — EXTERNAL NURSING HOME DOCUMENTATION (OUTPATIENT)
Dept: INTERNAL MEDICINE CLINIC | Age: 72
End: 2022-11-08
Payer: MEDICARE

## 2022-11-08 DIAGNOSIS — R13.10 DYSPHAGIA, UNSPECIFIED TYPE: ICD-10-CM

## 2022-11-08 DIAGNOSIS — J18.9 PNEUMONIA OF LEFT LOWER LOBE DUE TO INFECTIOUS ORGANISM: Primary | ICD-10-CM

## 2022-11-08 DIAGNOSIS — Z86.73 HISTORY OF CVA (CEREBROVASCULAR ACCIDENT): ICD-10-CM

## 2022-11-08 DIAGNOSIS — I73.9 PAD (PERIPHERAL ARTERY DISEASE) (HCC): ICD-10-CM

## 2022-11-08 PROCEDURE — 99306 1ST NF CARE HIGH MDM 50: CPT | Performed by: INTERNAL MEDICINE

## 2022-11-08 NOTE — Clinical Note
History of Present Illness:  Karli Zepeda is a 50-year-old male with past medical history significant for right cheek squamous cell cancer status post resection with PEG tube placement. He has been staying in [facility] for long time. He presented to [facility] with persistent dysphagia and cough and possible pneumonia with aspiration. Also there was a concern for retained food bolus. So the patient had CT neck done to evaluate head and neck cancer with food bolus in the upper esophagus and retained contrast was found probably from modified barium swallow. GI specialist has seen him and had endoscopy done with resolution of the food bolus, non-obstructing Schatzki's ring was found which was not the reason for food retention according to GI. Probably food bolus caused aspiration pneumonia. The patient developed left lower lobe pneumonia with sepsis for which he was placed on antibiotics. MRSA in the nose was positive. For dysphagia it was probably relatively acute and no obstruction noted. Food bolus was removed. Speech therapy was working with him. He was initiated on dysphagia diet until speech specialist has evaluated him. He was stabilized and was transferred back to [facility]. He is doing well in the rehab, not in distress. No cough or shortness of breath right now. Past Medical History:  Coronary artery disease, peripheral artery disease, cerebellar stroke, squamous cell cancer of the head and neck    Past Surgical History:  Status post right maxillectomy and right superficial parathyroidectomy, left carotid endarterectomy with Dacron patch, endoscopy and colonoscopy. Social History:  The patient was a smoker, he used to drink alcohol. He lives in [facility] for last one year. Family History:  Not contributory. Medications: The patient is on Tylenol 325 two tablets every four hours as needed, vitamin C 500 mg a day, Lipitor 40 mg h.s. Bacitracin ointment at the nose and the right cheek and right thigh, hydroxypropyl methyl cellulose 1% Ophthalmic solution two drops four times a day, vitamin D 50,000 units once a week, iron sulphate 225 mg twice a day, isosorbide dinitrate 30 mg three times a day, metoprolol tartrate 25 mg twice a day, Remeron 7.5 mg once a day, omeprazole 20 mg before breakfast, Zofran 4 mg every eight hour as needed, MiraLax 17 g once a day, Senna 8.6 mg once a day, therapeutic multivitamins one tablet a day. Review of Systems:  Complete review of systems done. Right now essentially negative. Physical Examination:    General:  This is a pleasant  male, not in apparent distress. VITALS:  T:  96.8 degrees Fahrenheit. P:  91 per minute. BP:  111/68 mmHg. SaO2:  96% on room air. HEENT:  No abnormality detected. The patient has right-sided facial graft placed. Graft looks healthy. NECK:  Supple, no JVD, no carotid bruits, no thyromegaly. CHEST:  Chest is clear to auscultation bilaterally. No rales, no rhonchi. CARDIOVASCULAR:  S1, S2 normal.  Regular rate and rhythm. ABDOMEN:  Soft, nontender, nondistended, bowel sounds present. EXTREMITIES:  No edema noticed. Dorsalis pedis pulse normal.  NEUROLOGICAL:  Alert and oriented x3. Cranial nerves II through XII grossly intact. Motor 4/5 bilaterally. Sensory within normal limits. Assessment and Plan:   1. Left lower lobe pneumonia with sepsis, probably due to dysphagia and food bolus retention and possible aspiration. The patient received IV antibiotics. WBC count came down. MRSA was positive. The patient was on Bacitracin for that. 2. Retained food bolus status post EGD done, food bolus removed. The patient is eating good. 3. Dysphagia. The patient was evaluated by speech therapist.  Liquid diet was given. He is able to swallow it now. 4. CKD stage III with anemia. Creatinine is slightly above baseline. The patient refused IV fluid.   5. Osgood-Schlatter disease with history of CVA. The patient had endarterectomy done. The patient is on statin. 6. Squamous cell cancer of the right cheek, status post maxillofacial implant placed. The patient followed up with ENT doing well. THIS IS NOT A COMPLETE MEDICAL RECORD ON THIS PATIENT. THIS IS A PATIENT AT [facility]. PLEASE CONTACT THE FACILITY LISTED BELOW FOR MORE INFORMATION ON THIS PATIENT.     THE COMPLETE RECORD RESIDES WITH THIS LONG TERM CARE FACILITY

## 2022-11-09 NOTE — PROGRESS NOTES
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 He has been staying in [facility] for long time. He presented to [facility] with persistent dysphagia and cough and possible pneumonia with aspiration. Also there was a concern for retained food bolus. So the patient had CT neck done to evaluate head and neck cancer with food bolus in the upper esophagus and retained contrast was found probably from modified barium swallow. GI specialist has seen him and had endoscopy done with resolution of the food bolus, non-obstructing Schatzki's ring was found which was not the reason for food retention according to GI. Probably food bolus caused aspiration pneumonia. The patient developed left lower lobe pneumonia with sepsis for which he was placed on antibiotics. MRSA in the nose was positive. For dysphagia it was probably relatively acute and no obstruction noted. Food bolus was removed. Speech therapy was working with him. He was initiated on dysphagia diet until speech specialist has evaluated him. He was stabilized and was transferred back to [facility]. He is doing well in the rehab, not in distress. No cough or shortness of breath right now. Past Medical History:  Coronary artery disease, peripheral artery disease, cerebellar stroke, squamous cell cancer of the head and neck    Past Surgical History:  Status post right maxillectomy and right superficial parathyroidectomy, left carotid endarterectomy with Dacron patch, endoscopy and colonoscopy. Social History:  The patient was a smoker, he used to drink alcohol. He lives in [facility] for last one year. Family History:  Not contributory. Medications: The patient is on Tylenol 325 two tablets every four hours as needed, vitamin C 500 mg a day, Lipitor 40 mg h.s. Bacitracin ointment at the nose and the right cheek and right thigh, hydroxypropyl methyl cellulose 1% Ophthalmic solution two drops four times a day, vitamin D 50,000 units once a week, iron sulphate 225 mg twice a day, isosorbide dinitrate 30 mg three times a day, metoprolol tartrate 25 mg twice a day, Remeron 7.5 mg once a day, omeprazole 20 mg before breakfast, Zofran 4 mg every eight hour as needed, MiraLax 17 g once a day, Senna 8.6 mg once a day, therapeutic multivitamins one tablet a day. Review of Systems:  Complete review of systems done. Right now essentially negative. Physical Examination:    General:  This is a pleasant  male, not in apparent distress. VITALS:  T:  96.8 degrees Fahrenheit. P:  91 per minute. BP:  111/68 mmHg. SaO2:  96% on room air. HEENT:  No abnormality detected. The patient has right-sided facial graft placed. Graft looks healthy. NECK:  Supple, no JVD, no carotid bruits, no thyromegaly. CHEST:  Chest is clear to auscultation bilaterally. No rales, no rhonchi. CARDIOVASCULAR:  S1, S2 normal.  Regular rate and rhythm. ABDOMEN:  Soft, nontender, nondistended, bowel sounds present. EXTREMITIES:  No edema noticed. Dorsalis pedis pulse normal.  NEUROLOGICAL:  Alert and oriented x3. Cranial nerves II through XII grossly intact. Motor 4/5 bilaterally. Sensory within normal limits. Assessment and Plan:   1. Left lower lobe pneumonia with sepsis, probably due to dysphagia and food bolus retention and possible aspiration. The patient received IV antibiotics. WBC count came down. MRSA was positive. The patient was on Bacitracin for that. 2. Retained food bolus status post EGD done, food bolus removed. The patient is eating good. 3. Dysphagia. The patient was evaluated by speech therapist.  Liquid diet was given. He is able to swallow it now. 4. CKD stage III with anemia. Creatinine is slightly above baseline. The patient refused IV fluid.   5. Osgood-Schlatter disease with history of CVA. The patient had endarterectomy done. The patient is on statin. 6. Squamous cell cancer of the right cheek, status post maxillofacial implant placed. The patient followed up with ENT doing well.

## 2022-11-15 ENCOUNTER — EXTERNAL NURSING HOME DOCUMENTATION (OUTPATIENT)
Dept: INTERNAL MEDICINE CLINIC | Age: 72
End: 2022-11-15
Payer: MEDICARE

## 2022-11-15 DIAGNOSIS — H10.31 ACUTE BACTERIAL CONJUNCTIVITIS OF RIGHT EYE: Primary | ICD-10-CM

## 2022-11-15 DIAGNOSIS — I73.9 PAD (PERIPHERAL ARTERY DISEASE) (HCC): ICD-10-CM

## 2022-11-15 DIAGNOSIS — R13.10 DYSPHAGIA, UNSPECIFIED TYPE: ICD-10-CM

## 2022-11-15 DIAGNOSIS — J18.9 PNEUMONIA OF LEFT LOWER LOBE DUE TO INFECTIOUS ORGANISM: ICD-10-CM

## 2022-11-15 PROCEDURE — 99309 SBSQ NF CARE MODERATE MDM 30: CPT | Performed by: INTERNAL MEDICINE

## 2022-11-15 NOTE — Clinical Note
Progress Note     Subjective:  Mr. Jeremy Carballo is having thick yellow discharge from his right eye for the last several days. Right conjunctiva is injected. Denies any pain, no blurred vision. He was recently diagnosed with pneumonia. No cough or wheezing right now. He is swallowing okay. He is not eating enough, but according to the speech pathologist and dietitian he some days stops eating. He seems okay to me and he is eating his lunch right now. Objective:    VITALS:  T:  97.8 degrees Fahrenheit. P:  91 per minute. BP:  111/68 mmHg. SaO2:  96% on room air. Weight is 143 pounds. HEENT:  No abnormality detected. Right eye conjunctiva red and injected. Thick yellowish-green discharge noticed. NECK:  Supple, no JVD, no carotid bruits, no thyromegaly. CHEST:  Chest is clear to auscultation bilaterally. No rales, no rhonchi. CARDIOVASCULAR:  S1, S2 normal.  Regular rate and rhythm. ABDOMEN:  Soft, nontender, nondistended, bowel sounds present. EXTREMITIES:  No edema is noticed. Dorsalis pedis pulse normal.    NEUROLOGICAL:  Alert and oriented x3. Assessment and Plan:   1. Right eye conjunctivitis. We will put him on Tobramycin two drops three times a day for one week. Need to do warm wash twice a day. 2. Dysphagia. The patient is on soft diet. He is able to swallow okay. I have talked to the dietitian. I will put him on nutritional supplement. We will monitor his weight closely. 3. Left lower lobe pneumonia. The patient's breathing is okay. No more cough. Finished up IV antibiotics, doing well,  4. Coronary artery disease with history of CVA. The patient had endarterectomy done. The patient is on statin. 5. Squamous cell cancer of the right cheek. The patient seems stable. THIS IS NOT A COMPLETE MEDICAL RECORD ON THIS PATIENT. THIS IS A PATIENT AT [facility]. PLEASE CONTACT THE FACILITY LISTED BELOW FOR MORE INFORMATION ON THIS PATIENT.     THE COMPLETE RECORD RESIDES WITH THIS LONG TERM CARE FACILITY

## 2022-11-16 NOTE — PROGRESS NOTES
Progress Note     Subjective:  Mr. Gemma Barajas is having thick yellow discharge from his right eye for the last several days. Right conjunctiva is injected. Denies any pain, no blurred vision. He was recently diagnosed with pneumonia. No cough or wheezing right now. He is swallowing okay. He is not eating enough, but according to the speech pathologist and dietitian he some days stops eating. He seems okay to me and he is eating his lunch right now. Objective:    VITALS:  T:  97.8 degrees Fahrenheit. P:  91 per minute. BP:  111/68 mmHg. SaO2:  96% on room air. Weight is 143 pounds. HEENT:  No abnormality detected. Right eye conjunctiva red and injected. Thick yellowish-green discharge noticed. NECK:  Supple, no JVD, no carotid bruits, no thyromegaly. CHEST:  Chest is clear to auscultation bilaterally. No rales, no rhonchi. CARDIOVASCULAR:  S1, S2 normal.  Regular rate and rhythm. ABDOMEN:  Soft, nontender, nondistended, bowel sounds present. EXTREMITIES:  No edema is noticed. Dorsalis pedis pulse normal.    NEUROLOGICAL:  Alert and oriented x3. Assessment and Plan:   1. Right eye conjunctivitis. We will put him on Tobramycin two drops three times a day for one week. Need to do warm wash twice a day. 2. Dysphagia. The patient is on soft diet. He is able to swallow okay. I have talked to the dietitian. I will put him on nutritional supplement. We will monitor his weight closely. 3. Left lower lobe pneumonia. The patient's breathing is okay. No more cough. Finished up IV antibiotics, doing well,  4. Coronary artery disease with history of CVA. The patient had endarterectomy done. The patient is on statin. 5. Squamous cell cancer of the right cheek. The patient seems stable.

## 2022-12-02 ENCOUNTER — EXTERNAL NURSING HOME DOCUMENTATION (OUTPATIENT)
Dept: INTERNAL MEDICINE CLINIC | Age: 72
End: 2022-12-02
Payer: MEDICARE

## 2022-12-02 DIAGNOSIS — I10 HYPERTENSION, UNSPECIFIED TYPE: ICD-10-CM

## 2022-12-02 DIAGNOSIS — I73.9 PAD (PERIPHERAL ARTERY DISEASE) (HCC): ICD-10-CM

## 2022-12-02 DIAGNOSIS — Z86.73 HISTORY OF CVA (CEREBROVASCULAR ACCIDENT): ICD-10-CM

## 2022-12-02 DIAGNOSIS — C44.92 SCC (SQUAMOUS CELL CARCINOMA): ICD-10-CM

## 2022-12-02 DIAGNOSIS — I25.118 CORONARY ARTERY DISEASE OF NATIVE ARTERY OF NATIVE HEART WITH STABLE ANGINA PECTORIS (HCC): ICD-10-CM

## 2022-12-02 DIAGNOSIS — R13.10 DYSPHAGIA, UNSPECIFIED TYPE: Primary | ICD-10-CM

## 2022-12-02 NOTE — Clinical Note
Progress Note     Subjective:  Mr. Rula Manuel is doing well in the nursing home. He is able to swallow okay. No sign of aspiration. No fever or shortness of breath. He had left lower lobe pneumonia which subsided. He had conjunctivitis which has improved. Overall, he is doing well. Objective:    VITALS:  T:  97.4 degrees Fahrenheit. P:  88 per minute. BP:  110/61 mmHg. SaO2:  96% on room air. Weight is 142 pounds. HEENT:  No abnormality detected. NECK:  Supple, no JVD, no carotid bruits, no thyromegaly. CHEST:  Chest is clear to auscultation bilaterally. No rales, no rhonchi. CARDIOVASCULAR:  S1, S2 normal.  Regular rate and rhythm. ABDOMEN:  Soft, nontender, nondistended, bowel sounds present. EXTREMITIES:  No edema is noted. Dorsalis pedis pulse normal.    NEUROLOGICAL:  Alert and oriented x3. No recent labs done. Assessment and Plan:   1. Dysphagia. The patient is able to swallow. Right now speech therapist is working with him. He is able to maintain his weight. 2. Recent pneumonia with sepsis. The patient is afebrile and no cough or wheezing right now, doing well. 3. CKD. Stage III with anemia. Creatinine was stable. We will repeat lab work again. 4. History of CVA with carotid artery stenosis. The patient had endarterectomy done on statin, doing well. 5. Squamous cell cancer of the right cheek status post cell implant placed. The patient is doing well. THIS IS NOT A COMPLETE MEDICAL RECORD ON THIS PATIENT. THIS IS A PATIENT AT [facility]. PLEASE CONTACT THE FACILITY LISTED BELOW FOR MORE INFORMATION ON THIS PATIENT.     THE COMPLETE RECORD RESIDES WITH THIS LONG TERM CARE FACILITY

## 2022-12-03 NOTE — PROGRESS NOTES
Progress Note     Subjective:  Mr. Maida Hester is doing well in the nursing home. He is able to swallow okay. No sign of aspiration. No fever or shortness of breath. He had left lower lobe pneumonia which subsided. He had conjunctivitis which has improved. Overall, he is doing well. Objective:    VITALS:  T:  97.4 degrees Fahrenheit. P:  88 per minute. BP:  110/61 mmHg. SaO2:  96% on room air. Weight is 142 pounds. HEENT:  No abnormality detected. NECK:  Supple, no JVD, no carotid bruits, no thyromegaly. CHEST:  Chest is clear to auscultation bilaterally. No rales, no rhonchi. CARDIOVASCULAR:  S1, S2 normal.  Regular rate and rhythm. ABDOMEN:  Soft, nontender, nondistended, bowel sounds present. EXTREMITIES:  No edema is noted. Dorsalis pedis pulse normal.    NEUROLOGICAL:  Alert and oriented x3. No recent labs done. Assessment and Plan:   1. Dysphagia. The patient is able to swallow. Right now speech therapist is working with him. He is able to maintain his weight. 2. Recent pneumonia with sepsis. The patient is afebrile and no cough or wheezing right now, doing well. 3. CKD. Stage III with anemia. Creatinine was stable. We will repeat lab work again. 4. History of CVA with carotid artery stenosis. The patient had endarterectomy done on statin, doing well. 5. Squamous cell cancer of the right cheek status post cell implant placed. The patient is doing well.

## 2023-01-27 ENCOUNTER — EXTERNAL NURSING HOME DOCUMENTATION (OUTPATIENT)
Dept: INTERNAL MEDICINE CLINIC | Age: 73
End: 2023-01-27
Payer: MEDICARE

## 2023-01-27 DIAGNOSIS — I25.10 CVD (CARDIOVASCULAR DISEASE): ICD-10-CM

## 2023-01-27 DIAGNOSIS — I25.118 CORONARY ARTERY DISEASE OF NATIVE ARTERY OF NATIVE HEART WITH STABLE ANGINA PECTORIS (HCC): Primary | ICD-10-CM

## 2023-01-27 DIAGNOSIS — Z86.73 HISTORY OF CVA (CEREBROVASCULAR ACCIDENT): ICD-10-CM

## 2023-01-27 DIAGNOSIS — C44.92 SCC (SQUAMOUS CELL CARCINOMA): ICD-10-CM

## 2023-01-27 DIAGNOSIS — D64.9 ANEMIA, UNSPECIFIED TYPE: ICD-10-CM

## 2023-01-28 NOTE — PROGRESS NOTES
Progress Note     Subjective:  Mr. Elizabeth Randolph is doing well. He is a nursing home resident, suffered from stroke. He is eating well. Sleeping well. No discomfort. Objective:    VITALS:  T:  97.6 degrees Fahrenheit. P:  78 per minute. BP:  124/82 mmHg. SaO2:  96% on room air. Weight is 138 pounds. HEENT:  No abnormality detected. NECK:  Supple, no JVD, no carotid bruits, no thyromegaly. CHEST:  Chest is clear to auscultation bilaterally. No rales, no rhonchi. CARDIOVASCULAR:  S1, S2 normal.  Regular rate and rhythm. ABDOMEN:  Soft, nontender, nondistended, bowel sounds present. EXTREMITIES:  No edema is noticed. Laboratory Data:   Recent labs done six months back. CBC and CMP were stable. Assessment and Plan:   1. Coronary artery disease. The patient is taking aspirin and Plavix. Lipid panel stable. 2. History of anemia. We will repeat CBC with iron panel and ferritin level and CMP. 3. Squamous cell cancer of the right cheek status post surgery. The patient is doing well. He is able to swallow okay. 4. Stroke and carotid endarterectomy. The patient is on patient is aspirin and Plavix, doing well.

## 2023-03-21 ENCOUNTER — EXTERNAL NURSING HOME DOCUMENTATION (OUTPATIENT)
Dept: INTERNAL MEDICINE CLINIC | Age: 73
End: 2023-03-21
Payer: MEDICARE

## 2023-03-21 DIAGNOSIS — I25.10 CVD (CARDIOVASCULAR DISEASE): ICD-10-CM

## 2023-03-21 DIAGNOSIS — I25.118 CORONARY ARTERY DISEASE OF NATIVE ARTERY OF NATIVE HEART WITH STABLE ANGINA PECTORIS (HCC): Primary | ICD-10-CM

## 2023-03-21 DIAGNOSIS — C44.92 SCC (SQUAMOUS CELL CARCINOMA): ICD-10-CM

## 2023-03-21 DIAGNOSIS — I73.9 PAD (PERIPHERAL ARTERY DISEASE) (HCC): ICD-10-CM

## 2023-03-21 DIAGNOSIS — R26.9 GAIT ABNORMALITY: ICD-10-CM

## 2023-03-21 DIAGNOSIS — F32.A DEPRESSION, UNSPECIFIED DEPRESSION TYPE: ICD-10-CM

## 2023-03-21 PROCEDURE — 99309 SBSQ NF CARE MODERATE MDM 30: CPT | Performed by: INTERNAL MEDICINE

## 2023-03-21 NOTE — Clinical Note
Progress Note     Subjective:  Mr. Price Gomez is doing well. He is a nursing home resident. He is eating and sleeping good. Objective:    VITALS:  T:  97.6 degrees Fahrenheit. P:  78 per minute. BP:  124/82 mmHg. SaO2:  96% on room air. Weight is 136 pounds. HEENT:  No abnormality detected. The patient has a graft on the right side of the cheek. NECK:  Supple, no JVD, no carotid bruits, no thyromegaly. CHEST:  Chest is clear to auscultation bilaterally. No rales, no rhonchi. CARDIOVASCULAR:  S1, S2 normal.  Regular rate and rhythm. ABDOMEN:  Soft, nontender, nondistended, bowel sounds present. EXTREMITIES:  No edema is noticed. Dorsalis pedis pulse normal.    NEUROLOGICAL:  Alert and oriented x3. Laboratory Data:   Labs were reviewed. No recent labs done. Assessment and Plan:   1. Coronary artery disease. The patient is on aspirin, Plavix and statin. We will repeat CBC and CMP and lipid panel along with vitamin D.  2. History of GI blood loss. The patient received PRBC. He is again on blood thinner. We will repeat CBC. 3. History of stroke with carotid endarterectomy. The patient is on Plavix, aspirin and statin. He is mostly bedbound. 4. Peripheral artery disease. The patient is on medical management. 5. History of basal cell cancer of the skin. He is doing well. He able to swallow without any problem. THIS IS NOT A COMPLETE MEDICAL RECORD ON THIS PATIENT. THIS IS A PATIENT AT [facility]. PLEASE CONTACT THE FACILITY LISTED BELOW FOR MORE INFORMATION ON THIS PATIENT.     THE COMPLETE RECORD RESIDES WITH THIS LONG TERM CARE FACILITY

## 2023-03-22 NOTE — PROGRESS NOTES
Progress Note     Subjective:  Mr. Daniela Bright is doing well. He is a nursing home resident. He is eating and sleeping good. Objective:    VITALS:  T:  97.6 degrees Fahrenheit. P:  78 per minute. BP:  124/82 mmHg. SaO2:  96% on room air. Weight is 136 pounds. HEENT:  No abnormality detected. The patient has a graft on the right side of the cheek. NECK:  Supple, no JVD, no carotid bruits, no thyromegaly. CHEST:  Chest is clear to auscultation bilaterally. No rales, no rhonchi. CARDIOVASCULAR:  S1, S2 normal.  Regular rate and rhythm. ABDOMEN:  Soft, nontender, nondistended, bowel sounds present. EXTREMITIES:  No edema is noticed. Dorsalis pedis pulse normal.    NEUROLOGICAL:  Alert and oriented x3. Laboratory Data:   Labs were reviewed. No recent labs done. Assessment and Plan:   1. Coronary artery disease. The patient is on aspirin, Plavix and statin. We will repeat CBC and CMP and lipid panel along with vitamin D.  2. History of GI blood loss. The patient received PRBC. He is again on blood thinner. We will repeat CBC. 3. History of stroke with carotid endarterectomy. The patient is on Plavix, aspirin and statin. He is mostly bedbound. 4. Peripheral artery disease. The patient is on medical management. 5. History of basal cell cancer of the skin. He is doing well. He able to swallow without any problem.

## 2023-05-23 ENCOUNTER — OUTSIDE SERVICES (OUTPATIENT)
Age: 73
End: 2023-05-23
Payer: MEDICARE

## 2023-05-23 DIAGNOSIS — I25.118 CORONARY ARTERY DISEASE OF NATIVE ARTERY OF NATIVE HEART WITH STABLE ANGINA PECTORIS (HCC): ICD-10-CM

## 2023-05-23 DIAGNOSIS — I25.118 ATHEROSCLEROTIC HEART DISEASE OF NATIVE CORONARY ARTERY WITH OTHER FORMS OF ANGINA PECTORIS (HCC): ICD-10-CM

## 2023-05-23 DIAGNOSIS — H47.20 OPTIC ATROPHY: ICD-10-CM

## 2023-05-23 DIAGNOSIS — I73.9 PERIPHERAL VASCULAR DISEASE, UNSPECIFIED (HCC): ICD-10-CM

## 2023-05-23 DIAGNOSIS — I10 ESSENTIAL (PRIMARY) HYPERTENSION: Primary | ICD-10-CM

## 2023-05-23 DIAGNOSIS — I73.9 PAD (PERIPHERAL ARTERY DISEASE) (HCC): ICD-10-CM

## 2023-05-23 PROCEDURE — 99309 SBSQ NF CARE MODERATE MDM 30: CPT | Performed by: INTERNAL MEDICINE

## 2023-05-23 NOTE — PROGRESS NOTES
Progress Note     Subjective:  Mr. Mateo Vargas is a nursing home resident. He is doing well. He is able to swallow without any difficulty. He had recent surgery done for cancer. He is doing well. Has good appetite. Has coronary artery disease, denies any chest pain, palpitation or shortness of breath. Overall he is stable. Objective:    VITALS:  T: 96.41 °F, pulse 63/min, blood pressure 161/75 mmHg, SaO2 98% room air, weight 132.6 pound. HEENT: Right eye, open, slightly red surrounding area. Right cheek surgical area healed up. NECK:  Supple, no JVD, no carotid bruits, no thyromegaly. CHEST:  Chest is clear to auscultation bilaterally. No rales, no rhonchi. CARDIOVASCULAR:  S1, S2 normal.  Regular rate and rhythm. ABDOMEN:  Soft, nontender, nondistended, bowel sounds present. EXTREMITIES:  No edema is noticed. Dorsalis pedis pulse normal.    NEUROLOGICAL:  Alert and oriented x3. Labs reviewed hemoglobin 10.6  Assessment and Plan:   1. Coronary artery disease stable on aspirin and Plavix. We will check CBC, CMP and lipid panel. 2. Squamous cell cancer of the right cheek status post infection. Maxillectomy and parotid gland resection done with removal, PEG tube removed, he is able to eat by mouth okay. 3. Severe anemia. Last hemoglobin was over 10. We will repeat CBC. 4. History of stroke and carotid endarterectomy. The patient is on aspirin and Plavix. He is stable. 5.  Optic atrophy  Seen by ophthalmologist, on antibiotic ointment. THIS IS NOT A COMPLETE MEDICAL RECORD ON THIS PATIENT. THIS IS A PATIENT AT [facility]. PLEASE CONTACT THE FACILITY LISTED BELOW FOR MORE INFORMATION ON THIS PATIENT.     THE COMPLETE RECORD RESIDES WITH THIS LONG TERM CARE FACILITY

## 2023-07-21 ENCOUNTER — OUTSIDE SERVICES (OUTPATIENT)
Age: 73
End: 2023-07-21

## 2023-07-21 DIAGNOSIS — I10 ESSENTIAL (PRIMARY) HYPERTENSION: Primary | ICD-10-CM

## 2023-07-21 DIAGNOSIS — C44.92 SQUAMOUS CELL CARCINOMA OF SKIN, UNSPECIFIED: ICD-10-CM

## 2023-07-21 DIAGNOSIS — I73.9 PAD (PERIPHERAL ARTERY DISEASE) (HCC): ICD-10-CM

## 2023-07-21 DIAGNOSIS — E44.0 MODERATE PROTEIN-CALORIE MALNUTRITION (HCC): ICD-10-CM

## 2023-07-21 DIAGNOSIS — Z86.73 PERSONAL HISTORY OF TRANSIENT ISCHEMIC ATTACK (TIA), AND CEREBRAL INFARCTION WITHOUT RESIDUAL DEFICITS: ICD-10-CM

## 2023-07-21 DIAGNOSIS — I25.118 ATHEROSCLEROTIC HEART DISEASE OF NATIVE CORONARY ARTERY WITH OTHER FORMS OF ANGINA PECTORIS (HCC): ICD-10-CM

## 2023-09-19 ENCOUNTER — OUTSIDE SERVICES (OUTPATIENT)
Age: 73
End: 2023-09-19
Payer: MEDICARE

## 2023-09-19 DIAGNOSIS — H47.20 OPTIC ATROPHY: ICD-10-CM

## 2023-09-19 DIAGNOSIS — C44.92 SQUAMOUS CELL CARCINOMA OF SKIN, UNSPECIFIED: ICD-10-CM

## 2023-09-19 DIAGNOSIS — I25.118 ATHEROSCLEROTIC HEART DISEASE OF NATIVE CORONARY ARTERY WITH OTHER FORMS OF ANGINA PECTORIS (HCC): Primary | ICD-10-CM

## 2023-09-19 DIAGNOSIS — Z86.73 PERSONAL HISTORY OF TRANSIENT ISCHEMIC ATTACK (TIA), AND CEREBRAL INFARCTION WITHOUT RESIDUAL DEFICITS: ICD-10-CM

## 2023-09-19 DIAGNOSIS — I10 ESSENTIAL (PRIMARY) HYPERTENSION: ICD-10-CM

## 2023-09-19 DIAGNOSIS — I73.9 PAD (PERIPHERAL ARTERY DISEASE) (HCC): ICD-10-CM

## 2023-09-19 PROCEDURE — 99309 SBSQ NF CARE MODERATE MDM 30: CPT | Performed by: INTERNAL MEDICINE

## 2023-11-14 ENCOUNTER — OUTSIDE SERVICES (OUTPATIENT)
Age: 73
End: 2023-11-14

## 2023-11-14 DIAGNOSIS — I25.118 ATHEROSCLEROTIC HEART DISEASE OF NATIVE CORONARY ARTERY WITH OTHER FORMS OF ANGINA PECTORIS (HCC): Primary | ICD-10-CM

## 2023-11-14 DIAGNOSIS — I73.9 PAD (PERIPHERAL ARTERY DISEASE) (HCC): ICD-10-CM

## 2023-11-14 DIAGNOSIS — C44.92 SQUAMOUS CELL CARCINOMA OF SKIN, UNSPECIFIED: ICD-10-CM

## 2023-11-14 DIAGNOSIS — F32.A DEPRESSION, UNSPECIFIED DEPRESSION TYPE: ICD-10-CM

## 2023-11-14 DIAGNOSIS — I10 ESSENTIAL (PRIMARY) HYPERTENSION: ICD-10-CM

## 2024-01-03 ENCOUNTER — TRANSCRIBE ORDERS (OUTPATIENT)
Facility: HOSPITAL | Age: 74
End: 2024-01-03

## 2024-01-03 DIAGNOSIS — Z93.1 GASTROSTOMY STATUS (HCC): Primary | ICD-10-CM

## 2024-01-09 ENCOUNTER — HOSPITAL ENCOUNTER (OUTPATIENT)
Facility: HOSPITAL | Age: 74
Discharge: NURSING, REHAB OR LONG-TERM CARE | End: 2024-01-09
Attending: INTERNAL MEDICINE | Admitting: STUDENT IN AN ORGANIZED HEALTH CARE EDUCATION/TRAINING PROGRAM
Payer: MEDICARE

## 2024-01-09 DIAGNOSIS — Z93.1 GASTROSTOMY STATUS (HCC): ICD-10-CM

## 2024-01-09 PROCEDURE — 99212 OFFICE O/P EST SF 10 MIN: CPT

## 2024-01-09 NOTE — PROGRESS NOTES
9:50 AM  Patient arrived. Pt prepped for procedure. Patient not on any blood thinners. Patient is here alone. According to the patient's paperwork, he is a emery of the [facility].  Patient is very hard of hearing.         10:55 AM  Discharge instructions given to Rachel at [facility]. Time given to ask questions and gain clarity. No questions at this time. Patient given copy of discharge instructions to take home.

## 2024-01-09 NOTE — PROGRESS NOTES
10:55 AM  Discharge instructions given to Rachel at [facility]. Time given to ask questions and gain clarity. No questions at this time. Patient given copy of discharge instructions to take home.

## 2024-01-09 NOTE — DISCHARGE INSTRUCTIONS
[facility].      DRAIN/PORT/CATHETER REMOVAL  DISCHARGE INSTRUCTIONS    General Information:     Your doctor has ordered for us to remove your drain. This could be that you do not need it anymore, it is not doing its job, your physician has decided on another plan for your treatment and/or it may need replacing.        Home Care Instructions:     You can resume your regular diet and medication regimen. Do not drink alcohol, drive, or make any important legal decisions in the next 24 hours. Do not lift anything heavier than a gallon of milk, or do anything strenuous for the next 24 hours. You will notice skin glue over the site of the removal. This skin glue should stay in place until the site is healed. It will fall off on its own. The nurse who discharges you to home should review with you any wound care instructions. Resume your normal level of activity slowly. You may shower after 24 hours, but do not take a bath, swim or immerse yourself in water until the site has healed. No ointments, creams, or alcohol along incision line. The site may ooze for a couple of days. This drainage should lessen with each passing day. If you would like, you can cover the site with a dry dressing. If you do, change daily.    Call If:     You should call your Physician if you have any bleeding other than a small spot on your bandage. Call if you have any signs of infection, fever, or increased pain at the site of the tube. Call if the oozing increases, if it changes color, or begins to have an odor.     Follow-Up Instructions: Please see your ordering doctor as he/she has requested.

## 2024-01-09 NOTE — H&P
Results   Component Value Date/Time     06/17/2021 01:20 PM    K 4.1 06/17/2021 01:20 PM     06/17/2021 01:20 PM    CO2 25 06/17/2021 01:20 PM    BUN 33 06/17/2021 01:20 PM    GFRAA 57 06/17/2021 01:20 PM     Lab Results   Component Value Date/Time    INR 1.3 09/12/2020 12:35 PM       PHYSICAL EXAM   @VS1@  General:  NAD  Heart:  RRR  Lungs:  NWOB  Neurological:  AAOX3    PLAN   Procedure to be performed:  G tube removal  Plan for sedation:  None  Post procedure plan:  observation per protocol  Informed consent:  risks, benefits, and alternatives reviewed with the patient / family who agree to proceed  Code status:  [unfilled]      Ubaldo Limon MD  [facility]

## 2024-01-09 NOTE — PROCEDURES
G tube removed at bedside successfully.       Ubaldo Limon M.D.  Interventional Radiology  [facility]

## (undated) DEVICE — WIPE 400300 MEROCEL 20PK INSTRUMENT: Brand: MEROCEL®

## (undated) DEVICE — VASCULAR-RICHMOND-LF: Brand: MEDLINE INDUSTRIES, INC.

## (undated) DEVICE — STRAP,POSITIONING,KNEE/BODY,FOAM,4X60": Brand: MEDLINE

## (undated) DEVICE — GOWN,SIRUS,NONRNF,SETINSLV,2XL,18/CS: Brand: MEDLINE

## (undated) DEVICE — NEEDLE HYPO 25GA L1.5IN BVL ORIENTED ECLIPSE

## (undated) DEVICE — INFECTION CONTROL KIT SYS

## (undated) DEVICE — MAGNETIC INSTR DRAPE 20X16: Brand: MEDLINE INDUSTRIES, INC.

## (undated) DEVICE — RESERVOIR,SUCTION,100CC,SILICONE: Brand: MEDLINE

## (undated) DEVICE — SPONGE,DRAIN,NONWVN,4"X4",6PLY,STRL,LF: Brand: MEDLINE

## (undated) DEVICE — DRAIN SURG FLAT W7MMXL20CM FULL PERF

## (undated) DEVICE — SUTURE VCRL SZ 3-0 L27IN ABSRB UD L26MM SH 1/2 CIR J416H

## (undated) DEVICE — STERILE POLYISOPRENE POWDER-FREE SURGICAL GLOVES WITH EMOLLIENT COATING: Brand: PROTEXIS

## (undated) DEVICE — TRAY PREP DRY W/ PREM GLV 2 APPL 6 SPNG 2 UNDPD 1 OVERWRAP

## (undated) DEVICE — HANDLE LT SNAP ON ULT DURABLE LENS FOR TRUMPF ALC DISPOSABLE

## (undated) DEVICE — CATHETER,URETHRAL,REDRUBBER,STRL,18FR: Brand: MEDLINE

## (undated) DEVICE — TUBING HYDR IRR --

## (undated) DEVICE — SOLUTION IV 500ML 0.9% SOD CHL FLX CONT

## (undated) DEVICE — FORCEPS BX L240CM JAW DIA2.8MM L CAP W/ NDL MIC MESH TOOTH

## (undated) DEVICE — GARMENT,MEDLINE,DVT,INT,CALF,MED, GEN2: Brand: MEDLINE

## (undated) DEVICE — SOLUTION IRRIG 1000ML H2O STRL BLT

## (undated) DEVICE — 3M™ MEDIPORE™ H SOFT CLOTH SURGICAL TAPE 2864, 4 INCH X 10 YARD (10CM X 9,14M), 12 ROLLS/CASE: Brand: 3M™ MEDIPORE™

## (undated) DEVICE — SUTURE MCRYL SZ 4-0 L27IN ABSRB UD L19MM PS-2 1/2 CIR PRIM Y426H

## (undated) DEVICE — SUT PROL 6-0 24IN BV1 DA BLU --

## (undated) DEVICE — SUT ETHLN 2-0 18IN FS BLK --

## (undated) DEVICE — DERMABOND SKIN ADH 0.7ML -- DERMABOND ADVANCED 12/BX

## (undated) DEVICE — REM POLYHESIVE ADULT PATIENT RETURN ELECTRODE: Brand: VALLEYLAB

## (undated) DEVICE — MASTISOL ADHESIVE LIQ 2/3ML

## (undated) DEVICE — AGENT HEMSTAT W4XL4IN OXIDIZED REGENERATED CELOS ABSRB SFT

## (undated) DEVICE — Z DISCONTINUED USE 2425483 (LOW STOCK PER MEDLINE) TAPE UMB L18IN DIA1/8IN WHT COT NONABSORBABLE W/O NDL FOR